# Patient Record
Sex: MALE | Race: WHITE | Employment: FULL TIME | URBAN - METROPOLITAN AREA
[De-identification: names, ages, dates, MRNs, and addresses within clinical notes are randomized per-mention and may not be internally consistent; named-entity substitution may affect disease eponyms.]

---

## 2017-01-31 ENCOUNTER — OFFICE VISIT (OUTPATIENT)
Dept: FAMILY MEDICINE | Facility: CLINIC | Age: 32
End: 2017-01-31
Payer: COMMERCIAL

## 2017-01-31 VITALS
BODY MASS INDEX: 33.6 KG/M2 | HEIGHT: 71 IN | TEMPERATURE: 97.8 F | DIASTOLIC BLOOD PRESSURE: 66 MMHG | WEIGHT: 240 LBS | HEART RATE: 76 BPM | SYSTOLIC BLOOD PRESSURE: 124 MMHG | RESPIRATION RATE: 20 BRPM

## 2017-01-31 DIAGNOSIS — R00.0 TACHYCARDIA: ICD-10-CM

## 2017-01-31 DIAGNOSIS — F32.1 MAJOR DEPRESSIVE DISORDER, SINGLE EPISODE, MODERATE (H): Primary | ICD-10-CM

## 2017-01-31 PROCEDURE — 99213 OFFICE O/P EST LOW 20 MIN: CPT | Performed by: PHYSICIAN ASSISTANT

## 2017-01-31 NOTE — MR AVS SNAPSHOT
After Visit Summary   1/31/2017    Ramiro Najera    MRN: [id number]           Patient Information     Date Of Birth          1985        Visit Information        Provider Department      1/31/2017 9:00 AM Laura Jung PA-C [facility] Instructions    [facility]   Discharged by : Vaishnavi LING CMA (AAMA)    Paper scripts provided to patient : none     If you have any questions regarding your visit please contact your care team:     Team Gold Clinic Hours Telephone Number   Dr. Jenn Jung PA-C   7am-7pm Monday - Thursday   7am-5pm Fridays  [phone]   (Appointment scheduling available 24/7)   RN Line   [phone] option 2       For a Price Quote for your services, please call our Consumer Price Line at [phone].     What options do I have for visits at the clinic other than the traditional office visit?     To expand how we care for you, many of our providers are utilizing electronic visits (e-visits) and telephone visits, when medically appropriate, for interactions with their patients rather than a visit in the clinic. We also offer nurse visits for many medical concerns. Just like any other service, we will bill your insurance company for this type of visit based on time spent on the phone with your provider. Not all insurance companies cover these visits. Please check with your medical insurance if this type of visit is covered. You will be responsible for any charges that are not paid by your insurance.   E-visits via uGift: generally incur a $35.00 fee.     Telephone visits:   Time spent on the phone: *charged based on time that is spent on the phone in increments of 10 minutes. Estimated cost:   5-10 mins $30.00   11-20 mins. $59.00   21-30 mins. $85.00     Use uGift (secure email communication and access to your chart) to send your primary care provider a message or  make an appointment. Ask someone on your Team how to sign up for Looxii.     As always, Thank you for trusting us with your health care needs!      [facility]:    Scheduling Appointments  Suresh Mendiola [facility]  Call: [phone]    AtkinsPillo bowman, [facility]  Call: [phone]    [facility]  Call: [phone]      WHERE TO GO FOR CARE?    Clinic    Make an appointment if you:       Are sick (cold, cough, flu, sore throat, earache or in pain).       Have a small injury (sprain, small cut, burn or broken bone).       Need a physical exam, Pap smear, vaccine or prescription refill.       Have questions about your health or medicines.    To reach us:      Call [phone] ([phone]). Open 24 hours every day. (For counseling services, call [phone].)    Log into Looxii at [email or web address]. (Visit [email or web address] to create an account.) Hospital emergency room    An emergency is a serious or life- threatening problem that must be treated right away.    Call 044 or get to the hospital if you have:      Very bad or sudden:            - Chest pain or pressure         - Bleeding         - Head or belly pain         - Dizziness or trouble seeing, walking or                          Speaking      Problems breathing      Blood in your vomit or you are coughing up blood      A major injury (knocked out, loss of a finger or limb, rape, broken bone protruding from skin)    A mental health crisis. (Or call the Mental Health Crisis line at 1-918.317.8920 or Suicide Prevention Hotline at 1-658.927.6290.)    Open 24 hours every day. You don't need an appointment.     Urgent care    Visit urgent care for sickness or small injuries when the clinic is closed. You don't need an appointment. To check hours or find an urgent care near you, visit [email or web address]. Online care    Get online care from [facility] for more than 70 common  problems, like colds, allergies and infections. Open 24 hours every day at: [email or web address]   Need help deciding?    For advice about where to be seen, you may call your clinic and ask to speak with a nurse. We're here for you 24 hours every day.         If you are deaf or hard of hearing, please let us know. We provide many free services including sign language interpreters, oral interpreters, TTYs, telephone amplifiers, note takers and written materials.                       Follow-ups after your visit        Your next 10 appointments already scheduled     Feb 06, 2017  9:30 AM   New Visit with Marcos Joseph [facility] ([facility])    [address] [address] [phone]            Mar 07, 2017  8:30 AM   (Arrive by 8:15 AM)   Return Visit with Lico Ruiz MD   [facility] Medicine ([facility])    [address]  5th Floor  [facility] [address] [phone]            Mar 14, 2017  8:00 AM   (Arrive by 7:45 AM)   POSTURAL ORTHOSTATIC SYCOPE with Bertin Fraser MD   [facility] ([facility])    [address]  3rd Floor  [facility] [address] [phone]              Who to contact     If you have questions or need follow up information about today's clinic visit or your schedule please contact [facility] directly at [phone].  Normal or non-critical lab and imaging results will be communicated to you by MyChart, letter or phone within 4 business days after the clinic has received the results. If you do not hear from us within 7 days, please contact the clinic through MyChart or phone. If you have a critical or abnormal lab result, we will notify you by phone as soon as possible.  Submit refill requests through [employer] or call your pharmacy and they will forward the refill request to us. Please allow 3  "business days for your refill to be completed.          Additional Information About Your Visit        MyChart Information     Geosophic gives you secure access to your electronic health record. If you see a primary care provider, you can also send messages to your care team and make appointments. If you have questions, please call your primary care clinic.  If you do not have a primary care provider, please call [phone] and they will assist you.        Care EveryWhere ID     This is your Care EveryWhere ID. This could be used by other organizations to access your [place] medical records  [id number]        Your Vitals Were     Pulse Temperature Respirations Height BMI (Body Mass Index)       76 97.8  F (36.6  C) (Oral) 20 5' 11\" (1.803 m) 33.49 kg/m2        Blood Pressure from Last 3 Encounters:   01/31/17 124/66   12/13/16 130/72   11/22/16 148/97    Weight from Last 3 Encounters:   01/31/17 240 lb (108.863 kg)   12/13/16 247 lb (112.038 kg)   11/22/16 250 lb 8 oz (113.626 kg)              Today, you had the following     No orders found for display       Primary Care Provider Office Phone # Fax #    Laura Jung PA-C [phone]       [address] [address]        Thank you!     Thank you for choosing [facility]  for your care. Our goal is always to provide you with excellent care. Hearing back from our patients is one way we can continue to improve our services. Please take a few minutes to complete the written survey that you may receive in the mail after your visit with us. Thank you!             Your Updated Medication List - Protect others around you: Learn how to safely use, store and throw away your medicines at www.disposemymeds.org.          This list is accurate as of: 1/31/17  9:36 AM.  Always use your most recent med list.                   Brand Name Dispense Instructions for use    vitamin B complex with " vitamin C Tabs tablet      Take 1 tablet by mouth daily       VITAMIN D (CHOLECALCIFEROL) PO      Take by mouth daily

## 2017-01-31 NOTE — PROGRESS NOTES
"  SUBJECTIVE:                                                    Ramiro Najera is a 31 year old male who presents to clinic today for the following health issues:      * discuss FMLA forms and plan of treatment for heart issues   Patient is a teacher and since tachycardia event and dealing with depression/ anxiety he has missed a lot of work. He needs FMLA for future appts for the remainder of the year. He has about 1 visit per week.     1. Tachycardia -- has appointment scheduled with cardiologist. He has really improved with his exercise and diet and has an improved resting heart rate.  This is likely from deconditioning.     2. Depression/anxiety. Has been much better since exercising, but he will start in therapy next week.         -------------------------------------    Problem list, Medication list, Allergies, and Medical/Social/Surgical histories reviewed in [facility] and updated as appropriate.    ROS:  A pertinent ROS of the General  Cardiovascular  Pulmonary  Psychological systems is otherwise unremarkable.      OBJECTIVE:                                                    /66 mmHg  Pulse 76  Temp(Src) 97.8  F (36.6  C) (Oral)  Resp 20  Ht 5' 11\" (1.803 m)  Wt 240 lb (108.863 kg)  BMI 33.49 kg/m2 .  GENERAL:: healthy, alert and no distress  PSYCH: Alert and oriented times 3; speech- coherent , normal rate and volume; able to articulate logical thoughts, able to abstract reason, no tangential thoughts, no hallucinations or delusions, affect- normal    Diagnostic test results:  Diagnostic Test Results:  none      ASSESSMENT/PLAN:                                                          ICD-10-CM    1. Major depressive disorder, single episode, moderate (H) F32.1    2. Tachycardia R00.0        Patient has many upcoming appts. Appropriate FMLA request.  Forms filled out with patient in visit.  Greater than 15 min with greater than 50 % in counseling    Follow up with Provider - 3-6 months or PRN "         Laura Jung PA-C  [facility]

## 2017-01-31 NOTE — PATIENT INSTRUCTIONS
[facility]   Discharged by : Vaishnavi LING CMA (AAMA)    Paper scripts provided to patient : none     If you have any questions regarding your visit please contact your care team:     Team Gold Clinic Hours Telephone Number   Dr. Jenn Jung PA-C   7am-7pm Monday - Thursday   7am-5pm Fridays  [phone]   (Appointment scheduling available 24/7)   RN Line   [phone] option 2       For a Price Quote for your services, please call our Consumer Price Line at [phone].     What options do I have for visits at the clinic other than the traditional office visit?     To expand how we care for you, many of our providers are utilizing electronic visits (e-visits) and telephone visits, when medically appropriate, for interactions with their patients rather than a visit in the clinic. We also offer nurse visits for many medical concerns. Just like any other service, we will bill your insurance company for this type of visit based on time spent on the phone with your provider. Not all insurance companies cover these visits. Please check with your medical insurance if this type of visit is covered. You will be responsible for any charges that are not paid by your insurance.   E-visits via Netlogon: generally incur a $35.00 fee.     Telephone visits:   Time spent on the phone: *charged based on time that is spent on the phone in increments of 10 minutes. Estimated cost:   5-10 mins $30.00   11-20 mins. $59.00   21-30 mins. $85.00     Use Pikihart (secure email communication and access to your chart) to send your primary care provider a message or make an appointment. Ask someone on your Team how to sign up for Nutmegt.     As always, Thank you for trusting us with your health care needs!      [facility]:    Scheduling Appointments  [facility]uresh Mendiola [facility]  Call: [phone]    [facility]  Call:  [phone]    [facility]  Call: [phone]      WHERE TO GO FOR CARE?    Clinic    Make an appointment if you:       Are sick (cold, cough, flu, sore throat, earache or in pain).       Have a small injury (sprain, small cut, burn or broken bone).       Need a physical exam, Pap smear, vaccine or prescription refill.       Have questions about your health or medicines.    To reach us:      Call [phone] ([phone]). Open 24 hours every day. (For counseling services, call [phone].)    Log into [employer] at FilterEasy. (Visit [email or web address] to create an account.) Hospital emergency room    An emergency is a serious or life- threatening problem that must be treated right away.    Call 288 or get to the hospital if you have:      Very bad or sudden:            - Chest pain or pressure         - Bleeding         - Head or belly pain         - Dizziness or trouble seeing, walking or                          Speaking      Problems breathing      Blood in your vomit or you are coughing up blood      A major injury (knocked out, loss of a finger or limb, rape, broken bone protruding from skin)    A mental health crisis. (Or call the Mental Health Crisis line at 1-286.735.3251 or Suicide Prevention Hotline at 1-682.368.5557.)    Open 24 hours every day. You don't need an appointment.     Urgent care    Visit urgent care for sickness or small injuries when the clinic is closed. You don't need an appointment. To check hours or find an urgent care near you, visit [email or web address]. Online care    Get online care from [employer] for more than 70 common problems, like colds, allergies and infections. Open 24 hours every day at: [email or web address]   Need help deciding?    For advice about where to be seen, you may call your clinic and ask to speak with a nurse. We're here for you 24 hours every day.         If you are deaf or hard of hearing, please let us  know. We provide many free services including sign language interpreters, oral interpreters, TTYs, telephone amplifiers, note takers and written materials.

## 2017-01-31 NOTE — NURSING NOTE
"Chief Complaint   Patient presents with     Forms     FMAL for upcoming medical appointments        Initial /66 mmHg  Pulse 76  Temp(Src) 97.8  F (36.6  C) (Oral)  Resp 20  Ht 5' 11\" (1.803 m)  Wt 240 lb (108.863 kg)  BMI 33.49 kg/m2 Estimated body mass index is 33.49 kg/(m^2) as calculated from the following:    Height as of this encounter: 5' 11\" (1.803 m).    Weight as of this encounter: 240 lb (108.863 kg).  BP completed using cuff size: sebastian Villalta CMA (AAMA)      "

## 2017-02-06 ENCOUNTER — OFFICE VISIT (OUTPATIENT)
Dept: BEHAVIORAL HEALTH | Facility: CLINIC | Age: 32
End: 2017-02-06
Attending: PHYSICIAN ASSISTANT
Payer: COMMERCIAL

## 2017-02-06 DIAGNOSIS — F31.81 BIPOLAR 2 DISORDER (H): Primary | ICD-10-CM

## 2017-02-06 PROCEDURE — 90791 PSYCH DIAGNOSTIC EVALUATION: CPT | Performed by: SOCIAL WORKER

## 2017-02-06 ASSESSMENT — ANXIETY QUESTIONNAIRES
2. NOT BEING ABLE TO STOP OR CONTROL WORRYING: SEVERAL DAYS
IF YOU CHECKED OFF ANY PROBLEMS ON THIS QUESTIONNAIRE, HOW DIFFICULT HAVE THESE PROBLEMS MADE IT FOR YOU TO DO YOUR WORK, TAKE CARE OF THINGS AT HOME, OR GET ALONG WITH OTHER PEOPLE: SOMEWHAT DIFFICULT
7. FEELING AFRAID AS IF SOMETHING AWFUL MIGHT HAPPEN: NOT AT ALL
1. FEELING NERVOUS, ANXIOUS, OR ON EDGE: SEVERAL DAYS
5. BEING SO RESTLESS THAT IT IS HARD TO SIT STILL: NEARLY EVERY DAY
GAD7 TOTAL SCORE: 9
6. BECOMING EASILY ANNOYED OR IRRITABLE: SEVERAL DAYS
3. WORRYING TOO MUCH ABOUT DIFFERENT THINGS: MORE THAN HALF THE DAYS

## 2017-02-06 ASSESSMENT — PATIENT HEALTH QUESTIONNAIRE - PHQ9: 5. POOR APPETITE OR OVEREATING: SEVERAL DAYS
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
## 2017-02-07 ASSESSMENT — PATIENT HEALTH QUESTIONNAIRE - PHQ9: SUM OF ALL RESPONSES TO PHQ QUESTIONS 1-9: 13

## 2017-02-07 ASSESSMENT — ANXIETY QUESTIONNAIRES: GAD7 TOTAL SCORE: 9

## 2017-02-20 ENCOUNTER — OFFICE VISIT (OUTPATIENT)
Dept: BEHAVIORAL HEALTH | Facility: CLINIC | Age: 32
End: 2017-02-20
Payer: COMMERCIAL

## 2017-02-20 DIAGNOSIS — F31.81 BIPOLAR 2 DISORDER (H): Primary | ICD-10-CM

## 2017-02-20 PROCEDURE — 90834 PSYTX W PT 45 MINUTES: CPT | Performed by: SOCIAL WORKER

## 2017-02-20 NOTE — PROGRESS NOTES
"                                             Progress Note    Client Name: Ramiro Najera  Date: 2/20/2017          Service Type: Individual      Session Start Time: 730  Session End Time: 815      Session Length: 45     Session #: 2     Attendees: Client attended alone    Treatment Plan Last Reviewed: 2/20/2017   PHQ-9 / DEANNA-7 :      DATA      Progress Since Last Session (Related to Symptoms / Goals / Homework):   Symptoms: Stable    Homework: Did not complete      Episode of Care Goals: Satisfactory progress - PREPARATION (Decided to change - considering how); Intervened by negotiating a change plan and determining options / strategies for behavior change, identifying triggers, exploring social supports, and working towards setting a date to begin behavior change     Current / Ongoing Stressors and Concerns:      Things have been \"OK.\"  Some stress with work and also struggling with relationship.  Thinks that his mood has nieves stable, sleep is still poor 4.5-5 hours per night.  Trouble staying asleep, wakes up multiple times during the night.  Energy is ok until mid-afternoon and thinks that his is related to poor sleep.                      Treatment Objective(s) Addressed in This Session:   Client will complete at least 10 minutes of self-regulation practice (e.g.: yoga, meditation, relaxation breathing, etc.) per day.     Client will use identified behavioral and cognitive skills to challenge negative self talk 90% of the time.     Intervention:     CBT   Discussed sleep today.  Client reports sleep onset/sleep maintenance insomnia.  Reviewed sleep habits.  Discussed avoiding caffeine and screen time before bed.  Identified negative thoughts associated with sleep.  Discussed the importance of limiting bedroom for sleep and intimate activities.  Discussed relying on internal cues (e.g. yawning, nodding) in deciding when to go to bed.  Discussed problem with \"forcing sleep.\"  Reviewed appropriate daytime napping " (45 minutes or less no later than 3pm).  Discussed impact of exercise and sunlight exposure on melatonin production, and discussed the role of melatonin in sleep.    Demonstrated diaphragmatic breathing (1:1 ratio and 1:2 ratio) for client today in session.  Led client in breathing exercise and coached client on abdominal vs chest breathing.  Discussed daily use of diaphragmatic breathing to reduce stress, panic, and anxiety.       ASSESSMENT: Current Emotional / Mental Status (status of significant symptoms):   Risk status (Self / Other harm or suicidal ideation)   Client denies current fears or concerns for personal safety.   Client denies current or recent suicidal ideation or behaviors.   Client denies current or recent homicidal ideation or behaviors.   Client denies current or recent self injurious behavior or ideation.   Client denies other safety concerns.   A safety and risk management plan has not been developed at this time, however client was given the after-hours number / 911 should there be a change in any of these risk factors.     Appearance:   Appropriate    Eye Contact:   Good    Psychomotor Behavior: Retarded (Slowed)    Attitude:   Cooperative    Orientation:   All   Speech    Rate / Production: Monotone     Volume:  Normal    Mood:    Depressed    Affect:    Appropriate    Thought Content:  Clear    Thought Form:  Coherent  Logical    Insight:    Good      Medication Review:   No current psychiatric medications prescribed     Medication Compliance:   NA     Changes in Health Issues:   None reported     Chemical Use Review:   Substance Use: Chemical use reviewed, no active concerns identified      Tobacco Use: No current tobacco use.       Collateral Reports Completed:   Not Applicable    PLAN: (Client Tasks / Therapist Tasks / Other)  1.  Client will practice diaphragmatic breathing once per day by next session.  Client will also rate mood and intensity of mood on a SUDS scale (1-10) before and  after breathing exercise at least once by next session.  2.  Meet in two weeks        Justo Dawson, LICSW                                                         ________________________________________________________________________    Treatment Plan    Client's Name: Ramiro Najera  YOB: 1985    Date: 2/20/2017     DSM5 Diagnoses: (Sustained by DSM5 Criteria Listed Above)  Diagnoses: 296.89 Bipolar II Disorder With anxious distress        PTSD  Psychosocial & Contextual Factors: parents' health problems  WHODAS 2.0 (12 item)            This questionnaire asks about difficulties due to health conditions. Health conditions  include  disease or illnesses, other health problems that may be short or long lasting,  injuries, mental health or emotional problems, and problems with alcohol or drugs.                     Think back over the past 30 days and answer these questions, thinking about how much  difficulty you had doing the following activities. For each question, please [place] only  one response.    S1 Standing for long periods such as 30 minutes? None =         1   S2 Taking care of household responsibilities? Mild =           2   S3 Learning a new task, for example, learning how to get to a new place? None =         1   S4 How much of a problem do you have joining community activities (for example, festivals, Anglican or other activities) in the same way as anyone else can? Moderate =   3   S5 How much have you been emotionally affected by your health problems? Moderate =   3     In the past 30 days, how much difficulty did you have in:   S6 Concentrating on doing something for ten minutes? Mild =           2   S7 Walking a long distance such as a kilometer (or equivalent)? None =         1   S8 Washing your whole body? None =         1   S9 Getting dressed? None =         1   S10 Dealing with people you do not know? None =         1   S11 Maintaining a friendship? Moderate =   3   S12  Your day to day work? None =         1     H1 Overall, in the past 30 days, how many days were these difficulties present? Record number of days 30   H2 In the past 30 days, for how many days were you totally unable to carry out your usual activities or work because of any health condition? Record number of days  0   H3 In the past 30 days, not counting the days that you were totally unable, for how many days did you cut back or reduce your usual activities or work because of any health condition? Record number of days 3       Referral / Collaboration:  Referral to another professional/service is not indicated at this time..    Anticipated number of session or this episode of care: 18-24      MeasurableTreatment Goal(s) related to diagnosis / functional impairment(s)  Goal-Emotional regulation: Client will effectively manage mood dysregulation    I will know I've met my goal when I am feeling less out of control.      Objective #A (Client Action)    Status: New - Date: 1/28/2016     Client will learn at least 3 mindfulness skills and practice one daily    Intervention(s)  Therapist will provide mindfulness training, psychoeducation, behavioral activation, and cognitive restructuring.    Objective #B  Client will use at least 3 coping skills for anxiety management in the next 12 weeks.    Status: New - Date: 1/28/2016     Intervention(s)  Therapist will provide psychoeducation, behavioral activation, and cognitive restructuring.      Objective #C  Client will identify three distraction and diversion activities and use those activities to improve distress tolerance and emotional regulation.  Status: New - Date: 1/28/2016     Intervention(s)  Therapist will provide psychoeducation, behavioral activation, and cognitive restructuring.    MeasurableTreatment Goal(s) related to diagnosis / functional impairment(s)            Goal-Depression: Client will decrease depressed mood    I will know I've met my goal when I am less  depressed.      Objective #A (Client Action)    Status: New - Date: 2/20/2017    Client will use identified behavioral and cognitive skills to challenge negative self talk 90% of the time.    Intervention(s)  Therapist will provide psychoeducation, behavioral activation, and cognitive restructuring.      Objective #B  Client will complete at least 10 minutes of self-regulation practice (e.g.: yoga, meditation, relaxation breathing, etc.) per day.    Status: New - Date: 2/20/2017    Intervention(s)  Therapist will provide psychoeducation, behavioral activation, and cognitive restructuring.      Objective #C  Client will exercise 30 minutes 36 times in the next 12 weeks.  Status: New - Date: 2/20/2017    Intervention(s)  Therapist will provide psychoeducation, behavioral activation, and cognitive restructuring.              Client has reviewed and agreed to the above plan.      Justo Dawson, YUNIOR  February 20, 2017

## 2017-02-20 NOTE — MR AVS SNAPSHOT
MRN:[id number]                      After Visit Summary   2/20/2017    Ramiro Najera    MRN: [id number]           Visit Information        Provider Department      2/20/2017 7:30 AM [facility]      Your next 10 appointments already scheduled     Mar 06, 2017  8:30 AM CST   Return Visit with YUNIOR Olea   [facility] ([facility])    [address] [place] [id number] [phone]            Mar 14, 2017  8:00 AM CDT   (Arrive by 7:45 AM)   POSTURAL ORTHOSTATIC SYCOPE with Bertin Fraser MD   [facility] ([facility])    [address] [place] [address]  3rd [facility] [id number] [phone]            Mar 14, 2017 11:45 AM CDT   (Arrive by 11:30 AM)   Return Visit with Lico Ruiz MD   [facility] ([facility])    [address] [place] [address]  [address] [facility] [id number] [phone]              MyChart Information     [facility] gives you secure access to your electronic health record. If you see a primary care provider, you can also send messages to your care team and make appointments. If you have questions, please call your primary care clinic.  If you do not have a primary care provider, please call [phone] and they will assist you.        Care EveryWhere ID     This is your Care EveryWhere ID. This could be used by other organizations to access your [place] medical records  [id number]

## 2017-03-02 ASSESSMENT — ENCOUNTER SYMPTOMS
DEPRESSION: 1
DECREASED CONCENTRATION: 1
NERVOUS/ANXIOUS: 1
PANIC: 0
INSOMNIA: 1

## 2017-03-06 ENCOUNTER — OFFICE VISIT (OUTPATIENT)
Dept: BEHAVIORAL HEALTH | Facility: CLINIC | Age: 32
End: 2017-03-06
Payer: COMMERCIAL

## 2017-03-06 DIAGNOSIS — F31.81 BIPOLAR 2 DISORDER (H): Primary | ICD-10-CM

## 2017-03-06 PROCEDURE — 90834 PSYTX W PT 45 MINUTES: CPT | Performed by: SOCIAL WORKER

## 2017-03-06 NOTE — PROGRESS NOTES
"                                             Progress Note    Client Name: Ramiro Najera  Date: 3/6/2017          Service Type: Individual      Session Start Time: 830  Session End Time: 915      Session Length: 45     Session #: 3     Attendees: Client attended alone    Treatment Plan Last Reviewed: 2/20/2017   PHQ-9 / DEANNA-7 :      DATA      Progress Since Last Session (Related to Symptoms / Goals / Homework):   Symptoms: Stable    Homework: Did not complete      Episode of Care Goals: Satisfactory progress - PREPARATION (Decided to change - considering how); Intervened by negotiating a change plan and determining options / strategies for behavior change, identifying triggers, exploring social supports, and working towards setting a date to begin behavior change     Current / Ongoing Stressors and Concerns:      Things have been \"stable emotionally.\"  Has been keeping up with the exercise and also doing some breathing and these seem to be helping.  Sleep is better, feeling a little more rested in the morning and less tossing and turning at night.                     Treatment Objective(s) Addressed in This Session:   Client will complete at least 10 minutes of self-regulation practice (e.g.: yoga, meditation, relaxation breathing, etc.) per day.     Client will use identified behavioral and cognitive skills to challenge negative self talk 90% of the time.     Intervention:     CBT     Discussed physical, mental, and emotional boundaries and limit-setting with others. Explored management of boundaries through direct communication and limiting contact and communication with others.  Discussed barriers to using boundary management skills including strong emotions and physical proximity.  Client given handout on boundaries today in session.         ASSESSMENT: Current Emotional / Mental Status (status of significant symptoms):   Risk status (Self / Other harm or suicidal ideation)   Client denies current fears or " concerns for personal safety.   Client denies current or recent suicidal ideation or behaviors.   Client denies current or recent homicidal ideation or behaviors.   Client denies current or recent self injurious behavior or ideation.   Client denies other safety concerns.   A safety and risk management plan has not been developed at this time, however client was given the after-hours number / 911 should there be a change in any of these risk factors.     Appearance:   Appropriate    Eye Contact:   Good    Psychomotor Behavior: Retarded (Slowed)    Attitude:   Cooperative    Orientation:   All   Speech    Rate / Production: Monotone     Volume:  Normal    Mood:    Depressed    Affect:    Appropriate    Thought Content:  Clear    Thought Form:  Coherent  Logical    Insight:    Good      Medication Review:   No current psychiatric medications prescribed     Medication Compliance:   NA     Changes in Health Issues:   None reported     Chemical Use Review:   Substance Use: Chemical use reviewed, no active concerns identified      Tobacco Use: No current tobacco use.       Collateral Reports Completed:   Not Applicable    PLAN: (Client Tasks / Therapist Tasks / Other)  1.  Client will use one boundary management skill (i.e., direct communication, saying no to a request) by next session.    2.  Meet in two weeks        YUNIOR Bustos                                                         ________________________________________________________________________    Treatment Plan    Client's Name: Ramiro Najera  YOB: 1985    Date: 2/20/2017     DSM5 Diagnoses: (Sustained by DSM5 Criteria Listed Above)  Diagnoses: 296.89 Bipolar II Disorder With anxious distress        PTSD  Psychosocial & Contextual Factors: parents' health problems  WHODAS 2.0 (12 item)            This questionnaire asks about difficulties due to health conditions. Health conditions  include  disease or illnesses, other health  problems that may be short or long lasting,  injuries, mental health or emotional problems, and problems with alcohol or drugs.                     Think back over the past 30 days and answer these questions, thinking about how much  difficulty you had doing the following activities. For each question, please [place] only  one response.    S1 Standing for long periods such as 30 minutes? None =         1   S2 Taking care of household responsibilities? Mild =           2   S3 Learning a new task, for example, learning how to get to a new place? None =         1   S4 How much of a problem do you have joining community activities (for example, festivals, [employer] or other activities) in the same way as anyone else can? Moderate =   3   S5 How much have you been emotionally affected by your health problems? Moderate =   3     In the past 30 days, how much difficulty did you have in:   S6 Concentrating on doing something for ten minutes? Mild =           2   S7 Walking a long distance such as a kilometer (or equivalent)? None =         1   S8 Washing your whole body? None =         1   S9 Getting dressed? None =         1   S10 Dealing with people you do not know? None =         1   S11 Maintaining a friendship? Moderate =   3   S12 Your day to day work? None =         1     H1 Overall, in the past 30 days, how many days were these difficulties present? Record number of days 30   H2 In the past 30 days, for how many days were you totally unable to carry out your usual activities or work because of any health condition? Record number of days  0   H3 In the past 30 days, not counting the days that you were totally unable, for how many days did you cut back or reduce your usual activities or work because of any health condition? Record number of days 3       Referral / Collaboration:  Referral to another professional/service is not indicated at this time..    Anticipated number of session or this episode of care:  18-24      MeasurableTreatment Goal(s) related to diagnosis / functional impairment(s)  Goal-Emotional regulation: Client will effectively manage mood dysregulation    I will know I've met my goal when I am feeling less out of control.      Objective #A (Client Action)    Status: New - Date: 1/28/2016     Client will learn at least 3 mindfulness skills and practice one daily    Intervention(s)  Therapist will provide mindfulness training, psychoeducation, behavioral activation, and cognitive restructuring.    Objective #B  Client will use at least 3 coping skills for anxiety management in the next 12 weeks.    Status: New - Date: 1/28/2016     Intervention(s)  Therapist will provide psychoeducation, behavioral activation, and cognitive restructuring.      Objective #C  Client will identify three distraction and diversion activities and use those activities to improve distress tolerance and emotional regulation.  Status: New - Date: 1/28/2016     Intervention(s)  Therapist will provide psychoeducation, behavioral activation, and cognitive restructuring.    MeasurableTreatment Goal(s) related to diagnosis / functional impairment(s)            Goal-Depression: Client will decrease depressed mood    I will know I've met my goal when I am less depressed.      Objective #A (Client Action)    Status: New - Date: 2/20/2017    Client will use identified behavioral and cognitive skills to challenge negative self talk 90% of the time.    Intervention(s)  Therapist will provide psychoeducation, behavioral activation, and cognitive restructuring.      Objective #B  Client will complete at least 10 minutes of self-regulation practice (e.g.: yoga, meditation, relaxation breathing, etc.) per day.    Status: New - Date: 2/20/2017    Intervention(s)  Therapist will provide psychoeducation, behavioral activation, and cognitive restructuring.      Objective #C  Client will exercise 30 minutes 36 times in the next 12 weeks.  Status: New  - Date: 2/20/2017    Intervention(s)  Therapist will provide psychoeducation, behavioral activation, and cognitive restructuring.              Client has reviewed and agreed to the above plan.      Justo Dawson, [facility]  February 20, 2017

## 2017-03-06 NOTE — MR AVS SNAPSHOT
MRN:[id number]                      After Visit Summary   3/6/2017    Ramiro Najera    MRN: [id number]           Visit Information        Provider Department      3/6/2017 8:30 AM Marcos Joseph LICSW [facility]      Your next 10 appointments already scheduled     Mar 14, 2017  8:00 AM CDT   (Arrive by 7:45 AM)   POSTURAL ORTHOSTATIC SYCOPE with Bertin Fraser MD   [facility] ([facility] [place])    [address]  [address] [facility] [address]   [phone]            Mar 14, 2017 11:45 AM CDT   (Arrive by 11:30 AM)   Return Visit with Lico Ruiz MD   [facility] Sports [facility] ([facility] [place])    [address] [place] [address]  [address] [facility] [id number] [phone]            Mar 20, 2017  8:30 AM CDT   Return Visit with YUNIOR Olea   [facility] ([facility])    [address] [place] [address] [phone]              MyChart Information     [employer] gives you secure access to your electronic health record. If you see a primary care provider, you can also send messages to your care team and make appointments. If you have questions, please call your primary care clinic.  If you do not have a primary care provider, please call [phone] and they will assist you.        Care EveryWhere ID     This is your Care EveryWhere ID. This could be used by other organizations to access your [place] medical records  [id number]

## 2017-03-13 ENCOUNTER — PRE VISIT (OUTPATIENT)
Dept: CARDIOLOGY | Facility: CLINIC | Age: 32
End: 2017-03-13

## 2017-03-13 DIAGNOSIS — R00.2 PALPITATIONS: Primary | ICD-10-CM

## 2017-03-14 ENCOUNTER — OFFICE VISIT (OUTPATIENT)
Dept: ORTHOPEDICS | Facility: CLINIC | Age: 32
End: 2017-03-14

## 2017-03-14 ENCOUNTER — OFFICE VISIT (OUTPATIENT)
Dept: CARDIOLOGY | Facility: CLINIC | Age: 32
End: 2017-03-14
Attending: INTERNAL MEDICINE
Payer: COMMERCIAL

## 2017-03-14 VITALS
SYSTOLIC BLOOD PRESSURE: 143 MMHG | HEIGHT: 71 IN | DIASTOLIC BLOOD PRESSURE: 95 MMHG | BODY MASS INDEX: 33.6 KG/M2 | WEIGHT: 240 LBS

## 2017-03-14 VITALS
SYSTOLIC BLOOD PRESSURE: 143 MMHG | HEIGHT: 71 IN | BODY MASS INDEX: 33.6 KG/M2 | WEIGHT: 240 LBS | DIASTOLIC BLOOD PRESSURE: 95 MMHG

## 2017-03-14 VITALS
HEIGHT: 71 IN | DIASTOLIC BLOOD PRESSURE: 79 MMHG | WEIGHT: 243.6 LBS | HEART RATE: 86 BPM | BODY MASS INDEX: 34.1 KG/M2 | OXYGEN SATURATION: 98 % | SYSTOLIC BLOOD PRESSURE: 135 MMHG

## 2017-03-14 DIAGNOSIS — R00.2 PALPITATIONS: ICD-10-CM

## 2017-03-14 DIAGNOSIS — Z71.3 DIETARY COUNSELING: Primary | ICD-10-CM

## 2017-03-14 DIAGNOSIS — E66.9 OBESITY, UNSPECIFIED: Primary | ICD-10-CM

## 2017-03-14 DIAGNOSIS — E66.9 OBESITY, UNSPECIFIED: ICD-10-CM

## 2017-03-14 PROCEDURE — 99204 OFFICE O/P NEW MOD 45 MIN: CPT | Performed by: INTERNAL MEDICINE

## 2017-03-14 PROCEDURE — 93010 ELECTROCARDIOGRAM REPORT: CPT | Mod: ZP | Performed by: INTERNAL MEDICINE

## 2017-03-14 PROCEDURE — 93005 ELECTROCARDIOGRAM TRACING: CPT | Mod: ZF

## 2017-03-14 PROCEDURE — 99212 OFFICE O/P EST SF 10 MIN: CPT | Mod: ZF

## 2017-03-14 ASSESSMENT — PAIN SCALES - GENERAL: PAINLEVEL: NO PAIN (0)

## 2017-03-14 NOTE — MR AVS SNAPSHOT
After Visit Summary   3/14/2017    Ramiro Najera    MRN: [id number]           Patient Information     Date Of Birth          1985        Visit Information        Provider Department      3/14/2017 8:00 AM Bertin Fraser MD [facility]        Today's Diagnoses     Palpitations          Care Instructions    You will be scheduled for a follow up visit as needed.    You will be scheduled for an echocardiogram. We will contact you with the results via my chart    If you have any questions regarding to your visit please contact your care team:     Cardiology  Telephone Number    Michelle Ren [phone]   Or send a message to your provider via my chart.   For scheduling appts or procedures:    April Alexander     [phone] or  [phone]   For the Device Clinic (Pacemakers and ICD's)   RN's :   Gerda Dowd  During business hours: [phone]    After business hours:   [phone]- select option 4 and ask for job code [phone].    You can also contact us using My Chart. If you need assistance in setting this up, please contact our office or ask at your follow up visit.     If you need a medication refill please contact your pharmacy. Please allow 3 business days for your refill to be completed.     As always, Thank you for trusting us with your health care needs!        Follow-ups after your visit        Follow-up notes from your care team     Return if symptoms worsen or fail to improve, for schd echocardiogram.      Your next 10 appointments already scheduled     Mar 14, 2017 11:30 AM CDT   (Arrive by 11:15 AM)   Return Weight Management Visit with Jennifer Workman RD   [facility] ([facility] [place])    [address] [place] [address]   [phone]            Mar 14, 2017 11:45 AM CDT   (Arrive by 11:30 AM)   Return Visit with Lico Ruiz MD   [facility] ([facility])    [address]  [address]th [address]  [place] [id number] [phone]            Mar 20, 2017  8:30 AM CDT   Return Visit with Marcos Joseph [facility] ([facility])    [address] [id number] [phone]            Mar 20, 2017 10:00 AM CDT   Ech Complete with [id number]   [facility] Echo ([facility])    [address]  3rd [address]  [facility] [id number] [phone]           1.  Please bring or wear a comfortable two-piece outfit. 2.  You may eat, drink and take your normal medicines. 3.  For any questions that cannot be answered, please contact the ordering physician              Future tests that were ordered for you today     Open Future Orders        Priority Expected Expires Ordered    Echocardiogram Complete Routine  3/14/2018 3/14/2017            Who to contact     If you have questions or need follow up information about today's clinic visit or your schedule please contact [facility] directly at [phone].  Normal or non-critical lab and imaging results will be communicated to you by [employer], letter or phone within 4 business days after the clinic has received the results. If you do not hear from us within 7 days, please contact the clinic through [employer] or phone. If you have a critical or abnormal lab result, we will notify you by phone as soon as possible.  Submit refill requests through [employer] or call your pharmacy and they will forward the refill request to us. Please allow 3 business days for your refill to be completed.          Additional Information About Your Visit        [employer] Information     [employer] gives you secure access to your electronic health record. If you see a primary care provider, you can also send messages to your care team and make appointments. If you have questions, please call your primary care clinic.  If you do not have a primary care  "provider, please call [phone] and they will assist you.        Care EveryWhere ID     This is your Care EveryWhere ID. This could be used by other organizations to access your [place] medical records  [id number]        Your Vitals Were     Pulse Height Pulse Oximetry BMI (Body Mass Index)          86 1.803 m (5' 11\") 98% 33.98 kg/m2         Blood Pressure from Last 3 Encounters:   03/14/17 135/79   01/31/17 124/66   12/13/16 130/72    Weight from Last 3 Encounters:   03/14/17 110.5 kg (243 lb 9.6 oz)   01/31/17 108.9 kg (240 lb)   12/13/16 112 kg (247 lb)              We Performed the Following     EKG 12-lead, tracing only (Future)        Primary Care Provider Office Phone # Fax #    Laura Jung PA-C [phone]       [address] [address]        Thank you!     Thank you for choosing [facility]  for your care. Our goal is always to provide you with excellent care. Hearing back from our patients is one way we can continue to improve our services. Please take a few minutes to complete the written survey that you may receive in the mail after your visit with us. Thank you!             Your Updated Medication List - Protect others around you: Learn how to safely use, store and throw away your medicines at www.disposemymeds.org.          This list is accurate as of: 3/14/17  8:23 AM.  Always use your most recent med list.                   Brand Name Dispense Instructions for use    vitamin B complex with vitamin C Tabs tablet      Take 1 tablet by mouth daily       VITAMIN D (CHOLECALCIFEROL) PO      Take by mouth daily         "

## 2017-03-14 NOTE — LETTER
"  3/14/2017      RE: Ramiro Najera  [address] [facility] [place] [facility] [address]       PATIENT HISTORY:   Mr. Ramiro Najera returns for his 2nd visit to the lifestyle medicine weight management program. His initial visit was on 11/22/2016 when his weight was 250.5 lbs.  Lifestyle Weight Tracking 11/22/2016 3/14/2017   Lifestyle /97 143/95   Lifestyle Ht 5' 11\" 5' 11\"   Starting Wt 250 lb 8 oz 250 lb 8 oz   Lifestyle Wt 250 lb 8 oz 240 lb   Lifestyle % Wt Change 0 0.0419   Lifestyle Body Fat % 31.9 42.4   Lifestyle BMI 34.9 33.5   Lifestyle BMR [id number]          Since his last visit, he reports to be weight lifting 5 days/week over the lunch break (about 15-20 mins). Also, on Thursday afternoon, goes for a 30-45 min jog.  On weekends, just walking. Monitoring steps and getting average of 9.5K per day.   Has not had any chest pain. In process of Cardiology evaluation with no limitations on exercise.     PERTINANT PAST HISTORY INCLUDES: See PMH      DIETARY HISTORY:   6:30:  Ham, banana, almonds or pistachios  9:00:  Almonds or other nuts  11:30:  PB sandwich, carrots, apple, almonds  14:00 nuts  17:00:  P[employer] cheese pizza ( 4-5 slices)  ETOH: none during lent and couple beers if not lets 2-3 beers per week.  pisacios 2 big handfuls a day.        OBJECTIVE:   5' 11\", 240 lbs 0 oz, Body mass index is 33.47 kg/(m^2)., (!) 143/95,  ,        Was 135/79 during his Cardiology evaluation earlier today.     Rest of physical exam is unchanged.      IMPRESSION:     32 yo with obesity. Has lost 10.5 lbs in past 3 months, just over 4% of body weight.       DIETARY ASSESSMENT/PLAN:     Discussed modifications in his nutrition:  Snacks and dinners that are simple but satisfies his nutritional needs.   Worked together to create practical ideas.  Questioning his nut consumption, he reports going through a few bags of nuts per week.  Will need to explore this further if weight loss stagnates due to high caloric density of " this food though it is healthy selection for fat.     Education on meal timing and meal balance.     Oden above tweaks in nutrition mainly snacks and dinners.     PHYSICAL ACTIVITY ASSESSMENT/PLAN:     Exercise is on the healthier side with the strength training, the 9.5K steps per day and one-day of jogging.    Suggest aiming for a minimum of 10K steps     Also, add a run/jog to the weekends which tend to be your least active times.     Look into signing up for a 5 or 10 K race. That is 3.1 or 6.2 miles.  Have a goal for a run this summer. If you really don't want to sign up, then do it on your own as a personal goal.     Miscellaneous Issues:     Had ECG this morning and has Echo next week.     New weight goal of 5% loss is 228 lbs (i.e. 12 lbs of weight loss) by July 2016    Follow-up:     With Nutrition in 2-4 weeks     With Dr. Ruiz in June    Over 50% of the 25 minute face-to-face clinic visit time was spent in counseling regarding strategies to achieve lifestyle changes in diet and physical activity as described above.   --MD Lico Rodarte MD

## 2017-03-14 NOTE — LETTER
3/14/2017      RE: Ramiro Najera  [address] [facility] [address]       Dear Colleague,    Thank you for the opportunity to participate in the care of your patient, Ramiro Najera, at the [facility] HEART [facility]. Please see a copy of my visit note below.          Clinical Cardiac Electrophysiology    Chief Complaint: Tachycardia    HPI: Ramiro Najera is a 31 year old male who presents to establish care for tachycardia.  His past medical history includes depression/anxiety, lyme's disease, and bipolar disorder.  On 11/11/2017, the episode started with GI distress, lack of sleep and attributed to anxiety to the chest pain. However, when it last for 4+ hours he decided to go to the ED.  He describes the CP as 4-5/10 constant under the pectoral muscle and elevated with walking and worse with lying still.   During this episode denies palpitations & SOB.   Presented to the ED for chest pain and his resting HR was in the 130-140s was given fluids and HR came down.  Now is his going throught the follow up.   States he hasn't had any reoccurent CP since 12/2016.  His primary provider thought the tachycardia may be related to deconditioning per note dated 1/31 as it has improved with increase in exercise.   Currently running and swimming; denies any chest pain and palpitations at rest or exercise.   Recovery from exercise includes deep breaths; denies headaches, dizziness, syncope, angina, dyspnea at rest or with exertion, palpitations.  Attributes this all to stress with not eating and sleeping due to mother and father having major health problems; he is an only child and has become the primary care giver.  Uses a fit bit regularly and states his HR is usually high (85 bpm last fall) and now is (65 bpm).  Has HR has decreased with increase in exercise.   Denies having HRs over 100 bpm other than with exercise.  Denies excessive caffeine use.  Denies smoking,  "alcohol or street drug use.  States he has always been able to keep up with others.   Only takes supplements; no other medications.       Family history = states his mother has a \"fast heart rate\" but doesn't know the details.  Maternal grandfather - heart disease states used nitroglycerin since age of 30     Today's EKG sinus rhythm with ventricular rate of 70.      Current Outpatient Prescriptions   Medication Sig Dispense Refill     vitamin  B complex with vitamin C (VITAMIN  B COMPLEX) TABS Take 1 tablet by mouth daily       VITAMIN D, CHOLECALCIFEROL, PO Take by mouth daily         Past Medical History   Diagnosis Date     Lyme disease      diagnosed 19, then flared at 22.     Major depression        Past Surgical History   Procedure Laterality Date     Tonsillectomy  1991       Family History   Problem Relation Age of Onset     DIABETES Mother      Hypertension Mother      Depression Mother      Other - See Comments Mother 63     abcess that was removed, caused by strep, located on spine     Obesity Mother      DIABETES Father      Hypertension Father      Depression Father      Aneurysm Father 61     DIABETES Maternal Grandmother      Hypertension Maternal Grandmother      DIABETES Paternal Grandmother      Hypertension Paternal Grandmother      HEART DISEASE Maternal Grandfather 60     Taking Nitro since he was 31     Coronary Artery Disease Maternal Grandfather      Breast Cancer No family hx of      Colon Cancer No family hx of      Prostate Cancer No family hx of        Social History   Substance Use Topics     Smoking status: Passive Smoke Exposure - Never Smoker     Smokeless tobacco: Not on file      Comment: lived with a smoker until he was 17 years old     Alcohol use Yes      Comment: a couple beers a month       No Known Allergies      ROS:   CONSTITUTIONAL:No report of fever, chills,  or change in weight  RESPIRATORY: No cough, wheezing, SOB, or hemoptysis  CARDIOVASCULAR: see " "HPI  MUSCULO-SKELETAL: No joint pain/swelling, no muscle pain  NEURO: No paresthesias, syncope, pre-syncope, light headness, dizziness or vertigo  ENDOCRINE: No temperature intolerance, no skin/hair changes  PSYCHIATRIC: No change in mood, feeling down/anxious, no change in sleep or appetite  GI: no melena or hematochezia, no change in bowel habits  : no hematuria or dysurea, no hesitancy, dribbling or incontinence  HEME: no easy bruising or bleeding, no history of anemia, no history of blood clots  SKIN: no rashes or sores, no unusual itching  Answers for HPI/ROS submitted by the patient on 3/2/2017   General Symptoms: No  Skin Symptoms: No  HENT Symptoms: No  EYE SYMPTOMS: No  HEART SYMPTOMS: No  LUNG SYMPTOMS: No  INTESTINAL SYMPTOMS: No  URINARY SYMPTOMS: No  REPRODUCTIVE SYMPTOMS: No  SKELETAL SYMPTOMS: No  BLOOD SYMPTOMS: No  NERVOUS SYSTEM SYMPTOMS: No  MENTAL HEALTH SYMPTOMS: Yes  Nervous or Anxious: Yes  Depression: Yes  Trouble sleeping: Yes  Trouble thinking or concentrating: Yes  Mood changes: Yes  Panic attacks: No        Physical Examination:  Vitals: /79 (BP Location: Right arm, Patient Position: Chair, Cuff Size: Adult Large)  Pulse 86  Ht 1.803 m (5' 11\")  Wt 110.5 kg (243 lb 9.6 oz)  SpO2 98%  BMI 33.98 kg/m2  BMI= Body mass index is 33.98 kg/(m^2).    GENERAL APPEARANCE: healthy, alert and no distress  HEENT: no icterus, no xanthelasmas, normal pupil size and reaction, normal palate, mucosa moist, no cyanosis.  NECK: no adenopathy, no asymmetry, masses, or scars, carotid upstrokes are normal bilaterally, no cervical bruits, JVP is not visible  CHEST: lungs clear to auscultation - no rales, rhonchi or wheezes, no use of accessory muscles, no retractions, respirations are unlabored, normal respiratory rate, no kyphosis, no scoliosis  CARDIOVASCULAR: regular rhythm, normal S1 with physiologic split S2, no S3 or S4 and no murmur, click or rub, precordium quiet with normal PMI.  ABDOMEN: soft, " non tender, without hepatosplenomegaly, no masses palpable, bowel sounds normal, aorta not enlarged by palpation, no abdominal bruits  EXTREMITIES: no clubbing, cyanosis or edema  NEURO: alert and oriented to person/place/time, normal speech, gait and affect  VASC: Radial, femoral, dorsalis pedis and posterior tibialis pulses are normal in volumes and symmetric bilaterally. No vascular bruits heard.  SKIN: no ecchymoses, no rashes      Laboratory:  Results for OZZY GARCIA (MRN [id number]) as of 3/14/2017 08:25   Ref. Range 11/11/2016 08:55   Sodium Latest Ref Range: 133 - 144 mmol/L 142   Potassium Latest Ref Range: 3.4 - 5.3 mmol/L 4.0   Chloride Latest Ref Range: 94 - 109 mmol/L 107   Carbon Dioxide Latest Ref Range: 20 - 32 mmol/L 26   Urea Nitrogen Latest Ref Range: 7 - 30 mg/dL 11   Creatinine Latest Ref Range: 0.66 - 1.25 mg/dL 0.92   GFR Estimate Latest Ref Range: >60 mL/min/1.7m2 >90...   GFR Estimate If Black Latest Ref Range: >60 mL/min/1.7m2 >90...   Calcium Latest Ref Range: 8.5 - 10.1 mg/dL 9.4   Anion Gap Latest Ref Range: 3 - 14 mmol/L 9   Troponin I ES Latest Ref Range: 0.000 - 0.045 ug/L <0.015...   TSH Latest Ref Range: 0.40 - 4.00 mU/L 1.05   Glucose Latest Ref Range: 70 - 99 mg/dL 102 (H)   WBC Latest Ref Range: 4.0 - 11.0 10e9/L 7.0   Hemoglobin Latest Ref Range: 13.3 - 17.7 g/dL 16.3   Hematocrit Latest Ref Range: 40.0 - 53.0 % 45.8   Platelet Count Latest Ref Range: 150 - 450 10e9/L 214   RBC Count Latest Ref Range: 4.4 - 5.9 10e12/L 5.55   MCV Latest Ref Range: 78 - 100 fl 83   MCH Latest Ref Range: 26.5 - 33.0 pg 29.4   MCHC Latest Ref Range: 31.5 - 36.5 g/dL 35.6   RDW Latest Ref Range: 10.0 - 15.0 % 13.1       Assessment and recommendations:  31 year old male with one episode of atypical chest pain and sinus tachycardia presents for evaluation.     His episode of tachycardia is likely physiological with appropriate response to pain, discomfort and anxiety, his does have a normal  heart rate fluctuation with several clinic visits showing a normal heart rate.   - ECHO to evaluate for structural heart problems  - continue to exercise regularly and eat heart healthy diet    Follow up as needed    I appreciate the chance to help with Mr. Najera care. Please let me know if you have any questions or concerns.    OSCAR Dillon, CNP    ELECTROPHYSIOLOGY STAFF  Patient seen and examined by me.  History and physical examination discussed with Padmini Martinez whose note reflects our joint assessment and recommendation/plans.  31 year old gentleman who presented to the ER with atypical chest pain and tachypalpitations.  ECG at that time showed sinus tachycardia.  He was under a lot of stress at that time due to issues with both of his parent's health.  Since then he has done very well.  He has had no symptoms and wears a monitor with no inappropriate fast HRs.  He appears to have had an appropriate, reactive sinus tachycardia.  He had had a limited bedside echo in the ER.  We will obtain a formal 2-D echo. We will contact him with the results.  Assuming there are no unexpected abnormalities we can follow-up prn.  It was a pleasure meeting Ramiro Najera today.  Bertin Fraser

## 2017-03-14 NOTE — PROGRESS NOTES
"      Clinical Cardiac Electrophysiology    Chief Complaint: Tachycardia    HPI: Ramiro Najera is a 31 year old male who presents to establish care for tachycardia.  His past medical history includes depression/anxiety, lyme's disease, and bipolar disorder.  On 11/11/2017, the episode started with GI distress, lack of sleep and attributed to anxiety to the chest pain. However, when it last for 4+ hours he decided to go to the ED.  He describes the CP as 4-5/10 constant under the pectoral muscle and elevated with walking and worse with lying still.   During this episode denies palpitations & SOB.   Presented to the ED for chest pain and his resting HR was in the 130-140s was given fluids and HR came down.  Now is his going throught the follow up.   States he hasn't had any reoccurent CP since 12/2016.  His primary provider thought the tachycardia may be related to deconditioning per note dated 1/31 as it has improved with increase in exercise.   Currently running and swimming; denies any chest pain and palpitations at rest or exercise.   Recovery from exercise includes deep breaths; denies headaches, dizziness, syncope, angina, dyspnea at rest or with exertion, palpitations.  Attributes this all to stress with not eating and sleeping due to mother and father having major health problems; he is an only child and has become the primary care giver.  Uses a fit bit regularly and states his HR is usually high (85 bpm last fall) and now is (65 bpm).  Has HR has decreased with increase in exercise.   Denies having HRs over 100 bpm other than with exercise.  Denies excessive caffeine use.  Denies smoking, alcohol or street drug use.  States he has always been able to keep up with others.   Only takes supplements; no other medications.       Family history = states his mother has a \"fast heart rate\" but doesn't know the details.  Maternal grandfather - heart disease states used nitroglycerin since age of 30     Today's EKG " sinus rhythm with ventricular rate of 70.      Current Outpatient Prescriptions   Medication Sig Dispense Refill     vitamin  B complex with vitamin C (VITAMIN  B COMPLEX) TABS Take 1 tablet by mouth daily       VITAMIN D, CHOLECALCIFEROL, PO Take by mouth daily         Past Medical History   Diagnosis Date     Lyme disease      diagnosed 19, then flared at 22.     Major depression        Past Surgical History   Procedure Laterality Date     Tonsillectomy  1991       Family History   Problem Relation Age of Onset     DIABETES Mother      Hypertension Mother      Depression Mother      Other - See Comments Mother 63     abcess that was removed, caused by strep, located on spine     Obesity Mother      DIABETES Father      Hypertension Father      Depression Father      Aneurysm Father 61     DIABETES Maternal Grandmother      Hypertension Maternal Grandmother      DIABETES Paternal Grandmother      Hypertension Paternal Grandmother      HEART DISEASE Maternal Grandfather 60     Taking Nitro since he was 31     Coronary Artery Disease Maternal Grandfather      Breast Cancer No family hx of      Colon Cancer No family hx of      Prostate Cancer No family hx of        Social History   Substance Use Topics     Smoking status: Passive Smoke Exposure - Never Smoker     Smokeless tobacco: Not on file      Comment: lived with a smoker until he was 17 years old     Alcohol use Yes      Comment: a couple beers a month       No Known Allergies      ROS:   CONSTITUTIONAL:No report of fever, chills,  or change in weight  RESPIRATORY: No cough, wheezing, SOB, or hemoptysis  CARDIOVASCULAR: see HPI  MUSCULO-SKELETAL: No joint pain/swelling, no muscle pain  NEURO: No paresthesias, syncope, pre-syncope, light headness, dizziness or vertigo  ENDOCRINE: No temperature intolerance, no skin/hair changes  PSYCHIATRIC: No change in mood, feeling down/anxious, no change in sleep or appetite  GI: no melena or hematochezia, no change in bowel  "habits  : no hematuria or dysurea, no hesitancy, dribbling or incontinence  HEME: no easy bruising or bleeding, no history of anemia, no history of blood clots  SKIN: no rashes or sores, no unusual itching  Answers for HPI/ROS submitted by the patient on 3/2/2017   General Symptoms: No  Skin Symptoms: No  HENT Symptoms: No  EYE SYMPTOMS: No  HEART SYMPTOMS: No  LUNG SYMPTOMS: No  INTESTINAL SYMPTOMS: No  URINARY SYMPTOMS: No  REPRODUCTIVE SYMPTOMS: No  SKELETAL SYMPTOMS: No  BLOOD SYMPTOMS: No  NERVOUS SYSTEM SYMPTOMS: No  MENTAL HEALTH SYMPTOMS: Yes  Nervous or Anxious: Yes  Depression: Yes  Trouble sleeping: Yes  Trouble thinking or concentrating: Yes  Mood changes: Yes  Panic attacks: No        Physical Examination:  Vitals: /79 (BP Location: Right arm, Patient Position: Chair, Cuff Size: Adult Large)  Pulse 86  Ht 1.803 m (5' 11\")  Wt 110.5 kg (243 lb 9.6 oz)  SpO2 98%  BMI 33.98 kg/m2  BMI= Body mass index is 33.98 kg/(m^2).    GENERAL APPEARANCE: healthy, alert and no distress  HEENT: no icterus, no xanthelasmas, normal pupil size and reaction, normal palate, mucosa moist, no cyanosis.  NECK: no adenopathy, no asymmetry, masses, or scars, carotid upstrokes are normal bilaterally, no cervical bruits, JVP is not visible  CHEST: lungs clear to auscultation - no rales, rhonchi or wheezes, no use of accessory muscles, no retractions, respirations are unlabored, normal respiratory rate, no kyphosis, no scoliosis  CARDIOVASCULAR: regular rhythm, normal S1 with physiologic split S2, no S3 or S4 and no murmur, click or rub, precordium quiet with normal PMI.  ABDOMEN: soft, non tender, without hepatosplenomegaly, no masses palpable, bowel sounds normal, aorta not enlarged by palpation, no abdominal bruits  EXTREMITIES: no clubbing, cyanosis or edema  NEURO: alert and oriented to person/place/time, normal speech, gait and affect  VASC: Radial, femoral, dorsalis pedis and posterior tibialis pulses are normal in " volumes and symmetric bilaterally. No vascular bruits heard.  SKIN: no ecchymoses, no rashes      Laboratory:  Results for OZZY GARCIA (MRN [id number]) as of 3/14/2017 08:25   Ref. Range 11/11/2016 08:55   Sodium Latest Ref Range: 133 - 144 mmol/L 142   Potassium Latest Ref Range: 3.4 - 5.3 mmol/L 4.0   Chloride Latest Ref Range: 94 - 109 mmol/L 107   Carbon Dioxide Latest Ref Range: 20 - 32 mmol/L 26   Urea Nitrogen Latest Ref Range: 7 - 30 mg/dL 11   Creatinine Latest Ref Range: 0.66 - 1.25 mg/dL 0.92   GFR Estimate Latest Ref Range: >60 mL/min/1.7m2 >90...   GFR Estimate If Black Latest Ref Range: >60 mL/min/1.7m2 >90...   Calcium Latest Ref Range: 8.5 - 10.1 mg/dL 9.4   Anion Gap Latest Ref Range: 3 - 14 mmol/L 9   Troponin I ES Latest Ref Range: 0.000 - 0.045 ug/L <0.015...   TSH Latest Ref Range: 0.40 - 4.00 mU/L 1.05   Glucose Latest Ref Range: 70 - 99 mg/dL 102 (H)   WBC Latest Ref Range: 4.0 - 11.0 10e9/L 7.0   Hemoglobin Latest Ref Range: 13.3 - 17.7 g/dL 16.3   Hematocrit Latest Ref Range: 40.0 - 53.0 % 45.8   Platelet Count Latest Ref Range: 150 - 450 10e9/L 214   RBC Count Latest Ref Range: 4.4 - 5.9 10e12/L 5.55   MCV Latest Ref Range: 78 - 100 fl 83   MCH Latest Ref Range: 26.5 - 33.0 pg 29.4   MCHC Latest Ref Range: 31.5 - 36.5 g/dL 35.6   RDW Latest Ref Range: 10.0 - 15.0 % 13.1       Assessment and recommendations:  31 year old male with one episode of atypical chest pain and sinus tachycardia presents for evaluation.     His episode of tachycardia is likely physiological with appropriate response to pain, discomfort and anxiety, his does have a normal heart rate fluctuation with several clinic visits showing a normal heart rate.   - ECHO to evaluate for structural heart problems  - continue to exercise regularly and eat heart healthy diet    Follow up as needed    I appreciate the chance to help with Mr. Reinaldo childs. Please let me know if you have any questions or concerns.    Padmini  Michelle, OSCAR, CNP    ELECTROPHYSIOLOGY STAFF  Patient seen and examined by me.  History and physical examination discussed with Padmini Martinez whose note reflects our joint assessment and recommendation/plans.  31 year old gentleman who presented to the ER with atypical chest pain and tachypalpitations.  ECG at that time showed sinus tachycardia.  He was under a lot of stress at that time due to issues with both of his parent's health.  Since then he has done very well.  He has had no symptoms and wears a monitor with no inappropriate fast HRs.  He appears to have had an appropriate, reactive sinus tachycardia.  He had had a limited bedside echo in the ER.  We will obtain a formal 2-D echo. We will contact him with the results.  Assuming there are no unexpected abnormalities we can follow-up prn.  It was a pleasure meeting Ramiro Najera today.  Bertin Fraser

## 2017-03-14 NOTE — PROGRESS NOTES
"Ramiro Najera  is a 31 year old male presents today for new nutrition consultation.    Vitals:  BP (!) 143/95  Ht 5' 11\" (1.803 m)  Wt 240 lb (108.9 kg)  BMI 33.47 kg/m2      Nutrition History  Patient is on a regular  diet at home.  Recall:  IETARY HISTORY:   6:30:  Ham, banana, almonds or pistachios  9:00:  Almonds or other nuts  11:30:  PB sandwich, carrots, apple, almonds  14:00 nuts  17:00:  [employer] cheese pizza ( 4-5 slices)  ETOH: none during lent and couple beers if not lets 2-3 beers per week.  pisacios 2 big handfuls a day.      IMPRESSION:     32 yo with obesity. Has lost 10.5 lbs in past 3 months, just over 4% of body weight.          Physical Activity  Wheight lifting 5 days/week over the lunch break (about 15-20 mins). Also, on Thursday afternoon, goes for a 30-45 min jog.  On weekends, just walking. Monitoring steps and getting average of 9.5K per day    Time with Patient:  30 Minutes    Nutrition  DX:.   1. Dietary counseling    2. Obesity, unspecified          Nutrition Goals:     Discussed modifications in his nutrition:  Snacks and dinners that are simple but satisfies his nutritional needs.   Worked together to create practical ideas.  Questioning his nut consumption, he reports going through a few bags of nuts per week.  Will need to explore this further if weight loss stagnates due to high caloric density of this food though it is healthy selection for fat.     Education on meal timing and meal balance.     [place] above tweaks in nutrition mainly snacks and dinners.        Jennifer Workman, MS, RD, CSSD, LD  [facility]    "

## 2017-03-14 NOTE — MR AVS SNAPSHOT
After Visit Summary   3/14/2017    Ramiro Najera    MRN: [id number]           Patient Information     Date Of Birth          1985        Visit Information        Provider Department      3/14/2017 11:45 AM Lico Ruiz MD [facility]        Today's Diagnoses     Obesity, unspecified    -  1       Follow-ups after your visit        Your next 10 appointments already scheduled     Apr 03, 2017  8:30 AM CDT   Return Visit with Marcos Joseph [facility] ([facility])    [address] [place] [id number] [phone]              Who to contact     Please call your clinic at [phone] to:    Ask questions about your health    Make or cancel appointments    Discuss your medicines    Learn about your test results    Speak to your doctor   If you have compliments or concerns about an experience at your clinic, or if you wish to file a complaint, please contact [facility] Patient Relations at [phone] or email us at [email or web address]         Additional Information About Your Visit        MyChart Information     [employer] gives you secure access to your electronic health record. If you see a primary care provider, you can also send messages to your care team and make appointments. If you have questions, please call your primary care clinic.  If you do not have a primary care provider, please call [phone] and they will assist you.      [employer] is an electronic gateway that provides easy, online access to your medical records. With [employer], you can request a clinic appointment, read your test results, renew a prescription or communicate with your care team.     To access your existing account, please contact your [facility] or call [phone] for assistance.        Care EveryWhere ID     This is your Care EveryWhere ID. This could be  "used by other organizations to access your [place] medical records  [id number]        Your Vitals Were     Height BMI (Body Mass Index)                5' 11\" (180.3 cm) 33.47 kg/m2           Blood Pressure from Last 3 Encounters:   03/14/17 (!) 143/95   03/14/17 (!) 143/95   03/14/17 135/79    Weight from Last 3 Encounters:   03/14/17 240 lb (108.9 kg)   03/14/17 240 lb (108.9 kg)   03/14/17 243 lb 9.6 oz (110.5 kg)              Today, you had the following     No orders found for display       Primary Care Provider Office Phone # Fax #    Laura Jung PA-C [phone]       [address] [address]        Thank you!     Thank you for choosing [facility]  for your care. Our goal is always to provide you with excellent care. Hearing back from our patients is one way we can continue to improve our services. Please take a few minutes to complete the written survey that you may receive in the mail after your visit with us. Thank you!             Your Updated Medication List - Protect others around you: Learn how to safely use, store and throw away your medicines at www.disposemymeds.org.          This list is accurate as of: 3/14/17 11:59 PM.  Always use your most recent med list.                   Brand Name Dispense Instructions for use    vitamin B complex with vitamin C Tabs tablet      Take 1 tablet by mouth daily       VITAMIN D (CHOLECALCIFEROL) PO      Take by mouth daily         "

## 2017-03-14 NOTE — PROGRESS NOTES
"PATIENT HISTORY:   Mr. Ramiro Najera returns for his 2nd visit to the lifestyle medicine weight management program. His initial visit was on 11/22/2016 when his weight was 250.5 lbs.  Lifestyle Weight Tracking 11/22/2016 3/14/2017   Lifestyle /97 143/95   Lifestyle Ht 5' 11\" 5' 11\"   Starting Wt 250 lb 8 oz 250 lb 8 oz   Lifestyle Wt 250 lb 8 oz 240 lb   Lifestyle % Wt Change 0 0.0419   Lifestyle Body Fat % 31.9 42.4   Lifestyle BMI 34.9 33.5   Lifestyle BMR 2334 1893          Since his last visit, he reports to be weight lifting 5 days/week over the lunch break (about 15-20 mins). Also, on Thursday afternoon, goes for a 30-45 min jog.  On weekends, just walking. Monitoring steps and getting average of 9.5K per day.   Has not had any chest pain. In process of Cardiology evaluation with no limitations on exercise.     PERTINANT PAST HISTORY INCLUDES: See PMH      DIETARY HISTORY:   6:30:  Ham, banana, almonds or pistachios  9:00:  Almonds or other nuts  11:30:  PB sandwich, carrots, apple, almonds  14:00 nuts  17:00:  Pizza Hut cheese pizza ( 4-5 slices)  ETOH: none during lent and couple beers if not lets 2-3 beers per week.  pisacios 2 big handfuls a day.        OBJECTIVE:   5' 11\", 240 lbs 0 oz, Body mass index is 33.47 kg/(m^2)., (!) 143/95,  ,        Was 135/79 during his Cardiology evaluation earlier today.     Rest of physical exam is unchanged.      IMPRESSION:     32 yo with obesity. Has lost 10.5 lbs in past 3 months, just over 4% of body weight.       DIETARY ASSESSMENT/PLAN:     Discussed modifications in his nutrition:  Snacks and dinners that are simple but satisfies his nutritional needs.   Worked together to create practical ideas.  Questioning his nut consumption, he reports going through a few bags of nuts per week.  Will need to explore this further if weight loss stagnates due to high caloric density of this food though it is healthy selection for fat.     Education on meal timing and " meal balance.     [place] above tweaks in nutrition mainly snacks and dinners.     PHYSICAL ACTIVITY ASSESSMENT/PLAN:     Exercise is on the healthier side with the strength training, the 9.5K steps per day and one-day of jogging.    Suggest aiming for a minimum of 10K steps     Also, add a run/jog to the weekends which tend to be your least active times.     Look into signing up for a 5 or 10 K race. That is 3.1 or 6.2 miles.  Have a goal for a run this summer. If you really don't want to sign up, then do it on your own as a personal goal.     Miscellaneous Issues:     Had ECG this morning and has Echo next week.     New weight goal of 5% loss is 228 lbs (i.e. 12 lbs of weight loss) by July 2016    Follow-up:     With Nutrition in 2-4 weeks     With Dr. Ruiz in June    Over 50% of the 25 minute face-to-face clinic visit time was spent in counseling regarding strategies to achieve lifestyle changes in diet and physical activity as described above.   --Lico Ruiz MD

## 2017-03-14 NOTE — MR AVS SNAPSHOT
After Visit Summary   3/14/2017    Ramiro Najera    MRN: [id number]           Patient Information     Date Of Birth          1985        Visit Information        Provider Department      3/14/2017 11:30 AM Jennifer Workman RD [facility]        Today's Diagnoses     Dietary counseling    -  1    Obesity, unspecified           Follow-ups after your visit        Your next 10 appointments already scheduled     Mar 20, 2017  8:30 AM CDT   Return Visit with YUNIOR Olea   [facility] ([facility])    [address] [address] [phone]            Mar 20, 2017 10:00 AM CDT   Ech Complete with UCECHCR4   [facility] Echo ([facility] [place])    [address]  [address] [facility] [address] [phone]           1.  Please bring or wear a comfortable two-piece outfit. 2.  You may eat, drink and take your normal medicines. 3.  For any questions that cannot be answered, please contact the ordering physician              Future tests that were ordered for you today     Open Future Orders        Priority Expected Expires Ordered    Echocardiogram Complete Routine  3/14/2018 3/14/2017            Who to contact     Please call your clinic at [phone] to:    Ask questions about your health    Make or cancel appointments    Discuss your medicines    Learn about your test results    Speak to your doctor   If you have compliments or concerns about an experience at your clinic, or if you wish to file a complaint, please contact [facility] Patient Relations at [phone] or email us at [email or web address]         Additional Information About Your Visit        MyChart Information     [employer] gives you secure access to your electronic health record. If you see a primary care provider, you can also send messages to your care team and make appointments. If  "you have questions, please call your primary care clinic.  If you do not have a primary care provider, please call [phone] and they will assist you.      [employer]cs is an electronic gateway that provides easy, online access to your medical records. With [employer]cs, you can request a clinic appointment, read your test results, renew a prescription or communicate with your care team.     To access your existing account, please contact your [facility] or call [phone] for assistance.        Care EveryWhere ID     This is your Care EveryWhere ID. This could be used by other organizations to access your [place] medical records  [id number]        Your Vitals Were     Height BMI (Body Mass Index)                5' 11\" (1.803 m) 33.47 kg/m2           Blood Pressure from Last 3 Encounters:   03/14/17 (!) 143/95   03/14/17 (!) 143/95 03/14/17 135/79    Weight from Last 3 Encounters:   03/14/17 240 lb (108.9 kg)   03/14/17 240 lb (108.9 kg)   03/14/17 243 lb 9.6 oz (110.5 kg)              Today, you had the following     No orders found for display       Primary Care Provider Office Phone # Fax #    Laura Jung PA-C [phone]       [address] [address]        Thank you!     Thank you for choosing [facility]  for your care. Our goal is always to provide you with excellent care. Hearing back from our patients is one way we can continue to improve our services. Please take a few minutes to complete the written survey that you may receive in the mail after your visit with us. Thank you!             Your Updated Medication List - Protect others around you: Learn how to safely use, store and throw away your medicines at www.disposemymeds.org.          This list is accurate as of: 3/14/17 10:22 PM.  Always use your most recent med list.                   Brand Name Dispense Instructions for use    vitamin B " complex with vitamin C Tabs tablet      Take 1 tablet by mouth daily       VITAMIN D (CHOLECALCIFEROL) PO      Take by mouth daily

## 2017-03-14 NOTE — LETTER
"  3/14/2017      RE: Ramiro Najera  [address] [facility] [place] [address]       Ramiro Najera  is a 31 year old male presents today for new nutrition consultation.    Vitals:  BP (!) 143/95  Ht 5' 11\" (1.803 m)  Wt 240 lb (108.9 kg)  BMI 33.47 kg/m2      Nutrition History  Patient is on a regular  diet at home.  Recall:  IETARY HISTORY:   6:30:  Ham, banana, almonds or pistachios  9:00:  Almonds or other nuts  11:30:  PB sandwich, carrots, apple, almonds  14:00 nuts  17:00:  Pizza Hut cheese pizza ( 4-5 slices)  ETOH: none during lent and couple beers if not lets 2-3 beers per week.  pisacios 2 big handfuls a day.      IMPRESSION:     30 yo with obesity. Has lost 10.5 lbs in past 3 months, just over 4% of body weight.          Physical Activity  Wheight lifting 5 days/week over the lunch break (about 15-20 mins). Also, on Thursday afternoon, goes for a 30-45 min jog.  On weekends, just walking. Monitoring steps and getting average of 9.5K per day    Time with Patient:  30 Minutes    Nutrition  DX:.   1. Dietary counseling    2. Obesity, unspecified          Nutrition Goals:     Discussed modifications in his nutrition:  Snacks and dinners that are simple but satisfies his nutritional needs.   Worked together to create practical ideas.  Questioning his nut consumption, he reports going through a few bags of nuts per week.  Will need to explore this further if weight loss stagnates due to high caloric density of this food though it is healthy selection for fat.     Education on meal timing and meal balance.     [place] above tweaks in nutrition mainly snacks and dinners.        Jennifer Workman, MS, RD, CSSD, LD  [facility]      Jennifer Workman RD    "

## 2017-03-15 LAB — INTERPRETATION ECG - MUSE: NORMAL

## 2017-03-20 ENCOUNTER — OFFICE VISIT (OUTPATIENT)
Dept: BEHAVIORAL HEALTH | Facility: CLINIC | Age: 32
End: 2017-03-20
Payer: COMMERCIAL

## 2017-03-20 DIAGNOSIS — F31.81 BIPOLAR 2 DISORDER (H): Primary | ICD-10-CM

## 2017-03-20 PROCEDURE — 90834 PSYTX W PT 45 MINUTES: CPT | Performed by: SOCIAL WORKER

## 2017-03-20 NOTE — PROGRESS NOTES
"                                             Progress Note    Client Name: Ramiro Najera  Date: 3/20/2017          Service Type: Individual      Session Start Time: 830  Session End Time: 915      Session Length: 45     Session #: 4     Attendees: Client attended alone    Treatment Plan Last Reviewed: 2/20/2017   PHQ-9 / DEANNA-7 :      DATA      Progress Since Last Session (Related to Symptoms / Goals / Homework):   Symptoms: Stable    Homework: Did not complete      Episode of Care Goals: Satisfactory progress - PREPARATION (Decided to change - considering how); Intervened by negotiating a change plan and determining options / strategies for behavior change, identifying triggers, exploring social supports, and working towards setting a date to begin behavior change     Current / Ongoing Stressors and Concerns:      Things have been \"ok, no real changes.\"  Mood has been stable.  Still sleeping less than he would like, most of this has to do with him staying out late rather than laying in bed being unable to sleep.  Appetite and energy and ok.  Long discussion today about relationships.  Client ventilated feelings of sadness and loss related to his ex-girlfriend leaving him several years ago.  Has tried to do some dating but not had much success.                    Treatment Objective(s) Addressed in This Session:   Client will complete at least 10 minutes of self-regulation practice (e.g.: yoga, meditation, relaxation breathing, etc.) per day.     Client will use identified behavioral and cognitive skills to challenge negative self talk 90% of the time.     Intervention:     CBT     Discussed physical, mental, and emotional boundaries and limit-setting with others. Explored management of boundaries through direct communication and limiting contact and communication with others.  Discussed barriers to using boundary management skills including strong emotions and physical proximity.  Client given handout on " boundaries today in session.         ASSESSMENT: Current Emotional / Mental Status (status of significant symptoms):   Risk status (Self / Other harm or suicidal ideation)   Client denies current fears or concerns for personal safety.   Client denies current or recent suicidal ideation or behaviors.   Client denies current or recent homicidal ideation or behaviors.   Client denies current or recent self injurious behavior or ideation.   Client denies other safety concerns.   A safety and risk management plan has not been developed at this time, however client was given the after-hours number / 911 should there be a change in any of these risk factors.     Appearance:   Appropriate    Eye Contact:   Good    Psychomotor Behavior: Retarded (Slowed)    Attitude:   Cooperative    Orientation:   All   Speech    Rate / Production: Monotone     Volume:  Normal    Mood:    Depressed    Affect:    Appropriate    Thought Content:  Clear    Thought Form:  Coherent  Logical    Insight:    Good      Medication Review:   No current psychiatric medications prescribed     Medication Compliance:   NA     Changes in Health Issues:   None reported     Chemical Use Review:   Substance Use: Chemical use reviewed, no active concerns identified      Tobacco Use: No current tobacco use.       Collateral Reports Completed:   Not Applicable    PLAN: (Client Tasks / Therapist Tasks / Other)  1.  Client will use one boundary management skill (i.e., direct communication, saying no to a request) by next session.    2.  Meet in two weeks        YUNIOR Bustos                                                         ________________________________________________________________________    Treatment Plan    Client's Name: Ramiro Najera  YOB: 1985    Date: 2/20/2017     DSM5 Diagnoses: (Sustained by DSM5 Criteria Listed Above)  Diagnoses: 296.89 Bipolar II Disorder With anxious distress        PTSD  Psychosocial &  Contextual Factors: parents' health problems  WHODAS 2.0 (12 item)            This questionnaire asks about difficulties due to health conditions. Health conditions  include  disease or illnesses, other health problems that may be short or long lasting,  injuries, mental health or emotional problems, and problems with alcohol or drugs.                     Think back over the past 30 days and answer these questions, thinking about how much  difficulty you had doing the following activities. For each question, please [place] only  one response.    S1 Standing for long periods such as 30 minutes? None =         1   S2 Taking care of household responsibilities? Mild =           2   S3 Learning a new task, for example, learning how to get to a new place? None =         1   S4 How much of a problem do you have joining community activities (for example, festivals, Sikh or other activities) in the same way as anyone else can? Moderate =   3   S5 How much have you been emotionally affected by your health problems? Moderate =   3     In the past 30 days, how much difficulty did you have in:   S6 Concentrating on doing something for ten minutes? Mild =           2   S7 Walking a long distance such as a kilometer (or equivalent)? None =         1   S8 Washing your whole body? None =         1   S9 Getting dressed? None =         1   S10 Dealing with people you do not know? None =         1   S11 Maintaining a friendship? Moderate =   3   S12 Your day to day work? None =         1     H1 Overall, in the past 30 days, how many days were these difficulties present? Record number of days 30   H2 In the past 30 days, for how many days were you totally unable to carry out your usual activities or work because of any health condition? Record number of days  0   H3 In the past 30 days, not counting the days that you were totally unable, for how many days did you cut back or reduce your usual activities or work because of any health  condition? Record number of days 3       Referral / Collaboration:  Referral to another professional/service is not indicated at this time..    Anticipated number of session or this episode of care: 18-24      MeasurableTreatment Goal(s) related to diagnosis / functional impairment(s)  Goal-Emotional regulation: Client will effectively manage mood dysregulation    I will know I've met my goal when I am feeling less out of control.      Objective #A (Client Action)    Status: New - Date: 1/28/2016     Client will learn at least 3 mindfulness skills and practice one daily    Intervention(s)  Therapist will provide mindfulness training, psychoeducation, behavioral activation, and cognitive restructuring.    Objective #B  Client will use at least 3 coping skills for anxiety management in the next 12 weeks.    Status: New - Date: 1/28/2016     Intervention(s)  Therapist will provide psychoeducation, behavioral activation, and cognitive restructuring.      Objective #C  Client will identify three distraction and diversion activities and use those activities to improve distress tolerance and emotional regulation.  Status: New - Date: 1/28/2016     Intervention(s)  Therapist will provide psychoeducation, behavioral activation, and cognitive restructuring.    MeasurableTreatment Goal(s) related to diagnosis / functional impairment(s)            Goal-Depression: Client will decrease depressed mood    I will know I've met my goal when I am less depressed.      Objective #A (Client Action)    Status: New - Date: 2/20/2017    Client will use identified behavioral and cognitive skills to challenge negative self talk 90% of the time.    Intervention(s)  Therapist will provide psychoeducation, behavioral activation, and cognitive restructuring.      Objective #B  Client will complete at least 10 minutes of self-regulation practice (e.g.: yoga, meditation, relaxation breathing, etc.) per day.    Status: New - Date:  2/20/2017    Intervention(s)  Therapist will provide psychoeducation, behavioral activation, and cognitive restructuring.      Objective #C  Client will exercise 30 minutes 36 times in the next 12 weeks.  Status: New - Date: 2/20/2017    Intervention(s)  Therapist will provide psychoeducation, behavioral activation, and cognitive restructuring.              Client has reviewed and agreed to the above plan.      Justo Dawson, [facility]  February 20, 2017

## 2017-03-20 NOTE — MR AVS SNAPSHOT
MRN:[id number]                      After Visit Summary   3/20/2017    Ramiro Najera    MRN: [id number]           Visit Information        Provider Department      3/20/2017 8:30 AM Marcos Joseph, [facility]      Your next 10 appointments already scheduled     Mar 20, 2017 10:00 AM CDT   Ech Complete with UCECHCR4   [facility] ([facility] [place])    [address]  [address] [facility] [id number] [phone]           1.  Please bring or wear a comfortable two-piece outfit. 2.  You may eat, drink and take your normal medicines. 3.  For any questions that cannot be answered, please contact the ordering physician            Apr 03, 2017  8:30 AM CDT   Return Visit with YUNIOR Olea   [facility] ([facility])    [address] [place] [address] [phone]              MyChart Information     [employer] gives you secure access to your electronic health record. If you see a primary care provider, you can also send messages to your care team and make appointments. If you have questions, please call your primary care clinic.  If you do not have a primary care provider, please call [phone] and they will assist you.        Care EveryWhere ID     This is your Care EveryWhere ID. This could be used by other organizations to access your [place] medical records  [id number]

## 2017-04-03 ENCOUNTER — OFFICE VISIT (OUTPATIENT)
Dept: BEHAVIORAL HEALTH | Facility: CLINIC | Age: 32
End: 2017-04-03
Payer: COMMERCIAL

## 2017-04-03 DIAGNOSIS — F31.81 BIPOLAR 2 DISORDER (H): Primary | ICD-10-CM

## 2017-04-03 PROCEDURE — 90834 PSYTX W PT 45 MINUTES: CPT | Performed by: SOCIAL WORKER

## 2017-04-03 NOTE — PROGRESS NOTES
"                                             Progress Note    Client Name: Ramiro Najera  Date: 4/3/2017          Service Type: Individual      Session Start Time: 830  Session End Time: 915      Session Length: 45     Session #: 5     Attendees: Client attended alone    Treatment Plan Last Reviewed: 2/20/2017   PHQ-9 / DEANNA-7 :      DATA      Progress Since Last Session (Related to Symptoms / Goals / Homework):   Symptoms: Stable    Homework: Did not complete      Episode of Care Goals: Satisfactory progress - PREPARATION (Decided to change - considering how); Intervened by negotiating a change plan and determining options / strategies for behavior change, identifying triggers, exploring social supports, and working towards setting a date to begin behavior change     Current / Ongoing Stressors and Concerns:      Things have been \"pretty even keel.\"  Wishes to discuss self-esteem and confidence.  Would like to develop some skills to help build this up.  Discussed automatic thoughts today in session.  Explored the role of automatic thoughts in increasing unhelpful thinking in depression, anxiety, and stress.  Explored techniques and strategies to increase awareness of unhelpful automatic thinking such as mindfulness.  Introduced concept of challenging negative thinking.           Treatment Objective(s) Addressed in This Session:   Client will complete at least 10 minutes of self-regulation practice (e.g.: yoga, meditation, relaxation breathing, etc.) per day.     Client will use identified behavioral and cognitive skills to challenge negative self talk 90% of the time.     Intervention:     CBT     Discussed physical, mental, and emotional boundaries and limit-setting with others. Explored management of boundaries through direct communication and limiting contact and communication with others.  Discussed barriers to using boundary management skills including strong emotions and physical proximity.  Client given " handout on boundaries today in session.         ASSESSMENT: Current Emotional / Mental Status (status of significant symptoms):   Risk status (Self / Other harm or suicidal ideation)   Client denies current fears or concerns for personal safety.   Client denies current or recent suicidal ideation or behaviors.   Client denies current or recent homicidal ideation or behaviors.   Client denies current or recent self injurious behavior or ideation.   Client denies other safety concerns.   A safety and risk management plan has not been developed at this time, however client was given the after-hours number / 911 should there be a change in any of these risk factors.     Appearance:   Appropriate    Eye Contact:   Good    Psychomotor Behavior: Retarded (Slowed)    Attitude:   Cooperative    Orientation:   All   Speech    Rate / Production: Monotone     Volume:  Normal    Mood:    Depressed    Affect:    Appropriate    Thought Content:  Clear    Thought Form:  Coherent  Logical    Insight:    Good      Medication Review:   No current psychiatric medications prescribed     Medication Compliance:   NA     Changes in Health Issues:   None reported     Chemical Use Review:   Substance Use: Chemical use reviewed, no active concerns identified      Tobacco Use: No current tobacco use.       Collateral Reports Completed:   Not Applicable    PLAN: (Client Tasks / Therapist Tasks / Other)  1.  Client will use one boundary management skill (i.e., direct communication, saying no to a request) by next session.    2.  Meet in two weeks        YUNIOR Bustos                                                         ________________________________________________________________________    Treatment Plan    Client's Name: Ramiro Najera  YOB: 1985    Date: 2/20/2017     DSM5 Diagnoses: (Sustained by DSM5 Criteria Listed Above)  Diagnoses: 296.89 Bipolar II Disorder With anxious distress         PTSD  Psychosocial & Contextual Factors: parents' health problems  WHODAS 2.0 (12 item)            This questionnaire asks about difficulties due to health conditions. Health conditions  include  disease or illnesses, other health problems that may be short or long lasting,  injuries, mental health or emotional problems, and problems with alcohol or drugs.                     Think back over the past 30 days and answer these questions, thinking about how much  difficulty you had doing the following activities. For each question, please [place] only  one response.    S1 Standing for long periods such as 30 minutes? None =         1   S2 Taking care of household responsibilities? Mild =           2   S3 Learning a new task, for example, learning how to get to a new place? None =         1   S4 How much of a problem do you have joining community activities (for example, festivals, [employer] or other activities) in the same way as anyone else can? Moderate =   3   S5 How much have you been emotionally affected by your health problems? Moderate =   3     In the past 30 days, how much difficulty did you have in:   S6 Concentrating on doing something for ten minutes? Mild =           2   S7 Walking a long distance such as a kilometer (or equivalent)? None =         1   S8 Washing your whole body? None =         1   S9 Getting dressed? None =         1   S10 Dealing with people you do not know? None =         1   S11 Maintaining a friendship? Moderate =   3   S12 Your day to day work? None =         1     H1 Overall, in the past 30 days, how many days were these difficulties present? Record number of days 30   H2 In the past 30 days, for how many days were you totally unable to carry out your usual activities or work because of any health condition? Record number of days  0   H3 In the past 30 days, not counting the days that you were totally unable, for how many days did you cut back or reduce your usual activities or work  because of any health condition? Record number of days 3       Referral / Collaboration:  Referral to another professional/service is not indicated at this time..    Anticipated number of session or this episode of care: 18-24      MeasurableTreatment Goal(s) related to diagnosis / functional impairment(s)  Goal-Emotional regulation: Client will effectively manage mood dysregulation    I will know I've met my goal when I am feeling less out of control.      Objective #A (Client Action)    Status: New - Date: 1/28/2016     Client will learn at least 3 mindfulness skills and practice one daily    Intervention(s)  Therapist will provide mindfulness training, psychoeducation, behavioral activation, and cognitive restructuring.    Objective #B  Client will use at least 3 coping skills for anxiety management in the next 12 weeks.    Status: New - Date: 1/28/2016     Intervention(s)  Therapist will provide psychoeducation, behavioral activation, and cognitive restructuring.      Objective #C  Client will identify three distraction and diversion activities and use those activities to improve distress tolerance and emotional regulation.  Status: New - Date: 1/28/2016     Intervention(s)  Therapist will provide psychoeducation, behavioral activation, and cognitive restructuring.    MeasurableTreatment Goal(s) related to diagnosis / functional impairment(s)            Goal-Depression: Client will decrease depressed mood    I will know I've met my goal when I am less depressed.      Objective #A (Client Action)    Status: New - Date: 2/20/2017    Client will use identified behavioral and cognitive skills to challenge negative self talk 90% of the time.    Intervention(s)  Therapist will provide psychoeducation, behavioral activation, and cognitive restructuring.      Objective #B  Client will complete at least 10 minutes of self-regulation practice (e.g.: yoga, meditation, relaxation breathing, etc.) per day.    Status: New -  Date: 2/20/2017    Intervention(s)  Therapist will provide psychoeducation, behavioral activation, and cognitive restructuring.      Objective #C  Client will exercise 30 minutes 36 times in the next 12 weeks.  Status: New - Date: 2/20/2017    Intervention(s)  Therapist will provide psychoeducation, behavioral activation, and cognitive restructuring.              Client has reviewed and agreed to the above plan.      Justo Dawson, [facility]  February 20, 2017

## 2017-04-03 NOTE — MR AVS SNAPSHOT
MRN:[id number]                      After Visit Summary   4/3/2017    Ramiro Najera    MRN: [id number]           Visit Information        Provider Department      4/3/2017 8:30 AM [facility]      Your next 10 appointments already scheduled     Apr 17, 2017  8:30 AM CDT   Return Visit with YUNIOR Olea   Located within [facility] ([facility])    [address] [place] [id number] [phone]              MyChart Information     [employer] gives you secure access to your electronic health record. If you see a primary care provider, you can also send messages to your care team and make appointments. If you have questions, please call your primary care clinic.  If you do not have a primary care provider, please call [phone] and they will assist you.        Care EveryWhere ID     This is your Care EveryWhere ID. This could be used by other organizations to access your [place] medical records  [id number]

## 2017-05-01 ENCOUNTER — OFFICE VISIT (OUTPATIENT)
Dept: BEHAVIORAL HEALTH | Facility: CLINIC | Age: 32
End: 2017-05-01
Payer: COMMERCIAL

## 2017-05-01 DIAGNOSIS — F31.81 BIPOLAR 2 DISORDER (H): Primary | ICD-10-CM

## 2017-05-01 PROCEDURE — 90834 PSYTX W PT 45 MINUTES: CPT | Performed by: SOCIAL WORKER

## 2017-05-01 NOTE — MR AVS SNAPSHOT
MRN:[id number]                      After Visit Summary   5/1/2017    Ramiro Najera    MRN: [id number]           Visit Information        Provider Department      5/1/2017 8:30 AM [facility]      Your next 10 appointments already scheduled     May 15, 2017  8:30 AM CDT   Return Visit with YUNIOR Olea   [facility] ([facility]    [address] [place] [id number] [phone]              MyChart Information     [employer] gives you secure access to your electronic health record. If you see a primary care provider, you can also send messages to your care team and make appointments. If you have questions, please call your primary care clinic.  If you do not have a primary care provider, please call [phone] and they will assist you.        Care EveryWhere ID     This is your Care EveryWhere ID. This could be used by other organizations to access your [place] medical records  [id number]

## 2017-05-01 NOTE — PROGRESS NOTES
"                                             Progress Note    Client Name: Ramiro Najera  Date: 5/1/2017          Service Type: Individual      Session Start Time: 830  Session End Time: 915      Session Length: 45     Session #: 6     Attendees: Client attended alone    Treatment Plan Last Reviewed: 2/20/2017   PHQ-9 / DEANNA-7 :      DATA      Progress Since Last Session (Related to Symptoms / Goals / Homework):   Symptoms: Stable    Homework: Did not complete      Episode of Care Goals: Satisfactory progress - PREPARATION (Decided to change - considering how); Intervened by negotiating a change plan and determining options / strategies for behavior change, identifying triggers, exploring social supports, and working towards setting a date to begin behavior change     Current / Ongoing Stressors and Concerns:      Things have been \"ok, not great, not awful.\"  Got sick over the past week and this has gotten in the way of his routine.  Notices that he does better when he sticks to a routine, when he does not \"things go to shit.\"  Diet, exercise, bible study, Uats[employer], lucero night all have been off over the last week.  Seems like it is hard to get back on track once he has gotten out of his routine.                  Treatment Objective(s) Addressed in This Session:   Client will complete at least 10 minutes of self-regulation practice (e.g.: yoga, meditation, relaxation breathing, etc.) per day.     Client will use identified behavioral and cognitive skills to challenge negative self talk 90% of the time.     Intervention:     CBT     Discussed physical, mental, and emotional boundaries and limit-setting with others. Explored management of boundaries through direct communication and limiting contact and communication with others.  Discussed barriers to using boundary management skills including strong emotions and physical proximity.  Client given handout on boundaries today in session.         ASSESSMENT: Current " Emotional / Mental Status (status of significant symptoms):   Risk status (Self / Other harm or suicidal ideation)   Client denies current fears or concerns for personal safety.   Client denies current or recent suicidal ideation or behaviors.   Client denies current or recent homicidal ideation or behaviors.   Client denies current or recent self injurious behavior or ideation.   Client denies other safety concerns.   A safety and risk management plan has not been developed at this time, however client was given the after-hours number / 911 should there be a change in any of these risk factors.     Appearance:   Appropriate    Eye Contact:   Good    Psychomotor Behavior: Retarded (Slowed)    Attitude:   Cooperative    Orientation:   All   Speech    Rate / Production: Monotone     Volume:  Normal    Mood:    Depressed    Affect:    Appropriate    Thought Content:  Clear    Thought Form:  Coherent  Logical    Insight:    Good      Medication Review:   No current psychiatric medications prescribed     Medication Compliance:   NA     Changes in Health Issues:   None reported     Chemical Use Review:   Substance Use: Chemical use reviewed, no active concerns identified      Tobacco Use: No current tobacco use.       Collateral Reports Completed:   Not Applicable    PLAN: (Client Tasks / Therapist Tasks / Other)  1.  Client will use one boundary management skill (i.e., direct communication, saying no to a request) by next session.    2.  Meet in two weeks        YUNIOR Bustos                                                         ________________________________________________________________________    Treatment Plan    Client's Name: Ramiro Najera  YOB: 1985    Date: 2/20/2017     DSM5 Diagnoses: (Sustained by DSM5 Criteria Listed Above)  Diagnoses: 296.89 Bipolar II Disorder With anxious distress        PTSD  Psychosocial & Contextual Factors: parents' health problems  WHODAS 2.0 (12  item)            This questionnaire asks about difficulties due to health conditions. Health conditions  include  disease or illnesses, other health problems that may be short or long lasting,  injuries, mental health or emotional problems, and problems with alcohol or drugs.                     Think back over the past 30 days and answer these questions, thinking about how much  difficulty you had doing the following activities. For each question, please [place] only  one response.    S1 Standing for long periods such as 30 minutes? None =         1   S2 Taking care of household responsibilities? Mild =           2   S3 Learning a new task, for example, learning how to get to a new place? None =         1   S4 How much of a problem do you have joining community activities (for example, festivals, Yaz[employer] or other activities) in the same way as anyone else can? Moderate =   3   S5 How much have you been emotionally affected by your health problems? Moderate =   3     In the past 30 days, how much difficulty did you have in:   S6 Concentrating on doing something for ten minutes? Mild =           2   S7 Walking a long distance such as a kilometer (or equivalent)? None =         1   S8 Washing your whole body? None =         1   S9 Getting dressed? None =         1   S10 Dealing with people you do not know? None =         1   S11 Maintaining a friendship? Moderate =   3   S12 Your day to day work? None =         1     H1 Overall, in the past 30 days, how many days were these difficulties present? Record number of days 30   H2 In the past 30 days, for how many days were you totally unable to carry out your usual activities or work because of any health condition? Record number of days  0   H3 In the past 30 days, not counting the days that you were totally unable, for how many days did you cut back or reduce your usual activities or work because of any health condition? Record number of days 3       Referral /  Collaboration:  Referral to another professional/service is not indicated at this time..    Anticipated number of session or this episode of care: 18-24      MeasurableTreatment Goal(s) related to diagnosis / functional impairment(s)  Goal-Emotional regulation: Client will effectively manage mood dysregulation    I will know I've met my goal when I am feeling less out of control.      Objective #A (Client Action)    Status: New - Date: 1/28/2016     Client will learn at least 3 mindfulness skills and practice one daily    Intervention(s)  Therapist will provide mindfulness training, psychoeducation, behavioral activation, and cognitive restructuring.    Objective #B  Client will use at least 3 coping skills for anxiety management in the next 12 weeks.    Status: New - Date: 1/28/2016     Intervention(s)  Therapist will provide psychoeducation, behavioral activation, and cognitive restructuring.      Objective #C  Client will identify three distraction and diversion activities and use those activities to improve distress tolerance and emotional regulation.  Status: New - Date: 1/28/2016     Intervention(s)  Therapist will provide psychoeducation, behavioral activation, and cognitive restructuring.    MeasurableTreatment Goal(s) related to diagnosis / functional impairment(s)            Goal-Depression: Client will decrease depressed mood    I will know I've met my goal when I am less depressed.      Objective #A (Client Action)    Status: New - Date: 2/20/2017    Client will use identified behavioral and cognitive skills to challenge negative self talk 90% of the time.    Intervention(s)  Therapist will provide psychoeducation, behavioral activation, and cognitive restructuring.      Objective #B  Client will complete at least 10 minutes of self-regulation practice (e.g.: yoga, meditation, relaxation breathing, etc.) per day.    Status: New - Date: 2/20/2017    Intervention(s)  Therapist will provide psychoeducation,  behavioral activation, and cognitive restructuring.      Objective #C  Client will exercise 30 minutes 36 times in the next 12 weeks.  Status: New - Date: 2/20/2017    Intervention(s)  Therapist will provide psychoeducation, behavioral activation, and cognitive restructuring.              Client has reviewed and agreed to the above plan.      Justo Dawson, [facility]  February 20, 2017

## 2017-05-15 ENCOUNTER — OFFICE VISIT (OUTPATIENT)
Dept: BEHAVIORAL HEALTH | Facility: CLINIC | Age: 32
End: 2017-05-15
Payer: COMMERCIAL

## 2017-05-15 DIAGNOSIS — F31.81 BIPOLAR 2 DISORDER (H): Primary | ICD-10-CM

## 2017-05-15 PROCEDURE — 90834 PSYTX W PT 45 MINUTES: CPT | Performed by: SOCIAL WORKER

## 2017-05-15 NOTE — MR AVS SNAPSHOT
MRN:[id number]                      After Visit Summary   5/15/2017    Ramiro Najera    MRN: [id number]           Visit Information        Provider Department      5/15/2017 8:30 AM [facility]      Your next 10 appointments already scheduled     Jun 05, 2017  8:30 AM CDT   Return Visit with YUNIOR Olea   [facility] ([facility])    [address] [place] [id number] [phone]              MyChart Information     [employer] gives you secure access to your electronic health record. If you see a primary care provider, you can also send messages to your care team and make appointments. If you have questions, please call your primary care clinic.  If you do not have a primary care provider, please call [phone] and they will assist you.        Care EveryWhere ID     This is your Care EveryWhere ID. This could be used by other organizations to access your [place] medical records  [id number]

## 2017-05-15 NOTE — PROGRESS NOTES
"                                             Progress Note    Client Name: Ramiro Najera  Date: 5/15/2017          Service Type: Individual      Session Start Time: 830  Session End Time: 915      Session Length: 45     Session #: 7     Attendees: Client attended alone    Treatment Plan Last Reviewed: 2/20/2017   PHQ-9 / DEANNA-7 :      DATA      Progress Since Last Session (Related to Symptoms / Goals / Homework):   Symptoms: Stable    Homework: Did not complete      Episode of Care Goals: Satisfactory progress - PREPARATION (Decided to change - considering how); Intervened by negotiating a change plan and determining options / strategies for behavior change, identifying triggers, exploring social supports, and working towards setting a date to begin behavior change     Current / Ongoing Stressors and Concerns:      Things have been \"good, from a mood perspective.\"  Wishes to discuss how to be more assertive with his own needs.  Discussed interpersonal effectiveness in session.  Provided handouts and homework.  Discussed attending to relationships, balancing should and wants, balancing priorities and demands.  Also discussed potential barriers to interpersonal effectiveness.            Treatment Objective(s) Addressed in This Session:   Client will complete at least 10 minutes of self-regulation practice (e.g.: yoga, meditation, relaxation breathing, etc.) per day.     Client will use identified behavioral and cognitive skills to challenge negative self talk 90% of the time.     Intervention:     CBT     Discussed physical, mental, and emotional boundaries and limit-setting with others. Explored management of boundaries through direct communication and limiting contact and communication with others.  Discussed barriers to using boundary management skills including strong emotions and physical proximity.  Client given handout on boundaries today in session.         ASSESSMENT: Current Emotional / Mental Status " (status of significant symptoms):   Risk status (Self / Other harm or suicidal ideation)   Client denies current fears or concerns for personal safety.   Client denies current or recent suicidal ideation or behaviors.   Client denies current or recent homicidal ideation or behaviors.   Client denies current or recent self injurious behavior or ideation.   Client denies other safety concerns.   A safety and risk management plan has not been developed at this time, however client was given the after-hours number / 911 should there be a change in any of these risk factors.     Appearance:   Appropriate    Eye Contact:   Good    Psychomotor Behavior: Retarded (Slowed)    Attitude:   Cooperative    Orientation:   All   Speech    Rate / Production: Monotone     Volume:  Normal    Mood:    Depressed    Affect:    Appropriate    Thought Content:  Clear    Thought Form:  Coherent  Logical    Insight:    Good      Medication Review:   No current psychiatric medications prescribed     Medication Compliance:   NA     Changes in Health Issues:   None reported     Chemical Use Review:   Substance Use: Chemical use reviewed, no active concerns identified      Tobacco Use: No current tobacco use.       Collateral Reports Completed:   Not Applicable    PLAN: (Client Tasks / Therapist Tasks / Other)  1.  Client will use one boundary management skill (i.e., direct communication, saying no to a request) by next session.    2.  Meet in two weeks        YUNIOR Bustos                                                         ________________________________________________________________________    Treatment Plan    Client's Name: Ramiro Najera  YOB: 1985    Date: 2/20/2017     DSM5 Diagnoses: (Sustained by DSM5 Criteria Listed Above)  Diagnoses: 296.89 Bipolar II Disorder With anxious distress        PTSD  Psychosocial & Contextual Factors: parents' health problems  WHODAS 2.0 (12 item)            This  questionnaire asks about difficulties due to health conditions. Health conditions  include  disease or illnesses, other health problems that may be short or long lasting,  injuries, mental health or emotional problems, and problems with alcohol or drugs.                     Think back over the past 30 days and answer these questions, thinking about how much  difficulty you had doing the following activities. For each question, please [place] only  one response.    S1 Standing for long periods such as 30 minutes? None =         1   S2 Taking care of household responsibilities? Mild =           2   S3 Learning a new task, for example, learning how to get to a new place? None =         1   S4 How much of a problem do you have joining community activities (for example, festivals, Rastafarian or other activities) in the same way as anyone else can? Moderate =   3   S5 How much have you been emotionally affected by your health problems? Moderate =   3     In the past 30 days, how much difficulty did you have in:   S6 Concentrating on doing something for ten minutes? Mild =           2   S7 Walking a long distance such as a kilometer (or equivalent)? None =         1   S8 Washing your whole body? None =         1   S9 Getting dressed? None =         1   S10 Dealing with people you do not know? None =         1   S11 Maintaining a friendship? Moderate =   3   S12 Your day to day work? None =         1     H1 Overall, in the past 30 days, how many days were these difficulties present? Record number of days 30   H2 In the past 30 days, for how many days were you totally unable to carry out your usual activities or work because of any health condition? Record number of days  0   H3 In the past 30 days, not counting the days that you were totally unable, for how many days did you cut back or reduce your usual activities or work because of any health condition? Record number of days 3       Referral / Collaboration:  Referral to  another professional/service is not indicated at this time..    Anticipated number of session or this episode of care: 18-24      MeasurableTreatment Goal(s) related to diagnosis / functional impairment(s)  Goal-Emotional regulation: Client will effectively manage mood dysregulation    I will know I've met my goal when I am feeling less out of control.      Objective #A (Client Action)    Status: New - Date: 1/28/2016     Client will learn at least 3 mindfulness skills and practice one daily    Intervention(s)  Therapist will provide mindfulness training, psychoeducation, behavioral activation, and cognitive restructuring.    Objective #B  Client will use at least 3 coping skills for anxiety management in the next 12 weeks.    Status: New - Date: 1/28/2016     Intervention(s)  Therapist will provide psychoeducation, behavioral activation, and cognitive restructuring.      Objective #C  Client will identify three distraction and diversion activities and use those activities to improve distress tolerance and emotional regulation.  Status: New - Date: 1/28/2016     Intervention(s)  Therapist will provide psychoeducation, behavioral activation, and cognitive restructuring.    MeasurableTreatment Goal(s) related to diagnosis / functional impairment(s)            Goal-Depression: Client will decrease depressed mood    I will know I've met my goal when I am less depressed.      Objective #A (Client Action)    Status: New - Date: 2/20/2017    Client will use identified behavioral and cognitive skills to challenge negative self talk 90% of the time.    Intervention(s)  Therapist will provide psychoeducation, behavioral activation, and cognitive restructuring.      Objective #B  Client will complete at least 10 minutes of self-regulation practice (e.g.: yoga, meditation, relaxation breathing, etc.) per day.    Status: New - Date: 2/20/2017    Intervention(s)  Therapist will provide psychoeducation, behavioral activation, and  cognitive restructuring.      Objective #C  Client will exercise 30 minutes 36 times in the next 12 weeks.  Status: New - Date: 2/20/2017    Intervention(s)  Therapist will provide psychoeducation, behavioral activation, and cognitive restructuring.               Client has reviewed and agreed to the above plan.      Justo Dawson, [facility]  February 20, 2017

## 2017-06-05 ENCOUNTER — OFFICE VISIT (OUTPATIENT)
Dept: BEHAVIORAL HEALTH | Facility: CLINIC | Age: 32
End: 2017-06-05
Payer: COMMERCIAL

## 2017-06-05 DIAGNOSIS — F31.81 BIPOLAR 2 DISORDER (H): Primary | ICD-10-CM

## 2017-06-05 PROCEDURE — 90834 PSYTX W PT 45 MINUTES: CPT | Performed by: SOCIAL WORKER

## 2017-06-05 NOTE — MR AVS SNAPSHOT
MRN:[id number]                      After Visit Summary   6/5/2017    Ramiro Najera    MRN: [id number]           Visit Information        Provider Department      6/5/2017 8:30 AM Marcos Joseph, [facility] Generic      Your next 10 appointments already scheduled     Jun 20, 2017 12:30 PM CDT   [facility]    [address] [place] [id number] [phone]              MyChart Information     [employer] gives you secure access to your electronic health record. If you see a primary care provider, you can also send messages to your care team and make appointments. If you have questions, please call your primary care clinic.  If you do not have a primary care provider, please call [phone] and they will assist you.        Care EveryWhere ID     This is your Care EveryWhere ID. This could be used by other organizations to access your [place] medical records  [id number]

## 2017-06-05 NOTE — PROGRESS NOTES
"                                             Progress Note    Client Name: Ramiro Najera  Date: 6/5/2017          Service Type: Individual      Session Start Time: 830  Session End Time: 915      Session Length: 45     Session #: 8     Attendees: Client attended alone    Treatment Plan Last Reviewed: 6/5/2017   PHQ-9 / DEANNA-7 :      DATA      Progress Since Last Session (Related to Symptoms / Goals / Homework):   Symptoms: Stable    Homework: Did not complete      Episode of Care Goals: Satisfactory progress - PREPARATION (Decided to change - considering how); Intervened by negotiating a change plan and determining options / strategies for behavior change, identifying triggers, exploring social supports, and working towards setting a date to begin behavior change     Current / Ongoing Stressors and Concerns:      Things have been \"not great, have felt myself sliding into a depressive mood.\"  Not sure what has triggered it, but he thinks that work stress and stress in her relationship with Stacy have been both a part of it.  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  Basically argues everytime he sees Stacy, he is starting to realize just how \"abusive\" this relationship can be.                   Treatment Objective(s) Addressed in This Session:   Client will complete at least 10 minutes of self-regulation practice (e.g.: yoga, meditation, relaxation breathing, etc.) per day.     Client will use identified behavioral and cognitive skills to challenge negative self talk 90% of the time.     Intervention:     CBT     Discussed physical, mental, and emotional boundaries and limit-setting with others. Explored management of boundaries through direct communication and limiting contact and communication with others.  Discussed barriers to using boundary management skills including " strong emotions and physical proximity.  Client given handout on boundaries today in session.         ASSESSMENT: Current Emotional / Mental Status (status of significant symptoms):   Risk status (Self / Other harm or suicidal ideation)   Client denies current fears or concerns for personal safety.   Client denies current or recent suicidal ideation or behaviors.   Client denies current or recent homicidal ideation or behaviors.   Client denies current or recent self injurious behavior or ideation.   Client denies other safety concerns.   A safety and risk management plan has not been developed at this time, however client was given the after-hours number / 911 should there be a change in any of these risk factors.     Appearance:   Appropriate    Eye Contact:   Good    Psychomotor Behavior: Retarded (Slowed)    Attitude:   Cooperative    Orientation:   All   Speech    Rate / Production: Monotone     Volume:  Normal    Mood:    Depressed    Affect:    Appropriate    Thought Content:  Clear    Thought Form:  Coherent  Logical    Insight:    Good      Medication Review:   No current psychiatric medications prescribed     Medication Compliance:   NA     Changes in Health Issues:   None reported     Chemical Use Review:   Substance Use: Chemical use reviewed, no active concerns identified      Tobacco Use: No current tobacco use.       Collateral Reports Completed:   Not Applicable    PLAN: (Client Tasks / Therapist Tasks / Other)  1.  Client will use one boundary management skill (i.e., direct communication, saying no to a request) by next session.    2.  Meet in two weeks        YUNIOR Bustos                                                         ________________________________________________________________________    Treatment Plan    Client's Name: Ramiro Najera  YOB: 1985    Date: 2/20/2017     DSM5 Diagnoses: (Sustained by DSM5 Criteria Listed Above)  Diagnoses: 296.89 Bipolar II  Disorder With anxious distress        PTSD  Psychosocial & Contextual Factors: parents' health problems  WHODAS 2.0 (12 item)            This questionnaire asks about difficulties due to health conditions. Health conditions  include  disease or illnesses, other health problems that may be short or long lasting,  injuries, mental health or emotional problems, and problems with alcohol or drugs.                     Think back over the past 30 days and answer these questions, thinking about how much  difficulty you had doing the following activities. For each question, please [place] only  one response.    S1 Standing for long periods such as 30 minutes? None =         1   S2 Taking care of household responsibilities? Mild =           2   S3 Learning a new task, for example, learning how to get to a new place? None =         1   S4 How much of a problem do you have joining community activities (for example, festivals, Hinduism or other activities) in the same way as anyone else can? Moderate =   3   S5 How much have you been emotionally affected by your health problems? Moderate =   3     In the past 30 days, how much difficulty did you have in:   S6 Concentrating on doing something for ten minutes? Mild =           2   S7 Walking a long distance such as a kilometer (or equivalent)? None =         1   S8 Washing your whole body? None =         1   S9 Getting dressed? None =         1   S10 Dealing with people you do not know? None =         1   S11 Maintaining a friendship? Moderate =   3   S12 Your day to day work? None =         1     H1 Overall, in the past 30 days, how many days were these difficulties present? Record number of days 30   H2 In the past 30 days, for how many days were you totally unable to carry out your usual activities or work because of any health condition? Record number of days  0   H3 In the past 30 days, not counting the days that you were totally unable, for how many days did you cut back or  reduce your usual activities or work because of any health condition? Record number of days 3       Referral / Collaboration:  Referral to another professional/service is not indicated at this time..    Anticipated number of session or this episode of care: 18-24      MeasurableTreatment Goal(s) related to diagnosis / functional impairment(s)  Goal-Emotional regulation: Client will effectively manage mood dysregulation    I will know I've met my goal when I am feeling less out of control.      Objective #A (Client Action)    Status: continued- Date: 6/5/2017     Client will learn at least 3 mindfulness skills and practice one daily    Intervention(s)  Therapist will provide mindfulness training, psychoeducation, behavioral activation, and cognitive restructuring.    Objective #B  Client will use at least 3 coping skills for anxiety management in the next 12 weeks.    Status: continued- Date: 6/5/2017     Intervention(s)  Therapist will provide psychoeducation, behavioral activation, and cognitive restructuring.      Objective #C  Client will identify three distraction and diversion activities and use those activities to improve distress tolerance and emotional regulation.  Status: continued- Date: 6/5/2017     Intervention(s)  Therapist will provide psychoeducation, behavioral activation, and cognitive restructuring.    MeasurableTreatment Goal(s) related to diagnosis / functional impairment(s)            Goal-Depression: Client will decrease depressed mood    I will know I've met my goal when I am less depressed.      Objective #A (Client Action)    Status: New - Date: 2/20/2017    Client will use identified behavioral and cognitive skills to challenge negative self talk 90% of the time.    Intervention(s)  Therapist will provide psychoeducation, behavioral activation, and cognitive restructuring.      Objective #B  Client will complete at least 10 minutes of self-regulation practice (e.g.: yoga, meditation,  relaxation breathing, etc.) per day.    Status: New - Date: 2/20/2017    Intervention(s)  Therapist will provide psychoeducation, behavioral activation, and cognitive restructuring.      Objective #C  Client will exercise 30 minutes 36 times in the next 12 weeks.  Status: New - Date: 2/20/2017    Intervention(s)  Therapist will provide psychoeducation, behavioral activation, and cognitive restructuring.               Client has reviewed and agreed to the above plan.      Justo Dawson, [facility]  February 20, 2017

## 2017-06-20 ENCOUNTER — OFFICE VISIT (OUTPATIENT)
Dept: BEHAVIORAL HEALTH | Facility: CLINIC | Age: 32
End: 2017-06-20
Payer: COMMERCIAL

## 2017-06-20 DIAGNOSIS — F31.81 BIPOLAR 2 DISORDER (H): Primary | ICD-10-CM

## 2017-06-20 PROCEDURE — 90834 PSYTX W PT 45 MINUTES: CPT | Performed by: SOCIAL WORKER

## 2017-06-20 NOTE — MR AVS SNAPSHOT
MRN:[id number]                      After Visit Summary   6/20/2017    Ramiro Najera    MRN: [id number]           Visit Information        Provider Department      6/20/2017 12:30 PM [facility]      Your next 10 appointments already scheduled     Jul 06, 2017  7:30 AM CDT   Return Visit with YUNIOR Olea   [facility] ([facility])    [address] [place] [id number] [phone]              MyChart Information     [employer] gives you secure access to your electronic health record. If you see a primary care provider, you can also send messages to your care team and make appointments. If you have questions, please call your primary care clinic.  If you do not have a primary care provider, please call [phone] and they will assist you.        Care EveryWhere ID     This is your Care EveryWhere ID. This could be used by other organizations to access your [place] medical records  [id number]

## 2017-06-20 NOTE — PROGRESS NOTES
"                                             Progress Note    Client Name: Ramiro Najera  Date: 6/20/2017          Service Type: Individual      Session Start Time: 830  Session End Time: 915      Session Length: 45     Session #: 9     Attendees: Client attended alone    Treatment Plan Last Reviewed: 6/5/2017   PHQ-9 / DEANNA-7 :      DATA      Progress Since Last Session (Related to Symptoms / Goals / Homework):   Symptoms: Stable    Homework: Did not complete      Episode of Care Goals: Satisfactory progress - PREPARATION (Decided to change - considering how); Intervened by negotiating a change plan and determining options / strategies for behavior change, identifying triggers, exploring social supports, and working towards setting a date to begin behavior change     Current / Ongoing Stressors and Concerns:      Things have been \"depressed.\"  Has not gotten in the way of work at this point, does feel \"drained and out of sorts on my days off.\"  No changes in medication, chemical use, or medical diagnosis.    Discussed the work of KIM Mathew from [facility] and highlighted \"3 Good Things\" protocol for depression, anxiety, and anger.  Identified that client should spend 10 minutes writing three good things that happened during the previous day, identify their role in these things, and list the emotional effects of these three good things.  Reviewed the research on the effectiveness of this practice and set a goal for client to follow it for two weeks and to log the results.                      Treatment Objective(s) Addressed in This Session:   Client will complete at least 10 minutes of self-regulation practice (e.g.: yoga, meditation, relaxation breathing, etc.) per day.     Client will use identified behavioral and cognitive skills to challenge negative self talk 90% of the time.     Intervention:     CBT     Discussed physical, mental, and emotional boundaries and limit-setting with others. Explored " management of boundaries through direct communication and limiting contact and communication with others.  Discussed barriers to using boundary management skills including strong emotions and physical proximity.  Client given handout on boundaries today in session.         ASSESSMENT: Current Emotional / Mental Status (status of significant symptoms):   Risk status (Self / Other harm or suicidal ideation)   Client denies current fears or concerns for personal safety.   Client denies current or recent suicidal ideation or behaviors.   Client denies current or recent homicidal ideation or behaviors.   Client denies current or recent self injurious behavior or ideation.   Client denies other safety concerns.   A safety and risk management plan has not been developed at this time, however client was given the after-hours number / 911 should there be a change in any of these risk factors.     Appearance:   Appropriate    Eye Contact:   Good    Psychomotor Behavior: Retarded (Slowed)    Attitude:   Cooperative    Orientation:   All   Speech    Rate / Production: Monotone     Volume:  Normal    Mood:    Depressed    Affect:    Appropriate    Thought Content:  Clear    Thought Form:  Coherent  Logical    Insight:    Good      Medication Review:   No current psychiatric medications prescribed     Medication Compliance:   NA     Changes in Health Issues:   None reported     Chemical Use Review:   Substance Use: Chemical use reviewed, no active concerns identified      Tobacco Use: No current tobacco use.       Collateral Reports Completed:   Not Applicable    PLAN: (Client Tasks / Therapist Tasks / Other)  1.  Client will use one boundary management skill (i.e., direct communication, saying no to a request) by next session.    2.  3 good things    3.  Meet in two weeks        YUNIOR Bustos                                                          ________________________________________________________________________    Treatment Plan    Client's Name: Ramiro Najera  YOB: 1985    Date: 2/20/2017     DSM5 Diagnoses: (Sustained by DSM5 Criteria Listed Above)  Diagnoses: 296.89 Bipolar II Disorder With anxious distress        PTSD  Psychosocial & Contextual Factors: parents' health problems  WHODAS 2.0 (12 item)            This questionnaire asks about difficulties due to health conditions. Health conditions  include  disease or illnesses, other health problems that may be short or long lasting,  injuries, mental health or emotional problems, and problems with alcohol or drugs.                     Think back over the past 30 days and answer these questions, thinking about how much  difficulty you had doing the following activities. For each question, please [place] only  one response.    S1 Standing for long periods such as 30 minutes? None =         1   S2 Taking care of household responsibilities? Mild =           2   S3 Learning a new task, for example, learning how to get to a new place? None =         1   S4 How much of a problem do you have joining community activities (for example, festivals, [employer] or other activities) in the same way as anyone else can? Moderate =   3   S5 How much have you been emotionally affected by your health problems? Moderate =   3     In the past 30 days, how much difficulty did you have in:   S6 Concentrating on doing something for ten minutes? Mild =           2   S7 Walking a long distance such as a kilometer (or equivalent)? None =         1   S8 Washing your whole body? None =         1   S9 Getting dressed? None =         1   S10 Dealing with people you do not know? None =         1   S11 Maintaining a friendship? Moderate =   3   S12 Your day to day work? None =         1     H1 Overall, in the past 30 days, how many days were these difficulties present? Record number of days 30   H2 In the past  30 days, for how many days were you totally unable to carry out your usual activities or work because of any health condition? Record number of days  0   H3 In the past 30 days, not counting the days that you were totally unable, for how many days did you cut back or reduce your usual activities or work because of any health condition? Record number of days 3       Referral / Collaboration:  Referral to another professional/service is not indicated at this time..    Anticipated number of session or this episode of care: 18-24      MeasurableTreatment Goal(s) related to diagnosis / functional impairment(s)  Goal-Emotional regulation: Client will effectively manage mood dysregulation    I will know I've met my goal when I am feeling less out of control.      Objective #A (Client Action)    Status: continued- Date: 6/5/2017     Client will learn at least 3 mindfulness skills and practice one daily    Intervention(s)  Therapist will provide mindfulness training, psychoeducation, behavioral activation, and cognitive restructuring.    Objective #B  Client will use at least 3 coping skills for anxiety management in the next 12 weeks.    Status: continued- Date: 6/5/2017     Intervention(s)  Therapist will provide psychoeducation, behavioral activation, and cognitive restructuring.      Objective #C  Client will identify three distraction and diversion activities and use those activities to improve distress tolerance and emotional regulation.  Status: continued- Date: 6/5/2017     Intervention(s)  Therapist will provide psychoeducation, behavioral activation, and cognitive restructuring.    MeasurableTreatment Goal(s) related to diagnosis / functional impairment(s)            Goal-Depression: Client will decrease depressed mood    I will know I've met my goal when I am less depressed.      Objective #A (Client Action)    Status: New - Date: 2/20/2017    Client will use identified behavioral and cognitive skills to challenge  negative self talk 90% of the time.    Intervention(s)  Therapist will provide psychoeducation, behavioral activation, and cognitive restructuring.      Objective #B  Client will complete at least 10 minutes of self-regulation practice (e.g.: yoga, meditation, relaxation breathing, etc.) per day.    Status: New - Date: 2/20/2017    Intervention(s)  Therapist will provide psychoeducation, behavioral activation, and cognitive restructuring.      Objective #C  Client will exercise 30 minutes 36 times in the next 12 weeks.  Status: New - Date: 2/20/2017    Intervention(s)  Therapist will provide psychoeducation, behavioral activation, and cognitive restructuring.               Client has reviewed and agreed to the above plan.      Justo Dawson, LICSW  February 20, 2017

## 2017-07-06 ENCOUNTER — OFFICE VISIT (OUTPATIENT)
Dept: BEHAVIORAL HEALTH | Facility: CLINIC | Age: 32
End: 2017-07-06
Payer: COMMERCIAL

## 2017-07-06 DIAGNOSIS — F31.81 BIPOLAR 2 DISORDER (H): Primary | ICD-10-CM

## 2017-07-06 PROCEDURE — 90834 PSYTX W PT 45 MINUTES: CPT | Performed by: SOCIAL WORKER

## 2017-07-06 NOTE — PROGRESS NOTES
"                                             Progress Note    Client Name: Ramiro Najera  Date: 7/6/2017          Service Type: Individual      Session Start Time: 730  Session End Time: 815      Session Length: 45     Session #: 10     Attendees: Client attended alone    Treatment Plan Last Reviewed: 6/5/2017   PHQ-9 / DEANNA-7 :      DATA      Progress Since Last Session (Related to Symptoms / Goals / Homework):   Symptoms: Stable    Homework: Did not complete      Episode of Care Goals: Satisfactory progress - PREPARATION (Decided to change - considering how); Intervened by negotiating a change plan and determining options / strategies for behavior change, identifying triggers, exploring social supports, and working towards setting a date to begin behavior change     Current / Ongoing Stressors and Concerns:      Things have been \"better.\"  Mood has been \"more stable and better in general.\"  Reviewed 3 good things.  Client brings his journal to session today and we review this.  Discussed negative thinking and schemas for client and framed these as the pathology of his depression.  Identified confirmation bias as one reason that these thoughts feels normal to him, and identified mindfulness and therapuetic writing as a way to challenge these taken for granted assumptions.                      Treatment Objective(s) Addressed in This Session:   Client will complete at least 10 minutes of self-regulation practice (e.g.: yoga, meditation, relaxation breathing, etc.) per day.     Client will use identified behavioral and cognitive skills to challenge negative self talk 90% of the time.     Intervention:     CBT     Discussed physical, mental, and emotional boundaries and limit-setting with others. Explored management of boundaries through direct communication and limiting contact and communication with others.  Discussed barriers to using boundary management skills including strong emotions and physical proximity.  " Client given handout on boundaries today in session.         ASSESSMENT: Current Emotional / Mental Status (status of significant symptoms):   Risk status (Self / Other harm or suicidal ideation)   Client denies current fears or concerns for personal safety.   Client denies current or recent suicidal ideation or behaviors.   Client denies current or recent homicidal ideation or behaviors.   Client denies current or recent self injurious behavior or ideation.   Client denies other safety concerns.   A safety and risk management plan has not been developed at this time, however client was given the after-hours number / 911 should there be a change in any of these risk factors.     Appearance:   Appropriate    Eye Contact:   Good    Psychomotor Behavior: Retarded (Slowed)    Attitude:   Cooperative    Orientation:   All   Speech    Rate / Production: Monotone     Volume:  Normal    Mood:    Depressed    Affect:    Appropriate    Thought Content:  Clear    Thought Form:  Coherent  Logical    Insight:    Good      Medication Review:   No current psychiatric medications prescribed     Medication Compliance:   NA     Changes in Health Issues:   None reported     Chemical Use Review:   Substance Use: Chemical use reviewed, no active concerns identified      Tobacco Use: No current tobacco use.       Collateral Reports Completed:   Not Applicable    PLAN: (Client Tasks / Therapist Tasks / Other)  1.  Client will use one boundary management skill (i.e., direct communication, saying no to a request) by next session.    2.  3 good things    3.  Meet in two weeks        YUNIOR Bustos                                                         ________________________________________________________________________    Treatment Plan    Client's Name: Ramiro Najera  YOB: 1985    Date: 2/20/2017     DSM5 Diagnoses: (Sustained by DSM5 Criteria Listed Above)  Diagnoses: 296.89 Bipolar II Disorder With  anxious distress        PTSD  Psychosocial & Contextual Factors: parents' health problems  WHODAS 2.0 (12 item)            This questionnaire asks about difficulties due to health conditions. Health conditions  include  disease or illnesses, other health problems that may be short or long lasting,  injuries, mental health or emotional problems, and problems with alcohol or drugs.                     Think back over the past 30 days and answer these questions, thinking about how much  difficulty you had doing the following activities. For each question, please [place] only  one response.    S1 Standing for long periods such as 30 minutes? None =         1   S2 Taking care of household responsibilities? Mild =           2   S3 Learning a new task, for example, learning how to get to a new place? None =         1   S4 How much of a problem do you have joining community activities (for example, festivals, [employer] or other activities) in the same way as anyone else can? Moderate =   3   S5 How much have you been emotionally affected by your health problems? Moderate =   3     In the past 30 days, how much difficulty did you have in:   S6 Concentrating on doing something for ten minutes? Mild =           2   S7 Walking a long distance such as a kilometer (or equivalent)? None =         1   S8 Washing your whole body? None =         1   S9 Getting dressed? None =         1   S10 Dealing with people you do not know? None =         1   S11 Maintaining a friendship? Moderate =   3   S12 Your day to day work? None =         1     H1 Overall, in the past 30 days, how many days were these difficulties present? Record number of days 30   H2 In the past 30 days, for how many days were you totally unable to carry out your usual activities or work because of any health condition? Record number of days  0   H3 In the past 30 days, not counting the days that you were totally unable, for how many days did you cut back or reduce your  usual activities or work because of any health condition? Record number of days 3       Referral / Collaboration:  Referral to another professional/service is not indicated at this time..    Anticipated number of session or this episode of care: 18-24      MeasurableTreatment Goal(s) related to diagnosis / functional impairment(s)  Goal-Emotional regulation: Client will effectively manage mood dysregulation    I will know I've met my goal when I am feeling less out of control.      Objective #A (Client Action)    Status: continued- Date: 6/5/2017     Client will learn at least 3 mindfulness skills and practice one daily    Intervention(s)  Therapist will provide mindfulness training, psychoeducation, behavioral activation, and cognitive restructuring.    Objective #B  Client will use at least 3 coping skills for anxiety management in the next 12 weeks.    Status: continued- Date: 6/5/2017     Intervention(s)  Therapist will provide psychoeducation, behavioral activation, and cognitive restructuring.      Objective #C  Client will identify three distraction and diversion activities and use those activities to improve distress tolerance and emotional regulation.  Status: continued- Date: 6/5/2017     Intervention(s)  Therapist will provide psychoeducation, behavioral activation, and cognitive restructuring.    MeasurableTreatment Goal(s) related to diagnosis / functional impairment(s)            Goal-Depression: Client will decrease depressed mood    I will know I've met my goal when I am less depressed.      Objective #A (Client Action)    Status: New - Date: 2/20/2017    Client will use identified behavioral and cognitive skills to challenge negative self talk 90% of the time.    Intervention(s)  Therapist will provide psychoeducation, behavioral activation, and cognitive restructuring.      Objective #B  Client will complete at least 10 minutes of self-regulation practice (e.g.: yoga, meditation, relaxation  breathing, etc.) per day.    Status: New - Date: 2/20/2017    Intervention(s)  Therapist will provide psychoeducation, behavioral activation, and cognitive restructuring.      Objective #C  Client will exercise 30 minutes 36 times in the next 12 weeks.  Status: New - Date: 2/20/2017    Intervention(s)  Therapist will provide psychoeducation, behavioral activation, and cognitive restructuring.               Client has reviewed and agreed to the above plan.      Justo Dawson, MaineGeneral Medical CenterSW  February 20, 2017

## 2017-07-06 NOTE — MR AVS SNAPSHOT
MRN:[id number]                      After Visit Summary   7/6/2017    Ramiro Najera    MRN: [id number]           Visit Information        Provider Department      7/6/2017 7:30 AM Marcos Joseph, YUNIOR [facility]      Your next 10 appointments already scheduled     Jul 17, 2017  7:30 AM CDT   Return Visit with YUNIOR Olea   [facility] ([facility])    [address] [place] [address] [id number] [phone]              MyChart Information     [employer] gives you secure access to your electronic health record. If you see a primary care provider, you can also send messages to your care team and make appointments. If you have questions, please call your primary care clinic.  If you do not have a primary care provider, please call [phone] and they will assist you.        Care EveryWhere ID     This is your Care EveryWhere ID. This could be used by other organizations to access your [place] medical records  [id number]        Equal Access to Services     CLAUS JOSEPH AH: Hadii aad ku hadasho Sojocelynali, waaxda luqadaha, qaybta kaalmada adeegyagladys, loly brown. So [facility] [phone].    ATENCIÓN: Si habla español, tiene a [place] disposición servicios gratuitos de asistencia lingüística. [facility] [phone].    We comply with applicable federal civil rights laws and [place] laws. We do not discriminate on the basis of race, color, national origin, age, disability sex, sexual orientation or gender identity.

## 2017-07-17 ENCOUNTER — OFFICE VISIT (OUTPATIENT)
Dept: BEHAVIORAL HEALTH | Facility: CLINIC | Age: 32
End: 2017-07-17
Payer: COMMERCIAL

## 2017-07-17 DIAGNOSIS — F31.81 BIPOLAR 2 DISORDER (H): Primary | ICD-10-CM

## 2017-07-17 PROCEDURE — 90834 PSYTX W PT 45 MINUTES: CPT | Performed by: SOCIAL WORKER

## 2017-07-17 NOTE — MR AVS SNAPSHOT
MRN:[id number]                      After Visit Summary   7/17/2017    Ramiro Najera    MRN: [id number]           Visit Information        Provider Department      7/17/2017 7:30 AM Marcos Joseph, YUNIOR [facility]      Your next 10 appointments already scheduled     Jul 31, 2017  7:30 AM CDT   Return Visit with YUNIOR Olea   [facility] ([facility])    [address] [place] [address] [phone]              MyChart Information     [employer] gives you secure access to your electronic health record. If you see a primary care provider, you can also send messages to your care team and make appointments. If you have questions, please call your primary care clinic.  If you do not have a primary care provider, please call [phone] and they will assist you.        Care EveryWhere ID     This is your Care EveryWhere ID. This could be used by other organizations to access your [place] medical records  [id number]        Equal Access to Services     CLAUS JOSEPH AH: Hadii aad ku hadasho Sojocelynali, waaxda luqadaha, qaybta kaalmada adeegyagladys, loly brown. So [facility] [phone].    ATENCIÓN: Si habla español, tiene a moffett disposición servicios gratuitos de asistencia lingüística. [facility] [phone].    We comply with applicable federal civil rights laws and [place] laws. We do not discriminate on the basis of race, color, national origin, age, disability sex, sexual orientation or gender identity.

## 2017-07-17 NOTE — PROGRESS NOTES
"                                             Progress Note    Client Name: Ramiro Najera  Date: 7/17/2017          Service Type: Individual      Session Start Time: 730  Session End Time: 815      Session Length: 45     Session #: 11     Attendees: Client attended alone    Treatment Plan Last Reviewed: 6/5/2017   PHQ-9 / DEANNA-7 :      DATA      Progress Since Last Session (Related to Symptoms / Goals / Homework):   Symptoms: Stable    Homework: Did not complete      Episode of Care Goals: Satisfactory progress - PREPARATION (Decided to change - considering how); Intervened by negotiating a change plan and determining options / strategies for behavior change, identifying triggers, exploring social supports, and working towards setting a date to begin behavior change     Current / Ongoing Stressors and Concerns:      Things have been \"on a better more even keel.\"  Has been working on his diet, consuming less in general and eating vegetarian at least two meals per day.  Feels like he has more energy and does think that his mood is better.  Also has been working to get more exercise, has a friend who is training for a 10k and client has been running with him.  Discussed mindfulness as being aware of what we are experiencing while we are experiencing it.  Contrasted this with avoidance and rumination.  Explored the role of mindfulness as an overall coping strategy for managing depression, anxiety, and strong emotions.  Explained concept of state of mind using SIFT (sensations, images, feelings, thoughts) pneumonic.  Led client in a guided mindfulness exercise focusing on sensations, images, feelings, and thoughts.  Discussed mindfulness as a tool to intentionally shift our awareness and focus as needed.                   Treatment Objective(s) Addressed in This Session:   Client will complete at least 10 minutes of self-regulation practice (e.g.: yoga, meditation, relaxation breathing, etc.) per day.     Client will " use identified behavioral and cognitive skills to challenge negative self talk 90% of the time.     Intervention:     CBT     Discussed physical, mental, and emotional boundaries and limit-setting with others. Explored management of boundaries through direct communication and limiting contact and communication with others.  Discussed barriers to using boundary management skills including strong emotions and physical proximity.  Client given handout on boundaries today in session.         ASSESSMENT: Current Emotional / Mental Status (status of significant symptoms):   Risk status (Self / Other harm or suicidal ideation)   Client denies current fears or concerns for personal safety.   Client denies current or recent suicidal ideation or behaviors.   Client denies current or recent homicidal ideation or behaviors.   Client denies current or recent self injurious behavior or ideation.   Client denies other safety concerns.   A safety and risk management plan has not been developed at this time, however client was given the after-hours number / 911 should there be a change in any of these risk factors.     Appearance:   Appropriate    Eye Contact:   Good    Psychomotor Behavior: Retarded (Slowed)    Attitude:   Cooperative    Orientation:   All   Speech    Rate / Production: Monotone     Volume:  Normal    Mood:    Depressed    Affect:    Appropriate    Thought Content:  Clear    Thought Form:  Coherent  Logical    Insight:    Good      Medication Review:   No current psychiatric medications prescribed     Medication Compliance:   NA     Changes in Health Issues:   None reported     Chemical Use Review:   Substance Use: Chemical use reviewed, no active concerns identified      Tobacco Use: No current tobacco use.       Collateral Reports Completed:   Not Applicable    PLAN: (Client Tasks / Therapist Tasks / Other)  1.  SIFT meditation daily    2.  3 good things    3.  Meet in two weeks        Justo Dawson  LICSW                                                         ________________________________________________________________________    Treatment Plan    Client's Name: Ramiro Najera  YOB: 1985    Date: 2/20/2017     DSM5 Diagnoses: (Sustained by DSM5 Criteria Listed Above)  Diagnoses: 296.89 Bipolar II Disorder With anxious distress        PTSD  Psychosocial & Contextual Factors: parents' health problems  WHODAS 2.0 (12 item)            This questionnaire asks about difficulties due to health conditions. Health conditions  include  disease or illnesses, other health problems that may be short or long lasting,  injuries, mental health or emotional problems, and problems with alcohol or drugs.                     Think back over the past 30 days and answer these questions, thinking about how much  difficulty you had doing the following activities. For each question, please [place] only  one response.    S1 Standing for long periods such as 30 minutes? None =         1   S2 Taking care of household responsibilities? Mild =           2   S3 Learning a new task, for example, learning how to get to a new place? None =         1   S4 How much of a problem do you have joining community activities (for example, festivals, Yazidism or other activities) in the same way as anyone else can? Moderate =   3   S5 How much have you been emotionally affected by your health problems? Moderate =   3     In the past 30 days, how much difficulty did you have in:   S6 Concentrating on doing something for ten minutes? Mild =           2   S7 Walking a long distance such as a kilometer (or equivalent)? None =         1   S8 Washing your whole body? None =         1   S9 Getting dressed? None =         1   S10 Dealing with people you do not know? None =         1   S11 Maintaining a friendship? Moderate =   3   S12 Your day to day work? None =         1     H1 Overall, in the past 30 days, how many days were these  difficulties present? Record number of days 30   H2 In the past 30 days, for how many days were you totally unable to carry out your usual activities or work because of any health condition? Record number of days  0   H3 In the past 30 days, not counting the days that you were totally unable, for how many days did you cut back or reduce your usual activities or work because of any health condition? Record number of days 3       Referral / Collaboration:  Referral to another professional/service is not indicated at this time..    Anticipated number of session or this episode of care: 18-24      MeasurableTreatment Goal(s) related to diagnosis / functional impairment(s)  Goal-Emotional regulation: Client will effectively manage mood dysregulation    I will know I've met my goal when I am feeling less out of control.      Objective #A (Client Action)    Status: continued- Date: 6/5/2017     Client will learn at least 3 mindfulness skills and practice one daily    Intervention(s)  Therapist will provide mindfulness training, psychoeducation, behavioral activation, and cognitive restructuring.    Objective #B  Client will use at least 3 coping skills for anxiety management in the next 12 weeks.    Status: continued- Date: 6/5/2017     Intervention(s)  Therapist will provide psychoeducation, behavioral activation, and cognitive restructuring.      Objective #C  Client will identify three distraction and diversion activities and use those activities to improve distress tolerance and emotional regulation.  Status: continued- Date: 6/5/2017     Intervention(s)  Therapist will provide psychoeducation, behavioral activation, and cognitive restructuring.    MeasurableTreatment Goal(s) related to diagnosis / functional impairment(s)            Goal-Depression: Client will decrease depressed mood    I will know I've met my goal when I am less depressed.      Objective #A (Client Action)    Status: New - Date: 2/20/2017    Client  will use identified behavioral and cognitive skills to challenge negative self talk 90% of the time.    Intervention(s)  Therapist will provide psychoeducation, behavioral activation, and cognitive restructuring.      Objective #B  Client will complete at least 10 minutes of self-regulation practice (e.g.: yoga, meditation, relaxation breathing, etc.) per day.    Status: New - Date: 2/20/2017    Intervention(s)  Therapist will provide psychoeducation, behavioral activation, and cognitive restructuring.      Objective #C  Client will exercise 30 minutes 36 times in the next 12 weeks.  Status: New - Date: 2/20/2017    Intervention(s)  Therapist will provide psychoeducation, behavioral activation, and cognitive restructuring.                Client has reviewed and agreed to the above plan.      Justo Dawson, [facility]  February 20, 2017

## 2017-07-31 ENCOUNTER — OFFICE VISIT (OUTPATIENT)
Dept: BEHAVIORAL HEALTH | Facility: CLINIC | Age: 32
End: 2017-07-31
Payer: COMMERCIAL

## 2017-07-31 DIAGNOSIS — F31.81 BIPOLAR 2 DISORDER (H): Primary | ICD-10-CM

## 2017-07-31 PROCEDURE — 90834 PSYTX W PT 45 MINUTES: CPT | Performed by: SOCIAL WORKER

## 2017-07-31 NOTE — PROGRESS NOTES
"                                             Progress Note    Client Name: Ramiro Najera  Date: 7/31/2017          Service Type: Individual      Session Start Time: 730  Session End Time: 815      Session Length: 45     Session #: 12     Attendees: Client attended alone    Treatment Plan Last Reviewed: 6/5/2017   PHQ-9 / DEANNA-7 :      DATA      Progress Since Last Session (Related to Symptoms / Goals / Homework):   Symptoms: Stable    Homework: Did not complete      Episode of Care Goals: Satisfactory progress - PREPARATION (Decided to change - considering how); Intervened by negotiating a change plan and determining options / strategies for behavior change, identifying triggers, exploring social supports, and working towards setting a date to begin behavior change     Current / Ongoing Stressors and Concerns:      Things have been \"pretty good.\"  Reports that mood has been improved.  Has kept up with changes in his diet and exercise.  Has noticed that he has more energy these days.  Discussed mindfulness as being aware of what we are experiencing while we are experiencing it.  Contrasted this with avoidance and rumination.  Explored the role of mindfulness as an overall coping strategy for managing depression, anxiety, and strong emotions.  Explained concept of state of mind using SIFT (sensations, images, feelings, thoughts) pneumonic.  Led client in a guided mindfulness exercise focusing on sensations, images, feelings, and thoughts.  Discussed mindfulness as a tool to intentionally shift our awareness and focus as needed.                   Treatment Objective(s) Addressed in This Session:   Client will complete at least 10 minutes of self-regulation practice (e.g.: yoga, meditation, relaxation breathing, etc.) per day.     Client will use identified behavioral and cognitive skills to challenge negative self talk 90% of the time.     Intervention:     CBT     Discussed physical, mental, and emotional boundaries " and limit-setting with others. Explored management of boundaries through direct communication and limiting contact and communication with others.  Discussed barriers to using boundary management skills including strong emotions and physical proximity.  Client given handout on boundaries today in session.         ASSESSMENT: Current Emotional / Mental Status (status of significant symptoms):   Risk status (Self / Other harm or suicidal ideation)   Client denies current fears or concerns for personal safety.   Client denies current or recent suicidal ideation or behaviors.   Client denies current or recent homicidal ideation or behaviors.   Client denies current or recent self injurious behavior or ideation.   Client denies other safety concerns.   A safety and risk management plan has not been developed at this time, however client was given the after-hours number / 911 should there be a change in any of these risk factors.     Appearance:   Appropriate    Eye Contact:   Good    Psychomotor Behavior: Retarded (Slowed)    Attitude:   Cooperative    Orientation:   All   Speech    Rate / Production: Monotone     Volume:  Normal    Mood:    Depressed    Affect:    Appropriate    Thought Content:  Clear    Thought Form:  Coherent  Logical    Insight:    Good      Medication Review:   No current psychiatric medications prescribed     Medication Compliance:   NA     Changes in Health Issues:   None reported     Chemical Use Review:   Substance Use: Chemical use reviewed, no active concerns identified      Tobacco Use: No current tobacco use.       Collateral Reports Completed:   Not Applicable    PLAN: (Client Tasks / Therapist Tasks / Other)  1.  SIFT meditation daily    2.  3 good things    3.  Meet in two weeks        YUNIOR Bustos                                                         ________________________________________________________________________    Treatment Plan    Client's Name: Ramiro L  Reinaldo  YOB: 1985    Date: 2/20/2017     DSM5 Diagnoses: (Sustained by DSM5 Criteria Listed Above)  Diagnoses: 296.89 Bipolar II Disorder With anxious distress        PTSD  Psychosocial & Contextual Factors: parents' health problems  WHODAS 2.0 (12 item)            This questionnaire asks about difficulties due to health conditions. Health conditions  include  disease or illnesses, other health problems that may be short or long lasting,  injuries, mental health or emotional problems, and problems with alcohol or drugs.                     Think back over the past 30 days and answer these questions, thinking about how much  difficulty you had doing the following activities. For each question, please [place] only  one response.    S1 Standing for long periods such as 30 minutes? None =         1   S2 Taking care of household responsibilities? Mild =           2   S3 Learning a new task, for example, learning how to get to a new place? None =         1   S4 How much of a problem do you have joining community activities (for example, festivals, L[employer]-day or other activities) in the same way as anyone else can? Moderate =   3   S5 How much have you been emotionally affected by your health problems? Moderate =   3     In the past 30 days, how much difficulty did you have in:   S6 Concentrating on doing something for ten minutes? Mild =           2   S7 Walking a long distance such as a kilometer (or equivalent)? None =         1   S8 Washing your whole body? None =         1   S9 Getting dressed? None =         1   S10 Dealing with people you do not know? None =         1   S11 Maintaining a friendship? Moderate =   3   S12 Your day to day work? None =         1     H1 Overall, in the past 30 days, how many days were these difficulties present? Record number of days 30   H2 In the past 30 days, for how many days were you totally unable to carry out your usual activities or work because of any health  condition? Record number of days  0   H3 In the past 30 days, not counting the days that you were totally unable, for how many days did you cut back or reduce your usual activities or work because of any health condition? Record number of days 3       Referral / Collaboration:  Referral to another professional/service is not indicated at this time..    Anticipated number of session or this episode of care: 18-24      MeasurableTreatment Goal(s) related to diagnosis / functional impairment(s)  Goal-Emotional regulation: Client will effectively manage mood dysregulation    I will know I've met my goal when I am feeling less out of control.      Objective #A (Client Action)    Status: continued- Date: 6/5/2017     Client will learn at least 3 mindfulness skills and practice one daily    Intervention(s)  Therapist will provide mindfulness training, psychoeducation, behavioral activation, and cognitive restructuring.    Objective #B  Client will use at least 3 coping skills for anxiety management in the next 12 weeks.    Status: continued- Date: 6/5/2017     Intervention(s)  Therapist will provide psychoeducation, behavioral activation, and cognitive restructuring.      Objective #C  Client will identify three distraction and diversion activities and use those activities to improve distress tolerance and emotional regulation.  Status: continued- Date: 6/5/2017     Intervention(s)  Therapist will provide psychoeducation, behavioral activation, and cognitive restructuring.    MeasurableTreatment Goal(s) related to diagnosis / functional impairment(s)            Goal-Depression: Client will decrease depressed mood    I will know I've met my goal when I am less depressed.      Objective #A (Client Action)    Status: New - Date: 2/20/2017    Client will use identified behavioral and cognitive skills to challenge negative self talk 90% of the time.    Intervention(s)  Therapist will provide psychoeducation, behavioral  activation, and cognitive restructuring.      Objective #B  Client will complete at least 10 minutes of self-regulation practice (e.g.: yoga, meditation, relaxation breathing, etc.) per day.    Status: New - Date: 2/20/2017    Intervention(s)  Therapist will provide psychoeducation, behavioral activation, and cognitive restructuring.      Objective #C  Client will exercise 30 minutes 36 times in the next 12 weeks.  Status: New - Date: 2/20/2017    Intervention(s)  Therapist will provide psychoeducation, behavioral activation, and cognitive restructuring.                Client has reviewed and agreed to the above plan.      Justo Dawson, [facility]  February 20, 2017

## 2017-07-31 NOTE — MR AVS SNAPSHOT
MRN:[id number]                      After Visit Summary   7/31/2017    Ramiro Najera    MRN: [id number]           Visit Information        Provider Department      7/31/2017 7:30 AM Marcos Joseph, YUNIOR [facility]      Your next 10 appointments already scheduled     Aug 14, 2017  8:30 AM CDT   Return Visit with YUNIOR Olea   [facility] ([facility])    [address] [place] [address] [id number] [phone]              MyChart Information     [facility] gives you secure access to your electronic health record. If you see a primary care provider, you can also send messages to your care team and make appointments. If you have questions, please call your primary care clinic.  If you do not have a primary care provider, please call [phone] and they will assist you.        Care EveryWhere ID     This is your Care EveryWhere ID. This could be used by other organizations to access your [place] medical records  [id number]        Equal Access to Services     CLAUS JOSEPH AH: Hadii aad ku hadasho Sodeanne, waaxda luqadaha, qaybta kaalmada adeegyagladys, loly brown. So [facility] [phone].    ATENCIÓN: Si habla español, tiene a [place] disposición servicios gratuitos de asistencia lingüística. [facility] [phone].    We comply with applicable federal civil rights laws and [place] laws. We do not discriminate on the basis of race, color, national origin, age, disability sex, sexual orientation or gender identity.

## 2017-08-14 ENCOUNTER — OFFICE VISIT (OUTPATIENT)
Dept: BEHAVIORAL HEALTH | Facility: CLINIC | Age: 32
End: 2017-08-14
Payer: COMMERCIAL

## 2017-08-14 DIAGNOSIS — F31.81 BIPOLAR 2 DISORDER (H): Primary | ICD-10-CM

## 2017-08-14 PROCEDURE — 90834 PSYTX W PT 45 MINUTES: CPT | Performed by: SOCIAL WORKER
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
## 2017-08-14 NOTE — MR AVS SNAPSHOT
MRN:[id number]                      After Visit Summary   8/14/2017    Ramiro Najera    MRN: [id number]           Visit Information        Provider Department      8/14/2017 8:30 AM Marcos Joseph, YUNIOR [facility] Generic      Your next 10 appointments already scheduled     Sep 11, 2017  7:30 AM CDT   Return Visit with YUNIOR Olea   [facility] ([facility])    [address] [place] [address] [phone]              MyChart Information     [employer] gives you secure access to your electronic health record. If you see a primary care provider, you can also send messages to your care team and make appointments. If you have questions, please call your primary care clinic.  If you do not have a primary care provider, please call [phone] and they will assist you.        Care EveryWhere ID     This is your Care EveryWhere ID. This could be used by other organizations to access your [facility] medical records  [id number]        Equal Access to Services     CLAUS JOSEPH AH: Hadii aad ku hadasho Sojocelynali, waaxda luqadaha, qaybta kaalmada adeegyagladys, loly brown. So [facility] [phone].    ATENCIÓN: Si habla español, tiene a [place]ett disposición servicios gratuitos de asistencia lingüística. [facility] [phone].    We comply with applicable federal civil rights laws and [place] laws. We do not discriminate on the basis of race, color, national origin, age, disability sex, sexual orientation or gender identity.

## 2017-11-06 ENCOUNTER — OFFICE VISIT (OUTPATIENT)
Dept: BEHAVIORAL HEALTH | Facility: CLINIC | Age: 32
End: 2017-11-06
Payer: COMMERCIAL

## 2017-11-06 DIAGNOSIS — F31.81 BIPOLAR 2 DISORDER (H): Primary | ICD-10-CM

## 2017-11-06 PROCEDURE — 90834 PSYTX W PT 45 MINUTES: CPT | Performed by: SOCIAL WORKER

## 2017-11-06 NOTE — MR AVS SNAPSHOT
MRN:[id number]                      After Visit Summary   11/6/2017    Ramiro Najera    MRN: [id number]           Visit Information        Provider Department      11/6/2017 7:30 AM Marcos Joseph, [facility] Generic      Your next 10 appointments already scheduled     Nov 20, 2017  9:30 AM CST   Return Visit with Marcos Joseph [facility] ([address] [phone]            Dec 04, 2017  7:30 AM CST   Return Visit with Marcos Joseph [facility] ([address] [phone]              MyChart Information     [employer] gives you secure access to your electronic health record. If you see a primary care provider, you can also send messages to your care team and make appointments. If you have questions, please call your primary care clinic.  If you do not have a primary care provider, please call [phone] and they will assist you.        Care EveryWhere ID     This is your Care EveryWhere ID. This could be used by other organizations to access your [facility] medical records  [id number]        Equal Access to Services     CLAUS JOSEPH : Ann holmano Sodeanne, waaxda luqadaha, qaybta kaalmada ike, loly brown. So [facility] [phone].    ATENCIÓN: Si habla español, tiene a [place]offett disposición servicios gratuitos de asistencia lingüística. Llame al [phone].    We comply with applicable federal civil rights laws and [place] laws. We do not discriminate on the basis of race, color, national origin, age, disability, sex, sexual orientation, or gender identity.

## 2017-11-06 NOTE — PROGRESS NOTES
"                                             Progress Note    Client Name: Ramiro Najera  Date: 11/6/2017          Service Type: Individual      Session Start Time: 730  Session End Time: 815      Session Length: 38-52     Session #: 14     Attendees: Client attended alone    Treatment Plan Last Reviewed: 11/6/2017   PHQ-9 / DEANNA-7 :      DATA      Progress Since Last Session (Related to Symptoms / Goals / Homework):   Symptoms: Stable    Homework: Partially completed   1.  SIFT meditation daily   2.  3 good things   3.  Meet in two weeks     Episode of Care Goals: Satisfactory progress - PREPARATION (Decided to change - considering how); Intervened by negotiating a change plan and determining options / strategies for behavior change, identifying triggers, exploring social supports, and working towards setting a date to begin behavior change     Current / Ongoing Stressors and Concerns:      First meeting since August.  Things have been \"crazy busy with work, too busy to do much more than just get work done.\"  Since things have calmed down at work he has noticed more depression and anxiety.  Discussed concept of schemas as habitual concepts of thinking.  Explained that schemas relate to core beliefs people have about themselves, and that they can influence our thoughts, feelings, and behaviors.  Described the connection between schemas and vulnerability to depression, anxiety, anger, personality disorders, and other mental health issues.  Provided the following examples of this: depression relate to schemas about loss, failure, rejection, and depletion; anxiety relates to schemas of threat and injury; anger relates to schemas of humiliation and domination.                           Treatment Objective(s) Addressed in This Session:   Client will complete at least 10 minutes of self-regulation practice (e.g.: yoga, meditation, relaxation breathing, etc.) per day.     Client will use identified behavioral and " cognitive skills to challenge negative self talk 90% of the time.     Intervention:     CBT     Discussed physical, mental, and emotional boundaries and limit-setting with others. Explored management of boundaries through direct communication and limiting contact and communication with others.  Discussed barriers to using boundary management skills including strong emotions and physical proximity.  Client given handout on boundaries today in session.         ASSESSMENT: Current Emotional / Mental Status (status of significant symptoms):   Risk status (Self / Other harm or suicidal ideation)   Client denies current fears or concerns for personal safety.   Client denies current or recent suicidal ideation or behaviors.   Client denies current or recent homicidal ideation or behaviors.   Client denies current or recent self injurious behavior or ideation.   Client denies other safety concerns.   A safety and risk management plan has not been developed at this time, however client was given the after-hours number / 911 should there be a change in any of these risk factors.     Appearance:   Appropriate    Eye Contact:   Good    Psychomotor Behavior: Retarded (Slowed)    Attitude:   Cooperative    Orientation:   All   Speech    Rate / Production: Monotone     Volume:  Normal    Mood:    Depressed    Affect:    Appropriate    Thought Content:  Clear    Thought Form:  Coherent  Logical    Insight:    Good      Medication Review:   No current psychiatric medications prescribed     Medication Compliance:   NA     Changes in Health Issues:   None reported     Chemical Use Review:   Substance Use: Chemical use reviewed, no active concerns identified      Tobacco Use: No current tobacco use.       Collateral Reports Completed:   Not Applicable    PLAN: (Client Tasks / Therapist Tasks / Other)  1.  SIFT meditation daily    2.  More schema work at next session    3.  Meet in two weeks        LINETTE BustosSW                                                          ________________________________________________________________________    Treatment Plan    Client's Name: Ramiro Najera  YOB: 1985    Date: 2/20/2017     DSM5 Diagnoses: (Sustained by DSM5 Criteria Listed Above)  Diagnoses: 296.89 Bipolar II Disorder With anxious distress        PTSD  Psychosocial & Contextual Factors: parents' health problems  WHODAS 2.0 (12 item)            This questionnaire asks about difficulties due to health conditions. Health conditions  include  disease or illnesses, other health problems that may be short or long lasting,  injuries, mental health or emotional problems, and problems with alcohol or drugs.                     Think back over the past 30 days and answer these questions, thinking about how much  difficulty you had doing the following activities. For each question, please [place] only  one response.    S1 Standing for long periods such as 30 minutes? None =         1   S2 Taking care of household responsibilities? Mild =           2   S3 Learning a new task, for example, learning how to get to a new place? None =         1   S4 How much of a problem do you have joining community activities (for example, festivals, [employer] or other activities) in the same way as anyone else can? Moderate =   3   S5 How much have you been emotionally affected by your health problems? Moderate =   3     In the past 30 days, how much difficulty did you have in:   S6 Concentrating on doing something for ten minutes? Mild =           2   S7 Walking a long distance such as a kilometer (or equivalent)? None =         1   S8 Washing your whole body? None =         1   S9 Getting dressed? None =         1   S10 Dealing with people you do not know? None =         1   S11 Maintaining a friendship? Moderate =   3   S12 Your day to day work? None =         1     H1 Overall, in the past 30 days, how many days were these difficulties present?  Record number of days 30   H2 In the past 30 days, for how many days were you totally unable to carry out your usual activities or work because of any health condition? Record number of days  0   H3 In the past 30 days, not counting the days that you were totally unable, for how many days did you cut back or reduce your usual activities or work because of any health condition? Record number of days 3       Referral / Collaboration:  Referral to another professional/service is not indicated at this time..    Anticipated number of session or this episode of care: 18-24      MeasurableTreatment Goal(s) related to diagnosis / functional impairment(s)  Goal-Emotional regulation: Client will effectively manage mood dysregulation    I will know I've met my goal when I am feeling less out of control.      Objective #A (Client Action)    Status: continued- Date: 11/6/2017     Client will learn at least 3 mindfulness skills and practice one daily    Intervention(s)  Therapist will provide mindfulness training, psychoeducation, behavioral activation, and cognitive restructuring.    Objective #B  Client will use at least 3 coping skills for anxiety management in the next 12 weeks.    Status: continued- Date: 11/6/2017     Intervention(s)  Therapist will provide psychoeducation, behavioral activation, and cognitive restructuring.      Objective #C  Client will identify three distraction and diversion activities and use those activities to improve distress tolerance and emotional regulation.  Status: continued- Date: 11/6/2017     Intervention(s)  Therapist will provide psychoeducation, behavioral activation, and cognitive restructuring.    MeasurableTreatment Goal(s) related to diagnosis / functional impairment(s)            Goal-Depression: Client will decrease depressed mood    I will know I've met my goal when I am less depressed.      Objective #A (Client Action)    Status: Continued- Date: 11/6/2017     Client will use  identified behavioral and cognitive skills to challenge negative self talk 90% of the time.    Intervention(s)  Therapist will provide psychoeducation, behavioral activation, and cognitive restructuring.      Objective #B  Client will complete at least 10 minutes of self-regulation practice (e.g.: yoga, meditation, relaxation breathing, etc.) per day.    Status: Continued- Date: 11/6/2017     Intervention(s)  Therapist will provide psychoeducation, behavioral activation, and cognitive restructuring.      Objective #C  Client will exercise 30 minutes 36 times in the next 12 weeks.  Status: Continued- Date: 11/6/2017     Intervention(s)  Therapist will provide psychoeducation, behavioral activation, and cognitive restructuring.                Client has reviewed and agreed to the above plan.      Justo Dawson, LINETTESW  February 20, 2017

## 2017-11-20 ENCOUNTER — OFFICE VISIT (OUTPATIENT)
Dept: BEHAVIORAL HEALTH | Facility: CLINIC | Age: 32
End: 2017-11-20
Payer: COMMERCIAL

## 2017-11-20 DIAGNOSIS — F31.81 BIPOLAR 2 DISORDER (H): Primary | ICD-10-CM

## 2017-11-20 PROCEDURE — 90834 PSYTX W PT 45 MINUTES: CPT | Performed by: SOCIAL WORKER

## 2017-11-20 NOTE — PROGRESS NOTES
"                                             Progress Note    Client Name: Ramiro Najera  Date: 11/20/2017          Service Type: Individual      Session Start Time: 930  Session End Time: 1015      Session Length: 38-52     Session #: 15     Attendees: Client attended alone    Treatment Plan Last Reviewed: 11/6/2017   PHQ-9 / DEANNA-7 :      DATA      Progress Since Last Session (Related to Symptoms / Goals / Homework):   Symptoms: Stable    Homework: Partially completed   1.  SIFT meditation daily   2.  3 good things   3.  Meet in two weeks     Episode of Care Goals: Satisfactory progress - PREPARATION (Decided to change - considering how); Intervened by negotiating a change plan and determining options / strategies for behavior change, identifying triggers, exploring social supports, and working towards setting a date to begin behavior change     Current / Ongoing Stressors and Concerns:      Things have been \"ok, overall pretty steady.\"  Some changes at home, living along now.  Roommate bought her own house and moved out a few weeks ago.  Things have been peaceful since she left.  Plans to remain in his place on his own while he saves money to buy a home of his own.  Discussed mindfulness as being aware of what we are experiencing while we are experiencing it.  Contrasted this with avoidance and rumination.  Explored the role of mindfulness as an overall coping strategy for managing depression, anxiety, and strong emotions.  Explained concept of state of mind using SIFT (sensations, images, feelings, thoughts) pneumonic.  Led client in a guided mindfulness exercise focusing on sensations, images, feelings, and thoughts.  Discussed mindfulness as a tool to intentionally shift our awareness and focus as needed.                           Treatment Objective(s) Addressed in This Session:   Client will complete at least 10 minutes of self-regulation practice (e.g.: yoga, meditation, relaxation breathing, etc.) per " day.     Client will use identified behavioral and cognitive skills to challenge negative self talk 90% of the time.     Intervention:     CBT     Discussed physical, mental, and emotional boundaries and limit-setting with others. Explored management of boundaries through direct communication and limiting contact and communication with others.  Discussed barriers to using boundary management skills including strong emotions and physical proximity.  Client given handout on boundaries today in session.         ASSESSMENT: Current Emotional / Mental Status (status of significant symptoms):   Risk status (Self / Other harm or suicidal ideation)   Client denies current fears or concerns for personal safety.   Client denies current or recent suicidal ideation or behaviors.   Client denies current or recent homicidal ideation or behaviors.   Client denies current or recent self injurious behavior or ideation.   Client denies other safety concerns.   A safety and risk management plan has not been developed at this time, however client was given the after-hours number / 911 should there be a change in any of these risk factors.     Appearance:   Appropriate    Eye Contact:   Good    Psychomotor Behavior: Retarded (Slowed)    Attitude:   Cooperative    Orientation:   All   Speech    Rate / Production: Monotone     Volume:  Normal    Mood:    Depressed    Affect:    Appropriate    Thought Content:  Clear    Thought Form:  Coherent  Logical    Insight:    Good      Medication Review:   No current psychiatric medications prescribed     Medication Compliance:   NA     Changes in Health Issues:   None reported     Chemical Use Review:   Substance Use: Chemical use reviewed, no active concerns identified      Tobacco Use: No current tobacco use.       Collateral Reports Completed:   Not Applicable    PLAN: (Client Tasks / Therapist Tasks / Other)  1.  SIFT meditation daily    2.  More schema work at next session    3.  Standing  meditation next session      4.  Meet in two weeks        Justo Dawson, LICSW                                                         ________________________________________________________________________    Treatment Plan    Client's Name: Ramiro Najera  YOB: 1985    Date: 2/20/2017     DSM5 Diagnoses: (Sustained by DSM5 Criteria Listed Above)  Diagnoses: 296.89 Bipolar II Disorder With anxious distress        PTSD  Psychosocial & Contextual Factors: parents' health problems  WHODAS 2.0 (12 item)            This questionnaire asks about difficulties due to health conditions. Health conditions  include  disease or illnesses, other health problems that may be short or long lasting,  injuries, mental health or emotional problems, and problems with alcohol or drugs.                     Think back over the past 30 days and answer these questions, thinking about how much  difficulty you had doing the following activities. For each question, please [place] only  one response.    S1 Standing for long periods such as 30 minutes? None =         1   S2 Taking care of household responsibilities? Mild =           2   S3 Learning a new task, for example, learning how to get to a new place? None =         1   S4 How much of a problem do you have joining community activities (for example, festivals, Orthodox or other activities) in the same way as anyone else can? Moderate =   3   S5 How much have you been emotionally affected by your health problems? Moderate =   3     In the past 30 days, how much difficulty did you have in:   S6 Concentrating on doing something for ten minutes? Mild =           2   S7 Walking a long distance such as a kilometer (or equivalent)? None =         1   S8 Washing your whole body? None =         1   S9 Getting dressed? None =         1   S10 Dealing with people you do not know? None =         1   S11 Maintaining a friendship? Moderate =   3   S12 Your day to day work? None =          1     H1 Overall, in the past 30 days, how many days were these difficulties present? Record number of days 30   H2 In the past 30 days, for how many days were you totally unable to carry out your usual activities or work because of any health condition? Record number of days  0   H3 In the past 30 days, not counting the days that you were totally unable, for how many days did you cut back or reduce your usual activities or work because of any health condition? Record number of days 3       Referral / Collaboration:  Referral to another professional/service is not indicated at this time..    Anticipated number of session or this episode of care: 18-24      MeasurableTreatment Goal(s) related to diagnosis / functional impairment(s)  Goal-Emotional regulation: Client will effectively manage mood dysregulation    I will know I've met my goal when I am feeling less out of control.      Objective #A (Client Action)    Status: continued- Date: 11/6/2017     Client will learn at least 3 mindfulness skills and practice one daily    Intervention(s)  Therapist will provide mindfulness training, psychoeducation, behavioral activation, and cognitive restructuring.    Objective #B  Client will use at least 3 coping skills for anxiety management in the next 12 weeks.    Status: continued- Date: 11/6/2017     Intervention(s)  Therapist will provide psychoeducation, behavioral activation, and cognitive restructuring.      Objective #C  Client will identify three distraction and diversion activities and use those activities to improve distress tolerance and emotional regulation.  Status: continued- Date: 11/6/2017     Intervention(s)  Therapist will provide psychoeducation, behavioral activation, and cognitive restructuring.    MeasurableTreatment Goal(s) related to diagnosis / functional impairment(s)            Goal-Depression: Client will decrease depressed mood    I will know I've met my goal when I am less depressed.       Objective #A (Client Action)    Status: Continued- Date: 11/6/2017     Client will use identified behavioral and cognitive skills to challenge negative self talk 90% of the time.    Intervention(s)  Therapist will provide psychoeducation, behavioral activation, and cognitive restructuring.      Objective #B  Client will complete at least 10 minutes of self-regulation practice (e.g.: yoga, meditation, relaxation breathing, etc.) per day.    Status: Continued- Date: 11/6/2017     Intervention(s)  Therapist will provide psychoeducation, behavioral activation, and cognitive restructuring.      Objective #C  Client will exercise 30 minutes 36 times in the next 12 weeks.  Status: Continued- Date: 11/6/2017     Intervention(s)  Therapist will provide psychoeducation, behavioral activation, and cognitive restructuring.                Client has reviewed and agreed to the above plan.      Justo Dawson, [facility]  February 20, 2017

## 2017-11-20 NOTE — MR AVS SNAPSHOT
MRN:[id number]                      After Visit Summary   11/20/2017    Ramiro Najera    MRN: [id number]           Visit Information        Provider Department      11/20/2017 9:30 AM [facility]      Your next 10 appointments already scheduled     Dec 04, 2017  7:30 AM CST   Return Visit with YUNIOR Olea   [facility] with [facility] ([facility])    [address] [place] [id number] [phone]              MyChart Information     [facility] gives you secure access to your electronic health record. If you see a primary care provider, you can also send messages to your care team and make appointments. If you have questions, please call your primary care clinic.  If you do not have a primary care provider, please call [phone] and they will assist you.        Care EveryWhere ID     This is your Care EveryWhere ID. This could be used by other organizations to access your [place] medical records  [id number]        Equal Access to Services     CLAUS JOSEPH : Hadii aad ku hadasho Sojocelnyali, waaxda luqadaha, qaybta kaalmada adeegyagladys, loly brown. So [facility] [phone].    ATENCIÓN: Si habla español, tiene a moffett disposición servicios gratuitos de asistencia lingüística. Llame al [phone].    We comply with applicable federal civil rights laws and [place] laws. We do not discriminate on the basis of race, color, national origin, age, disability, sex, sexual orientation, or gender identity.

## 2017-12-04 ENCOUNTER — OFFICE VISIT (OUTPATIENT)
Dept: BEHAVIORAL HEALTH | Facility: CLINIC | Age: 32
End: 2017-12-04
Payer: COMMERCIAL

## 2017-12-04 DIAGNOSIS — F31.81 BIPOLAR 2 DISORDER (H): Primary | ICD-10-CM

## 2017-12-04 PROCEDURE — 90834 PSYTX W PT 45 MINUTES: CPT | Performed by: SOCIAL WORKER

## 2017-12-04 NOTE — PROGRESS NOTES
"                                             Progress Note    Client Name: Ramiro Najera  Date: 12/4/2017          Service Type: Individual      Session Start Time: 730  Session End Time: 815      Session Length: 38-52     Session #: 16     Attendees: Client attended alone    Treatment Plan Last Reviewed: 11/6/2017   PHQ-9 / DEANNA-7 :      DATA      Progress Since Last Session (Related to Symptoms / Goals / Homework):   Symptoms: Stable    Homework: Partially completed   1.  SIFT meditation daily   2.  3 good things   3.  Meet in two weeks     Episode of Care Goals: Satisfactory progress - PREPARATION (Decided to change - considering how); Intervened by negotiating a change plan and determining options / strategies for behavior change, identifying triggers, exploring social supports, and working towards setting a date to begin behavior change     Current / Ongoing Stressors and Concerns:      Things have been \"ok, working a lot so that has been tiring but good too, get a sense of accomplishment.\"  Continues to have stress in his relationship with Stacy, not sure what he should do about it.  Discussed physical, mental, and emotional boundaries and limit-setting with others. Explored management of boundaries through direct communication and limiting contact and communication with others.  Discussed barriers to using boundary management skills including strong emotions and physical proximity.  Client given handout on boundaries today in session.                              Treatment Objective(s) Addressed in This Session:   Client will complete at least 10 minutes of self-regulation practice (e.g.: yoga, meditation, relaxation breathing, etc.) per day.     Client will use identified behavioral and cognitive skills to challenge negative self talk 90% of the time.     Intervention:     CBT     Discussed physical, mental, and emotional boundaries and limit-setting with others. Explored management of boundaries " through direct communication and limiting contact and communication with others.  Discussed barriers to using boundary management skills including strong emotions and physical proximity.  Client given handout on boundaries today in session.         ASSESSMENT: Current Emotional / Mental Status (status of significant symptoms):   Risk status (Self / Other harm or suicidal ideation)   Client denies current fears or concerns for personal safety.   Client denies current or recent suicidal ideation or behaviors.   Client denies current or recent homicidal ideation or behaviors.   Client denies current or recent self injurious behavior or ideation.   Client denies other safety concerns.   A safety and risk management plan has not been developed at this time, however client was given the after-hours number / 911 should there be a change in any of these risk factors.     Appearance:   Appropriate    Eye Contact:   Good    Psychomotor Behavior: Retarded (Slowed)    Attitude:   Cooperative    Orientation:   All   Speech    Rate / Production: Monotone     Volume:  Normal    Mood:    Depressed    Affect:    Appropriate    Thought Content:  Clear    Thought Form:  Coherent  Logical    Insight:    Good      Medication Review:   No current psychiatric medications prescribed     Medication Compliance:   NA     Changes in Health Issues:   None reported     Chemical Use Review:   Substance Use: Chemical use reviewed, no active concerns identified      Tobacco Use: No current tobacco use.       Collateral Reports Completed:   Not Applicable    PLAN: (Client Tasks / Therapist Tasks / Other)  1.  SIFT meditation daily    2.  More schema work at next session    3.  Standing meditation next session      4.  Meet in two weeks        YUNIOR Bustos                                                         ________________________________________________________________________    Treatment Plan    Client's Name: Ramiro TRAVIS  Reinaldo  YOB: 1985    Date: 2/20/2017     DSM5 Diagnoses: (Sustained by DSM5 Criteria Listed Above)  Diagnoses: 296.89 Bipolar II Disorder With anxious distress        PTSD  Psychosocial & Contextual Factors: parents' health problems  WHODAS 2.0 (12 item)            This questionnaire asks about difficulties due to health conditions. Health conditions  include  disease or illnesses, other health problems that may be short or long lasting,  injuries, mental health or emotional problems, and problems with alcohol or drugs.                     Think back over the past 30 days and answer these questions, thinking about how much  difficulty you had doing the following activities. For each question, please [place] only  one response.    S1 Standing for long periods such as 30 minutes? None =         1   S2 Taking care of household responsibilities? Mild =           2   S3 Learning a new task, for example, learning how to get to a new place? None =         1   S4 How much of a problem do you have joining community activities (for example, festivals, [place] or other activities) in the same way as anyone else can? Moderate =   3   S5 How much have you been emotionally affected by your health problems? Moderate =   3     In the past 30 days, how much difficulty did you have in:   S6 Concentrating on doing something for ten minutes? Mild =           2   S7 Walking a long distance such as a kilometer (or equivalent)? None =         1   S8 Washing your whole body? None =         1   S9 Getting dressed? None =         1   S10 Dealing with people you do not know? None =         1   S11 Maintaining a friendship? Moderate =   3   S12 Your day to day work? None =         1     H1 Overall, in the past 30 days, how many days were these difficulties present? Record number of days 30   H2 In the past 30 days, for how many days were you totally unable to carry out your usual activities or work because of any health  condition? Record number of days  0   H3 In the past 30 days, not counting the days that you were totally unable, for how many days did you cut back or reduce your usual activities or work because of any health condition? Record number of days 3       Referral / Collaboration:  Referral to another professional/service is not indicated at this time..    Anticipated number of session or this episode of care: 18-24      MeasurableTreatment Goal(s) related to diagnosis / functional impairment(s)  Goal-Emotional regulation: Client will effectively manage mood dysregulation    I will know I've met my goal when I am feeling less out of control.      Objective #A (Client Action)    Status: continued- Date: 11/6/2017     Client will learn at least 3 mindfulness skills and practice one daily    Intervention(s)  Therapist will provide mindfulness training, psychoeducation, behavioral activation, and cognitive restructuring.    Objective #B  Client will use at least 3 coping skills for anxiety management in the next 12 weeks.    Status: continued- Date: 11/6/2017     Intervention(s)  Therapist will provide psychoeducation, behavioral activation, and cognitive restructuring.      Objective #C  Client will identify three distraction and diversion activities and use those activities to improve distress tolerance and emotional regulation.  Status: continued- Date: 11/6/2017     Intervention(s)  Therapist will provide psychoeducation, behavioral activation, and cognitive restructuring.    MeasurableTreatment Goal(s) related to diagnosis / functional impairment(s)            Goal-Depression: Client will decrease depressed mood    I will know I've met my goal when I am less depressed.      Objective #A (Client Action)    Status: Continued- Date: 11/6/2017     Client will use identified behavioral and cognitive skills to challenge negative self talk 90% of the time.    Intervention(s)  Therapist will provide psychoeducation, behavioral  activation, and cognitive restructuring.      Objective #B  Client will complete at least 10 minutes of self-regulation practice (e.g.: yoga, meditation, relaxation breathing, etc.) per day.    Status: Continued- Date: 11/6/2017     Intervention(s)  Therapist will provide psychoeducation, behavioral activation, and cognitive restructuring.      Objective #C  Client will exercise 30 minutes 36 times in the next 12 weeks.  Status: Continued- Date: 11/6/2017     Intervention(s)  Therapist will provide psychoeducation, behavioral activation, and cognitive restructuring.                Client has reviewed and agreed to the above plan.      Justo Dawson, [facility]  February 20, 2017

## 2017-12-04 NOTE — MR AVS SNAPSHOT
MRN:[id number]                      After Visit Summary   12/4/2017    Ramiro Najera    MRN: [id number]           Visit Information        Provider Department      12/4/2017 7:30 AM Marcos Joseph, [facility] Generic      Your next 10 appointments already scheduled     Dec 18, 2017  1:30 PM CST   Return Visit with YUNIOR Olea   [facility] ([facility])    [address] [place] [id number] [phone]              MyChart Information     [facility] gives you secure access to your electronic health record. If you see a primary care provider, you can also send messages to your care team and make appointments. If you have questions, please call your primary care clinic.  If you do not have a primary care provider, please call [phone] and they will assist you.        Care EveryWhere ID     This is your Care EveryWhere ID. This could be used by other organizations to access your [place] medical records  [id number]        Equal Access to Services     CLAUS JOSEPH : Hadii aad ku hadasho Sojocelynali, waaxda luqadaha, qaybta kaalmada adeegyagladys, loly brown. So [facility] [phone].    ATENCIÓN: Si habla español, tiene a moffett disposición servicios gratuitos de asistencia lingüística. Llame al [phone].    We comply with applicable federal civil rights laws and [place] laws. We do not discriminate on the basis of race, color, national origin, age, disability, sex, sexual orientation, or gender identity.

## 2017-12-18 ENCOUNTER — OFFICE VISIT (OUTPATIENT)
Dept: BEHAVIORAL HEALTH | Facility: CLINIC | Age: 32
End: 2017-12-18
Payer: COMMERCIAL

## 2017-12-18 DIAGNOSIS — F31.81 BIPOLAR 2 DISORDER (H): Primary | ICD-10-CM

## 2017-12-18 PROCEDURE — 90834 PSYTX W PT 45 MINUTES: CPT | Performed by: SOCIAL WORKER

## 2017-12-18 NOTE — MR AVS SNAPSHOT
MRN:[id number]                      After Visit Summary   12/18/2017    Ramiro Najera    MRN: [id number]           Visit Information        Provider Department      12/18/2017 1:30 PM [facility]      Your next 10 appointments already scheduled     Jan 05, 2018  8:30 AM CST   Return Visit with YUNIOR Olea   Loc[facility] ([facility])    [address] [place] [id number] [phone]              MyChart Information     [facility] gives you secure access to your electronic health record. If you see a primary care provider, you can also send messages to your care team and make appointments. If you have questions, please call your primary care clinic.  If you do not have a primary care provider, please call [phone] and they will assist you.        Care EveryWhere ID     This is your Care EveryWhere ID. This could be used by other organizations to access your [place] medical records  [id number]        Equal Access to Services     CLAUS JOSEPH : Hadii aad ku hadasho Sojocelynali, waaxda luqadaha, qaybta kaalmada adeegyagladys, loly brown. So [facility] [phone].    ATENCIÓN: Si habla español, tiene a moffett disposición servicios gratuitos de asistencia lingüística. Llame al [phone].    We comply with applicable federal civil rights laws and [place] laws. We do not discriminate on the basis of race, color, national origin, age, disability, sex, sexual orientation, or gender identity.

## 2017-12-18 NOTE — PROGRESS NOTES
"                                             Progress Note    Client Name: Ramiro Najera  Date: 12/18/2017          Service Type: Individual      Session Start Time: 130  Session End Time: 215      Session Length: 38-52     Session #: 17     Attendees: Client attended alone    Treatment Plan Last Reviewed: 11/6/2017   PHQ-9 / DEANNA-7 :      DATA      Progress Since Last Session (Related to Symptoms / Goals / Homework):   Symptoms: Stable    Homework: Partially completed   1.  SIFT meditation daily   2.  3 good things   3.  Meet in two weeks     Episode of Care Goals: Satisfactory progress - PREPARATION (Decided to change - considering how); Intervened by negotiating a change plan and determining options / strategies for behavior change, identifying triggers, exploring social supports, and working towards setting a date to begin behavior change     Current / Ongoing Stressors and Concerns:      Things have been \"going well until Friday.   Got word that his father may be going into renal failure soon.\"  Client notes \"I am ok but scared.\"  Did not know that his dad's health was failure to this degree.  2 years ago his dad had an aneurism but had surgery for this and did recover.  Sounds like the kidney issues started about this time, but has recently gotten worse.                            Treatment Objective(s) Addressed in This Session:   Client will complete at least 10 minutes of self-regulation practice (e.g.: yoga, meditation, relaxation breathing, etc.) per day.     Client will use identified behavioral and cognitive skills to challenge negative self talk 90% of the time.     Intervention:     CBT     Discussed physical, mental, and emotional boundaries and limit-setting with others. Explored management of boundaries through direct communication and limiting contact and communication with others.  Discussed barriers to using boundary management skills including strong emotions and physical proximity.  Client " given handout on boundaries today in session.         ASSESSMENT: Current Emotional / Mental Status (status of significant symptoms):   Risk status (Self / Other harm or suicidal ideation)   Client denies current fears or concerns for personal safety.   Client denies current or recent suicidal ideation or behaviors.   Client denies current or recent homicidal ideation or behaviors.   Client denies current or recent self injurious behavior or ideation.   Client denies other safety concerns.   A safety and risk management plan has not been developed at this time, however client was given the after-hours number / 911 should there be a change in any of these risk factors.     Appearance:   Appropriate    Eye Contact:   Good    Psychomotor Behavior: Retarded (Slowed)    Attitude:   Cooperative    Orientation:   All   Speech    Rate / Production: Monotone     Volume:  Normal    Mood:    Depressed    Affect:    Appropriate    Thought Content:  Clear    Thought Form:  Coherent  Logical    Insight:    Good      Medication Review:   No current psychiatric medications prescribed     Medication Compliance:   NA     Changes in Health Issues:   None reported     Chemical Use Review:   Substance Use: Chemical use reviewed, no active concerns identified      Tobacco Use: No current tobacco use.       Collateral Reports Completed:   Not Applicable    PLAN: (Client Tasks / Therapist Tasks / Other)  1.  SIFT meditation daily    2.  More schema work at next session    3.  Standing meditation next session      4.  Meet in two weeks        YUNIOR Bustos                                                         ________________________________________________________________________    Treatment Plan    Client's Name: Ramiro Najera  YOB: 1985    Date: 2/20/2017     DSM5 Diagnoses: (Sustained by DSM5 Criteria Listed Above)  Diagnoses: 296.89 Bipolar II Disorder With anxious distress        PTSD  Psychosocial &  Contextual Factors: parents' health problems  WHODAS 2.0 (12 item)            This questionnaire asks about difficulties due to health conditions. Health conditions  include  disease or illnesses, other health problems that may be short or long lasting,  injuries, mental health or emotional problems, and problems with alcohol or drugs.                     Think back over the past 30 days and answer these questions, thinking about how much  difficulty you had doing the following activities. For each question, please [place] only  one response.    S1 Standing for long periods such as 30 minutes? None =         1   S2 Taking care of household responsibilities? Mild =           2   S3 Learning a new task, for example, learning how to get to a new place? None =         1   S4 How much of a problem do you have joining community activities (for example, festivals, Gnosticism or other activities) in the same way as anyone else can? Moderate =   3   S5 How much have you been emotionally affected by your health problems? Moderate =   3     In the past 30 days, how much difficulty did you have in:   S6 Concentrating on doing something for ten minutes? Mild =           2   S7 Walking a long distance such as a kilometer (or equivalent)? None =         1   S8 Washing your whole body? None =         1   S9 Getting dressed? None =         1   S10 Dealing with people you do not know? None =         1   S11 Maintaining a friendship? Moderate =   3   S12 Your day to day work? None =         1     H1 Overall, in the past 30 days, how many days were these difficulties present? Record number of days 30   H2 In the past 30 days, for how many days were you totally unable to carry out your usual activities or work because of any health condition? Record number of days  0   H3 In the past 30 days, not counting the days that you were totally unable, for how many days did you cut back or reduce your usual activities or work because of any health  condition? Record number of days 3       Referral / Collaboration:  Referral to another professional/service is not indicated at this time..    Anticipated number of session or this episode of care: 18-24      MeasurableTreatment Goal(s) related to diagnosis / functional impairment(s)  Goal-Emotional regulation: Client will effectively manage mood dysregulation    I will know I've met my goal when I am feeling less out of control.      Objective #A (Client Action)    Status: continued- Date: 11/6/2017     Client will learn at least 3 mindfulness skills and practice one daily    Intervention(s)  Therapist will provide mindfulness training, psychoeducation, behavioral activation, and cognitive restructuring.    Objective #B  Client will use at least 3 coping skills for anxiety management in the next 12 weeks.    Status: continued- Date: 11/6/2017     Intervention(s)  Therapist will provide psychoeducation, behavioral activation, and cognitive restructuring.      Objective #C  Client will identify three distraction and diversion activities and use those activities to improve distress tolerance and emotional regulation.  Status: continued- Date: 11/6/2017     Intervention(s)  Therapist will provide psychoeducation, behavioral activation, and cognitive restructuring.    MeasurableTreatment Goal(s) related to diagnosis / functional impairment(s)            Goal-Depression: Client will decrease depressed mood    I will know I've met my goal when I am less depressed.      Objective #A (Client Action)    Status: Continued- Date: 11/6/2017     Client will use identified behavioral and cognitive skills to challenge negative self talk 90% of the time.    Intervention(s)  Therapist will provide psychoeducation, behavioral activation, and cognitive restructuring.      Objective #B  Client will complete at least 10 minutes of self-regulation practice (e.g.: yoga, meditation, relaxation breathing, etc.) per day.    Status:  Continued- Date: 11/6/2017     Intervention(s)  Therapist will provide psychoeducation, behavioral activation, and cognitive restructuring.      Objective #C  Client will exercise 30 minutes 36 times in the next 12 weeks.  Status: Continued- Date: 11/6/2017     Intervention(s)  Therapist will provide psychoeducation, behavioral activation, and cognitive restructuring.                Client has reviewed and agreed to the above plan.      Justo Dawson, [facility]  February 20, 2017

## 2018-01-05 ENCOUNTER — OFFICE VISIT (OUTPATIENT)
Dept: BEHAVIORAL HEALTH | Facility: CLINIC | Age: 33
End: 2018-01-05
Payer: COMMERCIAL

## 2018-01-05 DIAGNOSIS — F31.81 BIPOLAR 2 DISORDER (H): Primary | ICD-10-CM

## 2018-01-05 PROCEDURE — 90834 PSYTX W PT 45 MINUTES: CPT | Performed by: SOCIAL WORKER

## 2018-01-05 NOTE — MR AVS SNAPSHOT
MRN:[id number]                      After Visit Summary   1/5/2018    Ramiro Najera    MRN: [id number]           Visit Information        Provider Department      1/5/2018 8:30 AM [facility]      Your next 10 appointments already scheduled     Jan 22, 2018  7:30 AM CST   Return Visit with YUNIOR Olea   [facility]    [address] [place] [address] [id number] [phone]              MyChart Information     [facility] gives you secure access to your electronic health record. If you see a primary care provider, you can also send messages to your care team and make appointments. If you have questions, please call your primary care clinic.  If you do not have a primary care provider, please call [phone] and they will assist you.        Care EveryWhere ID     This is your Care EveryWhere ID. This could be used by other organizations to access your [place] medical records  [id number]        Equal Access to Services     CLAUS JOSEPH : Hadii aad ku hadasho Sojocelynali, waaxda luqadaha, qaybta kaalmada adeegyagladys, loly brown. So [facility] [phone].    ATENCIÓN: Si habla español, tiene a moffett disposición servicios gratuitos de asistencia lingüística. Llame al [phone].    We comply with applicable federal civil rights laws and [place] laws. We do not discriminate on the basis of race, color, national origin, age, disability, sex, sexual orientation, or gender identity.

## 2018-01-05 NOTE — PROGRESS NOTES
"                                             Progress Note    Client Name: Ramiro Najera  Date: 1/5/2018           Service Type: Individual      Session Start Time: 130  Session End Time: 215      Session Length: 38-52     Session #: 18     Attendees: Client attended alone    Treatment Plan Last Reviewed: 11/6/2017   PHQ-9 / DEANNA-7 :      DATA      Progress Since Last Session (Related to Symptoms / Goals / Homework):   Symptoms: Stable    Homework: Partially completed   1.  SIFT meditation daily   2.  3 good things   3.  Meet in two weeks     Episode of Care Goals: Satisfactory progress - PREPARATION (Decided to change - considering how); Intervened by negotiating a change plan and determining options / strategies for behavior change, identifying triggers, exploring social supports, and working towards setting a date to begin behavior change     Current / Ongoing Stressors and Concerns:      Things have been \"been ok, lonely but ok.\"  Lead up to [employer] and New Years was busy with work.  Went to [employer] [place] [employer] with his mom, then went home and played [employer] and this was good.  But then realized that he had spent most of Chritmas alone and this was hard for him.  Does note that his life has \"seemed to be more calmed and settled and good.\"   Feels like mood has been stable, no strong anxiety despite some stress at work.  Has been able to avoid \"going into a spiral down.\"    Led client in a standing jay chi meditation exercise focused on breathing, balance, and staying present-focused. Explained neurobiological benefits of relaxation.  Discussed the benefits of daily practice as a way to manage stress and anxiety.  Encouraged client to practice on his own daily 5 minutes in AM, 5 minutes in PM, and whenever client notices an increase in anxiety, depression, stress, or strong emotion.                     Treatment Objective(s) Addressed in This Session:   Client will complete at least 10 minutes of self-regulation " practice (e.g.: yoga, meditation, relaxation breathing, etc.) per day.     Client will use identified behavioral and cognitive skills to challenge negative self talk 90% of the time.     Intervention:     CBT     Discussed physical, mental, and emotional boundaries and limit-setting with others. Explored management of boundaries through direct communication and limiting contact and communication with others.  Discussed barriers to using boundary management skills including strong emotions and physical proximity.  Client given handout on boundaries today in session.         ASSESSMENT: Current Emotional / Mental Status (status of significant symptoms):   Risk status (Self / Other harm or suicidal ideation)   Client denies current fears or concerns for personal safety.   Client denies current or recent suicidal ideation or behaviors.   Client denies current or recent homicidal ideation or behaviors.   Client denies current or recent self injurious behavior or ideation.   Client denies other safety concerns.   A safety and risk management plan has not been developed at this time, however client was given the after-hours number / 911 should there be a change in any of these risk factors.     Appearance:   Appropriate    Eye Contact:   Good    Psychomotor Behavior: Retarded (Slowed)    Attitude:   Cooperative    Orientation:   All   Speech    Rate / Production: Monotone     Volume:  Normal    Mood:    Depressed    Affect:    Appropriate    Thought Content:  Clear    Thought Form:  Coherent  Logical    Insight:    Good      Medication Review:   No current psychiatric medications prescribed     Medication Compliance:   NA     Changes in Health Issues:   None reported     Chemical Use Review:   Substance Use: Chemical use reviewed, no active concerns identified      Tobacco Use: No current tobacco use.       Collateral Reports Completed:   Not Applicable    PLAN: (Client Tasks / Therapist Tasks / Other)  1.  SIFT meditation  daily    2.  More schema work at next session    3.  Standing meditation next session      4.  Meet in two weeks        Justo Dawson, LICSW                                                         ________________________________________________________________________    Treatment Plan    Client's Name: Ramiro Najera  YOB: 1985    Date: 2/20/2017     DSM5 Diagnoses: (Sustained by DSM5 Criteria Listed Above)  Diagnoses: 296.89 Bipolar II Disorder With anxious distress        PTSD  Psychosocial & Contextual Factors: parents' health problems  WHODAS 2.0 (12 item)            This questionnaire asks about difficulties due to health conditions. Health conditions  include  disease or illnesses, other health problems that may be short or long lasting,  injuries, mental health or emotional problems, and problems with alcohol or drugs.                     Think back over the past 30 days and answer these questions, thinking about how much  difficulty you had doing the following activities. For each question, please [place] only  one response.    S1 Standing for long periods such as 30 minutes? None =         1   S2 Taking care of household responsibilities? Mild =           2   S3 Learning a new task, for example, learning how to get to a new place? None =         1   S4 How much of a problem do you have joining community activities (for example, festivals, Yarsanism or other activities) in the same way as anyone else can? Moderate =   3   S5 How much have you been emotionally affected by your health problems? Moderate =   3     In the past 30 days, how much difficulty did you have in:   S6 Concentrating on doing something for ten minutes? Mild =           2   S7 Walking a long distance such as a kilometer (or equivalent)? None =         1   S8 Washing your whole body? None =         1   S9 Getting dressed? None =         1   S10 Dealing with people you do not know? None =         1   S11 Maintaining a  friendship? Moderate =   3   S12 Your day to day work? None =         1     H1 Overall, in the past 30 days, how many days were these difficulties present? Record number of days 30   H2 In the past 30 days, for how many days were you totally unable to carry out your usual activities or work because of any health condition? Record number of days  0   H3 In the past 30 days, not counting the days that you were totally unable, for how many days did you cut back or reduce your usual activities or work because of any health condition? Record number of days 3       Referral / Collaboration:  Referral to another professional/service is not indicated at this time..    Anticipated number of session or this episode of care: 18-24      MeasurableTreatment Goal(s) related to diagnosis / functional impairment(s)  Goal-Emotional regulation: Client will effectively manage mood dysregulation    I will know I've met my goal when I am feeling less out of control.      Objective #A (Client Action)    Status: continued- Date: 11/6/2017     Client will learn at least 3 mindfulness skills and practice one daily    Intervention(s)  Therapist will provide mindfulness training, psychoeducation, behavioral activation, and cognitive restructuring.    Objective #B  Client will use at least 3 coping skills for anxiety management in the next 12 weeks.    Status: continued- Date: 11/6/2017     Intervention(s)  Therapist will provide psychoeducation, behavioral activation, and cognitive restructuring.      Objective #C  Client will identify three distraction and diversion activities and use those activities to improve distress tolerance and emotional regulation.  Status: continued- Date: 11/6/2017     Intervention(s)  Therapist will provide psychoeducation, behavioral activation, and cognitive restructuring.    MeasurableTreatment Goal(s) related to diagnosis / functional impairment(s)            Goal-Depression: Client will decrease depressed  mood    I will know I've met my goal when I am less depressed.      Objective #A (Client Action)    Status: Continued- Date: 11/6/2017     Client will use identified behavioral and cognitive skills to challenge negative self talk 90% of the time.    Intervention(s)  Therapist will provide psychoeducation, behavioral activation, and cognitive restructuring.      Objective #B  Client will complete at least 10 minutes of self-regulation practice (e.g.: yoga, meditation, relaxation breathing, etc.) per day.    Status: Continued- Date: 11/6/2017     Intervention(s)  Therapist will provide psychoeducation, behavioral activation, and cognitive restructuring.      Objective #C  Client will exercise 30 minutes 36 times in the next 12 weeks.  Status: Continued- Date: 11/6/2017     Intervention(s)  Therapist will provide psychoeducation, behavioral activation, and cognitive restructuring.                Client has reviewed and agreed to the above plan.      Justo Dawson, [facility]  February 20, 2017

## 2018-01-22 ENCOUNTER — OFFICE VISIT (OUTPATIENT)
Dept: BEHAVIORAL HEALTH | Facility: CLINIC | Age: 33
End: 2018-01-22
Payer: COMMERCIAL

## 2018-01-22 DIAGNOSIS — F31.81 BIPOLAR 2 DISORDER (H): Primary | ICD-10-CM

## 2018-01-22 PROCEDURE — 90834 PSYTX W PT 45 MINUTES: CPT | Performed by: SOCIAL WORKER

## 2018-01-22 NOTE — PROGRESS NOTES
"                                             Progress Note    Client Name: Ramiro Najera  Date: 1/22/2018          Service Type: Individual      Session Start Time: 730  Session End Time: 815      Session Length: 38-52     Session #: 19     Attendees: Client attended alone    Treatment Plan Last Reviewed: 11/6/2017   PHQ-9 / DEANNA-7 :      DATA      Progress Since Last Session (Related to Symptoms / Goals / Homework):   Symptoms: Stable    Homework: Partially completed   1.  SIFT meditation daily   2.  3 good things   3.  Meet in two weeks     Episode of Care Goals: Satisfactory progress - PREPARATION (Decided to change - considering how); Intervened by negotiating a change plan and determining options / strategies for behavior change, identifying triggers, exploring social supports, and working towards setting a date to begin behavior change     Current / Ongoing Stressors and Concerns:      Things have been \"busy, been fine, nothing really to report.\"  Symptoms have been \"not as pronounced, some nights of loneliness and depression, but not sure how much of that is normal and how much is not.  Does not feel like the same pressing depression as before.\"      Led client in a 6 hand position standing jay chi meditation exercise focused on breathing, balance, and staying present-focused. Explained neurobiological benefits of relaxation.  Discussed the benefits of daily practice as a way to manage stress and anxiety.  Encouraged client to practice on his own daily 5 minutes in AM, 5 minutes in PM, and whenever client notices an increase in anxiety, depression, stress, or strong emotion.                     Treatment Objective(s) Addressed in This Session:   Client will complete at least 10 minutes of self-regulation practice (e.g.: yoga, meditation, relaxation breathing, etc.) per day.     Client will use identified behavioral and cognitive skills to challenge negative self talk 90% of the " time.     Intervention:     CBT     Discussed physical, mental, and emotional boundaries and limit-setting with others. Explored management of boundaries through direct communication and limiting contact and communication with others.  Discussed barriers to using boundary management skills including strong emotions and physical proximity.  Client given handout on boundaries today in session.         ASSESSMENT: Current Emotional / Mental Status (status of significant symptoms):   Risk status (Self / Other harm or suicidal ideation)   Client denies current fears or concerns for personal safety.   Client denies current or recent suicidal ideation or behaviors.   Client denies current or recent homicidal ideation or behaviors.   Client denies current or recent self injurious behavior or ideation.   Client denies other safety concerns.   A safety and risk management plan has not been developed at this time, however client was given the after-hours number / 911 should there be a change in any of these risk factors.     Appearance:   Appropriate    Eye Contact:   Good    Psychomotor Behavior: Retarded (Slowed)    Attitude:   Cooperative    Orientation:   All   Speech    Rate / Production: Monotone     Volume:  Normal    Mood:    Depressed    Affect:    Appropriate    Thought Content:  Clear    Thought Form:  Coherent  Logical    Insight:    Good      Medication Review:   No current psychiatric medications prescribed     Medication Compliance:   NA     Changes in Health Issues:   None reported     Chemical Use Review:   Substance Use: Chemical use reviewed, no active concerns identified      Tobacco Use: No current tobacco use.       Collateral Reports Completed:   Not Applicable    PLAN: (Client Tasks / Therapist Tasks / Other)  1.  SIFT meditation daily    2.  More schema work at next session    3.  Standing meditation next session      4.  Meet in two weeks        YUNIOR Bustos                                                          ________________________________________________________________________    Treatment Plan    Client's Name: Ramiro Najera  YOB: 1985    Date: 2/20/2017     DSM5 Diagnoses: (Sustained by DSM5 Criteria Listed Above)  Diagnoses: 296.89 Bipolar II Disorder With anxious distress        PTSD  Psychosocial & Contextual Factors: parents' health problems  WHODAS 2.0 (12 item)            This questionnaire asks about difficulties due to health conditions. Health conditions  include  disease or illnesses, other health problems that may be short or long lasting,  injuries, mental health or emotional problems, and problems with alcohol or drugs.                     Think back over the past 30 days and answer these questions, thinking about how much  difficulty you had doing the following activities. For each question, please [place] only  one response.    S1 Standing for long periods such as 30 minutes? None =         1   S2 Taking care of household responsibilities? Mild =           2   S3 Learning a new task, for example, learning how to get to a new place? None =         1   S4 How much of a problem do you have joining community activities (for example, festivals, [employer] or other activities) in the same way as anyone else can? Moderate =   3   S5 How much have you been emotionally affected by your health problems? Moderate =   3     In the past 30 days, how much difficulty did you have in:   S6 Concentrating on doing something for ten minutes? Mild =           2   S7 Walking a long distance such as a kilometer (or equivalent)? None =         1   S8 Washing your whole body? None =         1   S9 Getting dressed? None =         1   S10 Dealing with people you do not know? None =         1   S11 Maintaining a friendship? Moderate =   3   S12 Your day to day work? None =         1     H1 Overall, in the past 30 days, how many days were these difficulties present? Record number of days  30   H2 In the past 30 days, for how many days were you totally unable to carry out your usual activities or work because of any health condition? Record number of days  0   H3 In the past 30 days, not counting the days that you were totally unable, for how many days did you cut back or reduce your usual activities or work because of any health condition? Record number of days 3       Referral / Collaboration:  Referral to another professional/service is not indicated at this time..    Anticipated number of session or this episode of care: 18-24      MeasurableTreatment Goal(s) related to diagnosis / functional impairment(s)  Goal-Emotional regulation: Client will effectively manage mood dysregulation    I will know I've met my goal when I am feeling less out of control.      Objective #A (Client Action)    Status: continued- Date: 11/6/2017     Client will learn at least 3 mindfulness skills and practice one daily    Intervention(s)  Therapist will provide mindfulness training, psychoeducation, behavioral activation, and cognitive restructuring.    Objective #B  Client will use at least 3 coping skills for anxiety management in the next 12 weeks.    Status: continued- Date: 11/6/2017     Intervention(s)  Therapist will provide psychoeducation, behavioral activation, and cognitive restructuring.      Objective #C  Client will identify three distraction and diversion activities and use those activities to improve distress tolerance and emotional regulation.  Status: continued- Date: 11/6/2017     Intervention(s)  Therapist will provide psychoeducation, behavioral activation, and cognitive restructuring.    MeasurableTreatment Goal(s) related to diagnosis / functional impairment(s)            Goal-Depression: Client will decrease depressed mood    I will know I've met my goal when I am less depressed.      Objective #A (Client Action)    Status: Continued- Date: 11/6/2017     Client will use identified behavioral and  cognitive skills to challenge negative self talk 90% of the time.    Intervention(s)  Therapist will provide psychoeducation, behavioral activation, and cognitive restructuring.      Objective #B  Client will complete at least 10 minutes of self-regulation practice (e.g.: yoga, meditation, relaxation breathing, etc.) per day.    Status: Continued- Date: 11/6/2017     Intervention(s)  Therapist will provide psychoeducation, behavioral activation, and cognitive restructuring.      Objective #C  Client will exercise 30 minutes 36 times in the next 12 weeks.  Status: Continued- Date: 11/6/2017     Intervention(s)  Therapist will provide psychoeducation, behavioral activation, and cognitive restructuring.                Client has reviewed and agreed to the above plan.      Justo Dawson, LICSW  February 20, 2017

## 2018-01-22 NOTE — MR AVS SNAPSHOT
MRN:[id number]                      After Visit Summary   1/22/2018    Ramiro Najera    MRN: [id number]           Visit Information        Provider Department      1/22/2018 7:30 AM [facility]      Your next 10 appointments already scheduled     Feb 05, 2018  8:30 AM CST   Return Visit with YUNIOR Olea   [facility] ([facility])    [address] [place] [address] [id number] [phone]              MyChart Information     [facility] gives you secure access to your electronic health record. If you see a primary care provider, you can also send messages to your care team and make appointments. If you have questions, please call your primary care clinic.  If you do not have a primary care provider, please call [phone] and they will assist you.        Care EveryWhere ID     This is your Care EveryWhere ID. This could be used by other organizations to access your [facility] medical records  [id number]        Equal Access to Services     CLAUS JOSEPH : Hadii aad ku hadasho Sojocelynali, waaxda luqadaha, qaybta kaalmada adeegyagladys, loly brown. So [facility] [phone].    ATENCIÓN: Si habla español, tiene a moffett disposición servicios gratuitos de asistencia lingüística. [facility] [phone].    We comply with applicable federal civil rights laws and [place] laws. We do not discriminate on the basis of race, color, national origin, age, disability, sex, sexual orientation, or gender identity.

## 2018-02-06 ENCOUNTER — OFFICE VISIT (OUTPATIENT)
Dept: BEHAVIORAL HEALTH | Facility: CLINIC | Age: 33
End: 2018-02-06
Payer: COMMERCIAL

## 2018-02-06 DIAGNOSIS — F31.81 BIPOLAR 2 DISORDER (H): Primary | ICD-10-CM

## 2018-02-06 PROCEDURE — 90834 PSYTX W PT 45 MINUTES: CPT | Performed by: SOCIAL WORKER

## 2018-02-06 NOTE — PROGRESS NOTES
"                                             Progress Note    Client Name: Ramiro Najera  Date: 2/6/2018          Service Type: Individual      Session Start Time: 730  Session End Time: 815      Session Length: 38-52     Session #: 19     Attendees: Client attended alone    Treatment Plan Last Reviewed: 11/6/2017   PHQ-9 / DEANNA-7 :      DATA      Progress Since Last Session (Related to Symptoms / Goals / Homework):   Symptoms: Stable    Homework: Partially completed   1.  SIFT meditation daily   2.  3 good things   3.  Meet in two weeks     Episode of Care Goals: Satisfactory progress - PREPARATION (Decided to change - considering how); Intervened by negotiating a change plan and determining options / strategies for behavior change, identifying triggers, exploring social supports, and working towards setting a date to begin behavior change     Current / Ongoing Stressors and Concerns:      Things have been \"a rough couple weeks.\"  Has been feeling \"out of it, listless and lost.\"  Some stress in relationship with Terrell and Stacy.  Discussed physical, mental, and emotional boundaries and limit-setting with others. Explored management of boundaries through direct communication and limiting contact and communication with others.  Discussed barriers to using boundary management skills including strong emotions and physical proximity.  Client given handout on boundaries today in session.                     Treatment Objective(s) Addressed in This Session:   Client will complete at least 10 minutes of self-regulation practice (e.g.: yoga, meditation, relaxation breathing, etc.) per day.     Client will use identified behavioral and cognitive skills to challenge negative self talk 90% of the time.     Intervention:     CBT     Discussed physical, mental, and emotional boundaries and limit-setting with others. Explored management of boundaries through direct communication and limiting contact and communication with " others.  Discussed barriers to using boundary management skills including strong emotions and physical proximity.  Client given handout on boundaries today in session.         ASSESSMENT: Current Emotional / Mental Status (status of significant symptoms):   Risk status (Self / Other harm or suicidal ideation)   Client denies current fears or concerns for personal safety.   Client denies current or recent suicidal ideation or behaviors.   Client denies current or recent homicidal ideation or behaviors.   Client denies current or recent self injurious behavior or ideation.   Client denies other safety concerns.   A safety and risk management plan has not been developed at this time, however client was given the after-hours number / 911 should there be a change in any of these risk factors.     Appearance:   Appropriate    Eye Contact:   Good    Psychomotor Behavior: Retarded (Slowed)    Attitude:   Cooperative    Orientation:   All   Speech    Rate / Production: Monotone     Volume:  Normal    Mood:    Depressed    Affect:    Appropriate    Thought Content:  Clear    Thought Form:  Coherent  Logical    Insight:    Good      Medication Review:   No current psychiatric medications prescribed     Medication Compliance:   NA     Changes in Health Issues:   None reported     Chemical Use Review:   Substance Use: Chemical use reviewed, no active concerns identified      Tobacco Use: No current tobacco use.       Collateral Reports Completed:   Not Applicable    PLAN: (Client Tasks / Therapist Tasks / Other)  1.  SIFT meditation daily    2.  More schema work at next session    3.  Standing meditation next session      4.  Meet in two weeks        YUNIOR Bustos                                                         ________________________________________________________________________    Treatment Plan    Client's Name: Ramiro Najera  YOB: 1985    Date: 2/20/2017     DSM5 Diagnoses: (Sustained  by DSM5 Criteria Listed Above)  Diagnoses: 296.89 Bipolar II Disorder With anxious distress        PTSD  Psychosocial & Contextual Factors: parents' health problems  WHODAS 2.0 (12 item)            This questionnaire asks about difficulties due to health conditions. Health conditions  include  disease or illnesses, other health problems that may be short or long lasting,  injuries, mental health or emotional problems, and problems with alcohol or drugs.                     Think back over the past 30 days and answer these questions, thinking about how much  difficulty you had doing the following activities. For each question, please [place] only  one response.    S1 Standing for long periods such as 30 minutes? None =         1   S2 Taking care of household responsibilities? Mild =           2   S3 Learning a new task, for example, learning how to get to a new place? None =         1   S4 How much of a problem do you have joining community activities (for example, festivals, Christianity or other activities) in the same way as anyone else can? Moderate =   3   S5 How much have you been emotionally affected by your health problems? Moderate =   3     In the past 30 days, how much difficulty did you have in:   S6 Concentrating on doing something for ten minutes? Mild =           2   S7 Walking a long distance such as a kilometer (or equivalent)? None =         1   S8 Washing your whole body? None =         1   S9 Getting dressed? None =         1   S10 Dealing with people you do not know? None =         1   S11 Maintaining a friendship? Moderate =   3   S12 Your day to day work? None =         1     H1 Overall, in the past 30 days, how many days were these difficulties present? Record number of days 30   H2 In the past 30 days, for how many days were you totally unable to carry out your usual activities or work because of any health condition? Record number of days  0   H3 In the past 30 days, not counting the days that  you were totally unable, for how many days did you cut back or reduce your usual activities or work because of any health condition? Record number of days 3       Referral / Collaboration:  Referral to another professional/service is not indicated at this time..    Anticipated number of session or this episode of care: 18-24      MeasurableTreatment Goal(s) related to diagnosis / functional impairment(s)  Goal-Emotional regulation: Client will effectively manage mood dysregulation    I will know I've met my goal when I am feeling less out of control.      Objective #A (Client Action)    Status: continued- Date: 11/6/2017     Client will learn at least 3 mindfulness skills and practice one daily    Intervention(s)  Therapist will provide mindfulness training, psychoeducation, behavioral activation, and cognitive restructuring.    Objective #B  Client will use at least 3 coping skills for anxiety management in the next 12 weeks.    Status: continued- Date: 11/6/2017     Intervention(s)  Therapist will provide psychoeducation, behavioral activation, and cognitive restructuring.      Objective #C  Client will identify three distraction and diversion activities and use those activities to improve distress tolerance and emotional regulation.  Status: continued- Date: 11/6/2017     Intervention(s)  Therapist will provide psychoeducation, behavioral activation, and cognitive restructuring.    MeasurableTreatment Goal(s) related to diagnosis / functional impairment(s)            Goal-Depression: Client will decrease depressed mood    I will know I've met my goal when I am less depressed.      Objective #A (Client Action)    Status: Continued- Date: 11/6/2017     Client will use identified behavioral and cognitive skills to challenge negative self talk 90% of the time.    Intervention(s)  Therapist will provide psychoeducation, behavioral activation, and cognitive restructuring.      Objective #B  Client will complete at  least 10 minutes of self-regulation practice (e.g.: yoga, meditation, relaxation breathing, etc.) per day.    Status: Continued- Date: 11/6/2017     Intervention(s)  Therapist will provide psychoeducation, behavioral activation, and cognitive restructuring.      Objective #C  Client will exercise 30 minutes 36 times in the next 12 weeks.  Status: Continued- Date: 11/6/2017     Intervention(s)  Therapist will provide psychoeducation, behavioral activation, and cognitive restructuring.                Client has reviewed and agreed to the above plan.      Justo Dawson, [facility]  February 20, 2017

## 2018-02-06 NOTE — MR AVS SNAPSHOT
MRN:[id number]                      After Visit Summary   2/6/2018    Ramiro Najera    MRN: [id number]           Visit Information        Provider Department      2/6/2018 8:30 AM [facility]      Your next 10 appointments already scheduled     Feb 20, 2018  8:30 AM CST   Return Visit with YUNIOR Olea   [facility] ([facility])    [address] [place] [address] [id number] [phone]              MyChart Information     [facility] gives you secure access to your electronic health record. If you see a primary care provider, you can also send messages to your care team and make appointments. If you have questions, please call your primary care clinic.  If you do not have a primary care provider, please call [phone] and they will assist you.        Care EveryWhere ID     This is your Care EveryWhere ID. This could be used by other organizations to access your [facility] medical records  [id number]        Equal Access to Services     CLAUS JOSEPH : Hadii aad ku hadasho Sojocelynali, waaxda luqadaha, qaybta kaalmada adeegyagladys, loly brown. So [facility] [phone].    ATENCIÓN: Si habla español, tiene a moffett disposición servicios gratuitos de asistencia lingüística. [facility] [phone].    We comply with applicable federal civil rights laws and [place] laws. We do not discriminate on the basis of race, color, national origin, age, disability, sex, sexual orientation, or gender identity.

## 2018-02-20 ENCOUNTER — OFFICE VISIT (OUTPATIENT)
Dept: BEHAVIORAL HEALTH | Facility: CLINIC | Age: 33
End: 2018-02-20
Payer: COMMERCIAL

## 2018-02-20 DIAGNOSIS — F31.81 BIPOLAR 2 DISORDER (H): Primary | ICD-10-CM

## 2018-02-20 PROCEDURE — 90834 PSYTX W PT 45 MINUTES: CPT | Performed by: SOCIAL WORKER

## 2018-02-20 NOTE — PROGRESS NOTES
"                                             Progress Note    Client Name: Ramiro Najera  Date: 2/20/2018          Service Type: Individual      Session Start Time: 730  Session End Time: 815      Session Length: 38-52     Session #: 20     Attendees: Client attended alone    Treatment Plan Last Reviewed: 2/20/2018   PHQ-9 / DEANNA-7 :      DATA      Progress Since Last Session (Related to Symptoms / Goals / Homework):   Symptoms: Stable    Homework: Partially completed   1.  SIFT meditation daily   2.  3 good things   3.  Meet in two weeks     Episode of Care Goals: Satisfactory progress - PREPARATION (Decided to change - considering how); Intervened by negotiating a change plan and determining options / strategies for behavior change, identifying triggers, exploring social supports, and working towards setting a date to begin behavior change     Current / Ongoing Stressors and Concerns:      Things have been \"sad but OK.\"  Has been dealing with some ambivalence, most of this is around his relationships.  Long discussion today about this.  Discussed physical, mental, and emotional boundaries and limit-setting with others. Explored management of boundaries through direct communication and limiting contact and communication with others.  Discussed barriers to using boundary management skills including strong emotions and physical proximity.                         Treatment Objective(s) Addressed in This Session:   Client will complete at least 10 minutes of self-regulation practice (e.g.: yoga, meditation, relaxation breathing, etc.) per day.     Client will use identified behavioral and cognitive skills to challenge negative self talk 90% of the time.     Intervention:     CBT     Discussed physical, mental, and emotional boundaries and limit-setting with others. Explored management of boundaries through direct communication and limiting contact and communication with others.  Discussed barriers to using boundary " management skills including strong emotions and physical proximity.  Client given handout on boundaries today in session.         ASSESSMENT: Current Emotional / Mental Status (status of significant symptoms):   Risk status (Self / Other harm or suicidal ideation)   Client denies current fears or concerns for personal safety.   Client denies current or recent suicidal ideation or behaviors.   Client denies current or recent homicidal ideation or behaviors.   Client denies current or recent self injurious behavior or ideation.   Client denies other safety concerns.   A safety and risk management plan has not been developed at this time, however client was given the after-hours number / 911 should there be a change in any of these risk factors.     Appearance:   Appropriate    Eye Contact:   Good    Psychomotor Behavior: Retarded (Slowed)    Attitude:   Cooperative    Orientation:   All   Speech    Rate / Production: Monotone     Volume:  Normal    Mood:    Depressed    Affect:    Appropriate    Thought Content:  Clear    Thought Form:  Coherent  Logical    Insight:    Good      Medication Review:   No current psychiatric medications prescribed     Medication Compliance:   NA     Changes in Health Issues:   None reported     Chemical Use Review:   Substance Use: Chemical use reviewed, no active concerns identified      Tobacco Use: No current tobacco use.       Collateral Reports Completed:   Not Applicable    PLAN: (Client Tasks / Therapist Tasks / Other)  1.  SIFT meditation daily    2.  More schema work at next session    3.  Standing meditation next session      4.  Meet in two weeks        YUNIOR Bustos                                                         ________________________________________________________________________    Treatment Plan    Client's Name: Ramiro Najera  YOB: 1985    Date: 2/20/2017     DSM5 Diagnoses: (Sustained by DSM5 Criteria Listed  Above)  Diagnoses: 296.89 Bipolar II Disorder With anxious distress        PTSD  Psychosocial & Contextual Factors: parents' health problems  WHODAS 2.0 (12 item)            This questionnaire asks about difficulties due to health conditions. Health conditions  include  disease or illnesses, other health problems that may be short or long lasting,  injuries, mental health or emotional problems, and problems with alcohol or drugs.                     Think back over the past 30 days and answer these questions, thinking about how much  difficulty you had doing the following activities. For each question, please [place] only  one response.    S1 Standing for long periods such as 30 minutes? None =         1   S2 Taking care of household responsibilities? Mild =           2   S3 Learning a new task, for example, learning how to get to a new place? None =         1   S4 How much of a problem do you have joining community activities (for example, festivals, [employer] or other activities) in the same way as anyone else can? Moderate =   3   S5 How much have you been emotionally affected by your health problems? Moderate =   3     In the past 30 days, how much difficulty did you have in:   S6 Concentrating on doing something for ten minutes? Mild =           2   S7 Walking a long distance such as a kilometer (or equivalent)? None =         1   S8 Washing your whole body? None =         1   S9 Getting dressed? None =         1   S10 Dealing with people you do not know? None =         1   S11 Maintaining a friendship? Moderate =   3   S12 Your day to day work? None =         1     H1 Overall, in the past 30 days, how many days were these difficulties present? Record number of days 30   H2 In the past 30 days, for how many days were you totally unable to carry out your usual activities or work because of any health condition? Record number of days  0   H3 In the past 30 days, not counting the days that you were totally unable,  for how many days did you cut back or reduce your usual activities or work because of any health condition? Record number of days 3       Referral / Collaboration:  Referral to another professional/service is not indicated at this time..    Anticipated number of session or this episode of care: 18-24      MeasurableTreatment Goal(s) related to diagnosis / functional impairment(s)  Goal-Emotional regulation: Client will effectively manage mood dysregulation    I will know I've met my goal when I am feeling less out of control.      Objective #A (Client Action)    Status: continued- Date: 2/20/2018     Client will learn at least 3 mindfulness skills and practice one daily    Intervention(s)  Therapist will provide mindfulness training, psychoeducation, behavioral activation, and cognitive restructuring.    Objective #B  Client will use at least 3 coping skills for anxiety management in the next 12 weeks.    Status: continued- Date: 2/20/2018     Intervention(s)  Therapist will provide psychoeducation, behavioral activation, and cognitive restructuring.      Objective #C  Client will identify three distraction and diversion activities and use those activities to improve distress tolerance and emotional regulation.  Status: continued- Date: 2/20/2018     Intervention(s)  Therapist will provide psychoeducation, behavioral activation, and cognitive restructuring.    MeasurableTreatment Goal(s) related to diagnosis / functional impairment(s)            Goal-Depression: Client will decrease depressed mood    I will know I've met my goal when I am less depressed.      Objective #A (Client Action)    Status: Continued- Date: 2/20/2018     Client will use identified behavioral and cognitive skills to challenge negative self talk 90% of the time.    Intervention(s)  Therapist will provide psychoeducation, behavioral activation, and cognitive restructuring.      Objective #B  Client will complete at least 10 minutes of  self-regulation practice (e.g.: yoga, meditation, relaxation breathing, etc.) per day.    Status: Continued- Date: 2/20/2018     Intervention(s)  Therapist will provide psychoeducation, behavioral activation, and cognitive restructuring.      Objective #C  Client will exercise 30 minutes 36 times in the next 12 weeks.  Status: Continued- Date: 2/20/2018     Intervention(s)  Therapist will provide psychoeducation, behavioral activation, and cognitive restructuring.                Client has reviewed and agreed to the above plan.      Justo Dawson, LICSW  February 20, 2017

## 2018-02-20 NOTE — MR AVS SNAPSHOT
MRN:[id number]                      After Visit Summary   2/20/2018    Ramiro Najera    MRN: [id number]           Visit Information        Provider Department      2/20/2018 8:30 AM [facility]      Your next 10 appointments already scheduled     Mar 09, 2018  8:30 AM CST   Return Visit with YUNIOR Olea   [facility] ([facility])    [address] [place] [id number] [phone]              MyChart Information     [facility] gives you secure access to your electronic health record. If you see a primary care provider, you can also send messages to your care team and make appointments. If you have questions, please call your primary care clinic.  If you do not have a primary care provider, please call [phone] and they will assist you.        Care EveryWhere ID     This is your Care EveryWhere ID. This could be used by other organizations to access your [place] medical records  [id number]        Equal Access to Services     CLAUS JOSEPH : Hadii aad ku hadasho Sojocelynali, waaxda luqadaha, qaybta kaalmada adeegyagladys, loly brown. So [facility] [phone].    ATENCIÓN: Si habla español, tiene a moffett disposición servicios gratuitos de asistencia lingüística. Llame al [phone].    We comply with applicable federal civil rights laws and [place] laws. We do not discriminate on the basis of race, color, national origin, age, disability, sex, sexual orientation, or gender identity.

## 2018-03-09 ENCOUNTER — OFFICE VISIT (OUTPATIENT)
Dept: BEHAVIORAL HEALTH | Facility: CLINIC | Age: 33
End: 2018-03-09
Payer: COMMERCIAL

## 2018-03-09 DIAGNOSIS — F31.81 BIPOLAR 2 DISORDER (H): Primary | ICD-10-CM

## 2018-03-09 PROCEDURE — 90834 PSYTX W PT 45 MINUTES: CPT | Performed by: SOCIAL WORKER

## 2018-03-09 NOTE — MR AVS SNAPSHOT
MRN:[id number]                      After Visit Summary   3/9/2018    Ramiro Najera    MRN: [id number]           Visit Information        Provider Department      3/9/2018 8:30 AM Marcos Joseph, YUNIOR [facility] Generic      Your next 10 appointments already scheduled     Apr 03, 2018  8:30 AM CDT   Return Visit with YUNIOR Olea   [facility] ([facility])    [address] [place] [address] [id number] [phone]              MyChart Information     [facility] gives you secure access to your electronic health record. If you see a primary care provider, you can also send messages to your care team and make appointments. If you have questions, please call your primary care clinic.  If you do not have a primary care provider, please call [phone] and they will assist you.        Care EveryWhere ID     This is your Care EveryWhere ID. This could be used by other organizations to access your [place] medical records  [id number]        Equal Access to Services     CLAUS JOSEPH AH: Hadii aad ku hadasho Sojocelynali, waaxda luqadaha, qaybta kaalmada adeegyagladys, loly brown. So [facility] [phone].    ATENCIÓN: Si habla español, tiene a moffett disposición servicios gratuitos de asistencia lingüística. [facility] [phone].    We comply with applicable federal civil rights laws and [place] laws. We do not discriminate on the basis of race, color, national origin, age, disability, sex, sexual orientation, or gender identity.

## 2018-03-09 NOTE — PROGRESS NOTES
"                                             Progress Note    Client Name: Ramiro Najera  Date: 3/9/2018          Service Type: Individual      Session Start Time: 830  Session End Time: 915      Session Length: 38-52     Session #: 21     Attendees: Client attended alone    Treatment Plan Last Reviewed: 2/20/2018   PHQ-9 / DEANNA-7 :      DATA      Progress Since Last Session (Related to Symptoms / Goals / Homework):   Symptoms: Stable    Homework: Partially completed   1.  SIFT meditation daily   2.  3 good things   3.  Meet in two weeks     Episode of Care Goals: Satisfactory progress - PREPARATION (Decided to change - considering how); Intervened by negotiating a change plan and determining options / strategies for behavior change, identifying triggers, exploring social supports, and working towards setting a date to begin behavior change     Current / Ongoing Stressors and Concerns:      Things have been \"pretty good, pretty quiet.  Trying to get more structure in my life.  A few bouts of loneliness.\"  Has started working out again and this feels good.  Doing calisthenics and weightlifting at home.  40-50 minutes a night at home.  Also trying to eat  and more intentionally.  Eating to fuel his body rather than for \"emotional needs.\"  Discussed physical, mental, and emotional boundaries and limit-setting with others. Explored management of boundaries through direct communication and limiting contact and communication with others.  Discussed barriers to using boundary management skills including strong emotions and physical proximity.                                 Treatment Objective(s) Addressed in This Session:   Client will complete at least 10 minutes of self-regulation practice (e.g.: yoga, meditation, relaxation breathing, etc.) per day.     Client will use identified behavioral and cognitive skills to challenge negative self talk 90% of the time.     Intervention:     CBT     Discussed physical, " mental, and emotional boundaries and limit-setting with others. Explored management of boundaries through direct communication and limiting contact and communication with others.  Discussed barriers to using boundary management skills including strong emotions and physical proximity.  Client given handout on boundaries today in session.         ASSESSMENT: Current Emotional / Mental Status (status of significant symptoms):   Risk status (Self / Other harm or suicidal ideation)   Client denies current fears or concerns for personal safety.   Client denies current or recent suicidal ideation or behaviors.   Client denies current or recent homicidal ideation or behaviors.   Client denies current or recent self injurious behavior or ideation.   Client denies other safety concerns.   A safety and risk management plan has not been developed at this time, however client was given the after-hours number / 911 should there be a change in any of these risk factors.     Appearance:   Appropriate    Eye Contact:   Good    Psychomotor Behavior: Retarded (Slowed)    Attitude:   Cooperative    Orientation:   All   Speech    Rate / Production: Monotone     Volume:  Normal    Mood:    Depressed    Affect:    Appropriate    Thought Content:  Clear    Thought Form:  Coherent  Logical    Insight:    Good      Medication Review:   No current psychiatric medications prescribed     Medication Compliance:   NA     Changes in Health Issues:   None reported     Chemical Use Review:   Substance Use: Chemical use reviewed, no active concerns identified      Tobacco Use: No current tobacco use.       Collateral Reports Completed:   Not Applicable    PLAN: (Client Tasks / Therapist Tasks / Other)  1.  SIFT meditation daily    2.  More schema work at next session    3.  Standing meditation next session      4.  Meet in two weeks        YUNIOR Bustos                                                          ________________________________________________________________________    Treatment Plan    Client's Name: Ramiro Najera  YOB: 1985    Date: 2/20/2017     DSM5 Diagnoses: (Sustained by DSM5 Criteria Listed Above)  Diagnoses: 296.89 Bipolar II Disorder With anxious distress        PTSD  Psychosocial & Contextual Factors: parents' health problems  WHODAS 2.0 (12 item)            This questionnaire asks about difficulties due to health conditions. Health conditions  include  disease or illnesses, other health problems that may be short or long lasting,  injuries, mental health or emotional problems, and problems with alcohol or drugs.                     Think back over the past 30 days and answer these questions, thinking about how much  difficulty you had doing the following activities. For each question, please [place] only  one response.    S1 Standing for long periods such as 30 minutes? None =         1   S2 Taking care of household responsibilities? Mild =           2   S3 Learning a new task, for example, learning how to get to a new place? None =         1   S4 How much of a problem do you have joining community activities (for example, festivals, Hinduism or other activities) in the same way as anyone else can? Moderate =   3   S5 How much have you been emotionally affected by your health problems? Moderate =   3     In the past 30 days, how much difficulty did you have in:   S6 Concentrating on doing something for ten minutes? Mild =           2   S7 Walking a long distance such as a kilometer (or equivalent)? None =         1   S8 Washing your whole body? None =         1   S9 Getting dressed? None =         1   S10 Dealing with people you do not know? None =         1   S11 Maintaining a friendship? Moderate =   3   S12 Your day to day work? None =         1     H1 Overall, in the past 30 days, how many days were these difficulties present? Record number of days 30   H2 In the past  30 days, for how many days were you totally unable to carry out your usual activities or work because of any health condition? Record number of days  0   H3 In the past 30 days, not counting the days that you were totally unable, for how many days did you cut back or reduce your usual activities or work because of any health condition? Record number of days 3       Referral / Collaboration:  Referral to another professional/service is not indicated at this time..    Anticipated number of session or this episode of care: 18-24      MeasurableTreatment Goal(s) related to diagnosis / functional impairment(s)  Goal-Emotional regulation: Client will effectively manage mood dysregulation    I will know I've met my goal when I am feeling less out of control.      Objective #A (Client Action)    Status: continued- Date: 2/20/2018     Client will learn at least 3 mindfulness skills and practice one daily    Intervention(s)  Therapist will provide mindfulness training, psychoeducation, behavioral activation, and cognitive restructuring.    Objective #B  Client will use at least 3 coping skills for anxiety management in the next 12 weeks.    Status: continued- Date: 2/20/2018     Intervention(s)  Therapist will provide psychoeducation, behavioral activation, and cognitive restructuring.      Objective #C  Client will identify three distraction and diversion activities and use those activities to improve distress tolerance and emotional regulation.  Status: continued- Date: 2/20/2018     Intervention(s)  Therapist will provide psychoeducation, behavioral activation, and cognitive restructuring.    MeasurableTreatment Goal(s) related to diagnosis / functional impairment(s)            Goal-Depression: Client will decrease depressed mood    I will know I've met my goal when I am less depressed.      Objective #A (Client Action)    Status: Continued- Date: 2/20/2018     Client will use identified behavioral and cognitive skills to  challenge negative self talk 90% of the time.    Intervention(s)  Therapist will provide psychoeducation, behavioral activation, and cognitive restructuring.      Objective #B  Client will complete at least 10 minutes of self-regulation practice (e.g.: yoga, meditation, relaxation breathing, etc.) per day.    Status: Continued- Date: 2/20/2018     Intervention(s)  Therapist will provide psychoeducation, behavioral activation, and cognitive restructuring.      Objective #C  Client will exercise 30 minutes 36 times in the next 12 weeks.  Status: Continued- Date: 2/20/2018     Intervention(s)  Therapist will provide psychoeducation, behavioral activation, and cognitive restructuring.                 Client has reviewed and agreed to the above plan.      Justo Dawson, [facility]  February 20, 2017

## 2018-04-03 ENCOUNTER — OFFICE VISIT (OUTPATIENT)
Dept: BEHAVIORAL HEALTH | Facility: CLINIC | Age: 33
End: 2018-04-03
Payer: COMMERCIAL

## 2018-04-03 DIAGNOSIS — F31.81 BIPOLAR 2 DISORDER (H): Primary | ICD-10-CM

## 2018-04-03 PROCEDURE — 90834 PSYTX W PT 45 MINUTES: CPT | Performed by: SOCIAL WORKER

## 2018-04-03 NOTE — MR AVS SNAPSHOT
MRN:[id number]                      After Visit Summary   4/3/2018    Ramiro Najera    MRN: [id number]           Visit Information        Provider Department      4/3/2018 8:30 AM Marcos Joseph, [facility] Generic      Your next 10 appointments already scheduled     Apr 16, 2018  3:15 PM CDT   (Arrive by 3:00 PM)   Return Visit with Marcos Joseph [facility] ([address] [address]   [phone]            May 01, 2018  8:30 AM CDT   Return Visit with Marcos Joseph [facility] ([address] [id number]   [phone]            May 14, 2018  8:30 AM CDT   Return Visit with Marcos Joseph [facility] ([address] [address]   [phone]              MyChart Information     [facility] gives you secure access to your electronic health record. If you see a primary care provider, you can also send messages to your care team and make appointments. If you have questions, please call your primary care clinic.  If you do not have a primary care provider, please call [phone] and they will assist you.        Care EveryWhere ID     This is your Care EveryWhere ID. This could be used by other organizations to access your [place] medical records  [id number]        Equal Access to Services     CLAUS JOSEPH : Hadii aad ku hadasho Soomaali, waaxda luqadaha, qaybta kaalmada adeegyada, loly vogt . So [facility] [phone].    ATENCIÓN: Si habla español, tiene a moffett disposición servicios gratuitos de asistencia lingüística. Llame al [phone].    We comply with applicable federal civil rights laws and [place] laws. We do not discriminate on the basis of  race, color, national origin, age, disability, sex, sexual orientation, or gender identity.

## 2018-04-16 ENCOUNTER — OFFICE VISIT (OUTPATIENT)
Dept: BEHAVIORAL HEALTH | Facility: CLINIC | Age: 33
End: 2018-04-16
Payer: COMMERCIAL

## 2018-04-16 DIAGNOSIS — F31.81 BIPOLAR 2 DISORDER (H): Primary | ICD-10-CM

## 2018-04-16 PROCEDURE — 90834 PSYTX W PT 45 MINUTES: CPT | Performed by: SOCIAL WORKER

## 2018-04-16 NOTE — PROGRESS NOTES
"                                             Progress Note    Client Name: Ramiro Najera  Date: 4/16/2018          Service Type: Individual      Session Start Time: 315 Session End Time: 400      Session Length: 38-52     Session #: 23     Attendees: Client attended alone    Treatment Plan Last Reviewed: 2/20/2018   PHQ-9 / DEANNA-7 :      DATA      Progress Since Last Session (Related to Symptoms / Goals / Homework):   Symptoms: Stable    Homework: Partially completed   1.  SIFT meditation daily   2.  3 good things   3.  Meet in two weeks     Episode of Care Goals: Satisfactory progress - PREPARATION (Decided to change - considering how); Intervened by negotiating a change plan and determining options / strategies for behavior change, identifying triggers, exploring social supports, and working towards setting a date to begin behavior change     Current / Ongoing Stressors and Concerns:      Things have been \"feeling discouraged.\"  Client notes that he has been having a hard time keeping to the things that have been good for him, diet and exercise and regularly scheduled social activities have all fallen by the wayside lately.  Discussed automatic thoughts today in session.  Explored the role of automatic thoughts in increasing unhelpful thinking in depression, anxiety, and stress.  Explored techniques and strategies to increase awareness of unhelpful automatic thinking such as mindfulness.  Introduced concept of challenging negative thinking.                                   Treatment Objective(s) Addressed in This Session:   Client will complete at least 10 minutes of self-regulation practice (e.g.: yoga, meditation, relaxation breathing, etc.) per day.     Client will use identified behavioral and cognitive skills to challenge negative self talk 90% of the time.     Intervention:     CBT     Discussed physical, mental, and emotional boundaries and limit-setting with others. Explored management of boundaries " through direct communication and limiting contact and communication with others.  Discussed barriers to using boundary management skills including strong emotions and physical proximity.  Client given handout on boundaries today in session.         ASSESSMENT: Current Emotional / Mental Status (status of significant symptoms):   Risk status (Self / Other harm or suicidal ideation)   Client denies current fears or concerns for personal safety.   Client denies current or recent suicidal ideation or behaviors.   Client denies current or recent homicidal ideation or behaviors.   Client denies current or recent self injurious behavior or ideation.   Client denies other safety concerns.   A safety and risk management plan has not been developed at this time, however client was given the after-hours number / 911 should there be a change in any of these risk factors.     Appearance:   Appropriate    Eye Contact:   Good    Psychomotor Behavior: Retarded (Slowed)    Attitude:   Cooperative    Orientation:   All   Speech    Rate / Production: Monotone     Volume:  Normal    Mood:    Depressed    Affect:    Appropriate    Thought Content:  Clear    Thought Form:  Coherent  Logical    Insight:    Good      Medication Review:   No current psychiatric medications prescribed     Medication Compliance:   NA     Changes in Health Issues:   None reported     Chemical Use Review:   Substance Use: Chemical use reviewed, no active concerns identified      Tobacco Use: No current tobacco use.       Collateral Reports Completed:   Not Applicable    PLAN: (Client Tasks / Therapist Tasks / Other)  1.  SIFT meditation daily    2.  More schema work at next session    3.  Standing meditation next session      4.  Meet in two weeks        YUNIOR Bustos                                                         ________________________________________________________________________    Treatment Plan    Client's Name: Ramiro TRAVIS  Reinaldo  YOB: 1985    Date: 2/20/2017     DSM5 Diagnoses: (Sustained by DSM5 Criteria Listed Above)  Diagnoses: 296.89 Bipolar II Disorder With anxious distress        PTSD  Psychosocial & Contextual Factors: parents' health problems  WHODAS 2.0 (12 item)            This questionnaire asks about difficulties due to health conditions. Health conditions  include  disease or illnesses, other health problems that may be short or long lasting,  injuries, mental health or emotional problems, and problems with alcohol or drugs.                     Think back over the past 30 days and answer these questions, thinking about how much  difficulty you had doing the following activities. For each question, please [place] only  one response.    S1 Standing for long periods such as 30 minutes? None =         1   S2 Taking care of household responsibilities? Mild =           2   S3 Learning a new task, for example, learning how to get to a new place? None =         1   S4 How much of a problem do you have joining community activities (for example, festivals, Restorationism or other activities) in the same way as anyone else can? Moderate =   3   S5 How much have you been emotionally affected by your health problems? Moderate =   3     In the past 30 days, how much difficulty did you have in:   S6 Concentrating on doing something for ten minutes? Mild =           2   S7 Walking a long distance such as a kilometer (or equivalent)? None =         1   S8 Washing your whole body? None =         1   S9 Getting dressed? None =         1   S10 Dealing with people you do not know? None =         1   S11 Maintaining a friendship? Moderate =   3   S12 Your day to day work? None =         1     H1 Overall, in the past 30 days, how many days were these difficulties present? Record number of days 30   H2 In the past 30 days, for how many days were you totally unable to carry out your usual activities or work because of any health  condition? Record number of days  0   H3 In the past 30 days, not counting the days that you were totally unable, for how many days did you cut back or reduce your usual activities or work because of any health condition? Record number of days 3       Referral / Collaboration:  Referral to another professional/service is not indicated at this time..    Anticipated number of session or this episode of care: 18-24      MeasurableTreatment Goal(s) related to diagnosis / functional impairment(s)  Goal-Emotional regulation: Client will effectively manage mood dysregulation    I will know I've met my goal when I am feeling less out of control.      Objective #A (Client Action)    Status: continued- Date: 2/20/2018     Client will learn at least 3 mindfulness skills and practice one daily    Intervention(s)  Therapist will provide mindfulness training, psychoeducation, behavioral activation, and cognitive restructuring.    Objective #B  Client will use at least 3 coping skills for anxiety management in the next 12 weeks.    Status: continued- Date: 2/20/2018     Intervention(s)  Therapist will provide psychoeducation, behavioral activation, and cognitive restructuring.      Objective #C  Client will identify three distraction and diversion activities and use those activities to improve distress tolerance and emotional regulation.  Status: continued- Date: 2/20/2018     Intervention(s)  Therapist will provide psychoeducation, behavioral activation, and cognitive restructuring.    MeasurableTreatment Goal(s) related to diagnosis / functional impairment(s)            Goal-Depression: Client will decrease depressed mood    I will know I've met my goal when I am less depressed.      Objective #A (Client Action)    Status: Continued- Date: 2/20/2018     Client will use identified behavioral and cognitive skills to challenge negative self talk 90% of the time.    Intervention(s)  Therapist will provide psychoeducation, behavioral  activation, and cognitive restructuring.      Objective #B  Client will complete at least 10 minutes of self-regulation practice (e.g.: yoga, meditation, relaxation breathing, etc.) per day.    Status: Continued- Date: 2/20/2018     Intervention(s)  Therapist will provide psychoeducation, behavioral activation, and cognitive restructuring.      Objective #C  Client will exercise 30 minutes 36 times in the next 12 weeks.  Status: Continued- Date: 2/20/2018     Intervention(s)  Therapist will provide psychoeducation, behavioral activation, and cognitive restructuring.                 Client has reviewed and agreed to the above plan.      Justo Dawson, [facility]  February 20, 2017

## 2018-04-16 NOTE — MR AVS SNAPSHOT
MRN:[id number]                      After Visit Summary   4/16/2018    Ramiro Najera    MRN: [id number]           Visit Information        Provider Department      4/16/2018 3:15 PM Marcos Joseph, [facility] Generic      Your next 10 appointments already scheduled     May 01, 2018  8:30 AM CDT   Return Visit with Marcos Joseph [facility] ([address] [address]   [phone]            May 14, 2018  8:30 AM CDT   Return Visit with Marcos Joseph [facility] ([address] [address]   [phone]            May 25, 2018  8:30 AM CDT   Return Visit with Marcos Joseph [facility] ([address] [id number] [phone]              MyChart Information     [employer] gives you secure access to your electronic health record. If you see a primary care provider, you can also send messages to your care team and make appointments. If you have questions, please call your primary care clinic.  If you do not have a primary care provider, please call [phone] and they will assist you.        Care EveryWhere ID     This is your Care EveryWhere ID. This could be used by other organizations to access your [place] medical records  [id number]        Equal Access to Services     CLAUS JOSEPH : Hadii lawson leblanc hadasho Soomaali, waaxda luqadaha, qaybta kaalmada adeegemmy, waxay alicia vogt . So [facility] [phone].    ATENCIÓN: Si habla español, tiene a moffett disposición servicios gratuitos de asistencia lingüística. Llame al [phone].    We comply with applicable federal civil rights laws and [place] laws. We do not discriminate on the basis of race, color, national  origin, age, disability, sex, sexual orientation, or gender identity.

## 2018-05-01 ENCOUNTER — OFFICE VISIT (OUTPATIENT)
Dept: BEHAVIORAL HEALTH | Facility: CLINIC | Age: 33
End: 2018-05-01
Payer: COMMERCIAL

## 2018-05-01 DIAGNOSIS — F31.81 BIPOLAR 2 DISORDER (H): Primary | ICD-10-CM

## 2018-05-01 PROCEDURE — 90834 PSYTX W PT 45 MINUTES: CPT | Performed by: SOCIAL WORKER

## 2018-05-01 NOTE — MR AVS SNAPSHOT
MRN:[id number]                      After Visit Summary   5/1/2018    Ramiro Najera    MRN: [id number]           Visit Information        Provider Department      5/1/2018 8:30 AM Marcos Joseph, [facility] Generic      Your next 10 appointments already scheduled     May 14, 2018  8:30 AM CDT   Return Visit with Marcos Joseph [facility] ([address] [address] [phone]            May 25, 2018  8:30 AM CDT   Return Visit with Marcos Joseph [facility] ([facility])    [address] [place] [id number] [phone]              MyChart Information     [employer] gives you secure access to your electronic health record. If you see a primary care provider, you can also send messages to your care team and make appointments. If you have questions, please call your primary care clinic.  If you do not have a primary care provider, please call [phone] and they will assist you.        Care EveryWhere ID     This is your Care EveryWhere ID. This could be used by other organizations to access your [place] medical records  [id number]        Equal Access to Services     CLAUS JOSEPH : Ann holmano Sodeanne, waaxda luqadaha, qaybta kaalmada adewalteryada, loly brown. So [facility] [phone].    ATENCIÓN: Si habla español, tiene a moffett disposición servicios gratuitos de asistencia lingüística. Llniharika al [phone].    We comply with applicable federal civil rights laws and [place] laws. We do not discriminate on the basis of race, color, national origin, age, disability, sex, sexual orientation, or gender identity.

## 2018-05-01 NOTE — PROGRESS NOTES
"                                             Progress Note    Client Name: Ramiro Najera  Date: 5/1/2018          Service Type: Individual      Session Start Time: 830 Session End Time: 915      Session Length: 38-52     Session #: 25     Attendees: Client attended alone    Treatment Plan Last Reviewed: 2/20/2018   PHQ-9 / DEANNA-7 :      DATA      Progress Since Last Session (Related to Symptoms / Goals / Homework):   Symptoms: Stable    Homework: Partially completed   1.  SIFT meditation daily   2.  3 good things   3.  Meet in two weeks     Episode of Care Goals: Satisfactory progress - PREPARATION (Decided to change - considering how); Intervened by negotiating a change plan and determining options / strategies for behavior change, identifying triggers, exploring social supports, and working towards setting a date to begin behavior change     Current / Ongoing Stressors and Concerns:      Things have been \"had a rough couple of days.\"  Thinks that he may have had a \"manic episode.\"  Had racing thoughts, a hard time sleeping, and concentrating, emotionally drained and physically drained, spent more money than he should have.  Thinks it started on Sunday and ended on Wednesday of last week.  Denies psychosis during this time.   Not taking any medicine currently.  But has been taking his B and D vitamins.  Discussed a psych consult and that this may be worth doing, client notes that he will consider doing this.                                  Treatment Objective(s) Addressed in This Session:   Client will complete at least 10 minutes of self-regulation practice (e.g.: yoga, meditation, relaxation breathing, etc.) per day.     Client will use identified behavioral and cognitive skills to challenge negative self talk 90% of the time.     Intervention:     CBT     Discussed physical, mental, and emotional boundaries and limit-setting with others. Explored management of boundaries through direct communication and " limiting contact and communication with others.  Discussed barriers to using boundary management skills including strong emotions and physical proximity.  Client given handout on boundaries today in session.         ASSESSMENT: Current Emotional / Mental Status (status of significant symptoms):   Risk status (Self / Other harm or suicidal ideation)   Client denies current fears or concerns for personal safety.   Client denies current or recent suicidal ideation or behaviors.   Client denies current or recent homicidal ideation or behaviors.   Client denies current or recent self injurious behavior or ideation.   Client denies other safety concerns.   A safety and risk management plan has not been developed at this time, however client was given the after-hours number / 911 should there be a change in any of these risk factors.     Appearance:   Appropriate    Eye Contact:   Good    Psychomotor Behavior: Retarded (Slowed)    Attitude:   Cooperative    Orientation:   All   Speech    Rate / Production: Monotone     Volume:  Normal    Mood:    Depressed    Affect:    Appropriate    Thought Content:  Clear    Thought Form:  Coherent  Logical    Insight:    Good      Medication Review:   No current psychiatric medications prescribed     Medication Compliance:   NA     Changes in Health Issues:   None reported     Chemical Use Review:   Substance Use: Chemical use reviewed, no active concerns identified      Tobacco Use: No current tobacco use.       Collateral Reports Completed:   Not Applicable    PLAN: (Client Tasks / Therapist Tasks / Other)  1.  SIFT meditation daily    2.  More schema work at next session    3.  Standing meditation next session      4.  Meet in two weeks        YUNIOR Bustos                                                         ________________________________________________________________________    Treatment Plan    Client's Name: Ramiro Najera  YOB: 1985    Date:  2/20/2017     DSM5 Diagnoses: (Sustained by DSM5 Criteria Listed Above)  Diagnoses: 296.89 Bipolar II Disorder With anxious distress        PTSD  Psychosocial & Contextual Factors: parents' health problems  WHODAS 2.0 (12 item)            This questionnaire asks about difficulties due to health conditions. Health conditions  include  disease or illnesses, other health problems that may be short or long lasting,  injuries, mental health or emotional problems, and problems with alcohol or drugs.                     Think back over the past 30 days and answer these questions, thinking about how much  difficulty you had doing the following activities. For each question, please [place] only  one response.    S1 Standing for long periods such as 30 minutes? None =         1   S2 Taking care of household responsibilities? Mild =           2   S3 Learning a new task, for example, learning how to get to a new place? None =         1   S4 How much of a problem do you have joining community activities (for example, festivals, Hoahaoism or other activities) in the same way as anyone else can? Moderate =   3   S5 How much have you been emotionally affected by your health problems? Moderate =   3     In the past 30 days, how much difficulty did you have in:   S6 Concentrating on doing something for ten minutes? Mild =           2   S7 Walking a long distance such as a kilometer (or equivalent)? None =         1   S8 Washing your whole body? None =         1   S9 Getting dressed? None =         1   S10 Dealing with people you do not know? None =         1   S11 Maintaining a friendship? Moderate =   3   S12 Your day to day work? None =         1     H1 Overall, in the past 30 days, how many days were these difficulties present? Record number of days 30   H2 In the past 30 days, for how many days were you totally unable to carry out your usual activities or work because of any health condition? Record number of days  0   H3 In the  past 30 days, not counting the days that you were totally unable, for how many days did you cut back or reduce your usual activities or work because of any health condition? Record number of days 3       Referral / Collaboration:  Referral to another professional/service is not indicated at this time..    Anticipated number of session or this episode of care: 18-24      MeasurableTreatment Goal(s) related to diagnosis / functional impairment(s)  Goal-Emotional regulation: Client will effectively manage mood dysregulation    I will know I've met my goal when I am feeling less out of control.      Objective #A (Client Action)    Status: continued- Date: 2/20/2018     Client will learn at least 3 mindfulness skills and practice one daily    Intervention(s)  Therapist will provide mindfulness training, psychoeducation, behavioral activation, and cognitive restructuring.    Objective #B  Client will use at least 3 coping skills for anxiety management in the next 12 weeks.    Status: continued- Date: 2/20/2018     Intervention(s)  Therapist will provide psychoeducation, behavioral activation, and cognitive restructuring.      Objective #C  Client will identify three distraction and diversion activities and use those activities to improve distress tolerance and emotional regulation.  Status: continued- Date: 2/20/2018     Intervention(s)  Therapist will provide psychoeducation, behavioral activation, and cognitive restructuring.    MeasurableTreatment Goal(s) related to diagnosis / functional impairment(s)            Goal-Depression: Client will decrease depressed mood    I will know I've met my goal when I am less depressed.      Objective #A (Client Action)    Status: Continued- Date: 2/20/2018     Client will use identified behavioral and cognitive skills to challenge negative self talk 90% of the time.    Intervention(s)  Therapist will provide psychoeducation, behavioral activation, and cognitive  restructuring.      Objective #B  Client will complete at least 10 minutes of self-regulation practice (e.g.: yoga, meditation, relaxation breathing, etc.) per day.    Status: Continued- Date: 2/20/2018     Intervention(s)  Therapist will provide psychoeducation, behavioral activation, and cognitive restructuring.      Objective #C  Client will exercise 30 minutes 36 times in the next 12 weeks.  Status: Continued- Date: 2/20/2018     Intervention(s)  Therapist will provide psychoeducation, behavioral activation, and cognitive restructuring.                 Client has reviewed and agreed to the above plan.      Justo Dawson, [facility] Care[facility]  February 20, 2017

## 2018-05-14 ENCOUNTER — OFFICE VISIT (OUTPATIENT)
Dept: BEHAVIORAL HEALTH | Facility: CLINIC | Age: 33
End: 2018-05-14
Payer: COMMERCIAL

## 2018-05-14 DIAGNOSIS — F31.81 BIPOLAR 2 DISORDER (H): Primary | ICD-10-CM

## 2018-05-14 PROCEDURE — 90834 PSYTX W PT 45 MINUTES: CPT | Performed by: SOCIAL WORKER

## 2018-05-14 NOTE — PROGRESS NOTES
"                                             Progress Note    Client Name: Ramiro Najera  Date: 5/14/2018          Service Type: Individual      Session Start Time: 830 Session End Time: 915      Session Length: 38-52     Session #: 26     Attendees: Client attended alone    Treatment Plan Last Reviewed: 5/14/2018   PHQ-9 / DEANNA-7 :      DATA      Progress Since Last Session (Related to Symptoms / Goals / Homework):   Symptoms: Stable    Homework: Partially completed   1.  SIFT meditation daily   2.  3 good things   3.  Meet in two weeks     Episode of Care Goals: Satisfactory progress - PREPARATION (Decided to change - considering how); Intervened by negotiating a change plan and determining options / strategies for behavior change, identifying triggers, exploring social supports, and working towards setting a date to begin behavior change     Current / Ongoing Stressors and Concerns:      Things have been \"good to great.\"  Has been focused on trying to be more conscious and empathic in his view of himself.  Seeing lots of differences between his interactions with Stacy and with most everyone else he deals with.  Is starting to see that she \"is consciously malicious at time.\"  Discussed mindfulness as being aware of what we are experiencing while we are experiencing it.  Contrasted this with avoidance and rumination.  Explored the role of mindfulness as an overall coping strategy for managing depression, anxiety, and strong emotions.  Explained concept of state of mind using SIFT (sensations, images, feelings, thoughts) pneumonic.  Led client in a guided mindfulness exercise focusing on sensations, images, feelings, and thoughts.  Discussed mindfulness as a tool to intentionally shift our awareness and focus as needed.  Completed a compassion-based meditation focused on generosity, patience, compassion, and lovingkindness.                           Treatment Objective(s) Addressed in This Session:   Client " will complete at least 10 minutes of self-regulation practice (e.g.: yoga, meditation, relaxation breathing, etc.) per day.     Client will use identified behavioral and cognitive skills to challenge negative self talk 90% of the time.     Intervention:     CBT     Discussed physical, mental, and emotional boundaries and limit-setting with others. Explored management of boundaries through direct communication and limiting contact and communication with others.  Discussed barriers to using boundary management skills including strong emotions and physical proximity.  Client given handout on boundaries today in session.         ASSESSMENT: Current Emotional / Mental Status (status of significant symptoms):   Risk status (Self / Other harm or suicidal ideation)   Client denies current fears or concerns for personal safety.   Client denies current or recent suicidal ideation or behaviors.   Client denies current or recent homicidal ideation or behaviors.   Client denies current or recent self injurious behavior or ideation.   Client denies other safety concerns.   A safety and risk management plan has not been developed at this time, however client was given the after-hours number / 911 should there be a change in any of these risk factors.     Appearance:   Appropriate    Eye Contact:   Good    Psychomotor Behavior: Retarded (Slowed)    Attitude:   Cooperative    Orientation:   All   Speech    Rate / Production: Monotone     Volume:  Normal    Mood:    Depressed    Affect:    Appropriate    Thought Content:  Clear    Thought Form:  Coherent  Logical    Insight:    Good      Medication Review:   No current psychiatric medications prescribed     Medication Compliance:   NA     Changes in Health Issues:   None reported     Chemical Use Review:   Substance Use: Chemical use reviewed, no active concerns identified      Tobacco Use: No current tobacco use.       Collateral Reports Completed:   Not Applicable    PLAN: (Client  Tasks / Therapist Tasks / Other)  1.  SIFT meditation daily    2.  More schema work at next session    3.  Standing meditation next session      4.  Meet in two weeks        Justo Dawson, LICSW                                                         ________________________________________________________________________    Treatment Plan    Client's Name: Ramiro Najera  YOB: 1985    Date: 2/20/2017     DSM5 Diagnoses: (Sustained by DSM5 Criteria Listed Above)  Diagnoses: 296.89 Bipolar II Disorder With anxious distress        PTSD  Psychosocial & Contextual Factors: parents' health problems  WHODAS 2.0 (12 item)            This questionnaire asks about difficulties due to health conditions. Health conditions  include  disease or illnesses, other health problems that may be short or long lasting,  injuries, mental health or emotional problems, and problems with alcohol or drugs.                     Think back over the past 30 days and answer these questions, thinking about how much  difficulty you had doing the following activities. For each question, please [place] only  one response.    S1 Standing for long periods such as 30 minutes? None =         1   S2 Taking care of household responsibilities? Mild =           2   S3 Learning a new task, for example, learning how to get to a new place? None =         1   S4 How much of a problem do you have joining community activities (for example, festivals, Yazidi or other activities) in the same way as anyone else can? Moderate =   3   S5 How much have you been emotionally affected by your health problems? Moderate =   3     In the past 30 days, how much difficulty did you have in:   S6 Concentrating on doing something for ten minutes? Mild =           2   S7 Walking a long distance such as a kilometer (or equivalent)? None =         1   S8 Washing your whole body? None =         1   S9 Getting dressed? None =         1   S10 Dealing with people  you do not know? None =         1   S11 Maintaining a friendship? Moderate =   3   S12 Your day to day work? None =         1     H1 Overall, in the past 30 days, how many days were these difficulties present? Record number of days 30   H2 In the past 30 days, for how many days were you totally unable to carry out your usual activities or work because of any health condition? Record number of days  0   H3 In the past 30 days, not counting the days that you were totally unable, for how many days did you cut back or reduce your usual activities or work because of any health condition? Record number of days 3       Referral / Collaboration:  Referral to another professional/service is not indicated at this time..    Anticipated number of session or this episode of care: 18-24      MeasurableTreatment Goal(s) related to diagnosis / functional impairment(s)  Goal-Emotional regulation: Client will effectively manage mood dysregulation    I will know I've met my goal when I am feeling less out of control.      Objective #A (Client Action)    Status: continued- Date: 5/14/2018     Client will learn at least 3 mindfulness skills and practice one daily    Intervention(s)  Therapist will provide mindfulness training, psychoeducation, behavioral activation, and cognitive restructuring.    Objective #B  Client will use at least 3 coping skills for anxiety management in the next 12 weeks.    Status: continued- Date: 5/14/2018     Intervention(s)  Therapist will provide psychoeducation, behavioral activation, and cognitive restructuring.      Objective #C  Client will identify three distraction and diversion activities and use those activities to improve distress tolerance and emotional regulation.  Status: continued- Date: 5/14/2018     Intervention(s)  Therapist will provide psychoeducation, behavioral activation, and cognitive restructuring.    MeasurableTreatment Goal(s) related to diagnosis / functional  impairment(s)            Goal-Depression: Client will decrease depressed mood    I will know I've met my goal when I am less depressed.      Objective #A (Client Action)    Status: Continued- Date: 5/14/2018     Client will use identified behavioral and cognitive skills to challenge negative self talk 90% of the time.    Intervention(s)  Therapist will provide psychoeducation, behavioral activation, and cognitive restructuring.      Objective #B  Client will complete at least 10 minutes of self-regulation practice (e.g.: yoga, meditation, relaxation breathing, etc.) per day.    Status: Continued- Date: 5/14/2018     Intervention(s)  Therapist will provide psychoeducation, behavioral activation, and cognitive restructuring.      Objective #C  Client will exercise 30 minutes 36 times in the next 12 weeks.  Status: Continued- Date: 5/14/2018     Intervention(s)  Therapist will provide psychoeducation, behavioral activation, and cognitive restructuring.                 Client has reviewed and agreed to the above plan.      Justo Dawson, [facility] Hospital[facility]  February 20, 2017

## 2018-05-14 NOTE — MR AVS SNAPSHOT
MRN:[id number]                      After Visit Summary   5/14/2018    Ramiro Najera    MRN: [id number]           Visit Information        Provider Department      5/14/2018 8:30 AM Marcos Joseph, [facility] Generic      Your next 10 appointments already scheduled     May 25, 2018  8:30 AM CDT   Return Visit with Marcos Joseph [facility] ([address] [address]   [phone]            Jun 18, 2018  7:30 AM CDT   Return Visit with Marcos Joseph [facility] ([address] [address]   [phone]            Jul 09, 2018  8:30 AM CDT   Return Visit with Marcos Joseph [facility] ([address] [id number] [phone]              MyChart Information     [employer] gives you secure access to your electronic health record. If you see a primary care provider, you can also send messages to your care team and make appointments. If you have questions, please call your primary care clinic.  If you do not have a primary care provider, please call [phone] and they will assist you.        Care EveryWhere ID     This is your Care EveryWhere ID. This could be used by other organizations to access your [place] medical records  [id number]        Equal Access to Services     CLAUS JOSEPH : Hadii lawson leblanc hadasho Sojocelynali, waaxda luqadaha, qaybta kaalmada adeegemmy, waxay alicia vogt . So [facility] [phone].    ATENCIÓN: Si habla español, tiene a moffett disposición servicios gratuitos de asistencia lingüística. Llame al [phone].    We comply with applicable federal civil rights laws and [place] laws. We do not discriminate on the basis of race, color, national  origin, age, disability, sex, sexual orientation, or gender identity.

## 2018-05-25 ENCOUNTER — OFFICE VISIT (OUTPATIENT)
Dept: BEHAVIORAL HEALTH | Facility: CLINIC | Age: 33
End: 2018-05-25
Payer: COMMERCIAL

## 2018-05-25 DIAGNOSIS — F31.81 BIPOLAR 2 DISORDER (H): Primary | ICD-10-CM

## 2018-05-25 PROCEDURE — 90834 PSYTX W PT 45 MINUTES: CPT | Performed by: SOCIAL WORKER

## 2018-05-25 NOTE — PROGRESS NOTES
"                                             Progress Note    Client Name: Ramiro Najera  Date: 5/25/2018          Service Type: Individual      Session Start Time: 830 Session End Time: 915      Session Length: 38-52     Session #: 27     Attendees: Client attended alone    Treatment Plan Last Reviewed: 5/14/2018   PHQ-9 / DEANNA-7 :      DATA      Progress Since Last Session (Related to Symptoms / Goals / Homework):   Symptoms: Stable    Homework: Partially completed   1.  SIFT meditation daily   2.  3 good things   3.  Meet in two weeks     Episode of Care Goals: Satisfactory progress - PREPARATION (Decided to change - considering how); Intervened by negotiating a change plan and determining options / strategies for behavior change, identifying triggers, exploring social supports, and working towards setting a date to begin behavior change     Current / Ongoing Stressors and Concerns:      Things have been \"hard.\"  Stress continues with Stacy, she was pregnant.  Long discussion today about this in session.  Therapist encouraged client to ventilate feelings and led client in a mindfulness exercise geared to increase integration of feelings and thoughts.  Discussed mindfulness as being aware of what we are experiencing while we are experiencing it.  Contrasted this with avoidance and rumination.  Explored the role of mindfulness as an overall coping strategy for managing depression, anxiety, and strong emotions.  Explained concept of state of mind using SIFT (sensations, images, feelings, thoughts) pneumonic.  Led client in a guided mindfulness exercise focusing on sensations, images, feelings, and thoughts.  Discussed mindfulness as a tool to intentionally shift our awareness and focus as needed.                           Treatment Objective(s) Addressed in This Session:   Client will complete at least 10 minutes of self-regulation practice (e.g.: yoga, meditation, relaxation breathing, etc.) per " day.     Client will use identified behavioral and cognitive skills to challenge negative self talk 90% of the time.     Intervention:     CBT     Discussed physical, mental, and emotional boundaries and limit-setting with others. Explored management of boundaries through direct communication and limiting contact and communication with others.  Discussed barriers to using boundary management skills including strong emotions and physical proximity.  Client given handout on boundaries today in session.         ASSESSMENT: Current Emotional / Mental Status (status of significant symptoms):   Risk status (Self / Other harm or suicidal ideation)   Client denies current fears or concerns for personal safety.   Client denies current or recent suicidal ideation or behaviors.   Client denies current or recent homicidal ideation or behaviors.   Client denies current or recent self injurious behavior or ideation.   Client denies other safety concerns.   A safety and risk management plan has not been developed at this time, however client was given the after-hours number / 911 should there be a change in any of these risk factors.     Appearance:   Appropriate    Eye Contact:   Good    Psychomotor Behavior: Retarded (Slowed)    Attitude:   Cooperative    Orientation:   All   Speech    Rate / Production: Monotone     Volume:  Normal    Mood:    Depressed    Affect:    Appropriate    Thought Content:  Clear    Thought Form:  Coherent  Logical    Insight:    Good      Medication Review:   No current psychiatric medications prescribed     Medication Compliance:   NA     Changes in Health Issues:   None reported     Chemical Use Review:   Substance Use: Chemical use reviewed, no active concerns identified      Tobacco Use: No current tobacco use.       Collateral Reports Completed:   Not Applicable    PLAN: (Client Tasks / Therapist Tasks / Other)  1.  SIFT meditation daily    2.  More schema work at next session    3.  Standing  meditation next session      4.  Meet in two weeks        Justo Dawson, LICSW                                                         ________________________________________________________________________    Treatment Plan    Client's Name: Ramiro Najera  YOB: 1985    Date: 2/20/2017     DSM5 Diagnoses: (Sustained by DSM5 Criteria Listed Above)  Diagnoses: 296.89 Bipolar II Disorder With anxious distress        PTSD  Psychosocial & Contextual Factors: parents' health problems  WHODAS 2.0 (12 item)            This questionnaire asks about difficulties due to health conditions. Health conditions  include  disease or illnesses, other health problems that may be short or long lasting,  injuries, mental health or emotional problems, and problems with alcohol or drugs.                     Think back over the past 30 days and answer these questions, thinking about how much  difficulty you had doing the following activities. For each question, please [place] only  one response.    S1 Standing for long periods such as 30 minutes? None =         1   S2 Taking care of household responsibilities? Mild =           2   S3 Learning a new task, for example, learning how to get to a new place? None =         1   S4 How much of a problem do you have joining community activities (for example, festivals, Adventism or other activities) in the same way as anyone else can? Moderate =   3   S5 How much have you been emotionally affected by your health problems? Moderate =   3     In the past 30 days, how much difficulty did you have in:   S6 Concentrating on doing something for ten minutes? Mild =           2   S7 Walking a long distance such as a kilometer (or equivalent)? None =         1   S8 Washing your whole body? None =         1   S9 Getting dressed? None =         1   S10 Dealing with people you do not know? None =         1   S11 Maintaining a friendship? Moderate =   3   S12 Your day to day work? None =          1     H1 Overall, in the past 30 days, how many days were these difficulties present? Record number of days 30   H2 In the past 30 days, for how many days were you totally unable to carry out your usual activities or work because of any health condition? Record number of days  0   H3 In the past 30 days, not counting the days that you were totally unable, for how many days did you cut back or reduce your usual activities or work because of any health condition? Record number of days 3       Referral / Collaboration:  Referral to another professional/service is not indicated at this time..    Anticipated number of session or this episode of care: 18-24      MeasurableTreatment Goal(s) related to diagnosis / functional impairment(s)  Goal-Emotional regulation: Client will effectively manage mood dysregulation    I will know I've met my goal when I am feeling less out of control.      Objective #A (Client Action)    Status: continued- Date: 5/14/2018     Client will learn at least 3 mindfulness skills and practice one daily    Intervention(s)  Therapist will provide mindfulness training, psychoeducation, behavioral activation, and cognitive restructuring.    Objective #B  Client will use at least 3 coping skills for anxiety management in the next 12 weeks.    Status: continued- Date: 5/14/2018     Intervention(s)  Therapist will provide psychoeducation, behavioral activation, and cognitive restructuring.      Objective #C  Client will identify three distraction and diversion activities and use those activities to improve distress tolerance and emotional regulation.  Status: continued- Date: 5/14/2018     Intervention(s)  Therapist will provide psychoeducation, behavioral activation, and cognitive restructuring.    MeasurableTreatment Goal(s) related to diagnosis / functional impairment(s)            Goal-Depression: Client will decrease depressed mood    I will know I've met my goal when I am less depressed.       Objective #A (Client Action)    Status: Continued- Date: 5/14/2018     Client will use identified behavioral and cognitive skills to challenge negative self talk 90% of the time.    Intervention(s)  Therapist will provide psychoeducation, behavioral activation, and cognitive restructuring.      Objective #B  Client will complete at least 10 minutes of self-regulation practice (e.g.: yoga, meditation, relaxation breathing, etc.) per day.    Status: Continued- Date: 5/14/2018     Intervention(s)  Therapist will provide psychoeducation, behavioral activation, and cognitive restructuring.      Objective #C  Client will exercise 30 minutes 36 times in the next 12 weeks.  Status: Continued- Date: 5/14/2018     Intervention(s)  Therapist will provide psychoeducation, behavioral activation, and cognitive restructuring.                 Client has reviewed and agreed to the above plan.      Justo Dawson, [facility]  February 20, 2017

## 2018-05-25 NOTE — MR AVS SNAPSHOT
MRN:[id number]                      After Visit Summary   5/25/2018    Ramiro Najera    MRN: [id number]           Visit Information        Provider Department      5/25/2018 8:30 AM Marcos Joseph, [facility] Generic      Your next 10 appointments already scheduled     Jun 01, 2018 11:30 AM CDT   Return Visit with Marcos Joseph [facility] ([address] [address]   [phone]            Jun 18, 2018  7:30 AM CDT   Return Visit with Marcos Joseph [facility] ([address] [address]   [phone]            Jul 09, 2018  8:30 AM CDT   Return Visit with Marcos Joseph [facility] ([address] [address] [phone]              MyChart Information     [employer] gives you secure access to your electronic health record. If you see a primary care provider, you can also send messages to your care team and make appointments. If you have questions, please call your primary care clinic.  If you do not have a primary care provider, please call [phone] and they will assist you.        Care EveryWhere ID     This is your Care EveryWhere ID. This could be used by other organizations to access your [place] medical records  [id number]        Equal Access to Services     CLAUS JOSEPH : Hadii lawson leblanc hadasho Sojocelynali, waaxda luqadaha, qaybta kaalmada adeegemmy, waxay alicia vogt . So [facility] [phone].    ATENCIÓN: Si habla español, tiene a moffett disposición servicios gratuitos de asistencia lingüística. Llame al [phone].    We comply with applicable federal civil rights laws and [place] laws. We do not discriminate on the basis of race, color, national  origin, age, disability, sex, sexual orientation, or gender identity.

## 2018-06-01 ENCOUNTER — OFFICE VISIT (OUTPATIENT)
Dept: BEHAVIORAL HEALTH | Facility: CLINIC | Age: 33
End: 2018-06-01
Payer: COMMERCIAL

## 2018-06-01 DIAGNOSIS — F31.81 BIPOLAR 2 DISORDER (H): Primary | ICD-10-CM

## 2018-06-01 PROCEDURE — 90834 PSYTX W PT 45 MINUTES: CPT | Performed by: SOCIAL WORKER

## 2018-06-01 NOTE — PROGRESS NOTES
"                                             Progress Note    Client Name: Ramiro Najera  Date: 6/1/2018          Service Type: Individual      Session Start Time: 830 Session End Time: 915      Session Length: 38-52     Session #: 28     Attendees: Client attended alone    Treatment Plan Last Reviewed: 5/14/2018   PHQ-9 / DEANNA-7 :      DATA      Progress Since Last Session (Related to Symptoms / Goals / Homework):   Symptoms: Stable    Homework: Partially completed   1.  SIFT meditation daily   2.  3 good things   3.  Meet in two weeks     Episode of Care Goals: Satisfactory progress - PREPARATION (Decided to change - considering how); Intervened by negotiating a change plan and determining options / strategies for behavior change, identifying triggers, exploring social supports, and working towards setting a date to begin behavior change     Current / Ongoing Stressors and Concerns:      Things have been \"ok.\"  Has been feeling tired lately.  Long discussion today about Maraget's pregnancy and the impact that this had on him.      Reviewed 5 Stages of Grief (Yulia Moore MD) with client today in session:  1. Denial: This can t be happening to me.   2. Anger:  Why is this happening to me?   3. Bargaining:  Please make this not happen and in return I will do anything.   4. Depression/Mourning:  I am so sad that I cannot change what has happened.   5. Acceptance:  I have made peace both with what has happened as well as my inability to change it.   Reviewed these as phases that may not always follow a linear pattern (i.e. client may feel depressed one day and angry the next).  Identified strategies for managing grief and loss including journaling, exercise, and investing time and energy into healthy interpersonal relationships.                         Treatment Objective(s) Addressed in This Session:   Client will complete at least 10 minutes of self-regulation practice (e.g.: yoga, meditation, relaxation " breathing, etc.) per day.     Client will use identified behavioral and cognitive skills to challenge negative self talk 90% of the time.     Intervention:     CBT     Discussed physical, mental, and emotional boundaries and limit-setting with others. Explored management of boundaries through direct communication and limiting contact and communication with others.  Discussed barriers to using boundary management skills including strong emotions and physical proximity.  Client given handout on boundaries today in session.         ASSESSMENT: Current Emotional / Mental Status (status of significant symptoms):   Risk status (Self / Other harm or suicidal ideation)   Client denies current fears or concerns for personal safety.   Client denies current or recent suicidal ideation or behaviors.   Client denies current or recent homicidal ideation or behaviors.   Client denies current or recent self injurious behavior or ideation.   Client denies other safety concerns.   A safety and risk management plan has not been developed at this time, however client was given the after-hours number / 911 should there be a change in any of these risk factors.     Appearance:   Appropriate    Eye Contact:   Good    Psychomotor Behavior: Retarded (Slowed)    Attitude:   Cooperative    Orientation:   All   Speech    Rate / Production: Monotone     Volume:  Normal    Mood:    Depressed    Affect:    Appropriate    Thought Content:  Clear    Thought Form:  Coherent  Logical    Insight:    Good      Medication Review:   No current psychiatric medications prescribed     Medication Compliance:   NA     Changes in Health Issues:   None reported     Chemical Use Review:   Substance Use: Chemical use reviewed, no active concerns identified      Tobacco Use: No current tobacco use.       Collateral Reports Completed:   Not Applicable    PLAN: (Client Tasks / Therapist Tasks / Other)  1.  SIFT meditation daily    2.  More schema work at next  session    3.  Standing meditation next session      4.  Meet in two weeks        Justo Dawson, LICSW                                                         ________________________________________________________________________    Treatment Plan    Client's Name: Ramiro Najera  YOB: 1985    Date: 2/20/2017     DSM5 Diagnoses: (Sustained by DSM5 Criteria Listed Above)  Diagnoses: 296.89 Bipolar II Disorder With anxious distress        PTSD  Psychosocial & Contextual Factors: parents' health problems  WHODAS 2.0 (12 item)            This questionnaire asks about difficulties due to health conditions. Health conditions  include  disease or illnesses, other health problems that may be short or long lasting,  injuries, mental health or emotional problems, and problems with alcohol or drugs.                     Think back over the past 30 days and answer these questions, thinking about how much  difficulty you had doing the following activities. For each question, please [place] only  one response.    S1 Standing for long periods such as 30 minutes? None =         1   S2 Taking care of household responsibilities? Mild =           2   S3 Learning a new task, for example, learning how to get to a new place? None =         1   S4 How much of a problem do you have joining community activities (for example, festivals, [employer] or other activities) in the same way as anyone else can? Moderate =   3   S5 How much have you been emotionally affected by your health problems? Moderate =   3     In the past 30 days, how much difficulty did you have in:   S6 Concentrating on doing something for ten minutes? Mild =           2   S7 Walking a long distance such as a kilometer (or equivalent)? None =         1   S8 Washing your whole body? None =         1   S9 Getting dressed? None =         1   S10 Dealing with people you do not know? None =         1   S11 Maintaining a friendship? Moderate =   3   S12 Your  day to day work? None =         1     H1 Overall, in the past 30 days, how many days were these difficulties present? Record number of days 30   H2 In the past 30 days, for how many days were you totally unable to carry out your usual activities or work because of any health condition? Record number of days  0   H3 In the past 30 days, not counting the days that you were totally unable, for how many days did you cut back or reduce your usual activities or work because of any health condition? Record number of days 3       Referral / Collaboration:  Referral to another professional/service is not indicated at this time..    Anticipated number of session or this episode of care: 18-24      MeasurableTreatment Goal(s) related to diagnosis / functional impairment(s)  Goal-Emotional regulation: Client will effectively manage mood dysregulation    I will know I've met my goal when I am feeling less out of control.      Objective #A (Client Action)    Status: continued- Date: 5/14/2018     Client will learn at least 3 mindfulness skills and practice one daily    Intervention(s)  Therapist will provide mindfulness training, psychoeducation, behavioral activation, and cognitive restructuring.    Objective #B  Client will use at least 3 coping skills for anxiety management in the next 12 weeks.    Status: continued- Date: 5/14/2018     Intervention(s)  Therapist will provide psychoeducation, behavioral activation, and cognitive restructuring.      Objective #C  Client will identify three distraction and diversion activities and use those activities to improve distress tolerance and emotional regulation.  Status: continued- Date: 5/14/2018     Intervention(s)  Therapist will provide psychoeducation, behavioral activation, and cognitive restructuring.    MeasurableTreatment Goal(s) related to diagnosis / functional impairment(s)            Goal-Depression: Client will decrease depressed mood    I will know I've met my goal when  I am less depressed.      Objective #A (Client Action)    Status: Continued- Date: 5/14/2018     Client will use identified behavioral and cognitive skills to challenge negative self talk 90% of the time.    Intervention(s)  Therapist will provide psychoeducation, behavioral activation, and cognitive restructuring.      Objective #B  Client will complete at least 10 minutes of self-regulation practice (e.g.: yoga, meditation, relaxation breathing, etc.) per day.    Status: Continued- Date: 5/14/2018     Intervention(s)  Therapist will provide psychoeducation, behavioral activation, and cognitive restructuring.      Objective #C  Client will exercise 30 minutes 36 times in the next 12 weeks.  Status: Continued- Date: 5/14/2018     Intervention(s)  Therapist will provide psychoeducation, behavioral activation, and cognitive restructuring.                 Client has reviewed and agreed to the above plan.      Justo Dawson, [facility]  February 20, 2017

## 2018-06-01 NOTE — PATIENT INSTRUCTIONS
You will be scheduled for a follow up visit as needed.    You will be scheduled for an echocardiogram. We will contact you with the results via my chart    If you have any questions regarding to your visit please contact your care team:     Cardiology  Telephone Number    Michelle Ren [phone]   Or send a message to your provider via my chart.   For scheduling appts or procedures:    April Alexander     [phone] or  [phone]   For the Device Clinic (Pacemakers and ICD's)   RN's :   Gedra Dowd  During business hours: [phone]    After business hours:   [phone]- select option 4 and ask for job code [id number].    You can also contact us using My Chart. If you need assistance in setting this up, please contact our office or ask at your follow up visit.     If you need a medication refill please contact your pharmacy. Please allow 3 business days for your refill to be completed.     As always, Thank you for trusting us with your health care needs!  
OT evaluate and treat

## 2018-06-01 NOTE — MR AVS SNAPSHOT
MRN:[id number]                      After Visit Summary   6/1/2018    Ramiro Najera    MRN: [id number]           Visit Information        Provider Department      6/1/2018 11:30 AM Marcos Joseph, [facility] Generic      Your next 10 appointments already scheduled     Jun 18, 2018  7:30 AM CDT   Return Visit with Marcos Joseph [facility] ([address] [id number] [phone]            Jul 09, 2018  8:30 AM CDT   [facility]    [address] [place] [id number] [phone]              MyChart Information     [employer] gives you secure access to your electronic health record. If you see a primary care provider, you can also send messages to your care team and make appointments. If you have questions, please call your primary care clinic.  If you do not have a primary care provider, please call [phone] and they will assist you.        Care EveryWhere ID     This is your Care EveryWhere ID. This could be used by other organizations to access your [place] medical records  [id number]        Equal Access to Services     CLAUS JOSEPH : Ann holmano Sodeanne, waaxda luqadaha, qaybta kaalmada adewalteryada, loly brown. So [facility] [phone].    ATENCIÓN: Si habla español, tiene a [place] disposición servicios gratuitos de asistencia lingüística. Llniharika al [phone].    We comply with applicable federal civil rights laws and [place] laws. We do not discriminate on the basis of race, color, national origin, age, disability, sex, sexual orientation, or gender identity.

## 2018-06-18 ENCOUNTER — OFFICE VISIT (OUTPATIENT)
Dept: BEHAVIORAL HEALTH | Facility: CLINIC | Age: 33
End: 2018-06-18
Payer: COMMERCIAL

## 2018-06-18 DIAGNOSIS — F31.81 BIPOLAR 2 DISORDER (H): Primary | ICD-10-CM

## 2018-06-18 PROCEDURE — 90834 PSYTX W PT 45 MINUTES: CPT | Performed by: SOCIAL WORKER

## 2018-06-18 NOTE — MR AVS SNAPSHOT
MRN:[id number]                      After Visit Summary   6/18/2018    Ramiro Najera    MRN: [id number]           Visit Information        Provider Department      6/18/2018 7:30 AM Marcos Joseph, YUNIOR [facility] Generic      Your next 10 appointments already scheduled     Jul 09, 2018  8:30 AM CDT   Return Visit with YUNIOR Olea   [facility] ([facility])    [address] [place] [id number] [phone]              MyChart Information     [employer] gives you secure access to your electronic health record. If you see a primary care provider, you can also send messages to your care team and make appointments. If you have questions, please call your primary care clinic.  If you do not have a primary care provider, please call [phone] and they will assist you.        Care EveryWhere ID     This is your Care EveryWhere ID. This could be used by other organizations to access your [place] medical records  [id number]        Equal Access to Services     CLAUS JOSEPH AH: Hadii aad ku hadasho Sojocelynali, waaxda luqadaha, qaybta kaalmada adeegyagladys, loly brown. So [facility] [phone].    ATENCIÓN: Si habla español, tiene a moffett disposición servicios gratuitos de asistencia lingüística. [facility] [phone].    We comply with applicable federal civil rights laws and [place] laws. We do not discriminate on the basis of race, color, national origin, age, disability, sex, sexual orientation, or gender identity.

## 2018-06-18 NOTE — PROGRESS NOTES
"                                             Progress Note    Client Name: Ramiro Najera  Date: 6/18/2018          Service Type: Individual      Session Start Time: 830 Session End Time: 915      Session Length: 38-52     Session #: 29     Attendees: Client attended alone    Treatment Plan Last Reviewed: 6/18/2018   PHQ-9 / DEANNA-7 :      DATA      Progress Since Last Session (Related to Symptoms / Goals / Homework):   Symptoms: Stable    Homework: Partially completed   1.  SIFT meditation daily   2.  3 good things   3.  Meet in two weeks     Episode of Care Goals: Satisfactory progress - PREPARATION (Decided to change - considering how); Intervened by negotiating a change plan and determining options / strategies for behavior change, identifying triggers, exploring social supports, and working towards setting a date to begin behavior change     Current / Ongoing Stressors and Concerns:      Things have been \"busy, len tiring but ok.\"  Has been tying to stay busy with some of the behavioral goals that we set at our last few sessions.  Missed about a week but otherwise has been consistent, trying to find some new things to do.  Meditation has not been as consistent as it could be, but is working on this.  Diet is going well. Long discussion today about behavioral activation.  Reviewed basic tracking options, identified differences between long-term v short-term planning, and discussed setting and achieving SMART goals as an effective method for dealing with depression, anxiety, and many other mental health concerns.  Set behavioral goals in session today.                                   Treatment Objective(s) Addressed in This Session:   Client will complete at least 10 minutes of self-regulation practice (e.g.: yoga, meditation, relaxation breathing, etc.) per day.     Client will use identified behavioral and cognitive skills to challenge negative self talk 90% of the " time.     Intervention:     CBT     Discussed physical, mental, and emotional boundaries and limit-setting with others. Explored management of boundaries through direct communication and limiting contact and communication with others.  Discussed barriers to using boundary management skills including strong emotions and physical proximity.  Client given handout on boundaries today in session.         ASSESSMENT: Current Emotional / Mental Status (status of significant symptoms):   Risk status (Self / Other harm or suicidal ideation)   Client denies current fears or concerns for personal safety.   Client denies current or recent suicidal ideation or behaviors.   Client denies current or recent homicidal ideation or behaviors.   Client denies current or recent self injurious behavior or ideation.   Client denies other safety concerns.   A safety and risk management plan has not been developed at this time, however client was given the after-hours number / 911 should there be a change in any of these risk factors.     Appearance:   Appropriate    Eye Contact:   Good    Psychomotor Behavior: Retarded (Slowed)    Attitude:   Cooperative    Orientation:   All   Speech    Rate / Production: Monotone     Volume:  Normal    Mood:    Depressed    Affect:    Appropriate    Thought Content:  Clear    Thought Form:  Coherent  Logical    Insight:    Good      Medication Review:   No current psychiatric medications prescribed     Medication Compliance:   NA     Changes in Health Issues:   None reported     Chemical Use Review:   Substance Use: Chemical use reviewed, no active concerns identified      Tobacco Use: No current tobacco use.       Collateral Reports Completed:   Not Applicable    PLAN: (Client Tasks / Therapist Tasks / Other)  1.  SIFT meditation daily    2.  More schema work at next session    3.  Standing meditation next session      4.  Meet in two weeks        YUNIOR Bustos                                                          ________________________________________________________________________    Treatment Plan    Client's Name: Ramiro Najera  YOB: 1985    Date: 2/20/2017     DSM5 Diagnoses: (Sustained by DSM5 Criteria Listed Above)  Diagnoses: 296.89 Bipolar II Disorder With anxious distress        PTSD  Psychosocial & Contextual Factors: parents' health problems  WHODAS 2.0 (12 item)            This questionnaire asks about difficulties due to health conditions. Health conditions  include  disease or illnesses, other health problems that may be short or long lasting,  injuries, mental health or emotional problems, and problems with alcohol or drugs.                     Think back over the past 30 days and answer these questions, thinking about how much  difficulty you had doing the following activities. For each question, please [place] only  one response.    S1 Standing for long periods such as 30 minutes? None =         1   S2 Taking care of household responsibilities? Mild =           2   S3 Learning a new task, for example, learning how to get to a new place? None =         1   S4 How much of a problem do you have joining community activities (for example, festivals, [employer] or other activities) in the same way as anyone else can? Moderate =   3   S5 How much have you been emotionally affected by your health problems? Moderate =   3     In the past 30 days, how much difficulty did you have in:   S6 Concentrating on doing something for ten minutes? Mild =           2   S7 Walking a long distance such as a kilometer (or equivalent)? None =         1   S8 Washing your whole body? None =         1   S9 Getting dressed? None =         1   S10 Dealing with people you do not know? None =         1   S11 Maintaining a friendship? Moderate =   3   S12 Your day to day work? None =         1     H1 Overall, in the past 30 days, how many days were these difficulties present? Record number of days  30   H2 In the past 30 days, for how many days were you totally unable to carry out your usual activities or work because of any health condition? Record number of days  0   H3 In the past 30 days, not counting the days that you were totally unable, for how many days did you cut back or reduce your usual activities or work because of any health condition? Record number of days 3       Referral / Collaboration:  Referral to another professional/service is not indicated at this time..    Anticipated number of session or this episode of care: 18-24      MeasurableTreatment Goal(s) related to diagnosis / functional impairment(s)  Goal-Emotional regulation: Client will effectively manage mood dysregulation    I will know I've met my goal when I am feeling less out of control.      Objective #A (Client Action)    Status: continued- Date: 5/14/2018     Client will learn at least 3 mindfulness skills and practice one daily    Intervention(s)  Therapist will provide mindfulness training, psychoeducation, behavioral activation, and cognitive restructuring.    Objective #B  Client will use at least 3 coping skills for anxiety management in the next 12 weeks.    Status: continued- Date: 5/14/2018     Intervention(s)  Therapist will provide psychoeducation, behavioral activation, and cognitive restructuring.      Objective #C  Client will identify three distraction and diversion activities and use those activities to improve distress tolerance and emotional regulation.  Status: continued- Date: 5/14/2018     Intervention(s)  Therapist will provide psychoeducation, behavioral activation, and cognitive restructuring.    MeasurableTreatment Goal(s) related to diagnosis / functional impairment(s)            Goal-Depression: Client will decrease depressed mood    I will know I've met my goal when I am less depressed.      Objective #A (Client Action)    Status: Continued- Date: 5/14/2018     Client will use identified behavioral and  cognitive skills to challenge negative self talk 90% of the time.    Intervention(s)  Therapist will provide psychoeducation, behavioral activation, and cognitive restructuring.      Objective #B  Client will complete at least 10 minutes of self-regulation practice (e.g.: yoga, meditation, relaxation breathing, etc.) per day.    Status: Continued- Date: 5/14/2018     Intervention(s)  Therapist will provide psychoeducation, behavioral activation, and cognitive restructuring.      Objective #C  Client will exercise 30 minutes 36 times in the next 12 weeks.  Status: Continued- Date: 5/14/2018     Intervention(s)  Therapist will provide psychoeducation, behavioral activation, and cognitive restructuring.                 Client has reviewed and agreed to the above plan.      Justo Dawson, LICSW  February 20, 2017

## 2018-07-09 ENCOUNTER — OFFICE VISIT (OUTPATIENT)
Dept: BEHAVIORAL HEALTH | Facility: CLINIC | Age: 33
End: 2018-07-09
Payer: COMMERCIAL

## 2018-07-09 DIAGNOSIS — F31.81 BIPOLAR 2 DISORDER (H): Primary | ICD-10-CM

## 2018-07-09 PROCEDURE — 90834 PSYTX W PT 45 MINUTES: CPT | Performed by: SOCIAL WORKER

## 2018-07-09 NOTE — PROGRESS NOTES
"                                             Progress Note    Client Name: Ramiro Najera  Date: 7/9/2018          Service Type: Individual      Session Start Time: 830 Session End Time: 915      Session Length: 38-52     Session #: 30     Attendees: Client attended alone    Treatment Plan Last Reviewed: 6/18/2018   PHQ-9 / DEANNA-7 :      DATA      Progress Since Last Session (Related to Symptoms / Goals / Homework):   Symptoms: Stable    Homework: Partially completed   1.  SIFT meditation daily   2.  3 good things   3.  Meet in two weeks     Episode of Care Goals: Satisfactory progress - PREPARATION (Decided to change - considering how); Intervened by negotiating a change plan and determining options / strategies for behavior change, identifying triggers, exploring social supports, and working towards setting a date to begin behavior change     Current / Ongoing Stressors and Concerns:      Things have been \"overwhelmongly positive.\"  Getting ready for the move in a few weeks. Some stress with work but this has nieves fulfilling.  Has been feeling tired lately and thinks that this is due to being busy in so many areas of life.  Workouts have fallen off a bit lately over the past week.  Long discussion around sleep today in session.  Discussed sleep today.  Client reports sleep maintenance insomnia.  Reviewed sleep habits.  Discussed avoiding caffeine and screen time before bed.  Identified negative thoughts associated with sleep.  Discussed the importance of limiting bedroom for sleep and intimate activities.  Discussed relying on internal cues (e.g. yawning, nodding) in deciding when to go to bed.  Discussed problem with \"forcing sleep.\"  Reviewed appropriate daytime napping (45 minutes or less no later than 3pm).  Discussed impact of exercise and sunlight exposure on melatonin production, and discussed the role of melatonin in sleep.                                    Treatment Objective(s) Addressed in This " Session:   Client will complete at least 10 minutes of self-regulation practice (e.g.: yoga, meditation, relaxation breathing, etc.) per day.     Client will use identified behavioral and cognitive skills to challenge negative self talk 90% of the time.     Intervention:     CBT     Discussed physical, mental, and emotional boundaries and limit-setting with others. Explored management of boundaries through direct communication and limiting contact and communication with others.  Discussed barriers to using boundary management skills including strong emotions and physical proximity.  Client given handout on boundaries today in session.         ASSESSMENT: Current Emotional / Mental Status (status of significant symptoms):   Risk status (Self / Other harm or suicidal ideation)   Client denies current fears or concerns for personal safety.   Client denies current or recent suicidal ideation or behaviors.   Client denies current or recent homicidal ideation or behaviors.   Client denies current or recent self injurious behavior or ideation.   Client denies other safety concerns.   A safety and risk management plan has not been developed at this time, however client was given the after-hours number / 911 should there be a change in any of these risk factors.     Appearance:   Appropriate    Eye Contact:   Good    Psychomotor Behavior: Retarded (Slowed)    Attitude:   Cooperative    Orientation:   All   Speech    Rate / Production: Monotone     Volume:  Normal    Mood:    Depressed    Affect:    Appropriate    Thought Content:  Clear    Thought Form:  Coherent  Logical    Insight:    Good      Medication Review:   No current psychiatric medications prescribed     Medication Compliance:   NA     Changes in Health Issues:   None reported     Chemical Use Review:   Substance Use: Chemical use reviewed, no active concerns identified      Tobacco Use: No current tobacco use.       Collateral Reports Completed:   Not  Applicable    PLAN: (Client Tasks / Therapist Tasks / Other)  1.  SIFT meditation daily    2.  More schema work at next session    3.  Standing meditation next session      4.  Meet in two weeks        YUNIOR Bustos                                                         ________________________________________________________________________    Treatment Plan    Client's Name: Ramiro Najera  YOB: 1985    Date: 2/20/2017     DSM5 Diagnoses: (Sustained by DSM5 Criteria Listed Above)  Diagnoses: 296.89 Bipolar II Disorder With anxious distress        PTSD  Psychosocial & Contextual Factors: parents' health problems  WHODAS 2.0 (12 item)            This questionnaire asks about difficulties due to health conditions. Health conditions  include  disease or illnesses, other health problems that may be short or long lasting,  injuries, mental health or emotional problems, and problems with alcohol or drugs.                     Think back over the past 30 days and answer these questions, thinking about how much  difficulty you had doing the following activities. For each question, please [place] only  one response.    S1 Standing for long periods such as 30 minutes? None =         1   S2 Taking care of household responsibilities? Mild =           2   S3 Learning a new task, for example, learning how to get to a new place? None =         1   S4 How much of a problem do you have joining community activities (for example, festivals, [employer] or other activities) in the same way as anyone else can? Moderate =   3   S5 How much have you been emotionally affected by your health problems? Moderate =   3     In the past 30 days, how much difficulty did you have in:   S6 Concentrating on doing something for ten minutes? Mild =           2   S7 Walking a long distance such as a kilometer (or equivalent)? None =         1   S8 Washing your whole body? None =         1   S9 Getting dressed? None =          1   S10 Dealing with people you do not know? None =         1   S11 Maintaining a friendship? Moderate =   3   S12 Your day to day work? None =         1     H1 Overall, in the past 30 days, how many days were these difficulties present? Record number of days 30   H2 In the past 30 days, for how many days were you totally unable to carry out your usual activities or work because of any health condition? Record number of days  0   H3 In the past 30 days, not counting the days that you were totally unable, for how many days did you cut back or reduce your usual activities or work because of any health condition? Record number of days 3       Referral / Collaboration:  Referral to another professional/service is not indicated at this time..    Anticipated number of session or this episode of care: 18-24      MeasurableTreatment Goal(s) related to diagnosis / functional impairment(s)  Goal-Emotional regulation: Client will effectively manage mood dysregulation    I will know I've met my goal when I am feeling less out of control.      Objective #A (Client Action)    Status: continued- Date: 5/14/2018     Client will learn at least 3 mindfulness skills and practice one daily    Intervention(s)  Therapist will provide mindfulness training, psychoeducation, behavioral activation, and cognitive restructuring.    Objective #B  Client will use at least 3 coping skills for anxiety management in the next 12 weeks.    Status: continued- Date: 5/14/2018     Intervention(s)  Therapist will provide psychoeducation, behavioral activation, and cognitive restructuring.      Objective #C  Client will identify three distraction and diversion activities and use those activities to improve distress tolerance and emotional regulation.  Status: continued- Date: 5/14/2018     Intervention(s)  Therapist will provide psychoeducation, behavioral activation, and cognitive restructuring.    MeasurableTreatment Goal(s) related to diagnosis /  functional impairment(s)            Goal-Depression: Client will decrease depressed mood    I will know I've met my goal when I am less depressed.      Objective #A (Client Action)    Status: Continued- Date: 5/14/2018     Client will use identified behavioral and cognitive skills to challenge negative self talk 90% of the time.    Intervention(s)  Therapist will provide psychoeducation, behavioral activation, and cognitive restructuring.      Objective #B  Client will complete at least 10 minutes of self-regulation practice (e.g.: yoga, meditation, relaxation breathing, etc.) per day.    Status: Continued- Date: 5/14/2018     Intervention(s)  Therapist will provide psychoeducation, behavioral activation, and cognitive restructuring.      Objective #C  Client will exercise 30 minutes 36 times in the next 12 weeks.  Status: Continued- Date: 5/14/2018     Intervention(s)  Therapist will provide psychoeducation, behavioral activation, and cognitive restructuring.                 Client has reviewed and agreed to the above plan.      Justo Dawson, [facility] HospitalSW  February 20, 2017

## 2018-07-09 NOTE — MR AVS SNAPSHOT
MRN:[id number]                      After Visit Summary   7/9/2018    Ramiro Najera    MRN: [id number]           Visit Information        Provider Department      7/9/2018 8:30 AM Marcos Joseph, YUNIOR [facility] Generic      Your next 10 appointments already scheduled     Jul 30, 2018  8:30 AM CDT   Return Visit with YUNIOR Olea   [facility] ([facility])    [address] [place] [id number] [phone]              MyChart Information     [employer] gives you secure access to your electronic health record. If you see a primary care provider, you can also send messages to your care team and make appointments. If you have questions, please call your primary care clinic.  If you do not have a primary care provider, please call [phone] and they will assist you.        Care EveryWhere ID     This is your Care EveryWhere ID. This could be used by other organizations to access your [place] medical records  [id number]        Equal Access to Services     CLAUS JOSEPH AH: Hadii aad ku hadasho Sojocelynali, waaxda luqadaha, qaybta kaalmada adeegyagladys, loly brown. So [facility] [phone].    ATENCIÓN: Si habla español, tiene a [place]offett disposición servicios gratuitos de asistencia lingüística. [facility] [phone].    We comply with applicable federal civil rights laws and [place] laws. We do not discriminate on the basis of race, color, national origin, age, disability, sex, sexual orientation, or gender identity.

## 2018-07-30 ENCOUNTER — OFFICE VISIT (OUTPATIENT)
Dept: BEHAVIORAL HEALTH | Facility: CLINIC | Age: 33
End: 2018-07-30
Payer: COMMERCIAL

## 2018-07-30 DIAGNOSIS — F31.81 BIPOLAR 2 DISORDER (H): Primary | ICD-10-CM

## 2018-07-30 PROCEDURE — 90834 PSYTX W PT 45 MINUTES: CPT | Performed by: SOCIAL WORKER

## 2018-07-30 NOTE — MR AVS SNAPSHOT
MRN:[id number]                      After Visit Summary   7/30/2018    Ramiro Najera    MRN: [id number]           Visit Information        Provider Department      7/30/2018 8:30 AM Marcos Joseph, [facility] Generic      Your next 10 appointments already scheduled     Jul 31, 2018 10:40 AM CDT   MyChart Long with Samantha Belle, DO   [facility]    [address] [place] [id number] [phone]            Aug 13, 2018  7:30 AM CDT   Return Visit with YUNIOR Olea   [facility] ([facility])    [address] [place] [id number] [phone]              MyChart Information     MyChart gives you secure access to your electronic health record. If you see a primary care provider, you can also send messages to your care team and make appointments. If you have questions, please call your primary care clinic.  If you do not have a primary care provider, please call [phone] and they will assist you.        Care EveryWhere ID     This is your Care EveryWhere ID. This could be used by other organizations to access your [place] medical records  [id number]        Equal Access to Services     CLAUS JOSEPH : Hadii lawson holmano Sojocelynali, waaxda luqadaha, qaybta kaalmada adeegyada, loly brown. So [facility] [phone].    ATENCIÓN: Si habla español, tiene a moffett disposición servicios gratuitos de asistencia lingüística. Llniharika al [phone].    We comply with applicable federal civil rights laws and [place] laws. We do not discriminate on the basis of race, color, national origin, age, disability, sex, sexual orientation, or gender identity.

## 2018-07-30 NOTE — PROGRESS NOTES
"                                             Progress Note    Client Name: Ramiro Najera  Date: 7/30/2018          Service Type: Individual      Session Start Time: 830 Session End Time: 915      Session Length: 38-52     Session #: 31     Attendees: Client attended alone    Treatment Plan Last Reviewed: 6/18/2018   PHQ-9 / DEANNA-7 :      DATA      Progress Since Last Session (Related to Symptoms / Goals / Homework):   Symptoms: Stable    Homework: Partially completed   1.  SIFT meditation daily   2.  3 good things   3.  Meet in two weeks     Episode of Care Goals: Satisfactory progress - PREPARATION (Decided to change - considering how); Intervened by negotiating a change plan and determining options / strategies for behavior change, identifying triggers, exploring social supports, and working towards setting a date to begin behavior change     Current / Ongoing Stressors and Concerns:      Things have been \"ok, its been a hard couple weeks.  Stress of the move coupled with the stress of work has been hard to stick to my routines.\"  Has not been doing as much of his self care activities lately.  Sleep has been an issue recently as well, client thinks that this is due to stress.  Taking some otc aids to help with this.  Sounds like eagle has gotten back together with her ex but still wants client to move in with her.  Discussed physical, mental, and emotional boundaries and limit-setting with others. Explored management of boundaries through direct communication and limiting contact and communication with others.  Discussed barriers to using boundary management skills including strong emotions and physical proximity.                            Treatment Objective(s) Addressed in This Session:   Client will complete at least 10 minutes of self-regulation practice (e.g.: yoga, meditation, relaxation breathing, etc.) per day.     Client will use identified behavioral and cognitive skills to challenge negative self talk " 90% of the time.     Intervention:     CBT     Discussed physical, mental, and emotional boundaries and limit-setting with others. Explored management of boundaries through direct communication and limiting contact and communication with others.  Discussed barriers to using boundary management skills including strong emotions and physical proximity.  Client given handout on boundaries today in session.         ASSESSMENT: Current Emotional / Mental Status (status of significant symptoms):   Risk status (Self / Other harm or suicidal ideation)   Client denies current fears or concerns for personal safety.   Client denies current or recent suicidal ideation or behaviors.   Client denies current or recent homicidal ideation or behaviors.   Client denies current or recent self injurious behavior or ideation.   Client denies other safety concerns.   A safety and risk management plan has not been developed at this time, however client was given the after-hours number / 911 should there be a change in any of these risk factors.     Appearance:   Appropriate    Eye Contact:   Good    Psychomotor Behavior: Retarded (Slowed)    Attitude:   Cooperative    Orientation:   All   Speech    Rate / Production: Monotone     Volume:  Normal    Mood:    Depressed    Affect:    Appropriate    Thought Content:  Clear    Thought Form:  Coherent  Logical    Insight:    Good      Medication Review:   No current psychiatric medications prescribed     Medication Compliance:   NA     Changes in Health Issues:   None reported     Chemical Use Review:   Substance Use: Chemical use reviewed, no active concerns identified      Tobacco Use: No current tobacco use.       Collateral Reports Completed:   Not Applicable    PLAN: (Client Tasks / Therapist Tasks / Other)  1.  SIFT meditation daily    2.  More schema work at next session    3.  Standing meditation next session      4.  Meet in two weeks        YUNIOR Bustos                                                          ________________________________________________________________________    Treatment Plan    Client's Name: Ramiro Najera  YOB: 1985    Date: 2/20/2017     DSM5 Diagnoses: (Sustained by DSM5 Criteria Listed Above)  Diagnoses: 296.89 Bipolar II Disorder With anxious distress        PTSD  Psychosocial & Contextual Factors: parents' health problems  WHODAS 2.0 (12 item)            This questionnaire asks about difficulties due to health conditions. Health conditions  include  disease or illnesses, other health problems that may be short or long lasting,  injuries, mental health or emotional problems, and problems with alcohol or drugs.                     Think back over the past 30 days and answer these questions, thinking about how much  difficulty you had doing the following activities. For each question, please [place] only  one response.    S1 Standing for long periods such as 30 minutes? None =         1   S2 Taking care of household responsibilities? Mild =           2   S3 Learning a new task, for example, learning how to get to a new place? None =         1   S4 How much of a problem do you have joining community activities (for example, festivals, Muslim or other activities) in the same way as anyone else can? Moderate =   3   S5 How much have you been emotionally affected by your health problems? Moderate =   3     In the past 30 days, how much difficulty did you have in:   S6 Concentrating on doing something for ten minutes? Mild =           2   S7 Walking a long distance such as a kilometer (or equivalent)? None =         1   S8 Washing your whole body? None =         1   S9 Getting dressed? None =         1   S10 Dealing with people you do not know? None =         1   S11 Maintaining a friendship? Moderate =   3   S12 Your day to day work? None =         1     H1 Overall, in the past 30 days, how many days were these difficulties present? Record  number of days 30   H2 In the past 30 days, for how many days were you totally unable to carry out your usual activities or work because of any health condition? Record number of days  0   H3 In the past 30 days, not counting the days that you were totally unable, for how many days did you cut back or reduce your usual activities or work because of any health condition? Record number of days 3       Referral / Collaboration:  Referral to another professional/service is not indicated at this time..    Anticipated number of session or this episode of care: 18-24      MeasurableTreatment Goal(s) related to diagnosis / functional impairment(s)  Goal-Emotional regulation: Client will effectively manage mood dysregulation    I will know I've met my goal when I am feeling less out of control.      Objective #A (Client Action)    Status: continued- Date: 5/14/2018     Client will learn at least 3 mindfulness skills and practice one daily    Intervention(s)  Therapist will provide mindfulness training, psychoeducation, behavioral activation, and cognitive restructuring.    Objective #B  Client will use at least 3 coping skills for anxiety management in the next 12 weeks.    Status: continued- Date: 5/14/2018     Intervention(s)  Therapist will provide psychoeducation, behavioral activation, and cognitive restructuring.      Objective #C  Client will identify three distraction and diversion activities and use those activities to improve distress tolerance and emotional regulation.  Status: continued- Date: 5/14/2018     Intervention(s)  Therapist will provide psychoeducation, behavioral activation, and cognitive restructuring.    MeasurableTreatment Goal(s) related to diagnosis / functional impairment(s)            Goal-Depression: Client will decrease depressed mood    I will know I've met my goal when I am less depressed.      Objective #A (Client Action)    Status: Continued- Date: 5/14/2018     Client will use identified  behavioral and cognitive skills to challenge negative self talk 90% of the time.    Intervention(s)  Therapist will provide psychoeducation, behavioral activation, and cognitive restructuring.      Objective #B  Client will complete at least 10 minutes of self-regulation practice (e.g.: yoga, meditation, relaxation breathing, etc.) per day.    Status: Continued- Date: 5/14/2018     Intervention(s)  Therapist will provide psychoeducation, behavioral activation, and cognitive restructuring.      Objective #C  Client will exercise 30 minutes 36 times in the next 12 weeks.  Status: Continued- Date: 5/14/2018     Intervention(s)  Therapist will provide psychoeducation, behavioral activation, and cognitive restructuring.                 Client has reviewed and agreed to the above plan.      Justo Dawson, LICSW  February 20, 2017

## 2018-07-31 ENCOUNTER — OFFICE VISIT (OUTPATIENT)
Dept: FAMILY MEDICINE | Facility: CLINIC | Age: 33
End: 2018-07-31
Payer: COMMERCIAL

## 2018-07-31 VITALS
HEIGHT: 71 IN | WEIGHT: 225 LBS | SYSTOLIC BLOOD PRESSURE: 130 MMHG | BODY MASS INDEX: 31.5 KG/M2 | TEMPERATURE: 98 F | DIASTOLIC BLOOD PRESSURE: 76 MMHG | HEART RATE: 74 BPM

## 2018-07-31 DIAGNOSIS — Z11.3 SCREEN FOR STD (SEXUALLY TRANSMITTED DISEASE): ICD-10-CM

## 2018-07-31 DIAGNOSIS — F32.1 MAJOR DEPRESSIVE DISORDER, SINGLE EPISODE, MODERATE (H): Primary | ICD-10-CM

## 2018-07-31 DIAGNOSIS — F31.81 BIPOLAR 2 DISORDER (H): ICD-10-CM

## 2018-07-31 DIAGNOSIS — H61.21 IMPACTED CERUMEN OF RIGHT EAR: ICD-10-CM

## 2018-07-31 DIAGNOSIS — Z83.2 FAMILY HISTORY OF ALPHA THALASSEMIA: ICD-10-CM

## 2018-07-31 LAB
ERYTHROCYTE [DISTWIDTH] IN BLOOD BY AUTOMATED COUNT: 13.6 % (ref 10–15)
HCT VFR BLD AUTO: 46 % (ref 40–53)
HGB BLD-MCNC: 16.1 G/DL (ref 13.3–17.7)
MCH RBC QN AUTO: 28.6 PG (ref 26.5–33)
MCHC RBC AUTO-ENTMCNC: 35 G/DL (ref 31.5–36.5)
MCV RBC AUTO: 82 FL (ref 78–100)
PLATELET # BLD AUTO: 235 10E9/L (ref 150–450)
RBC # BLD AUTO: 5.62 10E12/L (ref 4.4–5.9)
WBC # BLD AUTO: 6.9 10E9/L (ref 4–11)

## 2018-07-31 PROCEDURE — 99213 OFFICE O/P EST LOW 20 MIN: CPT | Performed by: FAMILY MEDICINE

## 2018-07-31 PROCEDURE — 87591 N.GONORRHOEAE DNA AMP PROB: CPT | Performed by: FAMILY MEDICINE

## 2018-07-31 PROCEDURE — 85027 COMPLETE CBC AUTOMATED: CPT | Performed by: FAMILY MEDICINE

## 2018-07-31 PROCEDURE — 86780 TREPONEMA PALLIDUM: CPT | Performed by: FAMILY MEDICINE

## 2018-07-31 PROCEDURE — 87491 CHLMYD TRACH DNA AMP PROBE: CPT | Performed by: FAMILY MEDICINE

## 2018-07-31 PROCEDURE — 87389 HIV-1 AG W/HIV-1&-2 AB AG IA: CPT | Performed by: FAMILY MEDICINE

## 2018-07-31 PROCEDURE — 36415 COLL VENOUS BLD VENIPUNCTURE: CPT | Performed by: FAMILY MEDICINE

## 2018-07-31 ASSESSMENT — PATIENT HEALTH QUESTIONNAIRE - PHQ9: 5. POOR APPETITE OR OVEREATING: SEVERAL DAYS

## 2018-07-31 ASSESSMENT — ANXIETY QUESTIONNAIRES
5. BEING SO RESTLESS THAT IT IS HARD TO SIT STILL: NOT AT ALL
2. NOT BEING ABLE TO STOP OR CONTROL WORRYING: MORE THAN HALF THE DAYS
GAD7 TOTAL SCORE: 9
6. BECOMING EASILY ANNOYED OR IRRITABLE: MORE THAN HALF THE DAYS
3. WORRYING TOO MUCH ABOUT DIFFERENT THINGS: MORE THAN HALF THE DAYS
7. FEELING AFRAID AS IF SOMETHING AWFUL MIGHT HAPPEN: NOT AT ALL
1. FEELING NERVOUS, ANXIOUS, OR ON EDGE: MORE THAN HALF THE DAYS

## 2018-07-31 NOTE — MR AVS SNAPSHOT
After Visit Summary   7/31/2018    Ramiro Najera    MRN: [id number]           Patient Information     Date Of Birth          1985        Visit Information        Provider Department      7/31/2018 10:40 AM Samantha Belle DO [facility]        Today's Diagnoses     Major depressive disorder, single episode, moderate (H)    -  1    Bipolar 2 disorder (H)        Screen for STD (sexually transmitted disease)        Impacted cerumen of right ear        Family history of alpha thalassemia          Care Instructions    [facility]   Discharged by : Janny Martin MA    Paper scripts provided to patient :  no   If you have any questions regarding to your visit please contact your care team:     Team Silver              Clinic Hours Telephone Number     Dr. Angel Gregorio PA-C   7am-7pm  Monday - Thursday   7am-5pm  Fridays  [phone]   (Appointment scheduling available 24/7)     RN Line  [phone] option 2     Urgent Care - [place] and [place] - 11am-9pm Monday-Friday Saturday-Sunday- 9am-5pm     [place] -   5pm-9pm Monday-Friday Saturday-Sunday- 9am-5pm    [phone] - [place]    [phone] - [place]     For a Price Quote for your services, please call our Consumer Price Line at [phone].     What options do I have for visits at the clinic other than the traditional office visit?     To expand how we care for you, many of our providers are utilizing electronic visits (e-visits) and telephone visits, when medically appropriate, for interactions with their patients rather than a visit in the clinic. We also offer nurse visits for many medical concerns. Just like any other service, we will bill your insurance company for this type of visit based on time spent on the phone with your provider. Not all insurance companies cover these visits. Please check with your medical  insurance if this type of visit is covered. You will be responsible for any charges that are not paid by your insurance.   E-visits via Aunt BerthaharMoxie: generally incur a $35.00 fee.     Telephone visits:   Time spent on the phone: *charged based on time that is spent on the phone in increments of 10 minutes. Estimated cost:   5-10 mins $30.00   11-20 mins. $59.00   21-30 mins. $85.00     Use SOAK (Smart [employer] toolKit) (secure email communication and access to your chart) to send your primary care provider a message or make an appointment. Ask someone on your Team how to sign up for SOAK ([employer] toolKit).     As always, Thank you for trusting us with your health care needs!      [facility]:    Scheduling Appointments  [place]  Call: [phone]    [place] [facility]  Call: [phone]    [facility]  Call: [phone]    For Gastroenterology referrals   [facility] Gastroenterology   Clinics and Surgery Center, 4th Floor   [address] [place] [address]   Appointments: [phone]    WHERE TO GO FOR CARE?  Clinic    Make an appointment if you:       Are sick (cold, cough, flu, sore throat, earache or in pain).       Have a small injury (sprain, small cut, burn or broken bone).       Need a physical exam, Pap smear, vaccine or prescription refill.       Have questions about your health or medicines.    To reach us:      Call [phone] ([phone]). Open 24 hours every day. (For counseling services, call [phone].)    Log into SOAK (Smart Operational [employer] toolKit) at [email or web address]. (Visit [email or web address] to create an account.) Hospital emergency room    An emergency is a serious or life- threatening problem that must be treated right away.    Call 416 or get to the hospital if you have:      Very bad or sudden:            - Chest pain or pressure         - Bleeding         - Head or belly pain         - Dizziness or trouble seeing, walking or                           Speaking      Problems breathing      Blood in your vomit or you are coughing up blood      A major injury (knocked out, loss of a finger or limb, rape, broken bone protruding from skin)    A mental health crisis. (Or call the Mental Health Crisis line at 1-536.571.1807 or Suicide Prevention Hotline at 1-454.949.1554.)    Open 24 hours every day. You don't need an appointment.     Urgent care    Visit urgent care for sickness or small injuries when the clinic is closed. You don't need an appointment. To check hours or find an urgent care near you, visit [email or web address]. Online care    Get online care from Twonq for more than 70 common problems, like colds, allergies and infections. Open 24 hours every day at:   [email or web address]   Need help deciding?    For advice about where to be seen, you may call your clinic and ask to speak with a nurse. We're here for you 24 hours every day.         If you are deaf or hard of hearing, please let us know. We provide many free services including sign language interpreters, oral interpreters, TTYs, telephone amplifiers, note takers and written materials.               Follow-ups after your visit        Your next 10 appointments already scheduled     Aug 13, 2018  7:30 AM CDT   Return Visit with [facility]    [address] [place] [id number] [phone]              Who to contact     If you have questions or need follow up information about today's clinic visit or your schedule please contact [facility] directly at [phone].  Normal or non-critical lab and imaging results will be communicated to you by MyChart, letter or phone within 4 business days after the clinic has received the results. If you do not hear from us within 7 days, please contact the clinic through MyChart or phone. If you have a critical or abnormal lab result, we will notify you by  "phone as soon as possible.  Submit refill requests through RingCaptcha or call your pharmacy and they will forward the refill request to us. Please allow 3 business days for your refill to be completed.          Additional Information About Your Visit        MetaIntellharKargoCard Information     RingCaptcha gives you secure access to your electronic health record. If you see a primary care provider, you can also send messages to your care team and make appointments. If you have questions, please call your primary care clinic.  If you do not have a primary care provider, please call [phone] and they will assist you.        Care EveryWhere ID     This is your Care EveryWhere ID. This could be used by other organizations to access your [facility] medical records  [id number]        Your Vitals Were     Pulse Temperature Height BMI (Body Mass Index)          74 98  F (36.7  C) (Oral) 5' 11\" (1.803 m) 31.38 kg/m2         Blood Pressure from Last 3 Encounters:   07/31/18 130/76   03/14/17 (!) 143/95   03/14/17 (!) 143/95    Weight from Last 3 Encounters:   07/31/18 225 lb (102.1 kg)   03/14/17 240 lb (108.9 kg)   03/14/17 240 lb (108.9 kg)              We Performed the Following     CBC with platelets     CHLAMYDIA TRACHOMATIS PCR     HIV Antigen Antibody Combo     NEISSERIA GONORRHOEA PCR     Treponema Abs w Reflex to RPR and Titer        Primary Care Provider Office Phone # Fax #    [facility] [phone]       [address] [facility] [address]        Equal Access to Services     JERALD JOSEPH AH: Hadii aad ku hadasho Soomaali, waaxda luqadaha, qaybta kaalmada adeegyada, waxay alicia brown. So [facility] [phone].    ATENCIÓN: Si habla español, tiene a moffett disposición servicios gratuitos de asistencia lingüística. Llame al [phone].    We comply with applicable federal civil rights laws and [place] laws. We do not discriminate on the basis of race, color, national origin, " age, disability, sex, sexual orientation, or gender identity.            Thank you!     Thank you for choosing [facility]  for your care. Our goal is always to provide you with excellent care. Hearing back from our patients is one way we can continue to improve our services. Please take a few minutes to complete the written survey that you may receive in the mail after your visit with us. Thank you!             Your Updated Medication List - Protect others around you: Learn how to safely use, store and throw away your medicines at www.disposemymeds.org.          This list is accurate as of 7/31/18 11:08 AM.  Always use your most recent med list.                   Brand Name Dispense Instructions for use Diagnosis    IRON SUPPLEMENT PO      Taking every other day        vitamin B complex with vitamin C Tabs tablet      Take 1 tablet by mouth daily        VITAMIN D (CHOLECALCIFEROL) PO      Take by mouth daily

## 2018-07-31 NOTE — PROGRESS NOTES
SUBJECTIVE:   Ramiro Najera is a 32 year old male who presents to clinic today for the following health issues:    Patient is here to establish care. Was a patient of Laura Jung.     Had some ear plugging earlier this year. Problems with hearing when waking up at times. He has had some allergy issues and attributes his ear issues to that.   This has resolved.      Depression and Anxiety Follow-Up    Status since last visit: still seeing therapist for depression and anxiety, lifestyle chages have helped. Diet and exercise.     Other associated symptoms:None    Complicating factors:     Significant life event: No     Current substance abuse: None    He is not on medication for this.     He has bipolar and was on lamictal.  This worked for awhile.  But he got sores on his gums.      He is doing well with self care.      His mother was recently diagnosed with alpha thalaseemia.  He is wondering about getting screened.  He had a  Normal CBC in 11/2016.      PHQ-9 2/6/2017   Total Score 13   Q9: Suicide Ideation Not at all     DEANNA-7 SCORE 2/6/2017   Total Score 9     In the past two weeks have you had thoughts of suicide or self-harm?  No.    Do you have concerns about your personal safety or the safety of others?   No  PHQ-9  English  PHQ-9   Any Language  DEANNA-7  Suicide Assessment Five-step Evaluation and Treatment (SAFE-T)    Amount of exercise or physical activity: 3 to 4 days weekly    Problems taking medications regularly: No    Medication side effects: none    Diet: intermittened fasting. Eat at 630am and stop at 12ish. Low carb, high protien, high fiber.           Problem list and histories reviewed & adjusted, as indicated.  Additional history: as documented    BP Readings from Last 3 Encounters:   07/31/18 130/76   03/14/17 (!) 143/95   03/14/17 (!) 143/95    Wt Readings from Last 3 Encounters:   07/31/18 225 lb (102.1 kg)   03/14/17 240 lb (108.9 kg)   03/14/17 240 lb (108.9 kg)                 "    Reviewed and updated as needed this visit by clinical staff       Reviewed and updated as needed this visit by Provider         ROS:  Constitutional, HEENT, cardiovascular, pulmonary, GI, , musculoskeletal, neuro, skin, endocrine and psych systems are negative, except as otherwise noted.    OBJECTIVE:     /76  Pulse 74  Temp 98  F (36.7  C) (Oral)  Ht 5' 11\" (1.803 m)  Wt 225 lb (102.1 kg)  BMI 31.38 kg/m2  Body mass index is 31.38 kg/(m^2).  GENERAL: healthy, alert and no distress  HENT: normal cephalic/atraumatic, oropharynx clear and oral mucous membranes moist  NECK: no adenopathy, no asymmetry, masses, or scars and thyroid normal to palpation  RESP: lungs clear to auscultation - no rales, rhonchi or wheezes  CV: regular rate and rhythm, normal S1 S2, no S3 or S4, no murmur, click or rub, no peripheral edema and peripheral pulses strong  MS: no gross musculoskeletal defects noted, no edema  PSYCH: mentation appears normal, affect normal/bright    Diagnostic Test Results:  none     ASSESSMENT/PLAN:     ASSESSMENT/PLAN:      ICD-10-CM    1. Major depressive disorder, single episode, moderate (H) F32.1    2. Bipolar 2 disorder (H) F31.81    3. Screen for STD (sexually transmitted disease) Z11.3 HIV Antigen Antibody Combo     Treponema Abs w Reflex to RPR and Titer     NEISSERIA GONORRHOEA PCR     CHLAMYDIA TRACHOMATIS PCR   4. Impacted cerumen of right ear H61.21    5. Family history of alpha thalassemia Z83.2 CBC with platelets     He is not on medications for his bipolar but sees a psychologist regularly     FUTURE APPOINTMENTS:       - Follow-up visit in 6 months for winston Belle DO  [facility]    "

## 2018-07-31 NOTE — NURSING NOTE
Patient identified using two patient identifiers.  Ear exam showing wax occlusion completed by provider.  Solution: warm water was placed in the right ear(s) via irrigation tool: elephant ear.  BORIS Toe

## 2018-07-31 NOTE — PATIENT INSTRUCTIONS
[facility]   Discharged by : Janny Martin MA    Paper scripts provided to patient :  no   If you have any questions regarding to your visit please contact your care team:     Team Silver              Clinic Hours Telephone Number     Dr. Angel Gregorio PA-C   7am-7pm  Monday - Thursday   7am-5pm  Fridays  [phone]   (Appointment scheduling available 24/7)     RN Line  [phone] option 2     Urgent Care - [place] - 11am-9pm Monday-Friday Saturday-Sunday- 9am-5pm     [place] -   5pm-9pm Monday-Friday Saturday-Sunday- 9am-5pm    [phone] - [place]    [phone] - [place]     For a Price Quote for your services, please call our [employer] Price Line at [phone].     What options do I have for visits at the clinic other than the traditional office visit?     To expand how we care for you, many of our providers are utilizing electronic visits (e-visits) and telephone visits, when medically appropriate, for interactions with their patients rather than a visit in the clinic. We also offer nurse visits for many medical concerns. Just like any other service, we will bill your insurance company for this type of visit based on time spent on the phone with your provider. Not all insurance companies cover these visits. Please check with your medical insurance if this type of visit is covered. You will be responsible for any charges that are not paid by your insurance.   E-visits via Myreks: generally incur a $35.00 fee.     Telephone visits:   Time spent on the phone: *charged based on time that is spent on the phone in increments of 10 minutes. Estimated cost:   5-10 mins $30.00   11-20 mins. $59.00   21-30 mins. $85.00     Use Myreks (secure email communication and access to your chart) to send your primary care provider a message or make an appointment. Ask someone on your Team how to sign up  for [employer].     As always, Thank you for trusting us with your health care needs!      [facility]:    Scheduling Appointments  [facility]  Call: [phone]    [place] [facility]  Call: [phone]    [facility]  Call: [phone]    For Gastroenterology referrals   [facility] Gastroenterology   Clinics and Surgery Center, 4th Floor   [address] [place] [address]   Appointments: [phone]    WHERE TO GO FOR CARE?  Clinic    Make an appointment if you:       Are sick (cold, cough, flu, sore throat, earache or in pain).       Have a small injury (sprain, small cut, burn or broken bone).       Need a physical exam, Pap smear, vaccine or prescription refill.       Have questions about your health or medicines.    To reach us:      Call [phone] ([phone]). Open 24 hours every day. (For counseling services, call [phone].)    Log into [employer] at [email or web address]. (Visit [email or web address] to create an account.) Hospital emergency room    An emergency is a serious or life- threatening problem that must be treated right away.    Call 523 or get to the hospital if you have:      Very bad or sudden:            - Chest pain or pressure         - Bleeding         - Head or belly pain         - Dizziness or trouble seeing, walking or                          Speaking      Problems breathing      Blood in your vomit or you are coughing up blood      A major injury (knocked out, loss of a finger or limb, rape, broken bone protruding from skin)    A mental health crisis. (Or call the Mental Health Crisis line at 1-796.626.2513 or Suicide Prevention Hotline at 1-850.621.4929.)    Open 24 hours every day. You don't need an appointment.     Urgent care    Visit urgent care for sickness or small injuries when the clinic is closed. You don't need an appointment. To check hours or find an urgent care near you,  visit [email or web address]. Online care    Get online care from [facility] for more than 70 common problems, like colds, allergies and infections. Open 24 hours every day at:   [email or web address]   Need help deciding?    For advice about where to be seen, you may call your clinic and ask to speak with a nurse. We're here for you 24 hours every day.         If you are deaf or hard of hearing, please let us know. We provide many free services including sign language interpreters, oral interpreters, TTYs, telephone amplifiers, note takers and written materials.

## 2018-08-01 LAB
C TRACH DNA SPEC QL NAA+PROBE: NEGATIVE
HIV 1+2 AB+HIV1 P24 AG SERPL QL IA: NONREACTIVE
N GONORRHOEA DNA SPEC QL NAA+PROBE: NEGATIVE
SPECIMEN SOURCE: NORMAL
SPECIMEN SOURCE: NORMAL
T PALLIDUM AB SER QL: NONREACTIVE

## 2018-08-01 ASSESSMENT — ANXIETY QUESTIONNAIRES: GAD7 TOTAL SCORE: 9

## 2018-08-01 ASSESSMENT — PATIENT HEALTH QUESTIONNAIRE - PHQ9: SUM OF ALL RESPONSES TO PHQ QUESTIONS 1-9: 10

## 2018-08-13 ENCOUNTER — OFFICE VISIT (OUTPATIENT)
Dept: BEHAVIORAL HEALTH | Facility: CLINIC | Age: 33
End: 2018-08-13
Payer: COMMERCIAL

## 2018-08-13 DIAGNOSIS — F31.81 BIPOLAR 2 DISORDER (H): Primary | ICD-10-CM

## 2018-08-13 PROCEDURE — 90834 PSYTX W PT 45 MINUTES: CPT | Performed by: SOCIAL WORKER

## 2018-08-13 NOTE — PROGRESS NOTES
"                                             Progress Note    Client Name: Ramiro Najera  Date: 8/13/2018          Service Type: Individual      Session Start Time: 730 Session End Time: 815      Session Length: 38-52     Session #: 32     Attendees: Client attended alone    Treatment Plan Last Reviewed: 6/18/2018   PHQ-9 / DEANNA-7 :      DATA      Progress Since Last Session (Related to Symptoms / Goals / Homework):   Symptoms: Stable    Homework: Partially completed   1.  SIFT meditation daily   2.  3 good things   3.  Meet in two weeks     Episode of Care Goals: Satisfactory progress - PREPARATION (Decided to change - considering how); Intervened by negotiating a change plan and determining options / strategies for behavior change, identifying triggers, exploring social supports, and working towards setting a date to begin behavior change     Current / Ongoing Stressors and Concerns:      Things have been \"hard, and even as I say that I don't feel as depressed.\"  Has feelings of \"loss, anger, sadness.\"  \"I am lonely and angry at Stacy and that is what everything is centered on.\"  Long discussion today about relationship with Stacy and also about client's \"sexual obsessions.\"  More on this at next session.                              Treatment Objective(s) Addressed in This Session:   Client will complete at least 10 minutes of self-regulation practice (e.g.: yoga, meditation, relaxation breathing, etc.) per day.     Client will use identified behavioral and cognitive skills to challenge negative self talk 90% of the time.     Intervention:     CBT     Discussed physical, mental, and emotional boundaries and limit-setting with others. Explored management of boundaries through direct communication and limiting contact and communication with others.  Discussed barriers to using boundary management skills including strong emotions and physical proximity.  Client given handout on boundaries today in " session.         ASSESSMENT: Current Emotional / Mental Status (status of significant symptoms):   Risk status (Self / Other harm or suicidal ideation)   Client denies current fears or concerns for personal safety.   Client denies current or recent suicidal ideation or behaviors.   Client denies current or recent homicidal ideation or behaviors.   Client denies current or recent self injurious behavior or ideation.   Client denies other safety concerns.   A safety and risk management plan has not been developed at this time, however client was given the after-hours number / 911 should there be a change in any of these risk factors.     Appearance:   Appropriate    Eye Contact:   Good    Psychomotor Behavior: Retarded (Slowed)    Attitude:   Cooperative    Orientation:   All   Speech    Rate / Production: Monotone     Volume:  Normal    Mood:    Depressed    Affect:    Appropriate    Thought Content:  Clear    Thought Form:  Coherent  Logical    Insight:    Good      Medication Review:   No current psychiatric medications prescribed     Medication Compliance:   NA     Changes in Health Issues:   None reported     Chemical Use Review:   Substance Use: Chemical use reviewed, no active concerns identified      Tobacco Use: No current tobacco use.       Collateral Reports Completed:   Not Applicable    PLAN: (Client Tasks / Therapist Tasks / Other)  1.  SIFT meditation daily    2.  More schema work at next session    3.  Standing meditation next session      4.  Meet in two weeks        YUNIOR Bustos                                                         ________________________________________________________________________    Treatment Plan    Client's Name: Ramiro Najera  YOB: 1985    Date: 2/20/2017     DSM5 Diagnoses: (Sustained by DSM5 Criteria Listed Above)  Diagnoses: 296.89 Bipolar II Disorder With anxious distress        PTSD  Psychosocial & Contextual Factors: parents' health  problems  WHODAS 2.0 (12 item)            This questionnaire asks about difficulties due to health conditions. Health conditions  include  disease or illnesses, other health problems that may be short or long lasting,  injuries, mental health or emotional problems, and problems with alcohol or drugs.                     Think back over the past 30 days and answer these questions, thinking about how much  difficulty you had doing the following activities. For each question, please Nenana only  one response.    S1 Standing for long periods such as 30 minutes? None =         1   S2 Taking care of household responsibilities? Mild =           2   S3 Learning a new task, for example, learning how to get to a new place? None =         1   S4 How much of a problem do you have joining community activities (for example, festivals, Judaism or other activities) in the same way as anyone else can? Moderate =   3   S5 How much have you been emotionally affected by your health problems? Moderate =   3     In the past 30 days, how much difficulty did you have in:   S6 Concentrating on doing something for ten minutes? Mild =           2   S7 Walking a long distance such as a kilometer (or equivalent)? None =         1   S8 Washing your whole body? None =         1   S9 Getting dressed? None =         1   S10 Dealing with people you do not know? None =         1   S11 Maintaining a friendship? Moderate =   3   S12 Your day to day work? None =         1     H1 Overall, in the past 30 days, how many days were these difficulties present? Record number of days 30   H2 In the past 30 days, for how many days were you totally unable to carry out your usual activities or work because of any health condition? Record number of days  0   H3 In the past 30 days, not counting the days that you were totally unable, for how many days did you cut back or reduce your usual activities or work because of any health condition? Record number of days 3        Referral / Collaboration:  Referral to another professional/service is not indicated at this time..    Anticipated number of session or this episode of care: 18-24      MeasurableTreatment Goal(s) related to diagnosis / functional impairment(s)  Goal-Emotional regulation: Client will effectively manage mood dysregulation    I will know I've met my goal when I am feeling less out of control.      Objective #A (Client Action)    Status: continued- Date: 5/14/2018     Client will learn at least 3 mindfulness skills and practice one daily    Intervention(s)  Therapist will provide mindfulness training, psychoeducation, behavioral activation, and cognitive restructuring.    Objective #B  Client will use at least 3 coping skills for anxiety management in the next 12 weeks.    Status: continued- Date: 5/14/2018     Intervention(s)  Therapist will provide psychoeducation, behavioral activation, and cognitive restructuring.      Objective #C  Client will identify three distraction and diversion activities and use those activities to improve distress tolerance and emotional regulation.  Status: continued- Date: 5/14/2018     Intervention(s)  Therapist will provide psychoeducation, behavioral activation, and cognitive restructuring.    MeasurableTreatment Goal(s) related to diagnosis / functional impairment(s)            Goal-Depression: Client will decrease depressed mood    I will know I've met my goal when I am less depressed.      Objective #A (Client Action)    Status: Continued- Date: 5/14/2018     Client will use identified behavioral and cognitive skills to challenge negative self talk 90% of the time.    Intervention(s)  Therapist will provide psychoeducation, behavioral activation, and cognitive restructuring.      Objective #B  Client will complete at least 10 minutes of self-regulation practice (e.g.: yoga, meditation, relaxation breathing, etc.) per day.    Status: Continued- Date: 5/14/2018      Intervention(s)  Therapist will provide psychoeducation, behavioral activation, and cognitive restructuring.      Objective #C  Client will exercise 30 minutes 36 times in the next 12 weeks.  Status: Continued- Date: 5/14/2018     Intervention(s)  Therapist will provide psychoeducation, behavioral activation, and cognitive restructuring.                 Client has reviewed and agreed to the above plan.      Justo Dawson, [facility] CenterSW  February 20, 2017

## 2018-08-13 NOTE — MR AVS SNAPSHOT
MRN:[id number]                      After Visit Summary   8/13/2018    Ramiro Najera    MRN: [id number]           Visit Information        Provider Department      8/13/2018 7:30 AM [facility]      Your next 10 appointments already scheduled     Aug 20, 2018  3:15 PM CDT   Return Visit with YUNIOR Olea   [facility] ([facility])    [address] [place] [id number] [phone]              MyChart Information     [employer] gives you secure access to your electronic health record. If you see a primary care provider, you can also send messages to your care team and make appointments. If you have questions, please call your primary care clinic.  If you do not have a primary care provider, please call [phone] and they will assist you.        Care EveryWhere ID     This is your Care EveryWhere ID. This could be used by other organizations to access your [facility] medical records  [id number]        Equal Access to Services     CLAUS JOSEPH AH: Hadii aad ku hadasho Sojocelynali, waaxda luqadaha, qaybta kaalmada adeegyagladys, loly brown. So [facility] [phone].    ATENCIÓN: Si habla español, tiene a moffett disposición servicios gratuitos de asistencia lingüística. [facility] [phone].    We comply with applicable federal civil rights laws and [place] laws. We do not discriminate on the basis of race, color, national origin, age, disability, sex, sexual orientation, or gender identity.

## 2018-08-20 ENCOUNTER — OFFICE VISIT (OUTPATIENT)
Dept: BEHAVIORAL HEALTH | Facility: CLINIC | Age: 33
End: 2018-08-20
Payer: COMMERCIAL

## 2018-08-20 DIAGNOSIS — F31.81 BIPOLAR 2 DISORDER (H): Primary | ICD-10-CM

## 2018-08-20 PROCEDURE — 90834 PSYTX W PT 45 MINUTES: CPT | Performed by: SOCIAL WORKER

## 2018-08-20 NOTE — PROGRESS NOTES
"                                             Progress Note    Client Name: Ramiro Najera  Date: 8/20/2018          Service Type: Individual      Session Start Time: 315 Session End Time: 400      Session Length: 38-52     Session #: 32     Attendees: Client attended alone    Treatment Plan Last Reviewed: 6/18/2018   PHQ-9 / DEANNA-7 :      DATA      Progress Since Last Session (Related to Symptoms / Goals / Homework):   Symptoms: Stable    Homework: Partially completed   1.  SIFT meditation daily   2.  3 good things   3.  Meet in two weeks     Episode of Care Goals: Satisfactory progress - PREPARATION (Decided to change - considering how); Intervened by negotiating a change plan and determining options / strategies for behavior change, identifying triggers, exploring social supports, and working towards setting a date to begin behavior change     Current / Ongoing Stressors and Concerns:      Things have been \"better, have had some good discussions with Stacy.\"  Sounds like he has had some conversations specifically about why things did not work out between them.  Long discussion today about \"bids\" as an important way to form and maintain relationships.  Identified that the process of making and responding to bids strengthens connections to others.  Discussed using repair attempts to fix rifts in relationships.                              Treatment Objective(s) Addressed in This Session:   Client will complete at least 10 minutes of self-regulation practice (e.g.: yoga, meditation, relaxation breathing, etc.) per day.     Client will use identified behavioral and cognitive skills to challenge negative self talk 90% of the time.     Intervention:     CBT     Discussed physical, mental, and emotional boundaries and limit-setting with others. Explored management of boundaries through direct communication and limiting contact and communication with others.  Discussed barriers to using boundary management skills " including strong emotions and physical proximity.  Client given handout on boundaries today in session.         ASSESSMENT: Current Emotional / Mental Status (status of significant symptoms):   Risk status (Self / Other harm or suicidal ideation)   Client denies current fears or concerns for personal safety.   Client denies current or recent suicidal ideation or behaviors.   Client denies current or recent homicidal ideation or behaviors.   Client denies current or recent self injurious behavior or ideation.   Client denies other safety concerns.   A safety and risk management plan has not been developed at this time, however client was given the after-hours number / 911 should there be a change in any of these risk factors.     Appearance:   Appropriate    Eye Contact:   Good    Psychomotor Behavior: Retarded (Slowed)    Attitude:   Cooperative    Orientation:   All   Speech    Rate / Production: Monotone     Volume:  Normal    Mood:    Depressed    Affect:    Appropriate    Thought Content:  Clear    Thought Form:  Coherent  Logical    Insight:    Good      Medication Review:   No current psychiatric medications prescribed     Medication Compliance:   NA     Changes in Health Issues:   None reported     Chemical Use Review:   Substance Use: Chemical use reviewed, no active concerns identified      Tobacco Use: No current tobacco use.       Collateral Reports Completed:   Not Applicable    PLAN: (Client Tasks / Therapist Tasks / Other)  1.  SIFT meditation daily    2.  More schema work at next session    3.  Standing meditation next session      4.  Meet in two weeks        YUNIOR Bustos                                                         ________________________________________________________________________    Treatment Plan    Client's Name: Ramiro Najera  YOB: 1985    Date: 2/20/2017     DSM5 Diagnoses: (Sustained by DSM5 Criteria Listed Above)  Diagnoses: 296.89 Bipolar II  Disorder With anxious distress        PTSD  Psychosocial & Contextual Factors: parents' health problems  WHODAS 2.0 (12 item)            This questionnaire asks about difficulties due to health conditions. Health conditions  include  disease or illnesses, other health problems that may be short or long lasting,  injuries, mental health or emotional problems, and problems with alcohol or drugs.                     Think back over the past 30 days and answer these questions, thinking about how much  difficulty you had doing the following activities. For each question, please [place]man only  one response.    S1 Standing for long periods such as 30 minutes? None =         1   S2 Taking care of household responsibilities? Mild =           2   S3 Learning a new task, for example, learning how to get to a new place? None =         1   S4 How much of a problem do you have joining community activities (for example, festivals, Temple or other activities) in the same way as anyone else can? Moderate =   3   S5 How much have you been emotionally affected by your health problems? Moderate =   3     In the past 30 days, how much difficulty did you have in:   S6 Concentrating on doing something for ten minutes? Mild =           2   S7 Walking a long distance such as a kilometer (or equivalent)? None =         1   S8 Washing your whole body? None =         1   S9 Getting dressed? None =         1   S10 Dealing with people you do not know? None =         1   S11 Maintaining a friendship? Moderate =   3   S12 Your day to day work? None =         1     H1 Overall, in the past 30 days, how many days were these difficulties present? Record number of days 30   H2 In the past 30 days, for how many days were you totally unable to carry out your usual activities or work because of any health condition? Record number of days  0   H3 In the past 30 days, not counting the days that you were totally unable, for how many days did you cut back or  reduce your usual activities or work because of any health condition? Record number of days 3       Referral / Collaboration:  Referral to another professional/service is not indicated at this time..    Anticipated number of session or this episode of care: 18-24      MeasurableTreatment Goal(s) related to diagnosis / functional impairment(s)  Goal-Emotional regulation: Client will effectively manage mood dysregulation    I will know I've met my goal when I am feeling less out of control.      Objective #A (Client Action)    Status: continued- Date: 5/14/2018     Client will learn at least 3 mindfulness skills and practice one daily    Intervention(s)  Therapist will provide mindfulness training, psychoeducation, behavioral activation, and cognitive restructuring.    Objective #B  Client will use at least 3 coping skills for anxiety management in the next 12 weeks.    Status: continued- Date: 5/14/2018     Intervention(s)  Therapist will provide psychoeducation, behavioral activation, and cognitive restructuring.      Objective #C  Client will identify three distraction and diversion activities and use those activities to improve distress tolerance and emotional regulation.  Status: continued- Date: 5/14/2018     Intervention(s)  Therapist will provide psychoeducation, behavioral activation, and cognitive restructuring.    MeasurableTreatment Goal(s) related to diagnosis / functional impairment(s)            Goal-Depression: Client will decrease depressed mood    I will know I've met my goal when I am less depressed.      Objective #A (Client Action)    Status: Continued- Date: 5/14/2018     Client will use identified behavioral and cognitive skills to challenge negative self talk 90% of the time.    Intervention(s)  Therapist will provide psychoeducation, behavioral activation, and cognitive restructuring.      Objective #B  Client will complete at least 10 minutes of self-regulation practice (e.g.: yoga,  meditation, relaxation breathing, etc.) per day.    Status: Continued- Date: 5/14/2018     Intervention(s)  Therapist will provide psychoeducation, behavioral activation, and cognitive restructuring.      Objective #C  Client will exercise 30 minutes 36 times in the next 12 weeks.  Status: Continued- Date: 5/14/2018     Intervention(s)  Therapist will provide psychoeducation, behavioral activation, and cognitive restructuring.                 Client has reviewed and agreed to the above plan.      Justo Dawson, [facility] HospitalSW  February 20, 2017

## 2018-08-20 NOTE — MR AVS SNAPSHOT
MRN:[id number]                      After Visit Summary   8/20/2018    Ramiro Najera    MRN: [id number]           Visit Information        Provider Department      8/20/2018 3:15 PM Marcos Joseph, [facility] Generic      Your next 10 appointments already scheduled     Sep 10, 2018  3:15 PM CDT   Return Visit with Marcos [facility] ([address] [id number] [phone]            Sep 24, 2018  8:30 AM CDT   [facility]    [address] [place] [id number] [phone]              MyChart Information     [employer] gives you secure access to your electronic health record. If you see a primary care provider, you can also send messages to your care team and make appointments. If you have questions, please call your primary care clinic.  If you do not have a primary care provider, please call [phone] and they will assist you.        Care EveryWhere ID     This is your Care EveryWhere ID. This could be used by other organizations to access your [facility] medical records  [id number]        Equal Access to Services     CLAUS JOSEPH : Ann holmano Sodeanne, waaxda luqadaha, qaybta kaalmada adewalteryada, loly brown. So [facility] [phone].    ATENCIÓN: Si habla español, tiene a moffett disposición servicios gratuitos de asistencia lingüística. Llniharika al [phone].    We comply with applicable federal civil rights laws and [place] laws. We do not discriminate on the basis of race, color, national origin, age, disability, sex, sexual orientation, or gender identity.

## 2018-09-10 ENCOUNTER — OFFICE VISIT (OUTPATIENT)
Dept: BEHAVIORAL HEALTH | Facility: CLINIC | Age: 33
End: 2018-09-10
Payer: COMMERCIAL

## 2018-09-10 DIAGNOSIS — F31.81 BIPOLAR 2 DISORDER (H): Primary | ICD-10-CM

## 2018-09-10 PROCEDURE — 90834 PSYTX W PT 45 MINUTES: CPT | Performed by: SOCIAL WORKER

## 2018-09-10 NOTE — MR AVS SNAPSHOT
MRN:[id number]                      After Visit Summary   9/10/2018    Ramiro Najera    MRN: [id number]           Visit Information        Provider Department      9/10/2018 3:15 PM [facility]      Your next 10 appointments already scheduled     Sep 24, 2018  8:30 AM CDT   Return Visit with YUNIOR Olea   [facility] ([facility])    [address] [place] [id number] [phone]              MyChart Information     [employer] gives you secure access to your electronic health record. If you see a primary care provider, you can also send messages to your care team and make appointments. If you have questions, please call your primary care clinic.  If you do not have a primary care provider, please call [phone] and they will assist you.        Care EveryWhere ID     This is your Care EveryWhere ID. This could be used by other organizations to access your [facility] medical records  [id number]        Equal Access to Services     CLAUS JOSEPH AH: Hadii aad ku hadasho Sojocelynali, waaxda luqadaha, qaybta kaalmada adeegyagladys, loly brown. So [facility] [phone].    ATENCIÓN: Si habla español, tiene a moffett disposición servicios gratuitos de asistencia lingüística. [facility] [phone].    We comply with applicable federal civil rights laws and [place] laws. We do not discriminate on the basis of race, color, national origin, age, disability, sex, sexual orientation, or gender identity.

## 2018-09-10 NOTE — PROGRESS NOTES
"                                             Progress Note    Client Name: Ramiro Najera  Date: 9/10/2018          Service Type: Individual      Session Start Time: 315 Session End Time: 400      Session Length: 38-52     Session #: 33     Attendees: Client attended alone    Treatment Plan Last Reviewed: 6/18/2018   PHQ-9 / DEANNA-7 :      DATA      Progress Since Last Session (Related to Symptoms / Goals / Homework):   Symptoms: Stable    Homework: Partially completed   1.  SIFT meditation daily   2.  3 good things   3.  Meet in two weeks     Episode of Care Goals: Satisfactory progress - PREPARATION (Decided to change - considering how); Intervened by negotiating a change plan and determining options / strategies for behavior change, identifying triggers, exploring social supports, and working towards setting a date to begin behavior change     Current / Ongoing Stressors and Concerns:      Things have been \"its been a hard couple weeks.\"  Some trouble motivating himself for self care, also has had some trouble \"regulating emotions.\"  Negative thinking has been more visible lately.  Discussed automatic thoughts today in session.  Explored the role of automatic thoughts in increasing unhelpful thinking in depression, anxiety, and stress.  Explored techniques and strategies to increase awareness of unhelpful automatic thinking such as mindfulness.  Reviewed concept of challenging negative thinking.                       Treatment Objective(s) Addressed in This Session:   Client will complete at least 10 minutes of self-regulation practice (e.g.: yoga, meditation, relaxation breathing, etc.) per day.     Client will use identified behavioral and cognitive skills to challenge negative self talk 90% of the time.     Intervention:     CBT     Discussed physical, mental, and emotional boundaries and limit-setting with others. Explored management of boundaries through direct communication and limiting contact and " communication with others.  Discussed barriers to using boundary management skills including strong emotions and physical proximity.  Client given handout on boundaries today in session.         ASSESSMENT: Current Emotional / Mental Status (status of significant symptoms):   Risk status (Self / Other harm or suicidal ideation)   Client denies current fears or concerns for personal safety.   Client denies current or recent suicidal ideation or behaviors.   Client denies current or recent homicidal ideation or behaviors.   Client denies current or recent self injurious behavior or ideation.   Client denies other safety concerns.   A safety and risk management plan has not been developed at this time, however client was given the after-hours number / 911 should there be a change in any of these risk factors.     Appearance:   Appropriate    Eye Contact:   Good    Psychomotor Behavior: Retarded (Slowed)    Attitude:   Cooperative    Orientation:   All   Speech    Rate / Production: Monotone     Volume:  Normal    Mood:    Depressed    Affect:    Appropriate    Thought Content:  Clear    Thought Form:  Coherent  Logical    Insight:    Good      Medication Review:   No current psychiatric medications prescribed     Medication Compliance:   NA     Changes in Health Issues:   None reported     Chemical Use Review:   Substance Use: Chemical use reviewed, no active concerns identified      Tobacco Use: No current tobacco use.       Collateral Reports Completed:   Not Applicable    PLAN: (Client Tasks / Therapist Tasks / Other)  1.  SIFT meditation daily    2.  More schema work at next session    3.  Standing meditation next session      4.  Meet in two weeks        YUNIOR Bustos                                                         ________________________________________________________________________    Treatment Plan    Client's Name: Ramiro Najera  YOB: 1985    Date: 2/20/2017     DSM5  Diagnoses: (Sustained by DSM5 Criteria Listed Above)  Diagnoses: 296.89 Bipolar II Disorder With anxious distress        PTSD  Psychosocial & Contextual Factors: parents' health problems  WHODAS 2.0 (12 item)            This questionnaire asks about difficulties due to health conditions. Health conditions  include  disease or illnesses, other health problems that may be short or long lasting,  injuries, mental health or emotional problems, and problems with alcohol or drugs.                     Think back over the past 30 days and answer these questions, thinking about how much  difficulty you had doing the following activities. For each question, please [place] only  one response.    S1 Standing for long periods such as 30 minutes? None =         1   S2 Taking care of household responsibilities? Mild =           2   S3 Learning a new task, for example, learning how to get to a new place? None =         1   S4 How much of a problem do you have joining community activities (for example, festivals, Jehovah's witness or other activities) in the same way as anyone else can? Moderate =   3   S5 How much have you been emotionally affected by your health problems? Moderate =   3     In the past 30 days, how much difficulty did you have in:   S6 Concentrating on doing something for ten minutes? Mild =           2   S7 Walking a long distance such as a kilometer (or equivalent)? None =         1   S8 Washing your whole body? None =         1   S9 Getting dressed? None =         1   S10 Dealing with people you do not know? None =         1   S11 Maintaining a friendship? Moderate =   3   S12 Your day to day work? None =         1     H1 Overall, in the past 30 days, how many days were these difficulties present? Record number of days 30   H2 In the past 30 days, for how many days were you totally unable to carry out your usual activities or work because of any health condition? Record number of days  0   H3 In the past 30 days, not  counting the days that you were totally unable, for how many days did you cut back or reduce your usual activities or work because of any health condition? Record number of days 3       Referral / Collaboration:  Referral to another professional/service is not indicated at this time..    Anticipated number of session or this episode of care: 18-24      MeasurableTreatment Goal(s) related to diagnosis / functional impairment(s)  Goal-Emotional regulation: Client will effectively manage mood dysregulation    I will know I've met my goal when I am feeling less out of control.      Objective #A (Client Action)    Status: continued- Date: 5/14/2018     Client will learn at least 3 mindfulness skills and practice one daily    Intervention(s)  Therapist will provide mindfulness training, psychoeducation, behavioral activation, and cognitive restructuring.    Objective #B  Client will use at least 3 coping skills for anxiety management in the next 12 weeks.    Status: continued- Date: 5/14/2018     Intervention(s)  Therapist will provide psychoeducation, behavioral activation, and cognitive restructuring.      Objective #C  Client will identify three distraction and diversion activities and use those activities to improve distress tolerance and emotional regulation.  Status: continued- Date: 5/14/2018     Intervention(s)  Therapist will provide psychoeducation, behavioral activation, and cognitive restructuring.    MeasurableTreatment Goal(s) related to diagnosis / functional impairment(s)            Goal-Depression: Client will decrease depressed mood    I will know I've met my goal when I am less depressed.      Objective #A (Client Action)    Status: Continued- Date: 5/14/2018     Client will use identified behavioral and cognitive skills to challenge negative self talk 90% of the time.    Intervention(s)  Therapist will provide psychoeducation, behavioral activation, and cognitive restructuring.      Objective  #B  Client will complete at least 10 minutes of self-regulation practice (e.g.: yoga, meditation, relaxation breathing, etc.) per day.    Status: Continued- Date: 5/14/2018     Intervention(s)  Therapist will provide psychoeducation, behavioral activation, and cognitive restructuring.      Objective #C  Client will exercise 30 minutes 36 times in the next 12 weeks.  Status: Continued- Date: 5/14/2018     Intervention(s)  Therapist will provide psychoeducation, behavioral activation, and cognitive restructuring.                 Client has reviewed and agreed to the above plan.      Justo Dawson, [facility]  February 20, 2017

## 2018-09-24 ENCOUNTER — OFFICE VISIT (OUTPATIENT)
Dept: BEHAVIORAL HEALTH | Facility: CLINIC | Age: 33
End: 2018-09-24
Payer: COMMERCIAL

## 2018-09-24 DIAGNOSIS — F31.81 BIPOLAR 2 DISORDER (H): Primary | ICD-10-CM

## 2018-09-24 PROCEDURE — 90834 PSYTX W PT 45 MINUTES: CPT | Performed by: SOCIAL WORKER

## 2018-09-24 NOTE — MR AVS SNAPSHOT
MRN:[id number]                      After Visit Summary   9/24/2018    Ramiro Najera    MRN: [id number]           Visit Information        Provider Department      9/24/2018 8:30 AM Marcos Joseph, YUNIOR [facility]      Your next 10 appointments already scheduled     Oct 05, 2018 11:30 AM CDT   Return Visit with YUNIOR Olea   [facility]    [address] [place] [id number] [phone]              MyChart Information     [facility] gives you secure access to your electronic health record. If you see a primary care provider, you can also send messages to your care team and make appointments. If you have questions, please call your primary care clinic.  If you do not have a primary care provider, please call [phone] and they will assist you.        Care EveryWhere ID     This is your Care EveryWhere ID. This could be used by other organizations to access your [facility] medical records  [id number]        Equal Access to Services     CLAUS JOSEPH AH: Hadii aad ku hadasho Sojocelynali, waaxda luqadaha, qaybta kaalmada adeegyagladys, loly brown. So [facility] [phone].    ATENCIÓN: Si habla español, tiene a [place] disposición servicios gratuitos de asistencia lingüística. [facility] [phone].    We comply with applicable federal civil rights laws and [place] laws. We do not discriminate on the basis of race, color, national origin, age, disability, sex, sexual orientation, or gender identity.

## 2018-09-24 NOTE — PROGRESS NOTES
"                                             Progress Note    Client Name: Ramiro Najera  Date: 9/24/2018          Service Type: Individual      Session Start Time: 315 Session End Time: 400      Session Length: 38-52     Session #: 34     Attendees: Client attended alone    Treatment Plan Last Reviewed: 9/24/2018    PHQ-9 / DEANNA-7 :      DATA      Progress Since Last Session (Related to Symptoms / Goals / Homework):   Symptoms: Stable    Homework: Partially completed   1.  SIFT meditation daily   2.  3 good things   3.  Meet in two weeks     Episode of Care Goals: Satisfactory progress - PREPARATION (Decided to change - considering how); Intervened by negotiating a change plan and determining options / strategies for behavior change, identifying triggers, exploring social supports, and working towards setting a date to begin behavior change     Current / Ongoing Stressors and Concerns:      Things have been \"hard to be motivated.\"  Has been trying to maintain the \"basics\" that he had worked on: work, eating right, exercise, sleep.  Not so much ability to focus on self-care lately, and we discuss helping him \"find that balance and get it back.\"  Discussed automatic thoughts today in session.  Explored the role of automatic thoughts in increasing unhelpful thinking in depression, anxiety, and stress.  Explored techniques and strategies to increase awareness of unhelpful automatic thinking such as mindfulness.  Introduced concept of challenging negative thinking.                           Treatment Objective(s) Addressed in This Session:   Client will complete at least 10 minutes of self-regulation practice (e.g.: yoga, meditation, relaxation breathing, etc.) per day.     Client will use identified behavioral and cognitive skills to challenge negative self talk 90% of the time.     Intervention:     CBT     Discussed physical, mental, and emotional boundaries and limit-setting with others. Explored management of " boundaries through direct communication and limiting contact and communication with others.  Discussed barriers to using boundary management skills including strong emotions and physical proximity.  Client given handout on boundaries today in session.         ASSESSMENT: Current Emotional / Mental Status (status of significant symptoms):   Risk status (Self / Other harm or suicidal ideation)   Client denies current fears or concerns for personal safety.   Client denies current or recent suicidal ideation or behaviors.   Client denies current or recent homicidal ideation or behaviors.   Client denies current or recent self injurious behavior or ideation.   Client denies other safety concerns.   A safety and risk management plan has not been developed at this time, however client was given the after-hours number / 911 should there be a change in any of these risk factors.     Appearance:   Appropriate    Eye Contact:   Good    Psychomotor Behavior: Retarded (Slowed)    Attitude:   Cooperative    Orientation:   All   Speech    Rate / Production: Monotone     Volume:  Normal    Mood:    Depressed    Affect:    Appropriate    Thought Content:  Clear    Thought Form:  Coherent  Logical    Insight:    Good      Medication Review:   No current psychiatric medications prescribed     Medication Compliance:   NA     Changes in Health Issues:   None reported     Chemical Use Review:   Substance Use: Chemical use reviewed, no active concerns identified      Tobacco Use: No current tobacco use.       Collateral Reports Completed:   Not Applicable    PLAN: (Client Tasks / Therapist Tasks / Other)  1.  SIFT meditation daily    2.  More schema work at next session    3.  Standing meditation next session      4.  Meet in two weeks        YUNIOR Bustos                                                         ________________________________________________________________________    Treatment Plan    Client's Name: Ramiro TRAVIS  Reinaldo  YOB: 1985    Date: 2/20/2017     DSM5 Diagnoses: (Sustained by DSM5 Criteria Listed Above)  Diagnoses: 296.89 Bipolar II Disorder With anxious distress        PTSD  Psychosocial & Contextual Factors: parents' health problems  WHODAS 2.0 (12 item)            This questionnaire asks about difficulties due to health conditions. Health conditions  include  disease or illnesses, other health problems that may be short or long lasting,  injuries, mental health or emotional problems, and problems with alcohol or drugs.                     Think back over the past 30 days and answer these questions, thinking about how much  difficulty you had doing the following activities. For each question, please [place] only  one response.    S1 Standing for long periods such as 30 minutes? None =         1   S2 Taking care of household responsibilities? Mild =           2   S3 Learning a new task, for example, learning how to get to a new place? None =         1   S4 How much of a problem do you have joining community activities (for example, festivals, Gnosticist or other activities) in the same way as anyone else can? Moderate =   3   S5 How much have you been emotionally affected by your health problems? Moderate =   3     In the past 30 days, how much difficulty did you have in:   S6 Concentrating on doing something for ten minutes? Mild =           2   S7 Walking a long distance such as a kilometer (or equivalent)? None =         1   S8 Washing your whole body? None =         1   S9 Getting dressed? None =         1   S10 Dealing with people you do not know? None =         1   S11 Maintaining a friendship? Moderate =   3   S12 Your day to day work? None =         1     H1 Overall, in the past 30 days, how many days were these difficulties present? Record number of days 30   H2 In the past 30 days, for how many days were you totally unable to carry out your usual activities or work because of any health  condition? Record number of days  0   H3 In the past 30 days, not counting the days that you were totally unable, for how many days did you cut back or reduce your usual activities or work because of any health condition? Record number of days 3       Referral / Collaboration:  Referral to another professional/service is not indicated at this time..    Anticipated number of session or this episode of care: 18-24      MeasurableTreatment Goal(s) related to diagnosis / functional impairment(s)  Goal-Emotional regulation: Client will effectively manage mood dysregulation    I will know I've met my goal when I am feeling less out of control.      Objective #A (Client Action)    Status: continued- Date: 9/24/2018     Client will learn at least 3 mindfulness skills and practice one daily    Intervention(s)  Therapist will provide mindfulness training, psychoeducation, behavioral activation, and cognitive restructuring.    Objective #B  Client will use at least 3 coping skills for anxiety management in the next 12 weeks.    Status: continued- Date: 9/24/2018      Intervention(s)  Therapist will provide psychoeducation, behavioral activation, and cognitive restructuring.      Objective #C  Client will identify three distraction and diversion activities and use those activities to improve distress tolerance and emotional regulation.  Status: continued- Date: 9/24/2018     Intervention(s)  Therapist will provide psychoeducation, behavioral activation, and cognitive restructuring.    MeasurableTreatment Goal(s) related to diagnosis / functional impairment(s)            Goal-Depression: Client will decrease depressed mood    I will know I've met my goal when I am less depressed.      Objective #A (Client Action)    Status: Continued- Date: 9/24/2018     Client will use identified behavioral and cognitive skills to challenge negative self talk 90% of the time.    Intervention(s)  Therapist will provide psychoeducation,  behavioral activation, and cognitive restructuring.      Objective #B  Client will complete at least 10 minutes of self-regulation practice (e.g.: yoga, meditation, relaxation breathing, etc.) per day.    Status: Continued- Date: 9/24/2018     Intervention(s)  Therapist will provide psychoeducation, behavioral activation, and cognitive restructuring.      Objective #C  Client will exercise 30 minutes 36 times in the next 12 weeks.  Status: Continued- Date: 9/24/2018      Intervention(s)  Therapist will provide psychoeducation, behavioral activation, and cognitive restructuring.                 Client has reviewed and agreed to the above plan.      Justo Dawson, LICSW  February 20, 2017

## 2018-10-05 ENCOUNTER — OFFICE VISIT (OUTPATIENT)
Dept: BEHAVIORAL HEALTH | Facility: CLINIC | Age: 33
End: 2018-10-05
Payer: COMMERCIAL

## 2018-10-05 DIAGNOSIS — F32.1 MAJOR DEPRESSIVE DISORDER, SINGLE EPISODE, MODERATE (H): Primary | ICD-10-CM

## 2018-10-05 PROCEDURE — 90834 PSYTX W PT 45 MINUTES: CPT | Performed by: SOCIAL WORKER

## 2018-10-05 NOTE — PROGRESS NOTES
"                                             Progress Note    Client Name: Ramiro Najera  Date: 10/5/2018          Service Type: Individual      Session Start Time: 1130 Session End Time: 1215      Session Length: 38-52     Session #: 35     Attendees: Client attended alone    Treatment Plan Last Reviewed: 9/24/2018    PHQ-9 / DEANNA-7 :      DATA      Progress Since Last Session (Related to Symptoms / Goals / Homework):   Symptoms: Stable    Homework: Partially completed   1.  SIFT meditation daily   2.  3 good things   3.  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  Has been making an effort to get out a little more and to spend less time with Stacy.  He feels like this has been going well, felt len good.  Also has been \"pushing back\" against some of her requests, and she has accepted him wanting some distance.  Discussed physical, mental, and emotional boundaries and limit-setting with others. Explored management of boundaries through direct communication and limiting contact and communication with others.  Discussed barriers to using boundary management skills including strong emotions and physical proximity.                           Treatment Objective(s) Addressed in This Session:   Client will complete at least 10 minutes of self-regulation practice (e.g.: yoga, meditation, relaxation breathing, etc.) per day.     Client will use identified behavioral and cognitive skills to challenge negative self talk 90% of the " time.     Intervention:     CBT     Discussed physical, mental, and emotional boundaries and limit-setting with others. Explored management of boundaries through direct communication and limiting contact and communication with others.  Discussed barriers to using boundary management skills including strong emotions and physical proximity.  Client given handout on boundaries today in session.         ASSESSMENT: Current Emotional / Mental Status (status of significant symptoms):   Risk status (Self / Other harm or suicidal ideation)   Client denies current fears or concerns for personal safety.   Client denies current or recent suicidal ideation or behaviors.   Client denies current or recent homicidal ideation or behaviors.   Client denies current or recent self injurious behavior or ideation.   Client denies other safety concerns.   A safety and risk management plan has not been developed at this time, however client was given the after-hours number / 911 should there be a change in any of these risk factors.     Appearance:   Appropriate    Eye Contact:   Good    Psychomotor Behavior: Retarded (Slowed)    Attitude:   Cooperative    Orientation:   All   Speech    Rate / Production: Monotone     Volume:  Normal    Mood:    Depressed    Affect:    Appropriate    Thought Content:  Clear    Thought Form:  Coherent  Logical    Insight:    Good      Medication Review:   No current psychiatric medications prescribed     Medication Compliance:   NA     Changes in Health Issues:   None reported     Chemical Use Review:   Substance Use: Chemical use reviewed, no active concerns identified      Tobacco Use: No current tobacco use.       Collateral Reports Completed:   Not Applicable    PLAN: (Client Tasks / Therapist Tasks / Other)  1.  SIFT meditation daily    2.  More schema work at next session    3.  Standing meditation next session      4.  Meet in two weeks        YUNIOR Bustos                                                          ________________________________________________________________________    Treatment Plan    Client's Name: Ramiro Najera  YOB: 1985    Date: 2/20/2017     DSM5 Diagnoses: (Sustained by DSM5 Criteria Listed Above)  Diagnoses: 296.89 Bipolar II Disorder With anxious distress        PTSD  Psychosocial & Contextual Factors: parents' health problems  WHODAS 2.0 (12 item)            This questionnaire asks about difficulties due to health conditions. Health conditions  include  disease or illnesses, other health problems that may be short or long lasting,  injuries, mental health or emotional problems, and problems with alcohol or drugs.                     Think back over the past 30 days and answer these questions, thinking about how much  difficulty you had doing the following activities. For each question, please [place] only  one response.    S1 Standing for long periods such as 30 minutes? None =         1   S2 Taking care of household responsibilities? Mild =           2   S3 Learning a new task, for example, learning how to get to a new place? None =         1   S4 How much of a problem do you have joining community activities (for example, festivals, [employer] or other activities) in the same way as anyone else can? Moderate =   3   S5 How much have you been emotionally affected by your health problems? Moderate =   3     In the past 30 days, how much difficulty did you have in:   S6 Concentrating on doing something for ten minutes? Mild =           2   S7 Walking a long distance such as a kilometer (or equivalent)? None =         1   S8 Washing your whole body? None =         1   S9 Getting dressed? None =         1   S10 Dealing with people you do not know? None =         1   S11 Maintaining a friendship? Moderate =   3   S12 Your day to day work? None =         1     H1 Overall, in the past 30 days, how many days were these difficulties present? Record number of days  30   H2 In the past 30 days, for how many days were you totally unable to carry out your usual activities or work because of any health condition? Record number of days  0   H3 In the past 30 days, not counting the days that you were totally unable, for how many days did you cut back or reduce your usual activities or work because of any health condition? Record number of days 3       Referral / Collaboration:  Referral to another professional/service is not indicated at this time..    Anticipated number of session or this episode of care: 18-24      MeasurableTreatment Goal(s) related to diagnosis / functional impairment(s)  Goal-Emotional regulation: Client will effectively manage mood dysregulation    I will know I've met my goal when I am feeling less out of control.      Objective #A (Client Action)    Status: continued- Date: 9/24/2018     Client will learn at least 3 mindfulness skills and practice one daily    Intervention(s)  Therapist will provide mindfulness training, psychoeducation, behavioral activation, and cognitive restructuring.    Objective #B  Client will use at least 3 coping skills for anxiety management in the next 12 weeks.    Status: continued- Date: 9/24/2018      Intervention(s)  Therapist will provide psychoeducation, behavioral activation, and cognitive restructuring.      Objective #C  Client will identify three distraction and diversion activities and use those activities to improve distress tolerance and emotional regulation.  Status: continued- Date: 9/24/2018     Intervention(s)  Therapist will provide psychoeducation, behavioral activation, and cognitive restructuring.    MeasurableTreatment Goal(s) related to diagnosis / functional impairment(s)            Goal-Depression: Client will decrease depressed mood    I will know I've met my goal when I am less depressed.      Objective #A (Client Action)    Status: Continued- Date: 9/24/2018     Client will use identified behavioral and  cognitive skills to challenge negative self talk 90% of the time.    Intervention(s)  Therapist will provide psychoeducation, behavioral activation, and cognitive restructuring.      Objective #B  Client will complete at least 10 minutes of self-regulation practice (e.g.: yoga, meditation, relaxation breathing, etc.) per day.    Status: Continued- Date: 9/24/2018     Intervention(s)  Therapist will provide psychoeducation, behavioral activation, and cognitive restructuring.      Objective #C  Client will exercise 30 minutes 36 times in the next 12 weeks.  Status: Continued- Date: 9/24/2018      Intervention(s)  Therapist will provide psychoeducation, behavioral activation, and cognitive restructuring.                 Client has reviewed and agreed to the above plan.      Justo Dawson, LICSW  February 20, 2017

## 2018-10-05 NOTE — MR AVS SNAPSHOT
MRN:[id number]                      After Visit Summary   10/5/2018    Ramiro Najera    MRN: [id number]           Visit Information        Provider Department      10/5/2018 11:30 AM Marcos Joseph, Y[facility]      Your next 10 appointments already scheduled     Oct 22, 2018 10:30 AM CDT   Return Visit with YUNIOR Olea   [facility] ([facility])    [address] [place] [id number] [phone]              MyChart Information     [employer] gives you secure access to your electronic health record. If you see a primary care provider, you can also send messages to your care team and make appointments. If you have questions, please call your primary care clinic.  If you do not have a primary care provider, please call [phone] and they will assist you.        Care EveryWhere ID     This is your Care EveryWhere ID. This could be used by other organizations to access your [facility] medical records  [id number]        Equal Access to Services     CLAUS JOSEPH AH: Hadii aad ku hadasho Sojocelynali, waaxda luqadaha, qaybta kaalmada adeegyagladys, loly brown. So [facility] [phone].    ATENCIÓN: Si habla español, tiene a moffett disposición servicios gratuitos de asistencia lingüística. [facility] [phone].    We comply with applicable federal civil rights laws and [place] laws. We do not discriminate on the basis of race, color, national origin, age, disability, sex, sexual orientation, or gender identity.

## 2018-10-22 ENCOUNTER — OFFICE VISIT (OUTPATIENT)
Dept: BEHAVIORAL HEALTH | Facility: CLINIC | Age: 33
End: 2018-10-22
Payer: COMMERCIAL

## 2018-10-22 DIAGNOSIS — F31.81 BIPOLAR 2 DISORDER (H): Primary | ICD-10-CM

## 2018-10-22 PROCEDURE — 90834 PSYTX W PT 45 MINUTES: CPT | Performed by: SOCIAL WORKER

## 2018-10-22 NOTE — PROGRESS NOTES
"                                             Progress Note    Client Name: Ramiro Najera  Date: 10/22/2018          Service Type: Individual      Session Start Time: 1030 Session End Time: 1115      Session Length: 38-52     Session #: 36     Attendees: Client attended alone    Treatment Plan Last Reviewed: 9/24/2018    PHQ-9 / DEANNA-7 :      DATA      Progress Since Last Session (Related to Symptoms / Goals / Homework):   Symptoms: Stable    Homework: Partially completed   1.  SIFT meditation daily   2.  3 good things   3.  Meet in two weeks     Episode of Care Goals: Satisfactory progress - PREPARATION (Decided to change - considering how); Intervened by negotiating a change plan and determining options / strategies for behavior change, identifying triggers, exploring social supports, and working towards setting a date to begin behavior change     Current / Ongoing Stressors and Concerns:      Things have been \"pretty quiet.\"  Still making an effort to leave the house, sometimes it feels easier to stay in his bedroom and close the door.  Has been having some success getting out and seeing people.  Knows that he feels better when he does.  Finds it especially hard to go into uncertain situations.  Long discussion today about \"bids\" as an important way to form and maintain relationships.  Identified that the process of making and responding to bids strengthens connections to others.  Discussed using repair attempts to fix rifts in relationships.                               Treatment Objective(s) Addressed in This Session:   Client will complete at least 10 minutes of self-regulation practice (e.g.: yoga, meditation, relaxation breathing, etc.) per day.     Client will use identified behavioral and cognitive skills to challenge negative self talk 90% of the time.     Intervention:     CBT     Discussed physical, mental, and emotional boundaries and limit-setting with others. Explored management of boundaries " through direct communication and limiting contact and communication with others.  Discussed barriers to using boundary management skills including strong emotions and physical proximity.  Client given handout on boundaries today in session.         ASSESSMENT: Current Emotional / Mental Status (status of significant symptoms):   Risk status (Self / Other harm or suicidal ideation)   Client denies current fears or concerns for personal safety.   Client denies current or recent suicidal ideation or behaviors.   Client denies current or recent homicidal ideation or behaviors.   Client denies current or recent self injurious behavior or ideation.   Client denies other safety concerns.   A safety and risk management plan has not been developed at this time, however client was given the after-hours number / 911 should there be a change in any of these risk factors.     Appearance:   Appropriate    Eye Contact:   Good    Psychomotor Behavior: Retarded (Slowed)    Attitude:   Cooperative    Orientation:   All   Speech    Rate / Production: Monotone     Volume:  Normal    Mood:    Depressed    Affect:    Appropriate    Thought Content:  Clear    Thought Form:  Coherent  Logical    Insight:    Good      Medication Review:   No current psychiatric medications prescribed     Medication Compliance:   NA     Changes in Health Issues:   None reported     Chemical Use Review:   Substance Use: Chemical use reviewed, no active concerns identified      Tobacco Use: No current tobacco use.       Collateral Reports Completed:   Not Applicable    PLAN: (Client Tasks / Therapist Tasks / Other)  1.  SIFT meditation daily    2.  More schema work at next session    3.  Standing meditation next session      4.  Meet in two weeks        YUNIOR Bustos                                                         ________________________________________________________________________    Treatment Plan    Client's Name: Ramiro TRAVIS  Reinaldo  YOB: 1985    Date: 2/20/2017     DSM5 Diagnoses: (Sustained by DSM5 Criteria Listed Above)  Diagnoses: 296.89 Bipolar II Disorder With anxious distress        PTSD  Psychosocial & Contextual Factors: parents' health problems  WHODAS 2.0 (12 item)            This questionnaire asks about difficulties due to health conditions. Health conditions  include  disease or illnesses, other health problems that may be short or long lasting,  injuries, mental health or emotional problems, and problems with alcohol or drugs.                     Think back over the past 30 days and answer these questions, thinking about how much  difficulty you had doing the following activities. For each question, please [place] only  one response.    S1 Standing for long periods such as 30 minutes? None =         1   S2 Taking care of household responsibilities? Mild =           2   S3 Learning a new task, for example, learning how to get to a new place? None =         1   S4 How much of a problem do you have joining community activities (for example, festivals, [employer] or other activities) in the same way as anyone else can? Moderate =   3   S5 How much have you been emotionally affected by your health problems? Moderate =   3     In the past 30 days, how much difficulty did you have in:   S6 Concentrating on doing something for ten minutes? Mild =           2   S7 Walking a long distance such as a kilometer (or equivalent)? None =         1   S8 Washing your whole body? None =         1   S9 Getting dressed? None =         1   S10 Dealing with people you do not know? None =         1   S11 Maintaining a friendship? Moderate =   3   S12 Your day to day work? None =         1     H1 Overall, in the past 30 days, how many days were these difficulties present? Record number of days 30   H2 In the past 30 days, for how many days were you totally unable to carry out your usual activities or work because of any health  condition? Record number of days  0   H3 In the past 30 days, not counting the days that you were totally unable, for how many days did you cut back or reduce your usual activities or work because of any health condition? Record number of days 3       Referral / Collaboration:  Referral to another professional/service is not indicated at this time..    Anticipated number of session or this episode of care: 18-24      MeasurableTreatment Goal(s) related to diagnosis / functional impairment(s)  Goal-Emotional regulation: Client will effectively manage mood dysregulation    I will know I've met my goal when I am feeling less out of control.      Objective #A (Client Action)    Status: continued- Date: 9/24/2018     Client will learn at least 3 mindfulness skills and practice one daily    Intervention(s)  Therapist will provide mindfulness training, psychoeducation, behavioral activation, and cognitive restructuring.    Objective #B  Client will use at least 3 coping skills for anxiety management in the next 12 weeks.    Status: continued- Date: 9/24/2018      Intervention(s)  Therapist will provide psychoeducation, behavioral activation, and cognitive restructuring.      Objective #C  Client will identify three distraction and diversion activities and use those activities to improve distress tolerance and emotional regulation.  Status: continued- Date: 9/24/2018     Intervention(s)  Therapist will provide psychoeducation, behavioral activation, and cognitive restructuring.    MeasurableTreatment Goal(s) related to diagnosis / functional impairment(s)            Goal-Depression: Client will decrease depressed mood    I will know I've met my goal when I am less depressed.      Objective #A (Client Action)    Status: Continued- Date: 9/24/2018     Client will use identified behavioral and cognitive skills to challenge negative self talk 90% of the time.    Intervention(s)  Therapist will provide psychoeducation,  behavioral activation, and cognitive restructuring.      Objective #B  Client will complete at least 10 minutes of self-regulation practice (e.g.: yoga, meditation, relaxation breathing, etc.) per day.    Status: Continued- Date: 9/24/2018     Intervention(s)  Therapist will provide psychoeducation, behavioral activation, and cognitive restructuring.      Objective #C  Client will exercise 30 minutes 36 times in the next 12 weeks.  Status: Continued- Date: 9/24/2018      Intervention(s)  Therapist will provide psychoeducation, behavioral activation, and cognitive restructuring.                 Client has reviewed and agreed to the above plan.      Justo Dawson, LICSW  February 20, 2017

## 2018-10-22 NOTE — MR AVS SNAPSHOT
MRN:[id number]                      After Visit Summary   10/22/2018    Ramiro Najera    MRN: [id number]           Visit Information        Provider Department      10/22/2018 10:30 AM [facility]      Your next 10 appointments already scheduled     Nov 12, 2018  8:30 AM CST   Return Visit with YUNIOR Olea   [facility] ([facility])    [address] [place] [id number] [phone]              MyChart Information     [employer] gives you secure access to your electronic health record. If you see a primary care provider, you can also send messages to your care team and make appointments. If you have questions, please call your primary care clinic.  If you do not have a primary care provider, please call [phone] and they will assist you.        Care EveryWhere ID     This is your Care EveryWhere ID. This could be used by other organizations to access your [facility] medical records  [id number]        Equal Access to Services     CLAUS JOSEPH : Hadii aad ku hadasho Sojocelynali, waaxda luqadaha, qaybta kaalmada adeegyagladys, loly brown. So [facility] [phone].    ATENCIÓN: Si habla español, tiene a moffett disposición servicios gratuitos de asistencia lingüística. Llame al [phone].    We comply with applicable federal civil rights laws and [place] laws. We do not discriminate on the basis of race, color, national origin, age, disability, sex, sexual orientation, or gender identity.

## 2018-11-12 ENCOUNTER — OFFICE VISIT (OUTPATIENT)
Dept: FAMILY MEDICINE | Facility: CLINIC | Age: 33
End: 2018-11-12
Payer: COMMERCIAL

## 2018-11-12 ENCOUNTER — OFFICE VISIT (OUTPATIENT)
Dept: BEHAVIORAL HEALTH | Facility: CLINIC | Age: 33
End: 2018-11-12
Payer: COMMERCIAL

## 2018-11-12 VITALS
HEIGHT: 71 IN | BODY MASS INDEX: 31.92 KG/M2 | WEIGHT: 228 LBS | HEART RATE: 84 BPM | SYSTOLIC BLOOD PRESSURE: 138 MMHG | TEMPERATURE: 98 F | DIASTOLIC BLOOD PRESSURE: 82 MMHG

## 2018-11-12 DIAGNOSIS — F31.81 BIPOLAR 2 DISORDER (H): Primary | ICD-10-CM

## 2018-11-12 PROCEDURE — 90834 PSYTX W PT 45 MINUTES: CPT | Performed by: SOCIAL WORKER

## 2018-11-12 PROCEDURE — 99214 OFFICE O/P EST MOD 30 MIN: CPT | Performed by: FAMILY MEDICINE

## 2018-11-12 RX ORDER — TRAZODONE HYDROCHLORIDE 50 MG/1
50-150 TABLET, FILM COATED ORAL AT BEDTIME
Qty: 90 TABLET | Refills: 1 | Status: SHIPPED | OUTPATIENT
Start: 2018-11-12 | End: 2019-06-06

## 2018-11-12 ASSESSMENT — ANXIETY QUESTIONNAIRES
3. WORRYING TOO MUCH ABOUT DIFFERENT THINGS: MORE THAN HALF THE DAYS
IF YOU CHECKED OFF ANY PROBLEMS ON THIS QUESTIONNAIRE, HOW DIFFICULT HAVE THESE PROBLEMS MADE IT FOR YOU TO DO YOUR WORK, TAKE CARE OF THINGS AT HOME, OR GET ALONG WITH OTHER PEOPLE: SOMEWHAT DIFFICULT
2. NOT BEING ABLE TO STOP OR CONTROL WORRYING: MORE THAN HALF THE DAYS
1. FEELING NERVOUS, ANXIOUS, OR ON EDGE: NEARLY EVERY DAY
7. FEELING AFRAID AS IF SOMETHING AWFUL MIGHT HAPPEN: MORE THAN HALF THE DAYS
5. BEING SO RESTLESS THAT IT IS HARD TO SIT STILL: SEVERAL DAYS
6. BECOMING EASILY ANNOYED OR IRRITABLE: SEVERAL DAYS
GAD7 TOTAL SCORE: 13

## 2018-11-12 ASSESSMENT — PATIENT HEALTH QUESTIONNAIRE - PHQ9
5. POOR APPETITE OR OVEREATING: MORE THAN HALF THE DAYS
SUM OF ALL RESPONSES TO PHQ QUESTIONS 1-9: 17

## 2018-11-12 NOTE — PATIENT INSTRUCTIONS
Begin Trazodone. You can take 1-3 tablets each night. Start with one, and increase as needed.     You may be slightly drowsy in the morning for a few days, but this should resolve.     If trazodone is not working well, you can see a psychiatrist and consider Lithium.     Follow up with me in 1 month for mood.     If you can, it is helpful if you fill out a survey about your clinic visit if it is mailed to your house in a few weeks.

## 2018-11-12 NOTE — MR AVS SNAPSHOT
MRN:[id number]                      After Visit Summary   11/12/2018    Ramiro Najera    MRN: [id number]           Visit Information        Provider Department      11/12/2018 8:30 AM Marcos Joseph, [facility]      Your next 10 appointments already scheduled     Nov 12, 2018 11:00 AM CST   MyChart Long with Samantha Lopeswendy, DO   [facility]    [address] [place] [id number] [phone]            Nov 26, 2018  2:30 PM CST   Return Visit with YUNIOR Olea   [facility] ([facility])    [address] [place] [id number] [phone]              MyChart Information     MyChart gives you secure access to your electronic health record. If you see a primary care provider, you can also send messages to your care team and make appointments. If you have questions, please call your primary care clinic.  If you do not have a primary care provider, please call [phone] and they will assist you.        Care EveryWhere ID     This is your Care EveryWhere ID. This could be used by other organizations to access your [facility] medical records  [id number]        Equal Access to Services     CLAUS JOSEPH : Ann holmano Sodeanne, waaxda luqadaha, qaybta kaalmada adewalteryada, loly brown. So [facility] [phone].    ATENCIÓN: Si habla español, tiene a moffett disposición servicios gratuitos de asistencia lingüística. Llame al [phone].    We comply with applicable federal civil rights laws and [place] laws. We do not discriminate on the basis of race, color, national origin, age, disability, sex, sexual orientation, or gender identity.

## 2018-11-12 NOTE — MR AVS SNAPSHOT
After Visit Summary   11/12/2018    Ramiro Najera    MRN: [id number]           Patient Information     Date Of Birth          1985        Visit Information        Provider Department      11/12/2018 11:00 AM Samantha Belle DO [facility]        Today's Diagnoses     Bipolar 2 disorder (H)    -  1      Care Instructions    Begin Trazodone. You can take 1-3 tablets each night. Start with one, and increase as needed.     You may be slightly drowsy in the morning for a few days, but this should resolve.     If trazodone is not working well, you can see a psychiatrist and consider Lithium.     Follow up with me in 1 month for mood.     If you can, it is helpful if you fill out a survey about your clinic visit if it is mailed to your house in a few weeks.             Follow-ups after your visit        Follow-up notes from your care team     Return in about 4 weeks (around 12/10/2018) for Mood, Med Check.      Your next 10 appointments already scheduled     Nov 26, 2018  2:30 PM CST   Return Visit with Marcos oJseph [facility] ([facility])    [address] [place] [id number] [phone]              Who to contact     If you have questions or need follow up information about today's clinic visit or your schedule please contact [facility] directly at [phone].  Normal or non-critical lab and imaging results will be communicated to you by MyChart, letter or phone within 4 business days after the clinic has received the results. If you do not hear from us within 7 days, please contact the clinic through MyChart or phone. If you have a critical or abnormal lab result, we will notify you by phone as soon as possible.  Submit refill requests through [employer] or call your pharmacy and they will forward the refill request to us. Please allow 3 business days for your refill to be completed.           "Additional Information About Your Visit        MyChart Information     [employer] gives you secure access to your electronic health record. If you see a primary care provider, you can also send messages to your care team and make appointments. If you have questions, please call your primary care clinic.  If you do not have a primary care provider, please call [phone] and they will assist you.        Care EveryWhere ID     This is your Care EveryWhere ID. This could be used by other organizations to access your [place] medical records  [id number]        Your Vitals Were     Pulse Temperature Height BMI (Body Mass Index)          84 98  F (36.7  C) (Oral) 5' 11\" (1.803 m) 31.8 kg/m2         Blood Pressure from Last 3 Encounters:   11/12/18 150/88   07/31/18 130/76   03/14/17 (!) 143/95    Weight from Last 3 Encounters:   11/12/18 228 lb (103.4 kg)   07/31/18 225 lb (102.1 kg)   03/14/17 240 lb (108.9 kg)              Today, you had the following     No orders found for display         Today's Medication Changes          These changes are accurate as of 11/12/18 11:26 AM.  If you have any questions, ask your nurse or doctor.               Start taking these medicines.        Dose/Directions    traZODone 50 MG tablet   Commonly known as:  DESYREL   Used for:  Bipolar 2 disorder (H)   Started by:  Samantha Belle DO        Dose:   mg   Take 1-3 tablets ( mg) by mouth At Bedtime   Quantity:  90 tablet   Refills:  1            Where to get your medicines      These medications were sent to [id number] - [facility] [address] AT [facility] [address]  [address], [facility] [address]     Phone:  [phone]     traZODone 50 MG tablet                Primary Care Provider Office Phone # Fax #    Samantha Belle [phone] [address] [facility] [address]        Equal Access to Services     CLAUS JOSEPH AH: Ann melgar " Alex, burt wildersurinder, adityata kasonido ramires, loly angelinain hayaan rosariowalter yaredandrea laKarelleonides kevin. So [facility] [phone].    ATENCIÓN: Si warner patten, tiene a moffett disposición servicios gratuitos de asistencia lingüística. Luh al [phone].    We comply with applicable federal civil rights laws and [place] laws. We do not discriminate on the basis of race, color, national origin, age, disability, sex, sexual orientation, or gender identity.            Thank you!     Thank you for choosing [facility]  for your care. Our goal is always to provide you with excellent care. Hearing back from our patients is one way we can continue to improve our services. Please take a few minutes to complete the written survey that you may receive in the mail after your visit with us. Thank you!             Your Updated Medication List - Protect others around you: Learn how to safely use, store and throw away your medicines at www.disposemymeds.org.          This list is accurate as of 11/12/18 11:26 AM.  Always use your most recent med list.                   Brand Name Dispense Instructions for use Diagnosis    IRON SUPPLEMENT PO      Taking every other day        traZODone 50 MG tablet    DESYREL    90 tablet    Take 1-3 tablets ( mg) by mouth At Bedtime    Bipolar 2 disorder (H)       vitamin B complex with vitamin C Tabs tablet      Take 1 tablet by mouth daily        VITAMIN D (CHOLECALCIFEROL) PO      Take by mouth daily

## 2018-11-12 NOTE — PROGRESS NOTES
SUBJECTIVE:   Ramiro Najera is a 32 year old male who presents to clinic today for the following health issues:      Depression Followup    Status since last visit: Worsened     See PHQ-9 for current symptoms.  Other associated symptoms: None    Complicating factors:   Significant life event:  Yes-  Recently moved    Current substance abuse:  Alcohol  Anxiety or Panic symptoms:  Yes    Patient as been working with our therapist Ayden regularly. He has been seeing him for about 2 years.   He has been on Lamictal in the past, but developed dry mouth and sores in his gums and discontinued this. His side effects resolved after discontinuing. When patient was seen 4 months ago he was not interested in medication for his bipolar disorder. He has also been on anti-depressants in the past.   Patient reports that he recently moved in with someone that he was considering dating, but not yet dating. He reports that this has turned into a toxic situation and he is now locked into a lease with this person. He states that now there has been too many sleepless nights and some suicidal ideation. He reports that he doesn't have plans for suicide, he describes this as just nagging thoughts that he can resolve logically. He does not have a gun in his home. He reports that CBT, exercise, and proper diet have helped control his mood. He feels medication may be helpful now due to his current situation.    PHQ 2/6/2017 7/31/2018   PHQ-9 Total Score 13 10   Q9: Suicide Ideation Not at all Not at all     PHQ-9  English  PHQ-9   Any Language  Suicide Assessment Five-step Evaluation and Treatment (SAFE-T)    Amount of exercise or physical activity: 4-5 days/week for an average of greater than 60 minutes    Problems taking medications regularly: No    Medication side effects: none    Diet: regular (no restrictions)        Problem list and histories reviewed & adjusted, as indicated.  Additional history: as documented    BP Readings from  "Last 3 Encounters:   11/12/18 150/88   07/31/18 130/76   03/14/17 (!) 143/95    Wt Readings from Last 3 Encounters:   11/12/18 103.4 kg (228 lb)   07/31/18 102.1 kg (225 lb)   03/14/17 108.9 kg (240 lb)                    Reviewed and updated as needed this visit by clinical staff  Tobacco  Allergies  Meds  Med Hx  Surg Hx  Fam Hx  Soc Hx      Reviewed and updated as needed this visit by Provider         ROS:  Constitutional, HEENT, cardiovascular, pulmonary, gi and gu systems are negative, except as otherwise noted.    This document serves as a record of the services and decisions personally performed by TOM SIMMS. It was created on his/her behalf by Deloris Rivas, a trained medical scribe. The creation of this document is based on the provider's statements to the medical scribe. Deloris Rivas, November 12, 2018 11:09 AM    OBJECTIVE:     /88 (Cuff Size: Adult Large)  Pulse 84  Temp 98  F (36.7  C) (Oral)  Ht 1.803 m (5' 11\")  Wt 103.4 kg (228 lb)  BMI 31.8 kg/m2  Body mass index is 31.8 kg/(m^2).  GENERAL: healthy, alert and no distress  HENT: ear canals and TM's normal, nose and mouth without ulcers or lesions  NECK: no adenopathy, no asymmetry, masses, or scars and thyroid normal to palpation  RESP: lungs clear to auscultation - no rales, rhonchi or wheezes  CV: regular rate and rhythm, normal S1 S2, no S3 or S4, no murmur, click or rub, no peripheral edema and peripheral pulses strong  PSYCH: mentation appears normal, affect normal/bright    Diagnostic Test Results:  No results found for this or any previous visit (from the past 24 hour(s)).    ASSESSMENT/PLAN:     Depression; recurrent episode-- Moderate   Associated with the following complications:    None   Plan:  Medications:   Sleep medication - Trazodone prescribed        ICD-10-CM    1. Bipolar 2 disorder (H) F31.81 traZODone (DESYREL) 50 MG tablet     I prescribed the patient trazodone to help manage his sleep and bipolar 2 disorder. I " recommended he try 1 tablet first, but he can take up to 3 tablets if needed. If trazodone is not successful will consider a referral to psych and consider lithium. He will follow up with me in 1 month for mood.     Patient Instructions   Begin Trazodone. You can take 1-3 tablets each night. Start with one, and increase as needed.     You may be slightly drowsy in the morning for a few days, but this should resolve.     If trazodone is not working well, you can see a psychiatrist and consider Lithium.     Follow up with me in 1 month for mood.     If you can, it is helpful if you fill out a survey about your clinic visit if it is mailed to your house in a few weeks.         Samantha Belle, DO  [facility]    The information in this document, created by the medical scribe Deloris Rivas for me, accurately reflects the services I personally performed and the decisions made by me. I have reviewed and approved this document for accuracy prior to leaving the patient care area.

## 2018-11-12 NOTE — PROGRESS NOTES
"                                             Progress Note    Client Name: Ramiro Najera  Date: 11/12/2018          Service Type: Individual      Session Start Time: 1030 Session End Time: 1115      Session Length: 38-52     Session #: 37     Attendees: Client attended alone    Treatment Plan Last Reviewed: 9/24/2018    PHQ-9 / DEANNA-7 :      DATA      Progress Since Last Session (Related to Symptoms / Goals / Homework):   Symptoms: Stable    Homework: Partially completed   1.  SIFT meditation daily   2.  3 good things   3.  Meet in two weeks     Episode of Care Goals: Satisfactory progress - PREPARATION (Decided to change - considering how); Intervened by negotiating a change plan and determining options / strategies for behavior change, identifying triggers, exploring social supports, and working towards setting a date to begin behavior change     Current / Ongoing Stressors and Concerns:      Things have been \"positiver things are happening, but it has also been extremely difficult.\"  Has been getting out and having some success with making friends in the area he lives in.  Living with Stacy has been more challenging.  Discussed Stanley Larson and Rational Emotive Therapy.  Explored Neo' 3 Core Dysfunctional Beliefs today in session:      About me-I must perform well always and win the approval of others.  If I fail, I am a terrible, incompetent, and unworthy person who will probably always fail and who deserves to suffer and be miserable.  o Holding this belief tends to lead to feelings of anxiety, panic, depression, and despair.    About others-Other people must treat me nicely, considerately, and fairly.  If they do not, they are terrible people who will never ever treat me fairly, and they deserve to be punished.  o Holding this belief tends to lead to feelings of anger, rage, fury, and vindictiveness.    About life-Life must always be safe, pleasant, hassle-free, and enjoyable.  If it is not, then I " cannot bear it, and my life will always be terrible and hardly worth living at all.  o Holding this belief tends to lead to feelings of frustration and discomfort and can result in patterns of procrastination, avoidance, and inaction.                   Treatment Objective(s) Addressed in This Session:   Client will complete at least 10 minutes of self-regulation practice (e.g.: yoga, meditation, relaxation breathing, etc.) per day.     Client will use identified behavioral and cognitive skills to challenge negative self talk 90% of the time.     Intervention:     CBT     Discussed physical, mental, and emotional boundaries and limit-setting with others. Explored management of boundaries through direct communication and limiting contact and communication with others.  Discussed barriers to using boundary management skills including strong emotions and physical proximity.  Client given handout on boundaries today in session.         ASSESSMENT: Current Emotional / Mental Status (status of significant symptoms):   Risk status (Self / Other harm or suicidal ideation)   Client denies current fears or concerns for personal safety.   Client denies current or recent suicidal ideation or behaviors.   Client denies current or recent homicidal ideation or behaviors.   Client denies current or recent self injurious behavior or ideation.   Client denies other safety concerns.   A safety and risk management plan has not been developed at this time, however client was given the after-hours number / 911 should there be a change in any of these risk factors.     Appearance:   Appropriate    Eye Contact:   Good    Psychomotor Behavior: Retarded (Slowed)    Attitude:   Cooperative    Orientation:   All   Speech    Rate / Production: Monotone     Volume:  Normal    Mood:    Depressed    Affect:    Appropriate    Thought Content:  Clear    Thought Form:  Coherent  Logical    Insight:    Good      Medication Review:   No current  psychiatric medications prescribed     Medication Compliance:   NA     Changes in Health Issues:   None reported     Chemical Use Review:   Substance Use: Chemical use reviewed, no active concerns identified      Tobacco Use: No current tobacco use.       Collateral Reports Completed:   Not Applicable    PLAN: (Client Tasks / Therapist Tasks / Other)  1.  SIFT meditation daily    2.  More schema work at next session    3.  Standing meditation next session      4.  Meet in two weeks        Justo Dawson, LICSW                                                         ________________________________________________________________________    Treatment Plan    Client's Name: Ramiro Najera  YOB: 1985    Date: 2/20/2017     DSM5 Diagnoses: (Sustained by DSM5 Criteria Listed Above)  Diagnoses: 296.89 Bipolar II Disorder With anxious distress        PTSD  Psychosocial & Contextual Factors: parents' health problems  WHODAS 2.0 (12 item)            This questionnaire asks about difficulties due to health conditions. Health conditions  include  disease or illnesses, other health problems that may be short or long lasting,  injuries, mental health or emotional problems, and problems with alcohol or drugs.                     Think back over the past 30 days and answer these questions, thinking about how much  difficulty you had doing the following activities. For each question, please [place] only  one response.    S1 Standing for long periods such as 30 minutes? None =         1   S2 Taking care of household responsibilities? Mild =           2   S3 Learning a new task, for example, learning how to get to a new place? None =         1   S4 How much of a problem do you have joining community activities (for example, festivals, Buddhism or other activities) in the same way as anyone else can? Moderate =   3   S5 How much have you been emotionally affected by your health problems? Moderate =   3     In the  past 30 days, how much difficulty did you have in:   S6 Concentrating on doing something for ten minutes? Mild =           2   S7 Walking a long distance such as a kilometer (or equivalent)? None =         1   S8 Washing your whole body? None =         1   S9 Getting dressed? None =         1   S10 Dealing with people you do not know? None =         1   S11 Maintaining a friendship? Moderate =   3   S12 Your day to day work? None =         1     H1 Overall, in the past 30 days, how many days were these difficulties present? Record number of days 30   H2 In the past 30 days, for how many days were you totally unable to carry out your usual activities or work because of any health condition? Record number of days  0   H3 In the past 30 days, not counting the days that you were totally unable, for how many days did you cut back or reduce your usual activities or work because of any health condition? Record number of days 3       Referral / Collaboration:  Referral to another professional/service is not indicated at this time..    Anticipated number of session or this episode of care: 18-24      MeasurableTreatment Goal(s) related to diagnosis / functional impairment(s)  Goal-Emotional regulation: Client will effectively manage mood dysregulation    I will know I've met my goal when I am feeling less out of control.      Objective #A (Client Action)    Status: continued- Date: 9/24/2018     Client will learn at least 3 mindfulness skills and practice one daily    Intervention(s)  Therapist will provide mindfulness training, psychoeducation, behavioral activation, and cognitive restructuring.    Objective #B  Client will use at least 3 coping skills for anxiety management in the next 12 weeks.    Status: continued- Date: 9/24/2018      Intervention(s)  Therapist will provide psychoeducation, behavioral activation, and cognitive restructuring.      Objective #C  Client will identify three distraction and diversion  activities and use those activities to improve distress tolerance and emotional regulation.  Status: continued- Date: 9/24/2018     Intervention(s)  Therapist will provide psychoeducation, behavioral activation, and cognitive restructuring.    MeasurableTreatment Goal(s) related to diagnosis / functional impairment(s)            Goal-Depression: Client will decrease depressed mood    I will know I've met my goal when I am less depressed.      Objective #A (Client Action)    Status: Continued- Date: 9/24/2018     Client will use identified behavioral and cognitive skills to challenge negative self talk 90% of the time.    Intervention(s)  Therapist will provide psychoeducation, behavioral activation, and cognitive restructuring.      Objective #B  Client will complete at least 10 minutes of self-regulation practice (e.g.: yoga, meditation, relaxation breathing, etc.) per day.    Status: Continued- Date: 9/24/2018     Intervention(s)  Therapist will provide psychoeducation, behavioral activation, and cognitive restructuring.      Objective #C  Client will exercise 30 minutes 36 times in the next 12 weeks.  Status: Continued- Date: 9/24/2018      Intervention(s)  Therapist will provide psychoeducation, behavioral activation, and cognitive restructuring.                 Client has reviewed and agreed to the above plan.      Justo Dawson, [facility]  February 20, 2017

## 2018-11-13 ASSESSMENT — ANXIETY QUESTIONNAIRES: GAD7 TOTAL SCORE: 13

## 2018-11-26 ENCOUNTER — OFFICE VISIT (OUTPATIENT)
Dept: BEHAVIORAL HEALTH | Facility: CLINIC | Age: 33
End: 2018-11-26
Payer: COMMERCIAL

## 2018-11-26 DIAGNOSIS — F31.81 BIPOLAR 2 DISORDER (H): Primary | ICD-10-CM

## 2018-11-26 PROCEDURE — 90834 PSYTX W PT 45 MINUTES: CPT | Performed by: SOCIAL WORKER

## 2018-11-26 NOTE — MR AVS SNAPSHOT
MRN:[id number]                      After Visit Summary   11/26/2018    Ramiro Najera    MRN: [id number]           Visit Information        Provider Department      11/26/2018 2:30 PM Marcos Joseph, [facility]      Your next 10 appointments already scheduled     Dec 12, 2018  8:30 AM CST   Return Visit with YUNIOR Olea   [facility] ([facility])    [address] [place] [id number] [phone]              MyChart Information     [facility] gives you secure access to your electronic health record. If you see a primary care provider, you can also send messages to your care team and make appointments. If you have questions, please call your primary care clinic.  If you do not have a primary care provider, please call [phone] and they will assist you.        Care EveryWhere ID     This is your Care EveryWhere ID. This could be used by other organizations to access your [place] medical records  [id number]        Equal Access to Services     CLAUS JOSEPH : Hadii aad ku hadasho Sojocelynali, waaxda luqadaha, qaybta kaalmada adeegyagladys, loly brown. So [facility] [phone].    ATENCIÓN: Si habla español, tiene a moffett disposición servicios gratuitos de asistencia lingüística. Llame al [phone].    We comply with applicable federal civil rights laws and [place] laws. We do not discriminate on the basis of race, color, national origin, age, disability, sex, sexual orientation, or gender identity.

## 2018-11-26 NOTE — PROGRESS NOTES
"                                             Progress Note    Client Name: Ramiro Najera  Date: 11/26/2018          Service Type: Individual      Session Start Time: 215 Session End Time: 300      Session Length: 38-52     Session #: 38     Attendees: Client attended alone    Treatment Plan Last Reviewed: 9/24/2018    PHQ-9 / DEANNA-7 :      DATA      Progress Since Last Session (Related to Symptoms / Goals / Homework):   Symptoms: Stable    Homework: Partially completed   1.  SIFT meditation daily   2.  3 good things   3.  Meet in two weeks     Episode of Care Goals: Satisfactory progress - PREPARATION (Decided to change - considering how); Intervened by negotiating a change plan and determining options / strategies for behavior change, identifying triggers, exploring social supports, and working towards setting a date to begin behavior change     Current / Ongoing Stressors and Concerns:      Things have been \"ok, not great, but not terrible.\"  Mood has been \"more stable and easier despite it being an eventful few weeks.\"  Some stress in with living arrangment with [place], but he feels like he has been handling it ok.  Got out of town and spent a few days up in [place] and enjoyed his time there.  Wrote and read for a day up there as well and felt like he got lots accomplished.  Discussed productive and unproductive worry.  Framed productive worry as prudent, focused on events or problems, might reasonably cause a problem if left unattended, and that lead to a potential solution of a problem.  Explained productive worry as a \"to do\" list and unproductive worry as a \"what if\" list.  Used metaphor of car trip to describe the differences.  Explored how client has distinguish between the two in the past and might do this better in the future.  Discussed logging productive and unproductive worry between sessions.                  Treatment Objective(s) Addressed in This Session:   Client will complete at least 10 " minutes of self-regulation practice (e.g.: yoga, meditation, relaxation breathing, etc.) per day.     Client will use identified behavioral and cognitive skills to challenge negative self talk 90% of the time.     Intervention:     CBT     Discussed physical, mental, and emotional boundaries and limit-setting with others. Explored management of boundaries through direct communication and limiting contact and communication with others.  Discussed barriers to using boundary management skills including strong emotions and physical proximity.  Client given handout on boundaries today in session.         ASSESSMENT: Current Emotional / Mental Status (status of significant symptoms):   Risk status (Self / Other harm or suicidal ideation)   Client denies current fears or concerns for personal safety.   Client denies current or recent suicidal ideation or behaviors.   Client denies current or recent homicidal ideation or behaviors.   Client denies current or recent self injurious behavior or ideation.   Client denies other safety concerns.   A safety and risk management plan has not been developed at this time, however client was given the after-hours number / 911 should there be a change in any of these risk factors.     Appearance:   Appropriate    Eye Contact:   Good    Psychomotor Behavior: Retarded (Slowed)    Attitude:   Cooperative    Orientation:   All   Speech    Rate / Production: Monotone     Volume:  Normal    Mood:    Depressed    Affect:    Appropriate    Thought Content:  Clear    Thought Form:  Coherent  Logical    Insight:    Good      Medication Review:   No current psychiatric medications prescribed     Medication Compliance:   NA     Changes in Health Issues:   None reported     Chemical Use Review:   Substance Use: Chemical use reviewed, no active concerns identified      Tobacco Use: No current tobacco use.       Collateral Reports Completed:   Not Applicable    PLAN: (Client Tasks / Therapist Tasks /  Other)  1.  SIFT meditation daily    2.  More schema work at next session    3.  Standing meditation next session      4.  Meet in two weeks        Justo Dawson, LICSW                                                         ________________________________________________________________________    Treatment Plan    Client's Name: Ramiro Najera  YOB: 1985    Date: 2/20/2017     DSM5 Diagnoses: (Sustained by DSM5 Criteria Listed Above)  Diagnoses: 296.89 Bipolar II Disorder With anxious distress        PTSD  Psychosocial & Contextual Factors: parents' health problems  WHODAS 2.0 (12 item)            This questionnaire asks about difficulties due to health conditions. Health conditions  include  disease or illnesses, other health problems that may be short or long lasting,  injuries, mental health or emotional problems, and problems with alcohol or drugs.                     Think back over the past 30 days and answer these questions, thinking about how much  difficulty you had doing the following activities. For each question, please [place] only  one response.    S1 Standing for long periods such as 30 minutes? None =         1   S2 Taking care of household responsibilities? Mild =           2   S3 Learning a new task, for example, learning how to get to a new place? None =         1   S4 How much of a problem do you have joining community activities (for example, festivals, Sikh or other activities) in the same way as anyone else can? Moderate =   3   S5 How much have you been emotionally affected by your health problems? Moderate =   3     In the past 30 days, how much difficulty did you have in:   S6 Concentrating on doing something for ten minutes? Mild =           2   S7 Walking a long distance such as a kilometer (or equivalent)? None =         1   S8 Washing your whole body? None =         1   S9 Getting dressed? None =         1   S10 Dealing with people you do not know? None =          1   S11 Maintaining a friendship? Moderate =   3   S12 Your day to day work? None =         1     H1 Overall, in the past 30 days, how many days were these difficulties present? Record number of days 30   H2 In the past 30 days, for how many days were you totally unable to carry out your usual activities or work because of any health condition? Record number of days  0   H3 In the past 30 days, not counting the days that you were totally unable, for how many days did you cut back or reduce your usual activities or work because of any health condition? Record number of days 3       Referral / Collaboration:  Referral to another professional/service is not indicated at this time..    Anticipated number of session or this episode of care: 18-24      MeasurableTreatment Goal(s) related to diagnosis / functional impairment(s)  Goal-Emotional regulation: Client will effectively manage mood dysregulation    I will know I've met my goal when I am feeling less out of control.      Objective #A (Client Action)    Status: continued- Date: 9/24/2018     Client will learn at least 3 mindfulness skills and practice one daily    Intervention(s)  Therapist will provide mindfulness training, psychoeducation, behavioral activation, and cognitive restructuring.    Objective #B  Client will use at least 3 coping skills for anxiety management in the next 12 weeks.    Status: continued- Date: 9/24/2018      Intervention(s)  Therapist will provide psychoeducation, behavioral activation, and cognitive restructuring.      Objective #C  Client will identify three distraction and diversion activities and use those activities to improve distress tolerance and emotional regulation.  Status: continued- Date: 9/24/2018     Intervention(s)  Therapist will provide psychoeducation, behavioral activation, and cognitive restructuring.    MeasurableTreatment Goal(s) related to diagnosis / functional impairment(s)            Goal-Depression: Client  will decrease depressed mood    I will know I've met my goal when I am less depressed.      Objective #A (Client Action)    Status: Continued- Date: 9/24/2018     Client will use identified behavioral and cognitive skills to challenge negative self talk 90% of the time.    Intervention(s)  Therapist will provide psychoeducation, behavioral activation, and cognitive restructuring.      Objective #B  Client will complete at least 10 minutes of self-regulation practice (e.g.: yoga, meditation, relaxation breathing, etc.) per day.    Status: Continued- Date: 9/24/2018     Intervention(s)  Therapist will provide psychoeducation, behavioral activation, and cognitive restructuring.      Objective #C  Client will exercise 30 minutes 36 times in the next 12 weeks.  Status: Continued- Date: 9/24/2018      Intervention(s)  Therapist will provide psychoeducation, behavioral activation, and cognitive restructuring.                 Client has reviewed and agreed to the above plan.      Justo Dawson, [facility]  February 20, 2017

## 2018-12-12 ENCOUNTER — OFFICE VISIT (OUTPATIENT)
Dept: BEHAVIORAL HEALTH | Facility: CLINIC | Age: 33
End: 2018-12-12
Payer: COMMERCIAL

## 2018-12-12 DIAGNOSIS — F31.81 BIPOLAR 2 DISORDER (H): Primary | ICD-10-CM

## 2018-12-12 PROCEDURE — 90834 PSYTX W PT 45 MINUTES: CPT | Performed by: SOCIAL WORKER

## 2018-12-12 NOTE — PROGRESS NOTES
"                                             Progress Note    Client Name: Ramiro Najera  Date: 12/12/2018          Service Type: Individual      Session Start Time: 830 Session End Time: 915      Session Length: 38-52     Session #: 39     Attendees: Client attended alone    Treatment Plan Last Reviewed: 9/24/2018    PHQ-9 / DEANNA-7 :      DATA      Progress Since Last Session (Related to Symptoms / Goals / Homework):   Symptoms: Stable    Homework: Partially completed   1.  SIFT meditation daily   2.  3 good things   3.  Meet in two weeks     Episode of Care Goals: Satisfactory progress - PREPARATION (Decided to change - considering how); Intervened by negotiating a change plan and determining options / strategies for behavior change, identifying triggers, exploring social supports, and working towards setting a date to begin behavior change     Current / Ongoing Stressors and Concerns:      Things have been \"confusing and difficult.\"  Living situation remains a challenge.  Has been more stress lately with his roommate, and has been struggling with how to handle it.  Long discussion today about \"bids\" as an important way to form and maintain relationships.  Identified that the process of making and responding to bids strengthens connections to others.  Discussed using repair attempts to fix rifts in relationships.              Treatment Objective(s) Addressed in This Session:   Client will complete at least 10 minutes of self-regulation practice (e.g.: yoga, meditation, relaxation breathing, etc.) per day.     Client will use identified behavioral and cognitive skills to challenge negative self talk 90% of the time.     Intervention:     CBT     Discussed physical, mental, and emotional boundaries and limit-setting with others. Explored management of boundaries through direct communication and limiting contact and communication with others.  Discussed barriers to using boundary management skills including strong " emotions and physical proximity.  Client given handout on boundaries today in session.         ASSESSMENT: Current Emotional / Mental Status (status of significant symptoms):   Risk status (Self / Other harm or suicidal ideation)   Client denies current fears or concerns for personal safety.   Client denies current or recent suicidal ideation or behaviors.   Client denies current or recent homicidal ideation or behaviors.   Client denies current or recent self injurious behavior or ideation.   Client denies other safety concerns.   A safety and risk management plan has not been developed at this time, however client was given the after-hours number / 911 should there be a change in any of these risk factors.     Appearance:   Appropriate    Eye Contact:   Good    Psychomotor Behavior: Retarded (Slowed)    Attitude:   Cooperative    Orientation:   All   Speech    Rate / Production: Monotone     Volume:  Normal    Mood:    Depressed    Affect:    Appropriate    Thought Content:  Clear    Thought Form:  Coherent  Logical    Insight:    Good      Medication Review:   No current psychiatric medications prescribed     Medication Compliance:   NA     Changes in Health Issues:   None reported     Chemical Use Review:   Substance Use: Chemical use reviewed, no active concerns identified      Tobacco Use: No current tobacco use.       Collateral Reports Completed:   Not Applicable    PLAN: (Client Tasks / Therapist Tasks / Other)  1.  SIFT meditation daily    2.  More schema work at next session    3.  Standing meditation next session      4.  Meet in two weeks        YUNIOR Bustos                                                         ________________________________________________________________________    Treatment Plan    Client's Name: Ramiro Najera  YOB: 1985    Date: 2/20/2017     DSM5 Diagnoses: (Sustained by DSM5 Criteria Listed Above)  Diagnoses: 296.89 Bipolar II Disorder With  anxious distress        PTSD  Psychosocial & Contextual Factors: parents' health problems  WHODAS 2.0 (12 item)            This questionnaire asks about difficulties due to health conditions. Health conditions  include  disease or illnesses, other health problems that may be short or long lasting,  injuries, mental health or emotional problems, and problems with alcohol or drugs.                     Think back over the past 30 days and answer these questions, thinking about how much  difficulty you had doing the following activities. For each question, please [place] only  one response.    S1 Standing for long periods such as 30 minutes? None =         1   S2 Taking care of household responsibilities? Mild =           2   S3 Learning a new task, for example, learning how to get to a new place? None =         1   S4 How much of a problem do you have joining community activities (for example, festivals, Alevism or other activities) in the same way as anyone else can? Moderate =   3   S5 How much have you been emotionally affected by your health problems? Moderate =   3     In the past 30 days, how much difficulty did you have in:   S6 Concentrating on doing something for ten minutes? Mild =           2   S7 Walking a long distance such as a kilometer (or equivalent)? None =         1   S8 Washing your whole body? None =         1   S9 Getting dressed? None =         1   S10 Dealing with people you do not know? None =         1   S11 Maintaining a friendship? Moderate =   3   S12 Your day to day work? None =         1     H1 Overall, in the past 30 days, how many days were these difficulties present? Record number of days 30   H2 In the past 30 days, for how many days were you totally unable to carry out your usual activities or work because of any health condition? Record number of days  0   H3 In the past 30 days, not counting the days that you were totally unable, for how many days did you cut back or reduce your  usual activities or work because of any health condition? Record number of days 3       Referral / Collaboration:  Referral to another professional/service is not indicated at this time..    Anticipated number of session or this episode of care: 18-24      MeasurableTreatment Goal(s) related to diagnosis / functional impairment(s)  Goal-Emotional regulation: Client will effectively manage mood dysregulation    I will know I've met my goal when I am feeling less out of control.      Objective #A (Client Action)    Status: continued- Date: 9/24/2018     Client will learn at least 3 mindfulness skills and practice one daily    Intervention(s)  Therapist will provide mindfulness training, psychoeducation, behavioral activation, and cognitive restructuring.    Objective #B  Client will use at least 3 coping skills for anxiety management in the next 12 weeks.    Status: continued- Date: 9/24/2018      Intervention(s)  Therapist will provide psychoeducation, behavioral activation, and cognitive restructuring.      Objective #C  Client will identify three distraction and diversion activities and use those activities to improve distress tolerance and emotional regulation.  Status: continued- Date: 9/24/2018     Intervention(s)  Therapist will provide psychoeducation, behavioral activation, and cognitive restructuring.    MeasurableTreatment Goal(s) related to diagnosis / functional impairment(s)            Goal-Depression: Client will decrease depressed mood    I will know I've met my goal when I am less depressed.      Objective #A (Client Action)    Status: Continued- Date: 9/24/2018     Client will use identified behavioral and cognitive skills to challenge negative self talk 90% of the time.    Intervention(s)  Therapist will provide psychoeducation, behavioral activation, and cognitive restructuring.      Objective #B  Client will complete at least 10 minutes of self-regulation practice (e.g.: yoga, meditation,  relaxation breathing, etc.) per day.    Status: Continued- Date: 9/24/2018     Intervention(s)  Therapist will provide psychoeducation, behavioral activation, and cognitive restructuring.      Objective #C  Client will exercise 30 minutes 36 times in the next 12 weeks.  Status: Continued- Date: 9/24/2018      Intervention(s)  Therapist will provide psychoeducation, behavioral activation, and cognitive restructuring.                 Client has reviewed and agreed to the above plan.      Justo Dawson, [facility]  February 20, 2017

## 2018-12-14 ENCOUNTER — OFFICE VISIT (OUTPATIENT)
Dept: FAMILY MEDICINE | Facility: CLINIC | Age: 33
End: 2018-12-14
Payer: COMMERCIAL

## 2018-12-14 VITALS
WEIGHT: 227 LBS | TEMPERATURE: 98 F | DIASTOLIC BLOOD PRESSURE: 76 MMHG | BODY MASS INDEX: 31.78 KG/M2 | HEART RATE: 68 BPM | SYSTOLIC BLOOD PRESSURE: 124 MMHG | HEIGHT: 71 IN

## 2018-12-14 DIAGNOSIS — F31.81 BIPOLAR 2 DISORDER (H): ICD-10-CM

## 2018-12-14 PROCEDURE — 99214 OFFICE O/P EST MOD 30 MIN: CPT | Performed by: FAMILY MEDICINE

## 2018-12-14 ASSESSMENT — MIFFLIN-ST. JEOR: SCORE: 2001.8

## 2018-12-14 ASSESSMENT — ANXIETY QUESTIONNAIRES
1. FEELING NERVOUS, ANXIOUS, OR ON EDGE: SEVERAL DAYS
GAD7 TOTAL SCORE: 10
7. FEELING AFRAID AS IF SOMETHING AWFUL MIGHT HAPPEN: SEVERAL DAYS
2. NOT BEING ABLE TO STOP OR CONTROL WORRYING: MORE THAN HALF THE DAYS
6. BECOMING EASILY ANNOYED OR IRRITABLE: SEVERAL DAYS
3. WORRYING TOO MUCH ABOUT DIFFERENT THINGS: MORE THAN HALF THE DAYS
IF YOU CHECKED OFF ANY PROBLEMS ON THIS QUESTIONNAIRE, HOW DIFFICULT HAVE THESE PROBLEMS MADE IT FOR YOU TO DO YOUR WORK, TAKE CARE OF THINGS AT HOME, OR GET ALONG WITH OTHER PEOPLE: SOMEWHAT DIFFICULT
5. BEING SO RESTLESS THAT IT IS HARD TO SIT STILL: SEVERAL DAYS

## 2018-12-14 ASSESSMENT — PATIENT HEALTH QUESTIONNAIRE - PHQ9
5. POOR APPETITE OR OVEREATING: MORE THAN HALF THE DAYS
SUM OF ALL RESPONSES TO PHQ QUESTIONS 1-9: 10

## 2018-12-14 NOTE — PROGRESS NOTES
SUBJECTIVE:   Ramiro Najera is a 33 year old adult who presents to clinic today for the following health issues:      Depression Followup    Status since last visit: Improved slightly    See PHQ-9 for current symptoms.  Other associated symptoms: unsure    Complicating factors:   Significant life event:  No   Current substance abuse:  None  Anxiety or Panic symptoms:  Yes    Patient started trazodone when he was last seen about 1 month ago. Patient has been taking 50 mg daily. He reports that he has noticed a more stabilized mood and has more energy to get out and do things. He states that he has been sleeping 6.5-7.5 hours each night. He states that he has been waking up with headaches, but about an hour after with eating and hydrating. He does drink about a gallon of water a day.   Patient reports additional side effects of impotence and constipation. Despite the side effects, he has been happy with the way that trazodone has been working for him. He states that this feels like a good normal for him.   He has been seeing a counselor for CBT for about 1-1.5 years, and this has been going well.     PHQ 7/31/2018 11/12/2018 12/14/2018   PHQ-9 Total Score 10 17 10   Q9: Suicide Ideation Not at all More than half the days Several days     PHQ-9  English  PHQ-9   Any Language  Suicide Assessment Five-step Evaluation and Treatment (SAFE-T)        Amount of exercise or physical activity: 6-7 days/week for an average of greater than 60 minutes    Problems taking medications regularly: No    Medication side effects: headaches every morning, unable to obtain/maintain an erection, constipation    Diet: regular (no restrictions)        Problem list and histories reviewed & adjusted, as indicated.  Additional history: as documented    BP Readings from Last 3 Encounters:   12/14/18 124/76   11/12/18 138/82   07/31/18 130/76    Wt Readings from Last 3 Encounters:   12/14/18 103 kg (227 lb)   11/12/18 103.4 kg (228 lb)  "  07/31/18 102.1 kg (225 lb)                    Reviewed and updated as needed this visit by clinical staff  Tobacco  Allergies  Meds  Med Hx  Surg Hx  Fam Hx  Soc Hx      Reviewed and updated as needed this visit by Provider         ROS:  Constitutional, HEENT, cardiovascular, pulmonary, gi and gu systems are negative, except as otherwise noted.    This document serves as a record of the services and decisions personally performed by TOM SIMMS. It was created on his/her behalf by Deloris Rivas, a trained medical scribe. The creation of this document is based on the provider's statements to the medical scribe. Deloris Rivas, December 14, 2018 2:22 PM     OBJECTIVE:     /76 (Cuff Size: Adult Large)   Pulse 68   Temp 98  F (36.7  C) (Oral)   Ht 1.803 m (5' 11\")   Wt 103 kg (227 lb)   BMI 31.66 kg/m    Body mass index is 31.66 kg/m .  GENERAL: healthy, alert and no distress  HENT: ear canals and TM's normal, nose and mouth without ulcers or lesions  NECK: no adenopathy, no asymmetry, masses, or scars and thyroid normal to palpation  RESP: lungs clear to auscultation - no rales, rhonchi or wheezes  CV: regular rate and rhythm, normal S1 S2, no S3 or S4, no murmur, click or rub, no peripheral edema and peripheral pulses strong  PSYCH: mentation appears normal, affect normal/bright    Diagnostic Test Results:  No results found for this or any previous visit (from the past 24 hour(s)).    ASSESSMENT/PLAN:     Depression; recurrent episode-- Moderate   Associated with the following complications:    None   Plan:  No changes in the patient's current treatment plan        ICD-10-CM    1. Bipolar 2 disorder (H) F31.81      Patient's bipolar and depression are well managed with Trazodone and counseling. He will follow up with me in 5 months or sooner as needed.     Patient Instructions   Eating 4-5 prunes daily can help with constipation.     If you can, it is helpful if you fill out a survey about your clinic " visit if it is mailed to your house in a few weeks.       Samantha Belle, DO  [facility]    The information in this document, created by the medical scribe Deloris Rivas for me, accurately reflects the services I personally performed and the decisions made by me. I have reviewed and approved this document for accuracy prior to leaving the patient care area.

## 2018-12-14 NOTE — PATIENT INSTRUCTIONS
Eating 4-5 prunes daily can help with constipation.     If you can, it is helpful if you fill out a survey about your clinic visit if it is mailed to your house in a few weeks.

## 2018-12-15 ASSESSMENT — ANXIETY QUESTIONNAIRES: GAD7 TOTAL SCORE: 10

## 2019-01-07 ENCOUNTER — OFFICE VISIT (OUTPATIENT)
Dept: BEHAVIORAL HEALTH | Facility: CLINIC | Age: 34
End: 2019-01-07
Payer: COMMERCIAL

## 2019-01-07 DIAGNOSIS — F31.81 BIPOLAR 2 DISORDER (H): Primary | ICD-10-CM

## 2019-01-07 PROCEDURE — 90834 PSYTX W PT 45 MINUTES: CPT | Performed by: SOCIAL WORKER

## 2019-01-07 NOTE — PROGRESS NOTES
"                                             Progress Note    Client Name: Ramiro Najera  Date: 1/7/2019          Service Type: Individual      Session Start Time: 730 Session End Time: 815      Session Length: 38-52     Session #: 40     Attendees: Client attended alone    Treatment Plan Last Reviewed: 1/7/2019   PHQ-9 / DEANNA-7 :      DATA      Progress Since Last Session (Related to Symptoms / Goals / Homework):   Symptoms: Stable    Homework: Partially completed   1.  SIFT meditation daily   2.  3 good things   3.  Meet in two weeks     Episode of Care Goals: Satisfactory progress - PREPARATION (Decided to change - considering how); Intervened by negotiating a change plan and determining options / strategies for behavior change, identifying triggers, exploring social supports, and working towards setting a date to begin behavior change     Current / Ongoing Stressors and Concerns:      Things have been \"fine in a lot of ways, but tough in some other ways.\"  Stress continues with his living situation.  Also has been having to \"grapple with emotions and thoughts that I was not expecting.\"  Evenings a dn nights have been the hardest.  Long discussion today about behavioral activation.  Reviewed basic tracking options, identified differences between long-term v short-term planning, and discussed setting and achieving SMART goals as an effective method for dealing with depression, anxiety, and many other mental health concerns.  Set behavioral goals in session today.                    Treatment Objective(s) Addressed in This Session:   Client will complete at least 10 minutes of self-regulation practice (e.g.: yoga, meditation, relaxation breathing, etc.) per day.     Client will use identified behavioral and cognitive skills to challenge negative self talk 90% of the time.     Intervention:     CBT     Discussed physical, mental, and emotional boundaries and limit-setting with others. Explored management of " boundaries through direct communication and limiting contact and communication with others.  Discussed barriers to using boundary management skills including strong emotions and physical proximity.  Client given handout on boundaries today in session.         ASSESSMENT: Current Emotional / Mental Status (status of significant symptoms):   Risk status (Self / Other harm or suicidal ideation)   Client denies current fears or concerns for personal safety.   Client denies current or recent suicidal ideation or behaviors.   Client denies current or recent homicidal ideation or behaviors.   Client denies current or recent self injurious behavior or ideation.   Client denies other safety concerns.   A safety and risk management plan has not been developed at this time, however client was given the after-hours number / 911 should there be a change in any of these risk factors.     Appearance:   Appropriate    Eye Contact:   Good    Psychomotor Behavior: Retarded (Slowed)    Attitude:   Cooperative    Orientation:   All   Speech    Rate / Production: Monotone     Volume:  Normal    Mood:    Depressed    Affect:    Appropriate    Thought Content:  Clear    Thought Form:  Coherent  Logical    Insight:    Good      Medication Review:   No current psychiatric medications prescribed     Medication Compliance:   NA     Changes in Health Issues:   None reported     Chemical Use Review:   Substance Use: Chemical use reviewed, no active concerns identified      Tobacco Use: No current tobacco use.       Collateral Reports Completed:   Not Applicable    PLAN: (Client Tasks / Therapist Tasks / Other)  1.  SIFT meditation daily    2.  More schema work at next session    3.  Standing meditation next session      4.  Meet in two weeks        YUNIOR Bustos                                                         ________________________________________________________________________    Treatment Plan    Client's Name: Ramiro TRAVIS  Reinaldo  YOB: 1985    Date: 2/20/2017     DSM5 Diagnoses: (Sustained by DSM5 Criteria Listed Above)  Diagnoses: 296.89 Bipolar II Disorder With anxious distress        PTSD  Psychosocial & Contextual Factors: parents' health problems  WHODAS 2.0 (12 item)            This questionnaire asks about difficulties due to health conditions. Health conditions  include  disease or illnesses, other health problems that may be short or long lasting,  injuries, mental health or emotional problems, and problems with alcohol or drugs.                     Think back over the past 30 days and answer these questions, thinking about how much  difficulty you had doing the following activities. For each question, please [place] only  one response.    S1 Standing for long periods such as 30 minutes? None =         1   S2 Taking care of household responsibilities? Mild =           2   S3 Learning a new task, for example, learning how to get to a new place? None =         1   S4 How much of a problem do you have joining community activities (for example, festivals, [employer] or other activities) in the same way as anyone else can? Moderate =   3   S5 How much have you been emotionally affected by your health problems? Moderate =   3     In the past 30 days, how much difficulty did you have in:   S6 Concentrating on doing something for ten minutes? Mild =           2   S7 Walking a long distance such as a kilometer (or equivalent)? None =         1   S8 Washing your whole body? None =         1   S9 Getting dressed? None =         1   S10 Dealing with people you do not know? None =         1   S11 Maintaining a friendship? Moderate =   3   S12 Your day to day work? None =         1     H1 Overall, in the past 30 days, how many days were these difficulties present? Record number of days 30   H2 In the past 30 days, for how many days were you totally unable to carry out your usual activities or work because of any health  condition? Record number of days  0   H3 In the past 30 days, not counting the days that you were totally unable, for how many days did you cut back or reduce your usual activities or work because of any health condition? Record number of days 3       Referral / Collaboration:  Referral to another professional/service is not indicated at this time..    Anticipated number of session or this episode of care: 18-24      MeasurableTreatment Goal(s) related to diagnosis / functional impairment(s)  Goal-Emotional regulation: Client will effectively manage mood dysregulation    I will know I've met my goal when I am feeling less out of control.      Objective #A (Client Action)    Status: continued- Date: 1/7/2019     Client will learn at least 3 mindfulness skills and practice one daily    Intervention(s)  Therapist will provide mindfulness training, psychoeducation, behavioral activation, and cognitive restructuring.    Objective #B  Client will use at least 3 coping skills for anxiety management in the next 12 weeks.    Status: continued- Date: 1/7/2019      Intervention(s)  Therapist will provide psychoeducation, behavioral activation, and cognitive restructuring.      Objective #C  Client will identify three distraction and diversion activities and use those activities to improve distress tolerance and emotional regulation.  Status: continued- Date: 1/7/2019     Intervention(s)  Therapist will provide psychoeducation, behavioral activation, and cognitive restructuring.    MeasurableTreatment Goal(s) related to diagnosis / functional impairment(s)            Goal-Depression: Client will decrease depressed mood    I will know I've met my goal when I am less depressed.      Objective #A (Client Action)    Status: Continued- Date: 1/7/2019     Client will use identified behavioral and cognitive skills to challenge negative self talk 90% of the time.    Intervention(s)  Therapist will provide psychoeducation, behavioral  activation, and cognitive restructuring.      Objective #B  Client will complete at least 10 minutes of self-regulation practice (e.g.: yoga, meditation, relaxation breathing, etc.) per day.    Status: Continued- Date: 1/7/2019     Intervention(s)  Therapist will provide psychoeducation, behavioral activation, and cognitive restructuring.      Objective #C  Client will exercise 30 minutes 36 times in the next 12 weeks.  Status: Continued- Date: 1/7/2019     Intervention(s)  Therapist will provide psychoeducation, behavioral activation, and cognitive restructuring.                 Client has reviewed and agreed to the above plan.      Justo Dawson, Northern Light Mayo HospitalSW  February 20, 2017

## 2019-01-30 ENCOUNTER — OFFICE VISIT (OUTPATIENT)
Dept: BEHAVIORAL HEALTH | Facility: CLINIC | Age: 34
End: 2019-01-30
Payer: COMMERCIAL

## 2019-01-30 DIAGNOSIS — F31.81 BIPOLAR 2 DISORDER (H): Primary | ICD-10-CM

## 2019-01-30 PROCEDURE — 90834 PSYTX W PT 45 MINUTES: CPT | Performed by: SOCIAL WORKER

## 2019-01-30 NOTE — PROGRESS NOTES
"                                             Progress Note    Client Name: Ramiro Najera  Date: 1/30/2019          Service Type: Individual      Session Start Time: 730 Session End Time: 815      Session Length: 38-52     Session #: 41     Attendees: Client attended alone    Treatment Plan Last Reviewed: 1/7/2019   PHQ-9 / DEANNA-7 :      DATA      Progress Since Last Session (Related to Symptoms / Goals / Homework):   Symptoms: Stable    Homework: Partially completed   1.  SIFT meditation daily   2.  3 good things   3.  Meet in two weeks     Episode of Care Goals: Satisfactory progress - PREPARATION (Decided to change - considering how); Intervened by negotiating a change plan and determining options / strategies for behavior change, identifying triggers, exploring social supports, and working towards setting a date to begin behavior change     Current / Ongoing Stressors and Concerns:      Things have been \"kind of a mixed bag.\"  Has been finding it hard to get the motivation to do he needs to do.  Stress too with mixed emotions around Stacy, realizing that moving in with Stacy was the wrong thing to do.   Starting taking Lao jiu jitsu and enjoys it.  Practicing the guitar once a week and would like for this to increase.             Treatment Objective(s) Addressed in This Session:   Client will complete at least 10 minutes of self-regulation practice (e.g.: yoga, meditation, relaxation breathing, etc.) per day.    -    Client will use identified behavioral and cognitive skills to challenge negative self talk 90% of the time.   -reviewed recognizing automatic thoughts today in session     Intervention:     CBT     Discussed physical, mental, and emotional boundaries and limit-setting with others. Explored management of boundaries through direct communication and limiting contact and communication with others.  Discussed barriers to using boundary management skills including strong emotions and physical " proximity.  Client given handout on boundaries today in session.         ASSESSMENT: Current Emotional / Mental Status (status of significant symptoms):   Risk status (Self / Other harm or suicidal ideation)   Client denies current fears or concerns for personal safety.   Client denies current or recent suicidal ideation or behaviors.   Client denies current or recent homicidal ideation or behaviors.   Client denies current or recent self injurious behavior or ideation.   Client denies other safety concerns.   A safety and risk management plan has not been developed at this time, however client was given the after-hours number / 911 should there be a change in any of these risk factors.     Appearance:   Appropriate    Eye Contact:   Good    Psychomotor Behavior: Retarded (Slowed)    Attitude:   Cooperative    Orientation:   All   Speech    Rate / Production: Monotone     Volume:  Normal    Mood:    Depressed    Affect:    Appropriate    Thought Content:  Clear    Thought Form:  Coherent  Logical    Insight:    Good      Medication Review:   No current psychiatric medications prescribed     Medication Compliance:   NA     Changes in Health Issues:   None reported     Chemical Use Review:   Substance Use: Chemical use reviewed, no active concerns identified      Tobacco Use: No current tobacco use.       Collateral Reports Completed:   Not Applicable    PLAN: (Client Tasks / Therapist Tasks / Other)  1.  SIFT meditation daily    2.  Client will practice recognizing automatic thoughts once per day by next session.  Client will document this and bring to next session.    3.   Meet in two weeks        YUNIOR Bustos                                                         ________________________________________________________________________    Treatment Plan    Client's Name: Ramiro Najera  YOB: 1985    Date: 2/20/2017     DSM5 Diagnoses: (Sustained by DSM5 Criteria Listed  Above)  Diagnoses: 296.89 Bipolar II Disorder With anxious distress        PTSD  Psychosocial & Contextual Factors: parents' health problems  WHODAS 2.0 (12 item)            This questionnaire asks about difficulties due to health conditions. Health conditions  include  disease or illnesses, other health problems that may be short or long lasting,  injuries, mental health or emotional problems, and problems with alcohol or drugs.                     Think back over the past 30 days and answer these questions, thinking about how much  difficulty you had doing the following activities. For each question, please [place] only  one response.    S1 Standing for long periods such as 30 minutes? None =         1   S2 Taking care of household responsibilities? Mild =           2   S3 Learning a new task, for example, learning how to get to a new place? None =         1   S4 How much of a problem do you have joining community activities (for example, festivals, [employer] or other activities) in the same way as anyone else can? Moderate =   3   S5 How much have you been emotionally affected by your health problems? Moderate =   3     In the past 30 days, how much difficulty did you have in:   S6 Concentrating on doing something for ten minutes? Mild =           2   S7 Walking a long distance such as a kilometer (or equivalent)? None =         1   S8 Washing your whole body? None =         1   S9 Getting dressed? None =         1   S10 Dealing with people you do not know? None =         1   S11 Maintaining a friendship? Moderate =   3   S12 Your day to day work? None =         1     H1 Overall, in the past 30 days, how many days were these difficulties present? Record number of days 30   H2 In the past 30 days, for how many days were you totally unable to carry out your usual activities or work because of any health condition? Record number of days  0   H3 In the past 30 days, not counting the days that you were totally unable,  for how many days did you cut back or reduce your usual activities or work because of any health condition? Record number of days 3       Referral / Collaboration:  Referral to another professional/service is not indicated at this time..    Anticipated number of session or this episode of care: 18-24      MeasurableTreatment Goal(s) related to diagnosis / functional impairment(s)  Goal-Emotional regulation: Client will effectively manage mood dysregulation    I will know I've met my goal when I am feeling less out of control.      Objective #A (Client Action)    Status: continued- Date: 1/7/2019     Client will learn at least 3 mindfulness skills and practice one daily    Intervention(s)  Therapist will provide mindfulness training, psychoeducation, behavioral activation, and cognitive restructuring.    Objective #B  Client will use at least 3 coping skills for anxiety management in the next 12 weeks.    Status: continued- Date: 1/7/2019      Intervention(s)  Therapist will provide psychoeducation, behavioral activation, and cognitive restructuring.      Objective #C  Client will identify three distraction and diversion activities and use those activities to improve distress tolerance and emotional regulation.  Status: continued- Date: 1/7/2019     Intervention(s)  Therapist will provide psychoeducation, behavioral activation, and cognitive restructuring.    MeasurableTreatment Goal(s) related to diagnosis / functional impairment(s)            Goal-Depression: Client will decrease depressed mood    I will know I've met my goal when I am less depressed.      Objective #A (Client Action)    Status: Continued- Date: 1/7/2019     Client will use identified behavioral and cognitive skills to challenge negative self talk 90% of the time.    Intervention(s)  Therapist will provide psychoeducation, behavioral activation, and cognitive restructuring.      Objective #B  Client will complete at least 10 minutes of  self-regulation practice (e.g.: yoga, meditation, relaxation breathing, etc.) per day.    Status: Continued- Date: 1/7/2019     Intervention(s)  Therapist will provide psychoeducation, behavioral activation, and cognitive restructuring.      Objective #C  Client will exercise 30 minutes 36 times in the next 12 weeks.  Status: Continued- Date: 1/7/2019     Intervention(s)  Therapist will provide psychoeducation, behavioral activation, and cognitive restructuring.                 Client has reviewed and agreed to the above plan.      Justo Dawson, LICSW  February 20, 2017

## 2019-02-13 ENCOUNTER — OFFICE VISIT (OUTPATIENT)
Dept: BEHAVIORAL HEALTH | Facility: CLINIC | Age: 34
End: 2019-02-13
Payer: COMMERCIAL

## 2019-02-13 DIAGNOSIS — F31.81 BIPOLAR 2 DISORDER (H): Primary | ICD-10-CM

## 2019-02-13 PROCEDURE — 90834 PSYTX W PT 45 MINUTES: CPT | Performed by: SOCIAL WORKER

## 2019-02-13 NOTE — PROGRESS NOTES
"                                             Progress Note    Client Name: Ramiro Najera  Date: 2/13/2019          Service Type: Individual      Session Start Time: 330 Session End Time: 415      Session Length: 38-52     Session #: 42     Attendees: Client attended alone    Treatment Plan Last Reviewed: 1/7/2019   PHQ-9 / DEANNA-7 :      DATA      Progress Since Last Session (Related to Symptoms / Goals / Homework):   Symptoms: Stable    Homework: Partially completed   1.  SIFT meditation daily   2.  3 good things   3.  Meet in two weeks     Episode of Care Goals: Satisfactory progress - PREPARATION (Decided to change - considering how); Intervened by negotiating a change plan and determining options / strategies for behavior change, identifying triggers, exploring social supports, and working towards setting a date to begin behavior change     Current / Ongoing Stressors and Concerns:      Things have been \"mentally exhausting and emotionally draining.\"  Has been able to maintain a schedule with the activities that he has been doing for a while, but has been struggling to get back into [facility].  Has been having a hard time making time for this, and this has been frustrating.  Feeling frustrated and lonely with his current living situation.              Treatment Objective(s) Addressed in This Session:   Client will complete at least 10 minutes of self-regulation practice (e.g.: yoga, meditation, relaxation breathing, etc.) per day.    -in process, trying to get to bjj    Client will use identified behavioral and cognitive skills to challenge negative self talk 90% of the time.   -reviewed recognizing cognitive disortions today in session     Intervention:     CBT     Discussed concept of cognitive distortions today in session.  Explored connection between automatic thinking and cognitive distortions.  Discussed common examples (e.g., catastrophizing, mindreading, dichotomous thinking) of cognitive distortions in " "session.  Handout provided.  Discussed connection between cognitive distortions and depression, anxiety, and stress.  Introduced concept of challenging cognitive distortions by asking \"is this reaction logical?\" , \"is this reaction helpful?\" , \"am I overreacting?\" , and \"how can I respond to make this situation better?\"           ASSESSMENT: Current Emotional / Mental Status (status of significant symptoms):   Risk status (Self / Other harm or suicidal ideation)   Client denies current fears or concerns for personal safety.   Client denies current or recent suicidal ideation or behaviors.   Client denies current or recent homicidal ideation or behaviors.   Client denies current or recent self injurious behavior or ideation.   Client denies other safety concerns.   A safety and risk management plan has not been developed at this time, however client was given the after-hours number / 911 should there be a change in any of these risk factors.     Appearance:   Appropriate    Eye Contact:   Good    Psychomotor Behavior: Retarded (Slowed)    Attitude:   Cooperative    Orientation:   All   Speech    Rate / Production: Monotone     Volume:  Normal    Mood:    Depressed    Affect:    Appropriate    Thought Content:  Clear    Thought Form:  Coherent  Logical    Insight:    Good      Medication Review:   No current psychiatric medications prescribed     Medication Compliance:   NA     Changes in Health Issues:   None reported     Chemical Use Review:   Substance Use: Chemical use reviewed, no active concerns identified      Tobacco Use: No current tobacco use.       Collateral Reports Completed:   Not Applicable    PLAN: (Client Tasks / Therapist Tasks / Other)  1.  SIFT meditation daily    2.  Client will track and log cognitive distortions and bring to next session     3.   Meet in two weeks        YUNIOR Bustos                                                     "     ________________________________________________________________________    Treatment Plan    Client's Name: Ramiro Najera  YOB: 1985    Date: 2/20/2017     DSM5 Diagnoses: (Sustained by DSM5 Criteria Listed Above)  Diagnoses: 296.89 Bipolar II Disorder With anxious distress        PTSD  Psychosocial & Contextual Factors: parents' health problems  WHODAS 2.0 (12 item)            This questionnaire asks about difficulties due to health conditions. Health conditions  include  disease or illnesses, other health problems that may be short or long lasting,  injuries, mental health or emotional problems, and problems with alcohol or drugs.                     Think back over the past 30 days and answer these questions, thinking about how much  difficulty you had doing the following activities. For each question, please [place] only  one response.    S1 Standing for long periods such as 30 minutes? None =         1   S2 Taking care of household responsibilities? Mild =           2   S3 Learning a new task, for example, learning how to get to a new place? None =         1   S4 How much of a problem do you have joining community activities (for example, festivals, [place] or other activities) in the same way as anyone else can? Moderate =   3   S5 How much have you been emotionally affected by your health problems? Moderate =   3     In the past 30 days, how much difficulty did you have in:   S6 Concentrating on doing something for ten minutes? Mild =           2   S7 Walking a long distance such as a kilometer (or equivalent)? None =         1   S8 Washing your whole body? None =         1   S9 Getting dressed? None =         1   S10 Dealing with people you do not know? None =         1   S11 Maintaining a friendship? Moderate =   3   S12 Your day to day work? None =         1     H1 Overall, in the past 30 days, how many days were these difficulties present? Record number of days 30   H2 In the past  30 days, for how many days were you totally unable to carry out your usual activities or work because of any health condition? Record number of days  0   H3 In the past 30 days, not counting the days that you were totally unable, for how many days did you cut back or reduce your usual activities or work because of any health condition? Record number of days 3       Referral / Collaboration:  Referral to another professional/service is not indicated at this time..    Anticipated number of session or this episode of care: 18-24      MeasurableTreatment Goal(s) related to diagnosis / functional impairment(s)  Goal-Emotional regulation: Client will effectively manage mood dysregulation    I will know I've met my goal when I am feeling less out of control.      Objective #A (Client Action)    Status: continued- Date: 1/7/2019     Client will learn at least 3 mindfulness skills and practice one daily    Intervention(s)  Therapist will provide mindfulness training, psychoeducation, behavioral activation, and cognitive restructuring.    Objective #B  Client will use at least 3 coping skills for anxiety management in the next 12 weeks.    Status: continued- Date: 1/7/2019      Intervention(s)  Therapist will provide psychoeducation, behavioral activation, and cognitive restructuring.      Objective #C  Client will identify three distraction and diversion activities and use those activities to improve distress tolerance and emotional regulation.  Status: continued- Date: 1/7/2019     Intervention(s)  Therapist will provide psychoeducation, behavioral activation, and cognitive restructuring.    MeasurableTreatment Goal(s) related to diagnosis / functional impairment(s)            Goal-Depression: Client will decrease depressed mood    I will know I've met my goal when I am less depressed.      Objective #A (Client Action)    Status: Continued- Date: 1/7/2019     Client will use identified behavioral and cognitive skills to  challenge negative self talk 90% of the time.    Intervention(s)  Therapist will provide psychoeducation, behavioral activation, and cognitive restructuring.      Objective #B  Client will complete at least 10 minutes of self-regulation practice (e.g.: yoga, meditation, relaxation breathing, etc.) per day.    Status: Continued- Date: 1/7/2019     Intervention(s)  Therapist will provide psychoeducation, behavioral activation, and cognitive restructuring.      Objective #C  Client will exercise 30 minutes 36 times in the next 12 weeks.  Status: Continued- Date: 1/7/2019     Intervention(s)  Therapist will provide psychoeducation, behavioral activation, and cognitive restructuring.                 Client has reviewed and agreed to the above plan.      Justo Dawson, LICSW  February 20, 2017

## 2019-03-04 ENCOUNTER — OFFICE VISIT (OUTPATIENT)
Dept: BEHAVIORAL HEALTH | Facility: CLINIC | Age: 34
End: 2019-03-04
Payer: COMMERCIAL

## 2019-03-04 DIAGNOSIS — F31.81 BIPOLAR 2 DISORDER (H): Primary | ICD-10-CM

## 2019-03-04 PROCEDURE — 90834 PSYTX W PT 45 MINUTES: CPT | Performed by: SOCIAL WORKER

## 2019-03-04 NOTE — PROGRESS NOTES
"                                             Progress Note    Client Name: Ramiro Najera  Date: 3/4/2019          Service Type: Individual      Session Start Time: 730 Session End Time: 815      Session Length: 38-52     Session #: 43     Attendees: Client attended alone    Treatment Plan Last Reviewed: 1/7/2019   PHQ-9 / DEANNA-7 :      DATA      Progress Since Last Session (Related to Symptoms / Goals / Homework):   Symptoms: Stable    Homework: Partially completed  1.  SIFT meditation daily    2.  Client will track and log cognitive distortions and bring to next session     3.   Meet in two weeks     Episode of Care Goals: Satisfactory progress - ACTION (Actively working towards change); Intervened by reinforcing change plan / affirming steps taken     Current / Ongoing Stressors and Concerns:      Things have been \"good and bad, complicated.\"  Feels like he is working to accomplish lots of things in his life, and finds it hard to pay attention to these things, rather focuses on the bad in life.  Did a speed dating night a few weeks ago and met a few women.  Went out on two dates with one and continues talking with her.  Feels like this experience has built up his confidence.              Treatment Objective(s) Addressed in This Session:   Client will complete at least 10 minutes of self-regulation practice (e.g.: yoga, meditation, relaxation breathing, etc.) per day.    -working out at the gym 3-4 times weekly    Client will use identified behavioral and cognitive skills to challenge negative self talk 90% of the time.   -reviewed recognizing cognitive disortions today in session     Intervention:     CBT     Discussed physical, mental, and emotional boundaries and limit-setting with others. Explored management of boundaries through direct communication and limiting contact and communication with others.  Discussed barriers to using boundary management skills including strong emotions and physical proximity.  " Client given handout on boundaries today in session.     ASSESSMENT: Current Emotional / Mental Status (status of significant symptoms):   Risk status (Self / Other harm or suicidal ideation)   Client denies current fears or concerns for personal safety.   Client denies current or recent suicidal ideation or behaviors.   Client denies current or recent homicidal ideation or behaviors.   Client denies current or recent self injurious behavior or ideation.   Client denies other safety concerns.   A safety and risk management plan has not been developed at this time, however client was given the after-hours number / 911 should there be a change in any of these risk factors.     Appearance:   Appropriate    Eye Contact:   Good    Psychomotor Behavior: Retarded (Slowed)    Attitude:   Cooperative    Orientation:   All   Speech    Rate / Production: Monotone     Volume:  Normal    Mood:    Depressed    Affect:    Appropriate    Thought Content:  Clear    Thought Form:  Coherent  Logical    Insight:    Good      Medication Review:   No current psychiatric medications prescribed     Medication Compliance:   NA     Changes in Health Issues:   None reported     Chemical Use Review:   Substance Use: Chemical use reviewed, no active concerns identified      Tobacco Use: No current tobacco use.       Collateral Reports Completed:   Not Applicable    PLAN: (Client Tasks / Therapist Tasks / Other)  1.  SIFT meditation daily    2.  Client will use one boundary management skill (i.e., direct communication, saying no to a request) by next session.    3.   Meet in two weeks        YUNIOR Bustos                                                         ________________________________________________________________________    Treatment Plan    Client's Name: Ramiro Najera  YOB: 1985    Date: 2/20/2017     DSM5 Diagnoses: (Sustained by DSM5 Criteria Listed Above)  Diagnoses: 296.89 Bipolar II Disorder With  anxious distress        PTSD  Psychosocial & Contextual Factors: parents' health problems  WHODAS 2.0 (12 item)            This questionnaire asks about difficulties due to health conditions. Health conditions  include  disease or illnesses, other health problems that may be short or long lasting,  injuries, mental health or emotional problems, and problems with alcohol or drugs.                     Think back over the past 30 days and answer these questions, thinking about how much  difficulty you had doing the following activities. For each question, please [place] only  one response.    S1 Standing for long periods such as 30 minutes? None =         1   S2 Taking care of household responsibilities? Mild =           2   S3 Learning a new task, for example, learning how to get to a new place? None =         1   S4 How much of a problem do you have joining community activities (for example, festivals, Islam or other activities) in the same way as anyone else can? Moderate =   3   S5 How much have you been emotionally affected by your health problems? Moderate =   3     In the past 30 days, how much difficulty did you have in:   S6 Concentrating on doing something for ten minutes? Mild =           2   S7 Walking a long distance such as a kilometer (or equivalent)? None =         1   S8 Washing your whole body? None =         1   S9 Getting dressed? None =         1   S10 Dealing with people you do not know? None =         1   S11 Maintaining a friendship? Moderate =   3   S12 Your day to day work? None =         1     H1 Overall, in the past 30 days, how many days were these difficulties present? Record number of days 30   H2 In the past 30 days, for how many days were you totally unable to carry out your usual activities or work because of any health condition? Record number of days  0   H3 In the past 30 days, not counting the days that you were totally unable, for how many days did you cut back or reduce your  usual activities or work because of any health condition? Record number of days 3       Referral / Collaboration:  Referral to another professional/service is not indicated at this time..    Anticipated number of session or this episode of care: 18-24      MeasurableTreatment Goal(s) related to diagnosis / functional impairment(s)  Goal-Emotional regulation: Client will effectively manage mood dysregulation    I will know I've met my goal when I am feeling less out of control.      Objective #A (Client Action)    Status: continued- Date: 1/7/2019     Client will learn at least 3 mindfulness skills and practice one daily    Intervention(s)  Therapist will provide mindfulness training, psychoeducation, behavioral activation, and cognitive restructuring.    Objective #B  Client will use at least 3 coping skills for anxiety management in the next 12 weeks.    Status: continued- Date: 1/7/2019      Intervention(s)  Therapist will provide psychoeducation, behavioral activation, and cognitive restructuring.      Objective #C  Client will identify three distraction and diversion activities and use those activities to improve distress tolerance and emotional regulation.  Status: continued- Date: 1/7/2019     Intervention(s)  Therapist will provide psychoeducation, behavioral activation, and cognitive restructuring.    MeasurableTreatment Goal(s) related to diagnosis / functional impairment(s)            Goal-Depression: Client will decrease depressed mood    I will know I've met my goal when I am less depressed.      Objective #A (Client Action)    Status: Continued- Date: 1/7/2019     Client will use identified behavioral and cognitive skills to challenge negative self talk 90% of the time.    Intervention(s)  Therapist will provide psychoeducation, behavioral activation, and cognitive restructuring.      Objective #B  Client will complete at least 10 minutes of self-regulation practice (e.g.: yoga, meditation, relaxation  breathing, etc.) per day.    Status: Continued- Date: 1/7/2019     Intervention(s)  Therapist will provide psychoeducation, behavioral activation, and cognitive restructuring.      Objective #C  Client will exercise 30 minutes 36 times in the next 12 weeks.  Status: Continued- Date: 1/7/2019     Intervention(s)  Therapist will provide psychoeducation, behavioral activation, and cognitive restructuring.                 Client has reviewed and agreed to the above plan.      Justo Dawson, [facility] HospitalSW  February 20, 2017

## 2019-03-19 ENCOUNTER — OFFICE VISIT (OUTPATIENT)
Dept: BEHAVIORAL HEALTH | Facility: CLINIC | Age: 34
End: 2019-03-19
Payer: COMMERCIAL

## 2019-03-19 DIAGNOSIS — F31.81 BIPOLAR 2 DISORDER (H): Primary | ICD-10-CM

## 2019-03-19 PROCEDURE — 90834 PSYTX W PT 45 MINUTES: CPT | Performed by: SOCIAL WORKER

## 2019-03-19 NOTE — PROGRESS NOTES
"                                             Progress Note    Client Name: Ramiro Najera  Date: 3/19/2019          Service Type: Individual      Session Start Time: 400 Session End Time: 445      Session Length: 38-52     Session #: 44     Attendees: Client attended alone    Treatment Plan Last Reviewed: 1/7/2019   PHQ-9 / DEANNA-7 :      DATA      Progress Since Last Session (Related to Symptoms / Goals / Homework):   Symptoms: Stable    Homework: Partially completed  1.  SIFT meditation daily    2.  Client will track and log cognitive distortions and bring to next session     3.   Meet in two weeks     Episode of Care Goals: Satisfactory progress - ACTION (Actively working towards change); Intervened by reinforcing change plan / affirming steps taken     Current / Ongoing Stressors and Concerns:     Things have been \"an eventful two weeks.\"  Work has been good, workouts too.  Has been spending time with Sherita, the woman he met at [employer].  Living with Stacy has become \"a nightmare.\"         Treatment Objective(s) Addressed in This Session:   Client will complete at least 10 minutes of self-regulation practice (e.g.: yoga, meditation, relaxation breathing, etc.) per day.    -working out at the gym 3-4 times weekly    Client will use identified behavioral and cognitive skills to challenge negative self talk 90% of the time.   -reviewed recognizing cognitive disortions today in session     Intervention:     CBT     Discussed physical, mental, and emotional boundaries and limit-setting with others. Explored management of boundaries through direct communication and limiting contact and communication with others.  Discussed barriers to using boundary management skills including strong emotions and physical proximity.  Client given handout on boundaries today in session.     ASSESSMENT: Current Emotional / Mental Status (status of significant symptoms):   Risk status (Self / Other harm or suicidal " ideation)   Client denies current fears or concerns for personal safety.   Client denies current or recent suicidal ideation or behaviors.   Client denies current or recent homicidal ideation or behaviors.   Client denies current or recent self injurious behavior or ideation.   Client denies other safety concerns.   A safety and risk management plan has not been developed at this time, however client was given the after-hours number / 911 should there be a change in any of these risk factors.     Appearance:   Appropriate    Eye Contact:   Good    Psychomotor Behavior: Retarded (Slowed)    Attitude:   Cooperative    Orientation:   All   Speech    Rate / Production: Monotone     Volume:  Normal    Mood:    Depressed    Affect:    Appropriate    Thought Content:  Clear    Thought Form:  Coherent  Logical    Insight:    Good      Medication Review:   No current psychiatric medications prescribed     Medication Compliance:   NA     Changes in Health Issues:   None reported     Chemical Use Review:   Substance Use: Chemical use reviewed, no active concerns identified      Tobacco Use: No current tobacco use.       Collateral Reports Completed:   Not Applicable    PLAN: (Client Tasks / Therapist Tasks / Other)  1.  SIFT meditation daily    2.  Client will use one boundary management skill (i.e., direct communication, saying no to a request) by next session.    3.   Meet in two weeks        YUNIOR Bustos                                                         ________________________________________________________________________    Treatment Plan    Client's Name: Ramiro Najera  YOB: 1985    Date: 2/20/2017     DSM5 Diagnoses: (Sustained by DSM5 Criteria Listed Above)  Diagnoses: 296.89 Bipolar II Disorder With anxious distress        PTSD  Psychosocial & Contextual Factors: parents' health problems  WHODAS 2.0 (12 item)            This questionnaire asks about difficulties due to health  conditions. Health conditions  include  disease or illnesses, other health problems that may be short or long lasting,  injuries, mental health or emotional problems, and problems with alcohol or drugs.                     Think back over the past 30 days and answer these questions, thinking about how much  difficulty you had doing the following activities. For each question, please [place] only  one response.    S1 Standing for long periods such as 30 minutes? None =         1   S2 Taking care of household responsibilities? Mild =           2   S3 Learning a new task, for example, learning how to get to a new place? None =         1   S4 How much of a problem do you have joining community activities (for example, festivals, [place] or other activities) in the same way as anyone else can? Moderate =   3   S5 How much have you been emotionally affected by your health problems? Moderate =   3     In the past 30 days, how much difficulty did you have in:   S6 Concentrating on doing something for ten minutes? Mild =           2   S7 Walking a long distance such as a kilometer (or equivalent)? None =         1   S8 Washing your whole body? None =         1   S9 Getting dressed? None =         1   S10 Dealing with people you do not know? None =         1   S11 Maintaining a friendship? Moderate =   3   S12 Your day to day work? None =         1     H1 Overall, in the past 30 days, how many days were these difficulties present? Record number of days 30   H2 In the past 30 days, for how many days were you totally unable to carry out your usual activities or work because of any health condition? Record number of days  0   H3 In the past 30 days, not counting the days that you were totally unable, for how many days did you cut back or reduce your usual activities or work because of any health condition? Record number of days 3       Referral / Collaboration:  Referral to another professional/service is not indicated at this  time..    Anticipated number of session or this episode of care: 18-24      MeasurableTreatment Goal(s) related to diagnosis / functional impairment(s)  Goal-Emotional regulation: Client will effectively manage mood dysregulation    I will know I've met my goal when I am feeling less out of control.      Objective #A (Client Action)    Status: continued- Date: 1/7/2019     Client will learn at least 3 mindfulness skills and practice one daily    Intervention(s)  Therapist will provide mindfulness training, psychoeducation, behavioral activation, and cognitive restructuring.    Objective #B  Client will use at least 3 coping skills for anxiety management in the next 12 weeks.    Status: continued- Date: 1/7/2019      Intervention(s)  Therapist will provide psychoeducation, behavioral activation, and cognitive restructuring.      Objective #C  Client will identify three distraction and diversion activities and use those activities to improve distress tolerance and emotional regulation.  Status: continued- Date: 1/7/2019     Intervention(s)  Therapist will provide psychoeducation, behavioral activation, and cognitive restructuring.    MeasurableTreatment Goal(s) related to diagnosis / functional impairment(s)            Goal-Depression: Client will decrease depressed mood    I will know I've met my goal when I am less depressed.      Objective #A (Client Action)    Status: Continued- Date: 1/7/2019     Client will use identified behavioral and cognitive skills to challenge negative self talk 90% of the time.    Intervention(s)  Therapist will provide psychoeducation, behavioral activation, and cognitive restructuring.      Objective #B  Client will complete at least 10 minutes of self-regulation practice (e.g.: yoga, meditation, relaxation breathing, etc.) per day.    Status: Continued- Date: 1/7/2019     Intervention(s)  Therapist will provide psychoeducation, behavioral activation, and cognitive  restructuring.      Objective #C  Client will exercise 30 minutes 36 times in the next 12 weeks.  Status: Continued- Date: 1/7/2019     Intervention(s)  Therapist will provide psychoeducation, behavioral activation, and cognitive restructuring.                 Client has reviewed and agreed to the above plan.      Justo Dawson, [facility]  February 20, 2017

## 2019-04-08 ENCOUNTER — OFFICE VISIT (OUTPATIENT)
Dept: BEHAVIORAL HEALTH | Facility: CLINIC | Age: 34
End: 2019-04-08
Payer: COMMERCIAL

## 2019-04-08 DIAGNOSIS — F31.81 BIPOLAR 2 DISORDER (H): Primary | ICD-10-CM

## 2019-04-08 PROCEDURE — 90834 PSYTX W PT 45 MINUTES: CPT | Performed by: SOCIAL WORKER

## 2019-04-08 NOTE — PROGRESS NOTES
"                                             Progress Note    Client Name: Ramiro Najera  Date: 4/8/2019          Service Type: Individual      Session Start Time: 430 Session End Time: 515      Session Length: 38-52     Session #: 44     Attendees: Client attended alone    Treatment Plan Last Reviewed: 1/7/2019   PHQ-9 / DEANNA-7 :      DATA      Progress Since Last Session (Related to Symptoms / Goals / Homework):   Symptoms: Stable    Homework: Partially completed  1.  SIFT meditation daily    2.  Client will track and log cognitive distortions and bring to next session     3.   Meet in two weeks     Episode of Care Goals: Satisfactory progress - ACTION (Actively working towards change); Intervened by reinforcing change plan / affirming steps taken     Current / Ongoing Stressors and Concerns:     Things have been \"some parts of life are filled with nicol, other parts are not.\"  Spending more time with the people that make him happier, healthier, and more content.  Also has been thinking about what he can control and what he cannot.            Treatment Objective(s) Addressed in This Session:   Client will complete at least 10 minutes of self-regulation practice (e.g.: yoga, meditation, relaxation breathing, etc.) per day.    -working out at the gym 3-4 times weekly    Client will use identified behavioral and cognitive skills to challenge negative self talk 90% of the time.   -reviewed recognizing cognitive disortions today in session     Intervention:     CBT   Discussed automatic thoughts today in session.  Explored the role of automatic thoughts in increasing unhelpful thinking in depression, anxiety, and stress.  Explored techniques and strategies to increase awareness of unhelpful automatic thinking such as mindfulness.  Introduced concept of challenging negative thinking.     ASSESSMENT: Current Emotional / Mental Status (status of significant symptoms):   Risk status (Self / Other harm or suicidal " ideation)   Client denies current fears or concerns for personal safety.   Client denies current or recent suicidal ideation or behaviors.   Client denies current or recent homicidal ideation or behaviors.   Client denies current or recent self injurious behavior or ideation.   Client denies other safety concerns.   A safety and risk management plan has not been developed at this time, however client was given the after-hours number / 911 should there be a change in any of these risk factors.     Appearance:   Appropriate    Eye Contact:   Good    Psychomotor Behavior: Retarded (Slowed)    Attitude:   Cooperative    Orientation:   All   Speech    Rate / Production: Monotone     Volume:  Normal    Mood:    Depressed    Affect:    Appropriate    Thought Content:  Clear    Thought Form:  Coherent  Logical    Insight:    Good      Medication Review:   No current psychiatric medications prescribed     Medication Compliance:   NA     Changes in Health Issues:   None reported     Chemical Use Review:   Substance Use: Chemical use reviewed, no active concerns identified      Tobacco Use: No current tobacco use.       Collateral Reports Completed:   Not Applicable    PLAN: (Client Tasks / Therapist Tasks / Other)  1.  SIFT meditation daily    2.  Client will use one boundary management skill (i.e., direct communication, saying no to a request) by next session.    3.  Client will practice recognizing automatic thoughts once per day by next session.  Client will document this and bring to next session.     4.  Meet in two weeks        YUNIOR Bustos                                                         ________________________________________________________________________    Treatment Plan    Client's Name: Ramiro Najera  YOB: 1985    Date: 2/20/2017     DSM5 Diagnoses: (Sustained by DSM5 Criteria Listed Above)  Diagnoses: 296.89 Bipolar II Disorder With anxious distress         PTSD  Psychosocial & Contextual Factors: parents' health problems  WHODAS 2.0 (12 item)            This questionnaire asks about difficulties due to health conditions. Health conditions  include  disease or illnesses, other health problems that may be short or long lasting,  injuries, mental health or emotional problems, and problems with alcohol or drugs.                     Think back over the past 30 days and answer these questions, thinking about how much  difficulty you had doing the following activities. For each question, please [place] only  one response.    S1 Standing for long periods such as 30 minutes? None =         1   S2 Taking care of household responsibilities? Mild =           2   S3 Learning a new task, for example, learning how to get to a new place? None =         1   S4 How much of a problem do you have joining community activities (for example, festivals, [employer] or other activities) in the same way as anyone else can? Moderate =   3   S5 How much have you been emotionally affected by your health problems? Moderate =   3     In the past 30 days, how much difficulty did you have in:   S6 Concentrating on doing something for ten minutes? Mild =           2   S7 Walking a long distance such as a kilometer (or equivalent)? None =         1   S8 Washing your whole body? None =         1   S9 Getting dressed? None =         1   S10 Dealing with people you do not know? None =         1   S11 Maintaining a friendship? Moderate =   3   S12 Your day to day work? None =         1     H1 Overall, in the past 30 days, how many days were these difficulties present? Record number of days 30   H2 In the past 30 days, for how many days were you totally unable to carry out your usual activities or work because of any health condition? Record number of days  0   H3 In the past 30 days, not counting the days that you were totally unable, for how many days did you cut back or reduce your usual activities or work  because of any health condition? Record number of days 3       Referral / Collaboration:  Referral to another professional/service is not indicated at this time..    Anticipated number of session or this episode of care: 18-24      MeasurableTreatment Goal(s) related to diagnosis / functional impairment(s)  Goal-Emotional regulation: Client will effectively manage mood dysregulation    I will know I've met my goal when I am feeling less out of control.      Objective #A (Client Action)    Status: continued- Date: 1/7/2019     Client will learn at least 3 mindfulness skills and practice one daily    Intervention(s)  Therapist will provide mindfulness training, psychoeducation, behavioral activation, and cognitive restructuring.    Objective #B  Client will use at least 3 coping skills for anxiety management in the next 12 weeks.    Status: continued- Date: 1/7/2019      Intervention(s)  Therapist will provide psychoeducation, behavioral activation, and cognitive restructuring.      Objective #C  Client will identify three distraction and diversion activities and use those activities to improve distress tolerance and emotional regulation.  Status: continued- Date: 1/7/2019     Intervention(s)  Therapist will provide psychoeducation, behavioral activation, and cognitive restructuring.    MeasurableTreatment Goal(s) related to diagnosis / functional impairment(s)            Goal-Depression: Client will decrease depressed mood    I will know I've met my goal when I am less depressed.      Objective #A (Client Action)    Status: Continued- Date: 1/7/2019     Client will use identified behavioral and cognitive skills to challenge negative self talk 90% of the time.    Intervention(s)  Therapist will provide psychoeducation, behavioral activation, and cognitive restructuring.      Objective #B  Client will complete at least 10 minutes of self-regulation practice (e.g.: yoga, meditation, relaxation breathing, etc.) per  day.    Status: Continued- Date: 1/7/2019     Intervention(s)  Therapist will provide psychoeducation, behavioral activation, and cognitive restructuring.      Objective #C  Client will exercise 30 minutes 36 times in the next 12 weeks.  Status: Continued- Date: 1/7/2019     Intervention(s)  Therapist will provide psychoeducation, behavioral activation, and cognitive restructuring.                 Client has reviewed and agreed to the above plan.      Justo Dawson, [facility]  February 20, 2017

## 2019-04-22 ENCOUNTER — OFFICE VISIT (OUTPATIENT)
Dept: BEHAVIORAL HEALTH | Facility: CLINIC | Age: 34
End: 2019-04-22
Payer: COMMERCIAL

## 2019-04-22 DIAGNOSIS — F31.81 BIPOLAR 2 DISORDER (H): Primary | ICD-10-CM

## 2019-04-22 PROCEDURE — 90834 PSYTX W PT 45 MINUTES: CPT | Performed by: SOCIAL WORKER

## 2019-05-20 ENCOUNTER — OFFICE VISIT (OUTPATIENT)
Dept: BEHAVIORAL HEALTH | Facility: CLINIC | Age: 34
End: 2019-05-20
Payer: COMMERCIAL

## 2019-05-20 DIAGNOSIS — F31.81 BIPOLAR 2 DISORDER (H): Primary | ICD-10-CM

## 2019-05-20 PROCEDURE — 90834 PSYTX W PT 45 MINUTES: CPT | Performed by: SOCIAL WORKER

## 2019-05-20 NOTE — PROGRESS NOTES
"                                             Progress Note    Client Name: Ramiro Najera  Date: 5/20/2019          Service Type: Individual      Session Start Time: 430 Session End Time: 515      Session Length: 38-52     Session #: 46     Attendees: Client attended alone    Treatment Plan Last Reviewed: 4/22/2019   PHQ-9 / DEANNA-7 :      DATA      Progress Since Last Session (Related to Symptoms / Goals / Homework):   Symptoms: Stable    Homework: Partially completed  1.  SIFT meditation daily    2.  Client will track and log cognitive distortions and bring to next session     3.   Meet in two weeks     Episode of Care Goals: Satisfactory progress - ACTION (Actively working towards change); Intervened by reinforcing change plan / affirming steps taken     Current / Ongoing Stressors and Concerns:     Things have been \"not great, home life has been hard.\"  Relationship with gf continues to grow and is good, families and friends have met.  They are spending a lot of time together.  Mother retired and she and her father and she are planning to move.  Stress with roommate continues and he is looking forward to moving out.               Treatment Objective(s) Addressed in This Session:   Client will complete at least 10 minutes of self-regulation practice (e.g.: yoga, meditation, relaxation breathing, etc.) per day.    -working out at the gym 3-4 times weekly    Client will use identified behavioral and cognitive skills to challenge negative self talk 90% of the time.   -reviewed alternative explanations     Intervention:     CBT   Discussed alternative explanations today in session.  Identified alternative explanations as primary method of challenging unhelpful thinking.  Provided handout of alternative explanations in session.  Reviewed concept of challenging cognitive distortions by recognizing strong emotions and asking challenging questions like \"is my reaction logical?\" , \"is my reaction helpful?\" , \"am I " "overreacting?\" , and \"what else could be going on that I am not considering?\"                       ASSESSMENT: Current Emotional / Mental Status (status of significant symptoms):   Risk status (Self / Other harm or suicidal ideation)   Client denies current fears or concerns for personal safety.   Client denies current or recent suicidal ideation or behaviors.   Client denies current or recent homicidal ideation or behaviors.   Client denies current or recent self injurious behavior or ideation.   Client denies other safety concerns.   A safety and risk management plan has not been developed at this time, however client was given the after-hours number / 911 should there be a change in any of these risk factors.     Appearance:   Appropriate    Eye Contact:   Good    Psychomotor Behavior: Retarded (Slowed)    Attitude:   Cooperative    Orientation:   All   Speech    Rate / Production: Monotone     Volume:  Normal    Mood:    Depressed    Affect:    Appropriate    Thought Content:  Clear    Thought Form:  Coherent  Logical    Insight:    Good      Medication Review:   No current psychiatric medications prescribed     Medication Compliance:   NA     Changes in Health Issues:   None reported     Chemical Use Review:   Substance Use: Chemical use reviewed, no active concerns identified      Tobacco Use: No current tobacco use.       Collateral Reports Completed:   Not Applicable    PLAN: (Client Tasks / Therapist Tasks / Other)  1.  SIFT meditation daily    2.  Client will use one boundary management skill (i.e., direct communication, saying no to a request) by next session.    3.  Client will track and log cognitive distortions and bring to next session      4.  Meet in 4 weeks        YUNIOR Bustos                                                         ________________________________________________________________________    Treatment Plan    Client's Name: Ramiro TRAVIS Najera  Date Of " Birth: 1985    Date: 2/20/2017     DSM5 Diagnoses: (Sustained by DSM5 Criteria Listed Above)  Diagnoses: 296.89 Bipolar II Disorder With anxious distress        PTSD  Psychosocial & Contextual Factors: parents' health problems  WHODAS 2.0 (12 item)            This questionnaire asks about difficulties due to health conditions. Health conditions  include  disease or illnesses, other health problems that may be short or long lasting,  injuries, mental health or emotional problems, and problems with alcohol or drugs.                     Think back over the past 30 days and answer these questions, thinking about how much  difficulty you had doing the following activities. For each question, please [place] only  one response.    S1 Standing for long periods such as 30 minutes? None =         1   S2 Taking care of household responsibilities? Mild =           2   S3 Learning a new task, for example, learning how to get to a new place? None =         1   S4 How much of a problem do you have joining community activities (for example, festivals, Catholic or other activities) in the same way as anyone else can? Moderate =   3   S5 How much have you been emotionally affected by your health problems? Moderate =   3     In the past 30 days, how much difficulty did you have in:   S6 Concentrating on doing something for ten minutes? Mild =           2   S7 Walking a long distance such as a kilometer (or equivalent)? None =         1   S8 Washing your whole body? None =         1   S9 Getting dressed? None =         1   S10 Dealing with people you do not know? None =         1   S11 Maintaining a friendship? Moderate =   3   S12 Your day to day work? None =         1     H1 Overall, in the past 30 days, how many days were these difficulties present? Record number of days 30   H2 In the past 30 days, for how many days were you totally unable to carry out your usual activities or work because of any health condition? Record  number of days  0   H3 In the past 30 days, not counting the days that you were totally unable, for how many days did you cut back or reduce your usual activities or work because of any health condition? Record number of days 3       Referral / Collaboration:  Referral to another professional/service is not indicated at this time..    Anticipated number of session or this episode of care: 18-24      MeasurableTreatment Goal(s) related to diagnosis / functional impairment(s)  Goal-Emotional regulation: Client will effectively manage mood dysregulation    I will know I've met my goal when I am feeling less out of control.      Objective #A (Client Action)    Status: continued- Date: 4/22/2019     Client will learn at least 3 mindfulness skills and practice one daily    Intervention(s)  Therapist will provide mindfulness training, psychoeducation, behavioral activation, and cognitive restructuring.    Objective #B  Client will use at least 3 coping skills for anxiety management in the next 12 weeks.    Status: continued- Date: 4/22/2019      Intervention(s)  Therapist will provide psychoeducation, behavioral activation, and cognitive restructuring.      Objective #C  Client will identify three distraction and diversion activities and use those activities to improve distress tolerance and emotional regulation.  Status: continued- Date: 4/22/2019     Intervention(s)  Therapist will provide psychoeducation, behavioral activation, and cognitive restructuring.    MeasurableTreatment Goal(s) related to diagnosis / functional impairment(s)            Goal-Depression: Client will decrease depressed mood    I will know I've met my goal when I am less depressed.      Objective #A (Client Action)    Status: Continued- Date: 4/22/2019     Client will use identified behavioral and cognitive skills to challenge negative self talk 90% of the time.    Intervention(s)  Therapist will provide psychoeducation, behavioral activation, and  cognitive restructuring.      Objective #B  Client will complete at least 10 minutes of self-regulation practice (e.g.: yoga, meditation, relaxation breathing, etc.) per day.    Status: Continued- Date: 4/22/2019     Intervention(s)  Therapist will provide psychoeducation, behavioral activation, and cognitive restructuring.      Objective #C  Client will exercise 30 minutes 36 times in the next 12 weeks.  Status: Continued- Date: 4/22/2019      Intervention(s)  Therapist will provide psychoeducation, behavioral activation, and cognitive restructuring.                 Client has reviewed and agreed to the above plan.      Justo Dawson, [facility] HospitalSW  February 20, 2017

## 2019-05-30 ASSESSMENT — ENCOUNTER SYMPTOMS
HEMATURIA: 0
DYSURIA: 0
PARESTHESIAS: 0
HEARTBURN: 0
HEMATOCHEZIA: 0
PALPITATIONS: 0
COUGH: 0
CONSTIPATION: 1
SHORTNESS OF BREATH: 0
HEADACHES: 0
WEAKNESS: 0
MYALGIAS: 0
NAUSEA: 0
CHILLS: 0
JOINT SWELLING: 0
FREQUENCY: 0
NERVOUS/ANXIOUS: 1
FEVER: 0
ARTHRALGIAS: 0
DIZZINESS: 0
EYE PAIN: 0
DIARRHEA: 1
SORE THROAT: 0

## 2019-06-06 ENCOUNTER — OFFICE VISIT (OUTPATIENT)
Dept: FAMILY MEDICINE | Facility: CLINIC | Age: 34
End: 2019-06-06
Payer: COMMERCIAL

## 2019-06-06 VITALS
HEART RATE: 70 BPM | DIASTOLIC BLOOD PRESSURE: 80 MMHG | TEMPERATURE: 98 F | BODY MASS INDEX: 30.21 KG/M2 | SYSTOLIC BLOOD PRESSURE: 124 MMHG | HEIGHT: 71 IN | WEIGHT: 215.8 LBS

## 2019-06-06 DIAGNOSIS — F31.81 BIPOLAR 2 DISORDER (H): ICD-10-CM

## 2019-06-06 DIAGNOSIS — Z11.3 SCREEN FOR STD (SEXUALLY TRANSMITTED DISEASE): ICD-10-CM

## 2019-06-06 DIAGNOSIS — Z00.01 ENCOUNTER FOR ROUTINE ADULT MEDICAL EXAM WITH ABNORMAL FINDINGS: Primary | ICD-10-CM

## 2019-06-06 DIAGNOSIS — Z13.6 CARDIOVASCULAR SCREENING; LDL GOAL LESS THAN 130: ICD-10-CM

## 2019-06-06 DIAGNOSIS — F32.1 MAJOR DEPRESSIVE DISORDER, SINGLE EPISODE, MODERATE (H): ICD-10-CM

## 2019-06-06 DIAGNOSIS — Z23 NEED FOR TDAP VACCINATION: ICD-10-CM

## 2019-06-06 DIAGNOSIS — K12.1 MOUTH ULCER: ICD-10-CM

## 2019-06-06 DIAGNOSIS — Z86.2 HISTORY OF ANEMIA: ICD-10-CM

## 2019-06-06 LAB
CHOLEST SERPL-MCNC: 150 MG/DL
ERYTHROCYTE [DISTWIDTH] IN BLOOD BY AUTOMATED COUNT: 13.3 % (ref 10–15)
FERRITIN SERPL-MCNC: 106 NG/ML (ref 26–388)
HCT VFR BLD AUTO: 45.1 % (ref 40–53)
HDLC SERPL-MCNC: 47 MG/DL
HGB BLD-MCNC: 15.7 G/DL (ref 13.3–17.7)
HIV 1+2 AB+HIV1 P24 AG SERPL QL IA: NONREACTIVE
LDLC SERPL CALC-MCNC: 81 MG/DL
MCH RBC QN AUTO: 29.1 PG (ref 26.5–33)
MCHC RBC AUTO-ENTMCNC: 34.8 G/DL (ref 31.5–36.5)
MCV RBC AUTO: 84 FL (ref 78–100)
NONHDLC SERPL-MCNC: 103 MG/DL
PLATELET # BLD AUTO: 189 10E9/L (ref 150–450)
RBC # BLD AUTO: 5.4 10E12/L (ref 4.4–5.9)
TRIGL SERPL-MCNC: 111 MG/DL
WBC # BLD AUTO: 6.5 10E9/L (ref 4–11)

## 2019-06-06 PROCEDURE — 99395 PREV VISIT EST AGE 18-39: CPT | Mod: 25 | Performed by: FAMILY MEDICINE

## 2019-06-06 PROCEDURE — 90471 IMMUNIZATION ADMIN: CPT | Performed by: FAMILY MEDICINE

## 2019-06-06 PROCEDURE — 90715 TDAP VACCINE 7 YRS/> IM: CPT | Performed by: FAMILY MEDICINE

## 2019-06-06 PROCEDURE — 87491 CHLMYD TRACH DNA AMP PROBE: CPT | Performed by: FAMILY MEDICINE

## 2019-06-06 PROCEDURE — 99213 OFFICE O/P EST LOW 20 MIN: CPT | Mod: 25 | Performed by: FAMILY MEDICINE

## 2019-06-06 PROCEDURE — 36415 COLL VENOUS BLD VENIPUNCTURE: CPT | Performed by: FAMILY MEDICINE

## 2019-06-06 PROCEDURE — 87389 HIV-1 AG W/HIV-1&-2 AB AG IA: CPT | Performed by: FAMILY MEDICINE

## 2019-06-06 PROCEDURE — 87591 N.GONORRHOEAE DNA AMP PROB: CPT | Performed by: FAMILY MEDICINE

## 2019-06-06 PROCEDURE — 85027 COMPLETE CBC AUTOMATED: CPT | Performed by: FAMILY MEDICINE

## 2019-06-06 PROCEDURE — 86780 TREPONEMA PALLIDUM: CPT | Performed by: FAMILY MEDICINE

## 2019-06-06 PROCEDURE — 80061 LIPID PANEL: CPT | Performed by: FAMILY MEDICINE

## 2019-06-06 PROCEDURE — 82728 ASSAY OF FERRITIN: CPT | Performed by: FAMILY MEDICINE

## 2019-06-06 RX ORDER — TRAZODONE HYDROCHLORIDE 50 MG/1
50 TABLET, FILM COATED ORAL AT BEDTIME
Qty: 90 TABLET | Refills: 1 | Status: SHIPPED | OUTPATIENT
Start: 2019-06-06 | End: 2020-01-24

## 2019-06-06 ASSESSMENT — ENCOUNTER SYMPTOMS
HEMATOCHEZIA: 0
CHILLS: 0
DYSURIA: 0
ARTHRALGIAS: 0
CONSTIPATION: 1
FREQUENCY: 0
EYE PAIN: 0
MYALGIAS: 0
DIZZINESS: 0
NAUSEA: 0
DIARRHEA: 1
FEVER: 0
PARESTHESIAS: 0
HEMATURIA: 0
HEADACHES: 0
HEARTBURN: 0
SHORTNESS OF BREATH: 0
NERVOUS/ANXIOUS: 1
WEAKNESS: 0
SORE THROAT: 0
COUGH: 0
JOINT SWELLING: 0
PALPITATIONS: 0

## 2019-06-06 ASSESSMENT — PATIENT HEALTH QUESTIONNAIRE - PHQ9: SUM OF ALL RESPONSES TO PHQ QUESTIONS 1-9: 10

## 2019-06-06 ASSESSMENT — MIFFLIN-ST. JEOR: SCORE: 1947.02

## 2019-06-06 NOTE — PROGRESS NOTES
"Subjective     Ramiro Najera is a 33 year old male who presents to clinic today for the following health issues:    HPI   Hypertension Follow-up      Do you check your blood pressure regularly outside of the clinic? { :197165}     Are you following a low salt diet? { :763190}    Are your blood pressures ever more than 140 on the top number (systolic) OR more   than 90 on the bottom number (diastolic), for example 140/90? { :703058}    Amount of exercise or physical activity: {Exercise frequency days per week:084530}    Problems taking medications regularly: {Med Problems:617653::\"No\"}    Medication side effects: {CHRONIC MED SIDE EFFECTS:287516::\"none\"}    Diet: { :966786}      {additonal problems for provider to add (Optional):762357}    {HIST REVIEW/ LINKS 2 (Optional):985433}    {Additional problems for the provider to add (optional):273358}  Reviewed and updated as needed this visit by Provider         Review of Systems   {ROS COMP (Optional):020433}      Objective    There were no vitals taken for this visit.  There is no height or weight on file to calculate BMI.  Physical Exam   {Exam List (Optional):130004}    {Diagnostic Test Results (Optional):527424::\"Diagnostic Test Results:\",\"Labs reviewed in Epic\"}        {PROVIDER CHARTING PREFERENCE:130883}      "

## 2019-06-06 NOTE — PATIENT INSTRUCTIONS
Preventive Health Recommendations  Male Ages 26 - 39    Yearly exam:             See your health care provider every year in order to  o   Review health changes.   o   Discuss preventive care.    o   Review your medicines if your doctor has prescribed any.    You should be tested each year for STDs (sexually transmitted diseases), if you re at risk.     After age 35, talk to your provider about cholesterol testing. If you are at risk for heart disease, have your cholesterol tested at least every 5 years.     If you are at risk for diabetes, you should have a diabetes test (fasting glucose).  Shots: Get a flu shot each year. Get a tetanus shot every 10 years.     Nutrition:    Eat at least 5 servings of fruits and vegetables daily.     Eat whole-grain bread, whole-wheat pasta and brown rice instead of white grains and rice.     Get adequate Calcium and Vitamin D.     Lifestyle    Exercise for at least 150 minutes a week (30 minutes a day, 5 days a week). This will help you control your weight and prevent disease.     Limit alcohol to one drink per day.     No smoking.     Wear sunscreen to prevent skin cancer.     See your dentist every six months for an exam and cleaning.     
Yes
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
## 2019-06-07 LAB
C TRACH DNA SPEC QL NAA+PROBE: NEGATIVE
N GONORRHOEA DNA SPEC QL NAA+PROBE: NEGATIVE
SPECIMEN SOURCE: NORMAL
SPECIMEN SOURCE: NORMAL
T PALLIDUM AB SER QL: NONREACTIVE

## 2019-06-10 ENCOUNTER — OFFICE VISIT (OUTPATIENT)
Dept: BEHAVIORAL HEALTH | Facility: CLINIC | Age: 34
End: 2019-06-10
Payer: COMMERCIAL

## 2019-06-10 DIAGNOSIS — F31.81 BIPOLAR 2 DISORDER (H): Primary | ICD-10-CM

## 2019-06-10 PROCEDURE — 90834 PSYTX W PT 45 MINUTES: CPT | Performed by: SOCIAL WORKER

## 2019-06-10 NOTE — PROGRESS NOTES
"                                             Progress Note    Client Name: Ramiro Najera  Date: 6/10/2019          Service Type: Individual      Session Start Time: 330 Session End Time: 415      Session Length: 38-52     Session #: 46     Attendees: Client attended alone    Treatment Plan Last Reviewed: 4/22/2019   PHQ-9 / DEANNA-7 :      DATA      Progress Since Last Session (Related to Symptoms / Goals / Homework):   Symptoms: Stable    Homework: Partially completed  1.  SIFT meditation daily    2.  Client will use one boundary management skill (i.e., direct communication, saying no to a request) by next session.    3.  Client will track and log cognitive distortions and bring to next session      4.  Meet in 4 weeks     Episode of Care Goals: Satisfactory progress - ACTION (Actively working towards change); Intervened by reinforcing change plan / affirming steps taken     Current / Ongoing Stressors and Concerns:     Things have been \"stressful and difficult.\"  Coming up on the end of his lease with [employer]acy.  Thinking about starting his next lease in July and paying two rents for one month.  Some frustration in that his first lead on an apartment fell through.  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  Stress too with roommate Stacy and we talk in detail about this today in session.               Treatment Objective(s) Addressed in This Session:   Client will complete at least 10 minutes of self-regulation practice (e.g.: yoga, meditation, relaxation breathing, etc.) per day.    -working out at the gym 3-4 times weekly    Client will use identified behavioral and cognitive skills to challenge negative self talk 90% of the time.   -personal domain      Intervention:     CBT  Reviewed thought-feeling-action connection.  Discussed Sohan Monsalve's concept of a personal domain and its connection to our emotions.  Explained personal " domain as being comprised of our physical bodies, personal space, friends and loved ones, and significant beliefs.  Discussed four emotions and their relationship to the domain:      Estefany: Results from a perceived gain to the domain    Sadness: Results from a perceived loss to the domain    Fear: Results from a perceived threat to the domain    Anger: Results from a perceived harm to or transgression upon the domain           ASSESSMENT: Current Emotional / Mental Status (status of significant symptoms):   Risk status (Self / Other harm or suicidal ideation)   Client denies current fears or concerns for personal safety.   Client denies current or recent suicidal ideation or behaviors.   Client denies current or recent homicidal ideation or behaviors.   Client denies current or recent self injurious behavior or ideation.   Client denies other safety concerns.   A safety and risk management plan has not been developed at this time, however client was given the after-hours number / 911 should there be a change in any of these risk factors.     Appearance:   Appropriate    Eye Contact:   Good    Psychomotor Behavior: Retarded (Slowed)    Attitude:   Cooperative    Orientation:   All   Speech    Rate / Production: Monotone     Volume:  Normal    Mood:    Depressed    Affect:    Appropriate    Thought Content:  Clear    Thought Form:  Coherent  Logical    Insight:    Good      Medication Review:   No current psychiatric medications prescribed     Medication Compliance:   NA     Changes in Health Issues:   None reported     Chemical Use Review:   Substance Use: Chemical use reviewed, no active concerns identified      Tobacco Use: No current tobacco use.       Collateral Reports Completed:   Not Applicable    PLAN: (Client Tasks / Therapist Tasks / Other)  1.  SIFT meditation daily    2.  Client will use one boundary management skill (i.e., direct communication, saying no to a request) by next session.    3.  Client will  track and log cognitive distortions and bring to next session      4.  Personal Domain journaling 1+x/week by next session.      5.  Meet in 4 weeks        Justo Dawson, LINETTESW                                                         ________________________________________________________________________    Treatment Plan    Client's Name: Ramiro Najera  YOB: 1985    Date: 2/20/2017     DSM5 Diagnoses: (Sustained by DSM5 Criteria Listed Above)  Diagnoses: 296.89 Bipolar II Disorder With anxious distress        PTSD  Psychosocial & Contextual Factors: parents' health problems  WHODAS 2.0 (12 item)            This questionnaire asks about difficulties due to health conditions. Health conditions  include  disease or illnesses, other health problems that may be short or long lasting,  injuries, mental health or emotional problems, and problems with alcohol or drugs.                     Think back over the past 30 days and answer these questions, thinking about how much  difficulty you had doing the following activities. For each question, please [place] only  one response.    S1 Standing for long periods such as 30 minutes? None =         1   S2 Taking care of household responsibilities? Mild =           2   S3 Learning a new task, for example, learning how to get to a new place? None =         1   S4 How much of a problem do you have joining community activities (for example, festivals, [employer]onden[employer] or other activities) in the same way as anyone else can? Moderate =   3   S5 How much have you been emotionally affected by your health problems? Moderate =   3     In the past 30 days, how much difficulty did you have in:   S6 Concentrating on doing something for ten minutes? Mild =           2   S7 Walking a long distance such as a kilometer (or equivalent)? None =         1   S8 Washing your whole body? None =         1   S9 Getting dressed? None =         1   S10 Dealing with people you do not know?  None =         1   S11 Maintaining a friendship? Moderate =   3   S12 Your day to day work? None =         1     H1 Overall, in the past 30 days, how many days were these difficulties present? Record number of days 30   H2 In the past 30 days, for how many days were you totally unable to carry out your usual activities or work because of any health condition? Record number of days  0   H3 In the past 30 days, not counting the days that you were totally unable, for how many days did you cut back or reduce your usual activities or work because of any health condition? Record number of days 3       Referral / Collaboration:  Referral to another professional/service is not indicated at this time..    Anticipated number of session or this episode of care: 18-24      MeasurableTreatment Goal(s) related to diagnosis / functional impairment(s)  Goal-Emotional regulation: Client will effectively manage mood dysregulation    I will know I've met my goal when I am feeling less out of control.      Objective #A (Client Action)    Status: continued- Date: 4/22/2019     Client will learn at least 3 mindfulness skills and practice one daily    Intervention(s)  Therapist will provide mindfulness training, psychoeducation, behavioral activation, and cognitive restructuring.    Objective #B  Client will use at least 3 coping skills for anxiety management in the next 12 weeks.    Status: continued- Date: 4/22/2019      Intervention(s)  Therapist will provide psychoeducation, behavioral activation, and cognitive restructuring.      Objective #C  Client will identify three distraction and diversion activities and use those activities to improve distress tolerance and emotional regulation.  Status: continued- Date: 4/22/2019     Intervention(s)  Therapist will provide psychoeducation, behavioral activation, and cognitive restructuring.    MeasurableTreatment Goal(s) related to diagnosis / functional  impairment(s)            Goal-Depression: Client will decrease depressed mood    I will know I've met my goal when I am less depressed.      Objective #A (Client Action)    Status: Continued- Date: 4/22/2019     Client will use identified behavioral and cognitive skills to challenge negative self talk 90% of the time.    Intervention(s)  Therapist will provide psychoeducation, behavioral activation, and cognitive restructuring.      Objective #B  Client will complete at least 10 minutes of self-regulation practice (e.g.: yoga, meditation, relaxation breathing, etc.) per day.    Status: Continued- Date: 4/22/2019     Intervention(s)  Therapist will provide psychoeducation, behavioral activation, and cognitive restructuring.      Objective #C  Client will exercise 30 minutes 36 times in the next 12 weeks.  Status: Continued- Date: 4/22/2019      Intervention(s)  Therapist will provide psychoeducation, behavioral activation, and cognitive restructuring.                 Client has reviewed and agreed to the above plan.      Justo Dawson, [facility]  February 20, 2017

## 2019-06-24 ENCOUNTER — OFFICE VISIT (OUTPATIENT)
Dept: BEHAVIORAL HEALTH | Facility: CLINIC | Age: 34
End: 2019-06-24
Payer: COMMERCIAL

## 2019-06-24 DIAGNOSIS — F31.81 BIPOLAR 2 DISORDER (H): Primary | ICD-10-CM

## 2019-06-24 PROCEDURE — 90834 PSYTX W PT 45 MINUTES: CPT | Performed by: SOCIAL WORKER

## 2019-06-24 NOTE — PROGRESS NOTES
"                                             Progress Note    Client Name: Ramiro Najera  Date: 6/24/2019          Service Type: Individual      Session Start Time: 330 Session End Time: 415      Session Length: 38-52     Session #: 47     Attendees: Client attended alone    Treatment Plan Last Reviewed: 4/22/2019   PHQ-9 / DEANNA-7 :      DATA      Progress Since Last Session (Related to Symptoms / Goals / Homework):   Symptoms: Stable    Homework: Partially completed  1.  SIFT meditation daily    2.  Client will use one boundary management skill (i.e., direct communication, saying no to a request) by next session.    3.  Client will track and log cognitive distortions and bring to next session      4.  Personal Domain journaling 1+x/week by next session.      5.  Meet in 4 weeks     Episode of Care Goals: Satisfactory progress - ACTION (Actively working towards change); Intervened by reinforcing change plan / affirming steps taken     Current / Ongoing Stressors and Concerns:     Things have been \"overwhlmingly good, some stress.\"  Wants to get out of his apartment sooner rather than later.  Actually did not speak to his roommate for about two weeks and had some peace, but they are talking now.  Conversations with her \"leave me feeling like I am pedantic, self-rightous, or a complete asshole.\"              Treatment Objective(s) Addressed in This Session:   Client will complete at least 10 minutes of self-regulation practice (e.g.: yoga, meditation, relaxation breathing, etc.) per day.    -working out at the gym 3-4 times weekly    Client will use identified behavioral and cognitive skills to challenge negative self talk 90% of the time.   -personal domain      Intervention:     CBT  Reviewed thought-feeling-action connection.  Discussed Sohan Monsalve's concept of a personal domain and its connection to our emotions.  Explained personal domain as being comprised of our physical bodies, personal space, friends and " loved ones, and significant beliefs.  Discussed four emotions and their relationship to the domain:      Estefany: Results from a perceived gain to the domain    Sadness: Results from a perceived loss to the domain    Fear: Results from a perceived threat to the domain    Anger: Results from a perceived harm to or transgression upon the domain           ASSESSMENT: Current Emotional / Mental Status (status of significant symptoms):   Risk status (Self / Other harm or suicidal ideation)   Client denies current fears or concerns for personal safety.   Client denies current or recent suicidal ideation or behaviors.   Client denies current or recent homicidal ideation or behaviors.   Client denies current or recent self injurious behavior or ideation.   Client denies other safety concerns.   A safety and risk management plan has not been developed at this time, however client was given the after-hours number / 911 should there be a change in any of these risk factors.     Appearance:   Appropriate    Eye Contact:   Good    Psychomotor Behavior: Retarded (Slowed)    Attitude:   Cooperative    Orientation:   All   Speech    Rate / Production: Monotone     Volume:  Normal    Mood:    Depressed    Affect:    Appropriate    Thought Content:  Clear    Thought Form:  Coherent  Logical    Insight:    Good      Medication Review:   No current psychiatric medications prescribed     Medication Compliance:   NA     Changes in Health Issues:   None reported     Chemical Use Review:   Substance Use: Chemical use reviewed, no active concerns identified      Tobacco Use: No current tobacco use.       Collateral Reports Completed:   Not Applicable    PLAN: (Client Tasks / Therapist Tasks / Other)  1.  SIFT meditation daily    2.  Client will use one boundary management skill (i.e., direct communication, saying no to a request) by next session.    3.  Client will track and log cognitive distortions and bring to next session      4.  Personal  Domain journaling 1+x/week by next session.      5.  Meet in 4 weeks        Justo Dawson, LICSW                                                         ________________________________________________________________________    Treatment Plan    Client's Name: Ramiro Najera  YOB: 1985    Date: 2/20/2017     DSM5 Diagnoses: (Sustained by DSM5 Criteria Listed Above)  Diagnoses: 296.89 Bipolar II Disorder With anxious distress        PTSD  Psychosocial & Contextual Factors: parents' health problems  WHODAS 2.0 (12 item)            This questionnaire asks about difficulties due to health conditions. Health conditions  include  disease or illnesses, other health problems that may be short or long lasting,  injuries, mental health or emotional problems, and problems with alcohol or drugs.                     Think back over the past 30 days and answer these questions, thinking about how much  difficulty you had doing the following activities. For each question, please [place] only  one response.    S1 Standing for long periods such as 30 minutes? None =         1   S2 Taking care of household responsibilities? Mild =           2   S3 Learning a new task, for example, learning how to get to a new place? None =         1   S4 How much of a problem do you have joining community activities (for example, festivals, Voodoo or other activities) in the same way as anyone else can? Moderate =   3   S5 How much have you been emotionally affected by your health problems? Moderate =   3     In the past 30 days, how much difficulty did you have in:   S6 Concentrating on doing something for ten minutes? Mild =           2   S7 Walking a long distance such as a kilometer (or equivalent)? None =         1   S8 Washing your whole body? None =         1   S9 Getting dressed? None =         1   S10 Dealing with people you do not know? None =         1   S11 Maintaining a friendship? Moderate =   3   S12 Your day to  day work? None =         1     H1 Overall, in the past 30 days, how many days were these difficulties present? Record number of days 30   H2 In the past 30 days, for how many days were you totally unable to carry out your usual activities or work because of any health condition? Record number of days  0   H3 In the past 30 days, not counting the days that you were totally unable, for how many days did you cut back or reduce your usual activities or work because of any health condition? Record number of days 3       Referral / Collaboration:  Referral to another professional/service is not indicated at this time..    Anticipated number of session or this episode of care: 18-24      MeasurableTreatment Goal(s) related to diagnosis / functional impairment(s)  Goal-Emotional regulation: Client will effectively manage mood dysregulation    I will know I've met my goal when I am feeling less out of control.      Objective #A (Client Action)    Status: continued- Date: 4/22/2019     Client will learn at least 3 mindfulness skills and practice one daily    Intervention(s)  Therapist will provide mindfulness training, psychoeducation, behavioral activation, and cognitive restructuring.    Objective #B  Client will use at least 3 coping skills for anxiety management in the next 12 weeks.    Status: continued- Date: 4/22/2019      Intervention(s)  Therapist will provide psychoeducation, behavioral activation, and cognitive restructuring.      Objective #C  Client will identify three distraction and diversion activities and use those activities to improve distress tolerance and emotional regulation.  Status: continued- Date: 4/22/2019     Intervention(s)  Therapist will provide psychoeducation, behavioral activation, and cognitive restructuring.    MeasurableTreatment Goal(s) related to diagnosis / functional impairment(s)            Goal-Depression: Client will decrease depressed mood    I will know I've met my goal when I am  less depressed.      Objective #A (Client Action)    Status: Continued- Date: 4/22/2019     Client will use identified behavioral and cognitive skills to challenge negative self talk 90% of the time.    Intervention(s)  Therapist will provide psychoeducation, behavioral activation, and cognitive restructuring.      Objective #B  Client will complete at least 10 minutes of self-regulation practice (e.g.: yoga, meditation, relaxation breathing, etc.) per day.    Status: Continued- Date: 4/22/2019     Intervention(s)  Therapist will provide psychoeducation, behavioral activation, and cognitive restructuring.      Objective #C  Client will exercise 30 minutes 36 times in the next 12 weeks.  Status: Continued- Date: 4/22/2019      Intervention(s)  Therapist will provide psychoeducation, behavioral activation, and cognitive restructuring.                 Client has reviewed and agreed to the above plan.      Justo Dawson, [facility]  February 20, 2017

## 2019-07-15 ENCOUNTER — OFFICE VISIT (OUTPATIENT)
Dept: BEHAVIORAL HEALTH | Facility: CLINIC | Age: 34
End: 2019-07-15
Payer: COMMERCIAL

## 2019-07-15 DIAGNOSIS — F31.81 BIPOLAR 2 DISORDER (H): Primary | ICD-10-CM

## 2019-07-15 PROCEDURE — 90834 PSYTX W PT 45 MINUTES: CPT | Performed by: SOCIAL WORKER

## 2019-07-15 NOTE — PROGRESS NOTES
"                                             Progress Note    Client Name: Ramiro Najera  Date: 7/15/2019          Service Type: Individual      Session Start Time: 1230 Session End Time: 315      Session Length: 38-52     Session #: 48     Attendees: Client attended alone    Treatment Plan Last Reviewed: 7/15/2019   PHQ-9 / DEANNA-7 :      DATA      Progress Since Last Session (Related to Symptoms / Goals / Homework):   Symptoms: Stable    Homework: Partially completed   1.  SIFT meditation daily    2.  Client will use one boundary management skill (i.e., direct communication, saying no to a request) by next session.    3.  Client will track and log cognitive distortions and bring to next session      4.  Personal Domain journaling 1+x/week by next session.      5.  Meet in 4 weeks     Episode of Care Goals: Satisfactory progress - ACTION (Actively working towards change); Intervened by reinforcing change plan / affirming steps taken     Current / Ongoing Stressors and Concerns:     Things have been \"I'm struggling.\"  In the process of getting ready to move out of his apartment and into his new apartment.  Lots of challenges with moving out and we discuss this in session today.  Discussed physical, mental, and emotional boundaries and limit-setting with others. Explored management of boundaries through direct communication and limiting contact and communication with others.  Discussed barriers to using boundary management skills including strong emotions and physical proximity.  Client given handout on boundaries today in session.             Treatment Objective(s) Addressed in This Session:   Client will complete at least 10 minutes of self-regulation practice (e.g.: yoga, meditation, relaxation breathing, etc.) per day.    -working out at the gym 3-4 times weekly    Client will use identified behavioral and cognitive skills to challenge negative self talk 90% of the time.   -personal domain "      Intervention:     CBT  Reviewed Stanley Larson' 3 Core Dysfunctional Beliefs and connection to anxiety and depression.  Discussed acceptance as the key to challenging these beliefs:    Self acceptance    I have flaws like everyone else    There is no reason I have to be perfect, no one else is    I am no more or less worthy than anybody else  Other acceptance    Other people will treat me unfairly sometimes    There is no reason they will always treat me fairly    Others who treat me unfairly are no more or less worthy than anybody else  Life acceptance    Life does not always work the way I want it to    There is no reason why life should work the way I want it to    Life may not always be pleasant, but it is never really that terrible and is almost always bearable             ASSESSMENT: Current Emotional / Mental Status (status of significant symptoms):   Risk status (Self / Other harm or suicidal ideation)   Client denies current fears or concerns for personal safety.   Client denies current or recent suicidal ideation or behaviors.   Client denies current or recent homicidal ideation or behaviors.   Client denies current or recent self injurious behavior or ideation.   Client denies other safety concerns.   A safety and risk management plan has not been developed at this time, however client was given the after-hours number / 911 should there be a change in any of these risk factors.     Appearance:   Appropriate    Eye Contact:   Good    Psychomotor Behavior: Retarded (Slowed)    Attitude:   Cooperative    Orientation:   All   Speech    Rate / Production: Monotone     Volume:  Normal    Mood:    Depressed    Affect:    Appropriate    Thought Content:  Clear    Thought Form:  Coherent  Logical    Insight:    Good      Medication Review:   No current psychiatric medications prescribed     Medication Compliance:   NA     Changes in Health Issues:   None reported     Chemical Use Review:   Substance Use:  Chemical use reviewed, no active concerns identified      Tobacco Use: No current tobacco use.       Collateral Reports Completed:   Not Applicable    PLAN: (Client Tasks / Therapist Tasks / Other)  1.  SIFT meditation daily    2.  Client will use one boundary management skill (i.e., direct communication, saying no to a request) by next session.    3.  Client will track and log cognitive distortions and bring to next session      4.  Personal Domain or Larson acceptance journaling 1+x/week by next session.      5.  Meet in 4 weeks        YUNIOR Bustos                                                         ________________________________________________________________________    Treatment Plan    Client's Name: Ramiro Najera  YOB: 1985    Date: 2/20/2017     DSM5 Diagnoses: (Sustained by DSM5 Criteria Listed Above)  Diagnoses: 296.89 Bipolar II Disorder With anxious distress        PTSD  Psychosocial & Contextual Factors: parents' health problems  WHODAS 2.0 (12 item)            This questionnaire asks about difficulties due to health conditions. Health conditions  include  disease or illnesses, other health problems that may be short or long lasting,  injuries, mental health or emotional problems, and problems with alcohol or drugs.                     Think back over the past 30 days and answer these questions, thinking about how much  difficulty you had doing the following activities. For each question, please [place] only  one response.    S1 Standing for long periods such as 30 minutes? None =         1   S2 Taking care of household responsibilities? Mild =           2   S3 Learning a new task, for example, learning how to get to a new place? None =         1   S4 How much of a problem do you have joining community activities (for example, festivals, [employer]ian[employer] or other activities) in the same way as anyone else can? Moderate =   3   S5 How much have you been emotionally affected by  your health problems? Moderate =   3     In the past 30 days, how much difficulty did you have in:   S6 Concentrating on doing something for ten minutes? Mild =           2   S7 Walking a long distance such as a kilometer (or equivalent)? None =         1   S8 Washing your whole body? None =         1   S9 Getting dressed? None =         1   S10 Dealing with people you do not know? None =         1   S11 Maintaining a friendship? Moderate =   3   S12 Your day to day work? None =         1     H1 Overall, in the past 30 days, how many days were these difficulties present? Record number of days 30   H2 In the past 30 days, for how many days were you totally unable to carry out your usual activities or work because of any health condition? Record number of days  0   H3 In the past 30 days, not counting the days that you were totally unable, for how many days did you cut back or reduce your usual activities or work because of any health condition? Record number of days 3       Referral / Collaboration:  Referral to another professional/service is not indicated at this time..    Anticipated number of session or this episode of care: 18-24      MeasurableTreatment Goal(s) related to diagnosis / functional impairment(s)  Goal-Emotional regulation: Client will effectively manage mood dysregulation    I will know I've met my goal when I am feeling less out of control.      Objective #A (Client Action)    Status: continued- Date: 7/15/2019     Client will learn at least 3 mindfulness skills and practice one daily    Intervention(s)  Therapist will provide mindfulness training, psychoeducation, behavioral activation, and cognitive restructuring.    Objective #B  Client will use at least 3 coping skills for anxiety management in the next 12 weeks.    Status: continued- Date: 7/15/2019     Intervention(s)  Therapist will provide psychoeducation, behavioral activation, and cognitive restructuring.      Objective #C  Client will  identify three distraction and diversion activities and use those activities to improve distress tolerance and emotional regulation.  Status: continued- Date: 7/15/2019     Intervention(s)  Therapist will provide psychoeducation, behavioral activation, and cognitive restructuring.    MeasurableTreatment Goal(s) related to diagnosis / functional impairment(s)            Goal-Depression: Client will decrease depressed mood    I will know I've met my goal when I am less depressed.      Objective #A (Client Action)    Status: Continued- Date: 7/15/2019     Client will use identified behavioral and cognitive skills to challenge negative self talk 90% of the time.    Intervention(s)  Therapist will provide psychoeducation, behavioral activation, and cognitive restructuring.      Objective #B  Client will complete at least 10 minutes of self-regulation practice (e.g.: yoga, meditation, relaxation breathing, etc.) per day.    Status: Continued- Date: 7/15/2019     Intervention(s)  Therapist will provide psychoeducation, behavioral activation, and cognitive restructuring.      Objective #C  Client will exercise 30 minutes 36 times in the next 12 weeks.  Status: Continued- Date: 7/15/2019     Intervention(s)  Therapist will provide psychoeducation, behavioral activation, and cognitive restructuring.                 Client has reviewed and agreed to the above plan.      Justo Dawson, [facility]  February 20, 2017

## 2019-07-30 ENCOUNTER — OFFICE VISIT (OUTPATIENT)
Dept: BEHAVIORAL HEALTH | Facility: CLINIC | Age: 34
End: 2019-07-30
Payer: COMMERCIAL

## 2019-07-30 DIAGNOSIS — F31.81 BIPOLAR 2 DISORDER (H): Primary | ICD-10-CM

## 2019-07-30 PROCEDURE — 90834 PSYTX W PT 45 MINUTES: CPT | Performed by: SOCIAL WORKER

## 2019-07-30 NOTE — PROGRESS NOTES
"                                             Progress Note    Client Name: Ramiro Najera  Date: 7/30/2019          Service Type: Individual      Session Start Time: 1230 Session End Time: 315      Session Length: 38-52     Session #: 49     Attendees: Client attended alone    Treatment Plan Last Reviewed: 7/15/2019   PHQ-9 / DEANNA-7 :      DATA      Progress Since Last Session (Related to Symptoms / Goals / Homework):   Symptoms: Stable    Homework: Partially completed   1.  SIFT meditation daily    2.  Client will use one boundary management skill (i.e., direct communication, saying no to a request) by next session.    3.  Client will track and log cognitive distortions and bring to next session      4.  Personal Domain or Neo acceptance journaling 1+x/week by next session.      5.  Meet in 4 weeks         Episode of Care Goals: Satisfactory progress - ACTION (Actively working towards change); Intervened by reinforcing change plan / affirming steps taken     Current / Ongoing Stressors and Concerns:     Things have been \"stressful and difficult, but the end is 12 hours in sight, feel hopeful and good getting to this point has been hard.\"  Moves out of his apartment soon and is looking forward to this.  Lots of stress with roommate over the last few weeks at home.  Some stress with work as well, a few big projects that the feels like he has not put all of his attention toward.  Also, he reports that his father went back into the hospital a few weeks ago.             Treatment Objective(s) Addressed in This Session:   Client will complete at least 10 minutes of self-regulation practice (e.g.: yoga, meditation, relaxation breathing, etc.) per day.    -working out at the gym 3-4 times weekly    Client will use identified behavioral and cognitive skills to challenge negative self talk 90% of the time.   -personal domain      Intervention:     CBT  Reviewed Stanley Larson' 3 Core Dysfunctional Beliefs and connection to " anxiety and depression.  Discussed acceptance as the key to challenging these beliefs:    Self acceptance    I have flaws like everyone else    There is no reason I have to be perfect, no one else is    I am no more or less worthy than anybody else  Other acceptance    Other people will treat me unfairly sometimes    There is no reason they will always treat me fairly    Others who treat me unfairly are no more or less worthy than anybody else  Life acceptance    Life does not always work the way I want it to    There is no reason why life should work the way I want it to    Life may not always be pleasant, but it is never really that terrible and is almost always bearable             ASSESSMENT: Current Emotional / Mental Status (status of significant symptoms):   Risk status (Self / Other harm or suicidal ideation)   Client denies current fears or concerns for personal safety.   Client denies current or recent suicidal ideation or behaviors.   Client denies current or recent homicidal ideation or behaviors.   Client denies current or recent self injurious behavior or ideation.   Client denies other safety concerns.   A safety and risk management plan has not been developed at this time, however client was given the after-hours number / 911 should there be a change in any of these risk factors.     Appearance:   Appropriate    Eye Contact:   Good    Psychomotor Behavior: Retarded (Slowed)    Attitude:   Cooperative    Orientation:   All   Speech    Rate / Production: Monotone     Volume:  Normal    Mood:    Depressed    Affect:    Appropriate    Thought Content:  Clear    Thought Form:  Coherent  Logical    Insight:    Good      Medication Review:   No current psychiatric medications prescribed     Medication Compliance:   NA     Changes in Health Issues:   None reported     Chemical Use Review:   Substance Use: Chemical use reviewed, no active concerns identified      Tobacco Use: No current tobacco use.        Collateral Reports Completed:   Not Applicable    PLAN: (Client Tasks / Therapist Tasks / Other)  1.  SIFT meditation daily    2.  Client will use one boundary management skill (i.e., direct communication, saying no to a request) by next session.    3.  Client will track and log cognitive distortions and bring to next session      4.  Personal Domain or Larson acceptance journaling 1+x/week by next session.      5.  Meet in 4 weeks        Justo Dawson, YUNIOR                                                         ________________________________________________________________________    Treatment Plan    Client's Name: Ramiro Najera  YOB: 1985    Date: 2/20/2017     DSM5 Diagnoses: (Sustained by DSM5 Criteria Listed Above)  Diagnoses: 296.89 Bipolar II Disorder With anxious distress        PTSD  Psychosocial & Contextual Factors: parents' health problems  WHODAS 2.0 (12 item)            This questionnaire asks about difficulties due to health conditions. Health conditions  include  disease or illnesses, other health problems that may be short or long lasting,  injuries, mental health or emotional problems, and problems with alcohol or drugs.                     Think back over the past 30 days and answer these questions, thinking about how much  difficulty you had doing the following activities. For each question, please [place] only  one response.    S1 Standing for long periods such as 30 minutes? None =         1   S2 Taking care of household responsibilities? Mild =           2   S3 Learning a new task, for example, learning how to get to a new place? None =         1   S4 How much of a problem do you have joining community activities (for example, festivals, [employer] or other activities) in the same way as anyone else can? Moderate =   3   S5 How much have you been emotionally affected by your health problems? Moderate =   3     In the past 30 days, how much difficulty did you have  in:   S6 Concentrating on doing something for ten minutes? Mild =           2   S7 Walking a long distance such as a kilometer (or equivalent)? None =         1   S8 Washing your whole body? None =         1   S9 Getting dressed? None =         1   S10 Dealing with people you do not know? None =         1   S11 Maintaining a friendship? Moderate =   3   S12 Your day to day work? None =         1     H1 Overall, in the past 30 days, how many days were these difficulties present? Record number of days 30   H2 In the past 30 days, for how many days were you totally unable to carry out your usual activities or work because of any health condition? Record number of days  0   H3 In the past 30 days, not counting the days that you were totally unable, for how many days did you cut back or reduce your usual activities or work because of any health condition? Record number of days 3       Referral / Collaboration:  Referral to another professional/service is not indicated at this time..    Anticipated number of session or this episode of care: 18-24      MeasurableTreatment Goal(s) related to diagnosis / functional impairment(s)  Goal-Emotional regulation: Client will effectively manage mood dysregulation    I will know I've met my goal when I am feeling less out of control.      Objective #A (Client Action)    Status: continued- Date: 7/15/2019     Client will learn at least 3 mindfulness skills and practice one daily    Intervention(s)  Therapist will provide mindfulness training, psychoeducation, behavioral activation, and cognitive restructuring.    Objective #B  Client will use at least 3 coping skills for anxiety management in the next 12 weeks.    Status: continued- Date: 7/15/2019     Intervention(s)  Therapist will provide psychoeducation, behavioral activation, and cognitive restructuring.      Objective #C  Client will identify three distraction and diversion activities and use those activities to improve distress  tolerance and emotional regulation.  Status: continued- Date: 7/15/2019     Intervention(s)  Therapist will provide psychoeducation, behavioral activation, and cognitive restructuring.    MeasurableTreatment Goal(s) related to diagnosis / functional impairment(s)            Goal-Depression: Client will decrease depressed mood    I will know I've met my goal when I am less depressed.      Objective #A (Client Action)    Status: Continued- Date: 7/15/2019     Client will use identified behavioral and cognitive skills to challenge negative self talk 90% of the time.    Intervention(s)  Therapist will provide psychoeducation, behavioral activation, and cognitive restructuring.      Objective #B  Client will complete at least 10 minutes of self-regulation practice (e.g.: yoga, meditation, relaxation breathing, etc.) per day.    Status: Continued- Date: 7/15/2019     Intervention(s)  Therapist will provide psychoeducation, behavioral activation, and cognitive restructuring.      Objective #C  Client will exercise 30 minutes 36 times in the next 12 weeks.  Status: Continued- Date: 7/15/2019     Intervention(s)  Therapist will provide psychoeducation, behavioral activation, and cognitive restructuring.                 Client has reviewed and agreed to the above plan.      Justo Dawson, [facility] HospitalSW  February 20, 2017

## 2019-08-29 ENCOUNTER — OFFICE VISIT (OUTPATIENT)
Dept: BEHAVIORAL HEALTH | Facility: CLINIC | Age: 34
End: 2019-08-29
Payer: COMMERCIAL

## 2019-08-29 DIAGNOSIS — F31.81 BIPOLAR 2 DISORDER (H): Primary | ICD-10-CM

## 2019-08-29 PROCEDURE — 90834 PSYTX W PT 45 MINUTES: CPT | Performed by: SOCIAL WORKER

## 2019-08-29 NOTE — PROGRESS NOTES
"                                             Progress Note    Client Name: Ramiro Najera  Date: 8/29/2019          Service Type: Individual      Session Start Time: 345 Session End Time: 430      Session Length: 38-52     Session #: 49     Attendees: Client attended alone    Treatment Plan Last Reviewed: 7/15/2019   PHQ-9 / DEANNA-7 :      DATA      Progress Since Last Session (Related to Symptoms / Goals / Homework):   Symptoms: Stable    Homework: Partially completed   1.  SIFT meditation daily    2.  Client will use one boundary management skill (i.e., direct communication, saying no to a request) by next session.    3.  Client will track and log cognitive distortions and bring to next session      4.  Personal Domain or Neo acceptance journaling 1+x/week by next session.      5.  Meet in 4 weeks         Episode of Care Goals: Satisfactory progress - ACTION (Actively working towards change); Intervened by reinforcing change plan / affirming steps taken     Current / Ongoing Stressors and Concerns:     Things have been \"moved successfully, the moving days were pretty orderly.\"  Since moving he has been getting focused on working out and eating right, practicing his obey.  Feels like he has been making some efforts to be more intentional and mindful in his interactions with others.  Relationship with Sherita continues to be good.  They are talking about moving in together and getting .          Treatment Objective(s) Addressed in This Session:   Client will complete at least 10 minutes of self-regulation practice (e.g.: yoga, meditation, relaxation breathing, etc.) per day.    -working out at the gym 3-4 times weekly    Client will use identified behavioral and cognitive skills to challenge negative self talk 90% of the time.   -personal domain      Intervention:     CBT  Reviewed Stanley Larson' 3 Core Dysfunctional Beliefs and connection to anxiety and depression.  Discussed acceptance as the key to " challenging these beliefs:    Self acceptance    I have flaws like everyone else    There is no reason I have to be perfect, no one else is    I am no more or less worthy than anybody else  Other acceptance    Other people will treat me unfairly sometimes    There is no reason they will always treat me fairly    Others who treat me unfairly are no more or less worthy than anybody else  Life acceptance    Life does not always work the way I want it to    There is no reason why life should work the way I want it to    Life may not always be pleasant, but it is never really that terrible and is almost always bearable             ASSESSMENT: Current Emotional / Mental Status (status of significant symptoms):   Risk status (Self / Other harm or suicidal ideation)   Client denies current fears or concerns for personal safety.   Client denies current or recent suicidal ideation or behaviors.   Client denies current or recent homicidal ideation or behaviors.   Client denies current or recent self injurious behavior or ideation.   Client denies other safety concerns.   A safety and risk management plan has not been developed at this time, however client was given the after-hours number / 911 should there be a change in any of these risk factors.     Appearance:   Appropriate    Eye Contact:   Good    Psychomotor Behavior: Retarded (Slowed)    Attitude:   Cooperative    Orientation:   All   Speech    Rate / Production: Monotone     Volume:  Normal    Mood:    Depressed    Affect:    Appropriate    Thought Content:  Clear    Thought Form:  Coherent  Logical    Insight:    Good      Medication Review:   No current psychiatric medications prescribed     Medication Compliance:   NA     Changes in Health Issues:   None reported     Chemical Use Review:   Substance Use: Chemical use reviewed, no active concerns identified      Tobacco Use: No current tobacco use.       Collateral Reports Completed:   Not Applicable    PLAN: (Client  Tasks / Therapist Tasks / Other)  1.  SIFT meditation daily    2.  Client will use one boundary management skill (i.e., direct communication, saying no to a request) by next session.    3.  Client will track and log cognitive distortions and bring to next session      4.  Personal Domain or Larson acceptance journaling 1+x/week by next session.      5.  Meet in 4 weeks        Justo Dawson, LICSW                                                         ________________________________________________________________________    Treatment Plan    Client's Name: Ramiro Najera  YOB: 1985    Date: 2/20/2017     DSM5 Diagnoses: (Sustained by DSM5 Criteria Listed Above)  Diagnoses: 296.89 Bipolar II Disorder With anxious distress        PTSD  Psychosocial & Contextual Factors: parents' health problems  WHODAS 2.0 (12 item)            This questionnaire asks about difficulties due to health conditions. Health conditions  include  disease or illnesses, other health problems that may be short or long lasting,  injuries, mental health or emotional problems, and problems with alcohol or drugs.                     Think back over the past 30 days and answer these questions, thinking about how much  difficulty you had doing the following activities. For each question, please [place] only  one response.    S1 Standing for long periods such as 30 minutes? None =         1   S2 Taking care of household responsibilities? Mild =           2   S3 Learning a new task, for example, learning how to get to a new place? None =         1   S4 How much of a problem do you have joining community activities (for example, festivals, L[employer]-day or other activities) in the same way as anyone else can? Moderate =   3   S5 How much have you been emotionally affected by your health problems? Moderate =   3     In the past 30 days, how much difficulty did you have in:   S6 Concentrating on doing something for ten minutes? Mild =            2   S7 Walking a long distance such as a kilometer (or equivalent)? None =         1   S8 Washing your whole body? None =         1   S9 Getting dressed? None =         1   S10 Dealing with people you do not know? None =         1   S11 Maintaining a friendship? Moderate =   3   S12 Your day to day work? None =         1     H1 Overall, in the past 30 days, how many days were these difficulties present? Record number of days 30   H2 In the past 30 days, for how many days were you totally unable to carry out your usual activities or work because of any health condition? Record number of days  0   H3 In the past 30 days, not counting the days that you were totally unable, for how many days did you cut back or reduce your usual activities or work because of any health condition? Record number of days 3       Referral / Collaboration:  Referral to another professional/service is not indicated at this time..    Anticipated number of session or this episode of care: 18-24      MeasurableTreatment Goal(s) related to diagnosis / functional impairment(s)  Goal-Emotional regulation: Client will effectively manage mood dysregulation    I will know I've met my goal when I am feeling less out of control.      Objective #A (Client Action)    Status: continued- Date: 7/15/2019     Client will learn at least 3 mindfulness skills and practice one daily    Intervention(s)  Therapist will provide mindfulness training, psychoeducation, behavioral activation, and cognitive restructuring.    Objective #B  Client will use at least 3 coping skills for anxiety management in the next 12 weeks.    Status: continued- Date: 7/15/2019     Intervention(s)  Therapist will provide psychoeducation, behavioral activation, and cognitive restructuring.      Objective #C  Client will identify three distraction and diversion activities and use those activities to improve distress tolerance and emotional regulation.  Status: continued- Date:  7/15/2019     Intervention(s)  Therapist will provide psychoeducation, behavioral activation, and cognitive restructuring.    MeasurableTreatment Goal(s) related to diagnosis / functional impairment(s)            Goal-Depression: Client will decrease depressed mood    I will know I've met my goal when I am less depressed.      Objective #A (Client Action)    Status: Continued- Date: 7/15/2019     Client will use identified behavioral and cognitive skills to challenge negative self talk 90% of the time.    Intervention(s)  Therapist will provide psychoeducation, behavioral activation, and cognitive restructuring.      Objective #B  Client will complete at least 10 minutes of self-regulation practice (e.g.: yoga, meditation, relaxation breathing, etc.) per day.    Status: Continued- Date: 7/15/2019     Intervention(s)  Therapist will provide psychoeducation, behavioral activation, and cognitive restructuring.      Objective #C  Client will exercise 30 minutes 36 times in the next 12 weeks.  Status: Continued- Date: 7/15/2019     Intervention(s)  Therapist will provide psychoeducation, behavioral activation, and cognitive restructuring.                 Client has reviewed and agreed to the above plan.      Justo Dawson, [facility] HospitalSW  February 20, 2017

## 2019-09-18 ENCOUNTER — MYC REFILL (OUTPATIENT)
Dept: FAMILY MEDICINE | Facility: CLINIC | Age: 34
End: 2019-09-18

## 2019-09-18 DIAGNOSIS — F31.81 BIPOLAR 2 DISORDER (H): ICD-10-CM

## 2019-09-18 NOTE — TELEPHONE ENCOUNTER
"Requested Prescriptions   Pending Prescriptions Disp Refills     traZODone (DESYREL) 50 MG tablet  This medication may not be due for a refill.    Last Written Prescription Date:  6/6/2019  Last Fill Quantity: 90 tablet,  # refills: 1   Last office visit: 6/6/2019 with prescribing provider:  TRAVIS Belle   Future Office Visit:   Next 5 appointments (look out 90 days)    Sep 25, 2019  8:30 AM CDT  Return Visit with Marcos Joseph [address] [address]  [phone]          90 tablet 1     Sig: Take 1 tablet (50 mg) by mouth At Bedtime       Serotonin Modulators Passed - 9/18/2019 12:04 PM        Passed - Recent (12 mo) or future (30 days) visit within the authorizing provider's specialty     Patient had office visit in the last 12 months or has a visit in the next 30 days with authorizing provider or within the authorizing provider's specialty.  See \"Patient Info\" tab in inbasket, or \"Choose Columns\" in Meds & Orders section of the refill encounter.              Passed - Medication is active on med list        Passed - Patient is age 18 or older          "

## 2019-09-19 RX ORDER — TRAZODONE HYDROCHLORIDE 50 MG/1
50 TABLET, FILM COATED ORAL AT BEDTIME
Qty: 90 TABLET | Refills: 1 | OUTPATIENT
Start: 2019-09-19

## 2019-09-25 ENCOUNTER — OFFICE VISIT (OUTPATIENT)
Dept: BEHAVIORAL HEALTH | Facility: CLINIC | Age: 34
End: 2019-09-25
Payer: COMMERCIAL

## 2019-09-25 DIAGNOSIS — F31.81 BIPOLAR 2 DISORDER (H): Primary | ICD-10-CM

## 2019-09-25 PROCEDURE — 90834 PSYTX W PT 45 MINUTES: CPT | Performed by: SOCIAL WORKER

## 2019-09-25 NOTE — PROGRESS NOTES
"                                             Progress Note    Client Name: Ramiro Najera  Date: 9/25/2019          Service Type: Individual      Session Start Time: 830 Session End Time: 915      Session Length: 38-52     Session #: 50     Attendees: Client attended alone    Treatment Plan Last Reviewed: 7/15/2019   PHQ-9 / DEANNA-7 :      DATA      Progress Since Last Session (Related to Symptoms / Goals / Homework):   Symptoms: Stable    Homework: Partially completed   1.  SIFT meditation daily    2.  Client will use one boundary management skill (i.e., direct communication, saying no to a request) by next session.    3.  Client will track and log cognitive distortions and bring to next session      4.  Personal Domain or Larson acceptance journaling 1+x/week by next session.      5.  Meet in 4 weeks         Episode of Care Goals: Satisfactory progress - ACTION (Actively working towards change); Intervened by reinforcing change plan / affirming steps taken     Current / Ongoing Stressors and Concerns:     Things have been \"good overall, some issues around disbelief that I am worthy of this.\"  Discussed negative thinking as part of the pathology of depression and reviewed ways of coping with this including identifying alternative explanations.  Started to look for a house with his girlfriend and they have met with a .           Treatment Objective(s) Addressed in This Session:   Client will complete at least 10 minutes of self-regulation practice (e.g.: yoga, meditation, relaxation breathing, etc.) per day.    -working out at the gym 3-4 times weekly    Client will use identified behavioral and cognitive skills to challenge negative self talk 90% of the time.   -personal domain      Intervention:     CBT  Discussed alternative explanations today in session.  Identified alternative explanations as primary method of challenging unhelpful thinking.  Provided handout of alternative explanations in " "session.  Reviewed concept of challenging cognitive distortions by recognizing strong emotions and asking challenging questions like \"is my reaction logical?\" , \"is my reaction helpful?\" , \"am I overreacting?\" , and \"what else could be going on that I am not considering?\"             ASSESSMENT: Current Emotional / Mental Status (status of significant symptoms):   Risk status (Self / Other harm or suicidal ideation)   Client denies current fears or concerns for personal safety.   Client denies current or recent suicidal ideation or behaviors.   Client denies current or recent homicidal ideation or behaviors.   Client denies current or recent self injurious behavior or ideation.   Client denies other safety concerns.   A safety and risk management plan has not been developed at this time, however client was given the after-hours number / 911 should there be a change in any of these risk factors.     Appearance:   Appropriate    Eye Contact:   Good    Psychomotor Behavior: Retarded (Slowed)    Attitude:   Cooperative    Orientation:   All   Speech    Rate / Production: Monotone     Volume:  Normal    Mood:    Depressed    Affect:    Appropriate    Thought Content:  Clear    Thought Form:  Coherent  Logical    Insight:    Good      Medication Review:   No current psychiatric medications prescribed     Medication Compliance:   NA     Changes in Health Issues:   None reported     Chemical Use Review:   Substance Use: Chemical use reviewed, no active concerns identified      Tobacco Use: No current tobacco use.       Collateral Reports Completed:   Not Applicable    PLAN: (Client Tasks / Therapist Tasks / Other)  1.  SIFT meditation daily    2.  Client will use alternative explanations at least once per day by next session.  Client will also rate mood and intensity of mood on a SUDS scale (1-10) before and after using alternative explanation at least once by next session.    3.  Client will track and log cognitive " distortions and bring to next session      4.  Personal Domain or Larson acceptance journaling 1+x/week by next session.      5.  Meet in 4 weeks        Justo Dawson, LICSW                                                         ________________________________________________________________________    Treatment Plan    Client's Name: Ramiro Najera  YOB: 1985    Date: 2/20/2017     DSM5 Diagnoses: (Sustained by DSM5 Criteria Listed Above)  Diagnoses: 296.89 Bipolar II Disorder With anxious distress        PTSD  Psychosocial & Contextual Factors: parents' health problems  WHODAS 2.0 (12 item)            This questionnaire asks about difficulties due to health conditions. Health conditions  include  disease or illnesses, other health problems that may be short or long lasting,  injuries, mental health or emotional problems, and problems with alcohol or drugs.                     Think back over the past 30 days and answer these questions, thinking about how much  difficulty you had doing the following activities. For each question, please [place] only  one response.    S1 Standing for long periods such as 30 minutes? None =         1   S2 Taking care of household responsibilities? Mild =           2   S3 Learning a new task, for example, learning how to get to a new place? None =         1   S4 How much of a problem do you have joining community activities (for example, festivals, Yazidi or other activities) in the same way as anyone else can? Moderate =   3   S5 How much have you been emotionally affected by your health problems? Moderate =   3     In the past 30 days, how much difficulty did you have in:   S6 Concentrating on doing something for ten minutes? Mild =           2   S7 Walking a long distance such as a kilometer (or equivalent)? None =         1   S8 Washing your whole body? None =         1   S9 Getting dressed? None =         1   S10 Dealing with people you do not know? None  =         1   S11 Maintaining a friendship? Moderate =   3   S12 Your day to day work? None =         1     H1 Overall, in the past 30 days, how many days were these difficulties present? Record number of days 30   H2 In the past 30 days, for how many days were you totally unable to carry out your usual activities or work because of any health condition? Record number of days  0   H3 In the past 30 days, not counting the days that you were totally unable, for how many days did you cut back or reduce your usual activities or work because of any health condition? Record number of days 3       Referral / Collaboration:  Referral to another professional/service is not indicated at this time..    Anticipated number of session or this episode of care: 18-24      MeasurableTreatment Goal(s) related to diagnosis / functional impairment(s)  Goal-Emotional regulation: Client will effectively manage mood dysregulation    I will know I've met my goal when I am feeling less out of control.      Objective #A (Client Action)    Status: continued- Date: 7/15/2019     Client will learn at least 3 mindfulness skills and practice one daily    Intervention(s)  Therapist will provide mindfulness training, psychoeducation, behavioral activation, and cognitive restructuring.    Objective #B  Client will use at least 3 coping skills for anxiety management in the next 12 weeks.    Status: continued- Date: 7/15/2019     Intervention(s)  Therapist will provide psychoeducation, behavioral activation, and cognitive restructuring.      Objective #C  Client will identify three distraction and diversion activities and use those activities to improve distress tolerance and emotional regulation.  Status: continued- Date: 7/15/2019     Intervention(s)  Therapist will provide psychoeducation, behavioral activation, and cognitive restructuring.    MeasurableTreatment Goal(s) related to diagnosis / functional impairment(s)            Goal-Depression:  Client will decrease depressed mood    I will know I've met my goal when I am less depressed.      Objective #A (Client Action)    Status: Continued- Date: 7/15/2019     Client will use identified behavioral and cognitive skills to challenge negative self talk 90% of the time.    Intervention(s)  Therapist will provide psychoeducation, behavioral activation, and cognitive restructuring.      Objective #B  Client will complete at least 10 minutes of self-regulation practice (e.g.: yoga, meditation, relaxation breathing, etc.) per day.    Status: Continued- Date: 7/15/2019     Intervention(s)  Therapist will provide psychoeducation, behavioral activation, and cognitive restructuring.      Objective #C  Client will exercise 30 minutes 36 times in the next 12 weeks.  Status: Continued- Date: 7/15/2019     Intervention(s)  Therapist will provide psychoeducation, behavioral activation, and cognitive restructuring.                 Client has reviewed and agreed to the above plan.      Justo Dawson, [facility]  February 20, 2017

## 2019-12-31 ENCOUNTER — OFFICE VISIT (OUTPATIENT)
Dept: BEHAVIORAL HEALTH | Facility: CLINIC | Age: 34
End: 2019-12-31
Payer: COMMERCIAL

## 2019-12-31 DIAGNOSIS — F31.81 BIPOLAR 2 DISORDER (H): Primary | ICD-10-CM

## 2019-12-31 PROCEDURE — 90834 PSYTX W PT 45 MINUTES: CPT | Performed by: SOCIAL WORKER

## 2019-12-31 NOTE — PROGRESS NOTES
"                                             Progress Note    Client Name: Ramiro Najera  Date: 12/31/2019          Service Type: Individual      Session Start Time: 130 Session End Time: 215      Session Length: 38-52     Session #: 1     Attendees: Client attended alone    Treatment Plan Last Reviewed: 7/15/2019   PHQ-9 / DEANNA-7 :      DATA      Progress Since Last Session (Related to Symptoms / Goals / Homework):   Symptoms: Stable    Homework: Partially completed        Episode of Care Goals: Satisfactory progress - ACTION (Actively working towards change); Intervened by reinforcing change plan / affirming steps taken     Current / Ongoing Stressors and Concerns:     First meeting in two months, new episode of care started today.  Things have been \"overwhelmingly good.\"  Bought a house with his girlfriend and they are living in [place].  Plans to ask her to  him early next year.  Some stress with parents and we discuss this today in session.  Loss also in that his uncle passed away suddenly last week and they had been close for years.  Reviewed stages of grief and loss today in session.  Client reports that his behavioral activation is not whaere it should be, has not been to the gym in months primarily because he has been busy with the new house and caring for his parents.  Set a goal today to have him get back to his working out, he feels better when he is consistent with this.             Treatment Objective(s) Addressed in This Session:   Client will complete at least 10 minutes of self-regulation practice (e.g.: yoga, meditation, relaxation breathing, etc.) per day.    Client to follow BA plan    Client will use identified behavioral and cognitive skills to challenge negative self talk 90% of the time.   Log ANTS, Cog Ds, and Alt Es     Intervention:     CBT  Discussed cognitive restructuring and behavioral activation.  Explored the connection between thoughts, feelings, and actions by using " examples relative to client's presenting concerns.  Explained major domains of symptoms (cognitive, emotional, somatic, behavioral, interpersonal) and importance of targeted and specific interventions to reduce symptoms of anxiety and depression.  Discussed role of symptom monitoring in cognitive behavioral treatment.           ASSESSMENT: Current Emotional / Mental Status (status of significant symptoms):   Risk status (Self / Other harm or suicidal ideation)   Client denies current fears or concerns for personal safety.   Client denies current or recent suicidal ideation or behaviors.   Client denies current or recent homicidal ideation or behaviors.   Client denies current or recent self injurious behavior or ideation.   Client denies other safety concerns.   A safety and risk management plan has not been developed at this time, however client was given the after-hours number / 911 should there be a change in any of these risk factors.     Appearance:   Appropriate    Eye Contact:   Good    Psychomotor Behavior: Retarded (Slowed)    Attitude:   Cooperative    Orientation:   All   Speech    Rate / Production: Monotone     Volume:  Normal    Mood:    Depressed    Affect:    Appropriate    Thought Content:  Clear    Thought Form:  Coherent  Logical    Insight:    Good      Medication Review:   No current psychiatric medications prescribed     Medication Compliance:   NA     Changes in Health Issues:   None reported     Chemical Use Review:   Substance Use: Chemical use reviewed, no active concerns identified      Tobacco Use: No current tobacco use.       Collateral Reports Completed:   Not Applicable    PLAN: (Client Tasks / Therapist Tasks / Other)  1.  Client will log activities of Achievement, Closeness, and Enjoyment (ACEs) using ACE rating scale and bring to next session.         2.  Log ANTS, Cog Ds, and Alt Es            Justo Dawson, LICSW                                                          ________________________________________________________________________    Treatment Plan    Client's Name: Ramiro Najera  YOB: 1985    Date: 2/20/2017     DSM5 Diagnoses: (Sustained by DSM5 Criteria Listed Above)  Diagnoses: 296.89 Bipolar II Disorder With anxious distress        PTSD  Psychosocial & Contextual Factors: parents' health problems  WHODAS 2.0 (12 item)            This questionnaire asks about difficulties due to health conditions. Health conditions  include  disease or illnesses, other health problems that may be short or long lasting,  injuries, mental health or emotional problems, and problems with alcohol or drugs.                     Think back over the past 30 days and answer these questions, thinking about how much  difficulty you had doing the following activities. For each question, please [place] only  one response.    S1 Standing for long periods such as 30 minutes? None =         1   S2 Taking care of household responsibilities? Mild =           2   S3 Learning a new task, for example, learning how to get to a new place? None =         1   S4 How much of a problem do you have joining community activities (for example, festivals, Orthodoxy or other activities) in the same way as anyone else can? Moderate =   3   S5 How much have you been emotionally affected by your health problems? Moderate =   3     In the past 30 days, how much difficulty did you have in:   S6 Concentrating on doing something for ten minutes? Mild =           2   S7 Walking a long distance such as a kilometer (or equivalent)? None =         1   S8 Washing your whole body? None =         1   S9 Getting dressed? None =         1   S10 Dealing with people you do not know? None =         1   S11 Maintaining a friendship? Moderate =   3   S12 Your day to day work? None =         1     H1 Overall, in the past 30 days, how many days were these difficulties present? Record number of days 30   H2 In the past  30 days, for how many days were you totally unable to carry out your usual activities or work because of any health condition? Record number of days  0   H3 In the past 30 days, not counting the days that you were totally unable, for how many days did you cut back or reduce your usual activities or work because of any health condition? Record number of days 3       Referral / Collaboration:  Referral to another professional/service is not indicated at this time..    Anticipated number of session or this episode of care: 18-24      MeasurableTreatment Goal(s) related to diagnosis / functional impairment(s)  Goal-Emotional regulation: Client will effectively manage mood dysregulation    I will know I've met my goal when I am feeling less out of control.      Objective #A (Client Action)    Status: continued- Date: 7/15/2019     Client will learn at least 3 mindfulness skills and practice one daily    Intervention(s)  Therapist will provide mindfulness training, psychoeducation, behavioral activation, and cognitive restructuring.    Objective #B  Client will use at least 3 coping skills for anxiety management in the next 12 weeks.    Status: continued- Date: 7/15/2019     Intervention(s)  Therapist will provide psychoeducation, behavioral activation, and cognitive restructuring.      Objective #C  Client will identify three distraction and diversion activities and use those activities to improve distress tolerance and emotional regulation.  Status: continued- Date: 7/15/2019     Intervention(s)  Therapist will provide psychoeducation, behavioral activation, and cognitive restructuring.    MeasurableTreatment Goal(s) related to diagnosis / functional impairment(s)            Goal-Depression: Client will decrease depressed mood    I will know I've met my goal when I am less depressed.      Objective #A (Client Action)    Status: Continued- Date: 7/15/2019     Client will use identified behavioral and cognitive skills to  challenge negative self talk 90% of the time.    Intervention(s)  Therapist will provide psychoeducation, behavioral activation, and cognitive restructuring.      Objective #B  Client will complete at least 10 minutes of self-regulation practice (e.g.: yoga, meditation, relaxation breathing, etc.) per day.    Status: Continued- Date: 7/15/2019     Intervention(s)  Therapist will provide psychoeducation, behavioral activation, and cognitive restructuring.      Objective #C  Client will exercise 30 minutes 36 times in the next 12 weeks.  Status: Continued- Date: 7/15/2019     Intervention(s)  Therapist will provide psychoeducation, behavioral activation, and cognitive restructuring.                 Client has reviewed and agreed to the above plan.      Justo Dawson, LICSW  February 20, 2017

## 2020-01-20 ENCOUNTER — OFFICE VISIT (OUTPATIENT)
Dept: BEHAVIORAL HEALTH | Facility: CLINIC | Age: 35
End: 2020-01-20
Payer: COMMERCIAL

## 2020-01-20 DIAGNOSIS — F31.81 BIPOLAR 2 DISORDER (H): Primary | ICD-10-CM

## 2020-01-20 PROCEDURE — 90834 PSYTX W PT 45 MINUTES: CPT | Performed by: SOCIAL WORKER

## 2020-01-20 NOTE — PROGRESS NOTES
"                                             Progress Note    Client Name: Ramiro Najera  Date: 1/20/2020            Service Type: Individual      Session Start Time: 830 Session End Time: 915      Session Length: 38-52     Session #: 2     Attendees: Client attended alone    Treatment Plan Last Reviewed: 1/20/2020   PHQ-9 / DEANNA-7 :      DATA      Progress Since Last Session (Related to Symptoms / Goals / Homework):   Symptoms: Stable    Homework: Partially completed        Episode of Care Goals: Satisfactory progress - ACTION (Actively working towards change); Intervened by reinforcing change plan / affirming steps taken     Current / Ongoing Stressors and Concerns:     Things have been \"in a holding pattern.\"  Getting settled into the new house with gf.  Long discussion today about getting back in to the rounties around health and fitness that he had been doing over the summer.             Treatment Objective(s) Addressed in This Session:   Client will complete at least 10 minutes of self-regulation practice (e.g.: yoga, meditation, relaxation breathing, etc.) per day.    Client to follow BA plan    Client will use identified behavioral and cognitive skills to challenge negative self talk 90% of the time.   Log ANTS, Cog Ds, and Alt Es     Intervention:     CBT- reviewed schema journaling today in session    Discussed cognitive therapy today in session.  Reviewed that cognitive therapy addresses cognitions on three levels: automatic thoughts, conditional assumptions, and core beliefs.  Identified that way that these three levels of cognition can influence emotions, behaviors, and body sensations.  Discussed interpersonal strategies informed by these three levels of cognitive dysfunction that the patient has used in the past.         ASSESSMENT: Current Emotional / Mental Status (status of significant symptoms):   Risk status (Self / Other harm or suicidal ideation)   Client denies current fears or concerns for " personal safety.   Client denies current or recent suicidal ideation or behaviors.   Client denies current or recent homicidal ideation or behaviors.   Client denies current or recent self injurious behavior or ideation.   Client denies other safety concerns.   A safety and risk management plan has not been developed at this time, however client was given the after-hours number / 911 should there be a change in any of these risk factors.     Appearance:   Appropriate    Eye Contact:   Good    Psychomotor Behavior: Retarded (Slowed)    Attitude:   Cooperative    Orientation:   All   Speech    Rate / Production: Monotone     Volume:  Normal    Mood:    Depressed    Affect:    Appropriate    Thought Content:  Clear    Thought Form:  Coherent  Logical    Insight:    Good      Medication Review:   No current psychiatric medications prescribed     Medication Compliance:   NA     Changes in Health Issues:   None reported     Chemical Use Review:   Substance Use: Chemical use reviewed, no active concerns identified      Tobacco Use: No current tobacco use.       Collateral Reports Completed:   Not Applicable    PLAN: (Client Tasks / Therapist Tasks / Other)  1.  Client will log activities of Achievement, Closeness, and Enjoyment (ACEs) using ACE rating scale and bring to next session.         2.  Log ANTS, Cog Ds, and Alt Es  3.  Client will read handouts on cognitive therapy by next session.            Justo Dawson, LincolnHealthSW                                                         ________________________________________________________________________    Treatment Plan    Client's Name: Ramiro Najera  YOB: 1985    Date: 2/20/2017     DSM5 Diagnoses: (Sustained by DSM5 Criteria Listed Above)  Diagnoses: 296.89 Bipolar II Disorder With anxious distress        PTSD  Psychosocial & Contextual Factors: parents' health problems  WHODAS 2.0 (12 item)            This questionnaire asks about difficulties due  to health conditions. Health conditions  include  disease or illnesses, other health problems that may be short or long lasting,  injuries, mental health or emotional problems, and problems with alcohol or drugs.                     Think back over the past 30 days and answer these questions, thinking about how much  difficulty you had doing the following activities. For each question, please [place] only  one response.    S1 Standing for long periods such as 30 minutes? None =         1   S2 Taking care of household responsibilities? Mild =           2   S3 Learning a new task, for example, learning how to get to a new place? None =         1   S4 How much of a problem do you have joining community activities (for example, festivals, Voodoo or other activities) in the same way as anyone else can? Moderate =   3   S5 How much have you been emotionally affected by your health problems? Moderate =   3     In the past 30 days, how much difficulty did you have in:   S6 Concentrating on doing something for ten minutes? Mild =           2   S7 Walking a long distance such as a kilometer (or equivalent)? None =         1   S8 Washing your whole body? None =         1   S9 Getting dressed? None =         1   S10 Dealing with people you do not know? None =         1   S11 Maintaining a friendship? Moderate =   3   S12 Your day to day work? None =         1     H1 Overall, in the past 30 days, how many days were these difficulties present? Record number of days 30   H2 In the past 30 days, for how many days were you totally unable to carry out your usual activities or work because of any health condition? Record number of days  0   H3 In the past 30 days, not counting the days that you were totally unable, for how many days did you cut back or reduce your usual activities or work because of any health condition? Record number of days 3       Referral / Collaboration:  Referral to another professional/service is not  indicated at this time..    Anticipated number of session or this episode of care: 18-24      MeasurableTreatment Goal(s) related to diagnosis / functional impairment(s)  Goal-Emotional regulation: Client will effectively manage mood dysregulation    I will know I've met my goal when I am feeling less out of control.      Objective #A (Client Action)    Status: continued- Date: 1/20/2020     Client will learn at least 3 mindfulness skills and practice one daily    Intervention(s)  Therapist will provide mindfulness training, psychoeducation, behavioral activation, and cognitive restructuring.    Objective #B  Client will use at least 3 coping skills for anxiety management in the next 12 weeks.    Status: continued- Date: 1/20/2020     Intervention(s)  Therapist will provide psychoeducation, behavioral activation, and cognitive restructuring.      Objective #C  Client will identify three distraction and diversion activities and use those activities to improve distress tolerance and emotional regulation.  Status: continued- Date: 1/20/2020     Intervention(s)  Therapist will provide psychoeducation, behavioral activation, and cognitive restructuring.    MeasurableTreatment Goal(s) related to diagnosis / functional impairment(s)            Goal-Depression: Client will decrease depressed mood    I will know I've met my goal when I am less depressed.      Objective #A (Client Action)    Status: Continued- Date: 1/20/2020     Client will use identified behavioral and cognitive skills to challenge negative self talk 90% of the time.    Intervention(s)  Therapist will provide psychoeducation, behavioral activation, and cognitive restructuring.      Objective #B  Client will complete at least 10 minutes of self-regulation practice (e.g.: yoga, meditation, relaxation breathing, etc.) per day.    Status: Continued- Date: 1/20/2020     Intervention(s)  Therapist will provide psychoeducation, behavioral activation, and cognitive  restructuring.      Objective #C  Client will exercise 30 minutes 36 times in the next 12 weeks.  Status: Continued- Date: 1/20/2020     Intervention(s)  Therapist will provide psychoeducation, behavioral activation, and cognitive restructuring.                 Client has reviewed and agreed to the above plan.      Justo Dawson, [facility]  February 20, 2017

## 2020-01-24 ENCOUNTER — OFFICE VISIT (OUTPATIENT)
Dept: FAMILY MEDICINE | Facility: CLINIC | Age: 35
End: 2020-01-24
Payer: COMMERCIAL

## 2020-01-24 VITALS
TEMPERATURE: 97.7 F | BODY MASS INDEX: 34.22 KG/M2 | HEART RATE: 88 BPM | DIASTOLIC BLOOD PRESSURE: 78 MMHG | HEIGHT: 71 IN | SYSTOLIC BLOOD PRESSURE: 138 MMHG | WEIGHT: 244.4 LBS

## 2020-01-24 DIAGNOSIS — F51.05 INSOMNIA DUE TO OTHER MENTAL DISORDER: ICD-10-CM

## 2020-01-24 DIAGNOSIS — F99 INSOMNIA DUE TO OTHER MENTAL DISORDER: ICD-10-CM

## 2020-01-24 DIAGNOSIS — F31.81 BIPOLAR 2 DISORDER (H): Primary | ICD-10-CM

## 2020-01-24 PROCEDURE — 90686 IIV4 VACC NO PRSV 0.5 ML IM: CPT | Performed by: FAMILY MEDICINE

## 2020-01-24 PROCEDURE — 99213 OFFICE O/P EST LOW 20 MIN: CPT | Mod: 25 | Performed by: FAMILY MEDICINE

## 2020-01-24 PROCEDURE — 90471 IMMUNIZATION ADMIN: CPT | Performed by: FAMILY MEDICINE

## 2020-01-24 RX ORDER — TRAZODONE HYDROCHLORIDE 50 MG/1
50 TABLET, FILM COATED ORAL AT BEDTIME
Qty: 90 TABLET | Refills: 1 | Status: SHIPPED | OUTPATIENT
Start: 2020-01-24 | End: 2021-02-11

## 2020-01-24 ASSESSMENT — ANXIETY QUESTIONNAIRES
3. WORRYING TOO MUCH ABOUT DIFFERENT THINGS: SEVERAL DAYS
GAD7 TOTAL SCORE: 9
IF YOU CHECKED OFF ANY PROBLEMS ON THIS QUESTIONNAIRE, HOW DIFFICULT HAVE THESE PROBLEMS MADE IT FOR YOU TO DO YOUR WORK, TAKE CARE OF THINGS AT HOME, OR GET ALONG WITH OTHER PEOPLE: SOMEWHAT DIFFICULT
7. FEELING AFRAID AS IF SOMETHING AWFUL MIGHT HAPPEN: SEVERAL DAYS
6. BECOMING EASILY ANNOYED OR IRRITABLE: SEVERAL DAYS
1. FEELING NERVOUS, ANXIOUS, OR ON EDGE: MORE THAN HALF THE DAYS
2. NOT BEING ABLE TO STOP OR CONTROL WORRYING: MORE THAN HALF THE DAYS
5. BEING SO RESTLESS THAT IT IS HARD TO SIT STILL: NOT AT ALL

## 2020-01-24 ASSESSMENT — MIFFLIN-ST. JEOR: SCORE: 2071.75

## 2020-01-24 ASSESSMENT — PATIENT HEALTH QUESTIONNAIRE - PHQ9
SUM OF ALL RESPONSES TO PHQ QUESTIONS 1-9: 13
5. POOR APPETITE OR OVEREATING: MORE THAN HALF THE DAYS

## 2020-01-24 NOTE — PROGRESS NOTES
Subjective     Ramiro Najera is a 34 year old male who presents to clinic today for the following health issues:    HPI   Depression Followup     How are you doing with your depression since your last visit? Worsened due to life situations, patient is aware of struggles and why he has been feeling depressed.     Are you having other symptoms that might be associated with depression? Yes:  sleepless, anxiousness    Have you had a significant life event?  OTHER: recent move and health issues with parents     Are you feeling anxious or having panic attacks?   Yes:  anxiousness    Do you have any concerns with your use of alcohol or other drugs? No      Patient reports that the Trazodone medication has helped significantly with sleeping and mood     Anxious  Irritable   Wake up constantly   Weight gain about 30 lbs       Social History     Tobacco Use     Smoking status: Passive Smoke Exposure - Never Smoker     Smokeless tobacco: Never Used     Tobacco comment: lived with a smoker until he was 17 years old   Substance Use Topics     Alcohol use: Yes     Comment: a couple beers a month     Drug use: No     PHQ 12/14/2018 6/6/2019 1/24/2020   PHQ-9 Total Score 10 10 13   Q9: Thoughts of better off dead/self-harm past 2 weeks Several days Not at all Not at all     DEANNA-7 SCORE 11/12/2018 12/14/2018 1/24/2020   Total Score 13 10 9     Last PHQ-9 1/24/2020   1.  Little interest or pleasure in doing things 1   2.  Feeling down, depressed, or hopeless 2   3.  Trouble falling or staying asleep, or sleeping too much 3   4.  Feeling tired or having little energy 3   5.  Poor appetite or overeating 2   6.  Feeling bad about yourself 1   7.  Trouble concentrating 1   8.  Moving slowly or restless 0   Q9: Thoughts of better off dead/self-harm past 2 weeks 0   PHQ-9 Total Score 13   Difficulty at work, home, or with people Somewhat difficult     DEANNA-7  1/24/2020   1. Feeling nervous, anxious, or on edge 2   2. Not being able to  stop or control worrying 2   3. Worrying too much about different things 1   4. Trouble relaxing 2   5. Being so restless that it is hard to sit still 0   6. Becoming easily annoyed or irritable 1   7. Feeling afraid, as if something awful might happen 1   DEANNA-7 Total Score 9   If you checked any problems, how difficult have they made it for you to do your work, take care of things at home, or get along with other people? Somewhat difficult     Suicide Assessment Five-step Evaluation and Treatment (SAFE-T)      How many servings of fruits and vegetables do you eat daily?  2-3    On average, how many sweetened beverages do you drink each day (Examples: soda, juice, sweet tea, etc.  Do NOT count diet or artificially sweetened beverages)?   3    How many days per week do you exercise enough to make your heart beat faster? 3 or less    How many minutes a day do you exercise enough to make your heart beat faster? 30 - 60  How many days per week do you miss taking your medication? This week because patient is out of medications    What makes it hard for you to take your medications?  out of medications        BP Readings from Last 3 Encounters:   01/24/20 138/78   06/06/19 124/80   12/14/18 124/76    Wt Readings from Last 3 Encounters:   01/24/20 110.9 kg (244 lb 6.4 oz)   06/06/19 97.9 kg (215 lb 12.8 oz)   12/14/18 103 kg (227 lb)         Reviewed and updated as needed this visit by Provider         Review of Systems   10 point ROS of systems including Constitutional, Eyes, Respiratory, Cardiovascular, Gastroenterology, Genitourinary, Integumentary, Muscularskeletal, Psychiatric were all negative except for pertinent positives noted in my HPI.    This document serves as a record of the services and decisions personally performed and made by Samantha Belle DO. It was created on her behalf by Kaci Perry, waqas medical scribe. The creation of this document is based on the provider's statements to the medical  "kalen.        Objective    /78 (BP Location: Right arm, Patient Position: Chair, Cuff Size: Adult Large)   Pulse 88   Temp 97.7  F (36.5  C) (Oral)   Ht 1.797 m (5' 10.75\")   Wt 110.9 kg (244 lb 6.4 oz)   BMI 34.33 kg/m    Body mass index is 34.33 kg/m .  Physical Exam   GENERAL: healthy, alert and no distress  HENT: ear canals and TM's normal, nose and mouth without ulcers or lesions  NECK: no adenopathy, no asymmetry, masses, or scars and thyroid normal to palpation  RESP: lungs clear to auscultation - no rales, rhonchi or wheezes  CV: regular rate and rhythm, normal S1 S2, no S3 or S4, no murmur, click or rub, no peripheral edema and peripheral pulses strong    Diagnostic Test Results:  Labs reviewed in Epic  none         Assessment & Plan     1. Bipolar 2 disorder (H)  Controlled with medication, Continue taking medication. Follow up as needed.   - traZODone (DESYREL) 50 MG tablet; Take 1 tablet (50 mg) by mouth At Bedtime  Dispense: 90 tablet; Refill: 1    2. Insomnia due to other mental disorder  Controlled with medication, Continue current management plan.       BMI:   Estimated body mass index is 34.33 kg/m  as calculated from the following:    Height as of this encounter: 1.797 m (5' 10.75\").    Weight as of this encounter: 110.9 kg (244 lb 6.4 oz).   Weight management plan: Discussed healthy diet and exercise guidelines    Reviewed preventive health counseling, as reflected in patient instructions       Immunizations    Vaccinated for: Influenza (today)        Patient Instructions   Medication refills today.      Return in about 2 weeks (around 2/7/2020) for BP Recheck.    Length of visit was 10 minutes with more than 50 percent of that time used for discussing medical concerns and education    The information in this document, created by the medical scribe, Kaci Perry, for me, accurately reflects the services I personally performed and the decisions made by me. I have reviewed and " approved this document for accuracy prior to leaving the patient care area. 11:06 AM 1/24/2020    Samantha Belle DO  [facility] [place]

## 2020-01-25 ASSESSMENT — ANXIETY QUESTIONNAIRES: GAD7 TOTAL SCORE: 9

## 2020-02-20 ENCOUNTER — OFFICE VISIT (OUTPATIENT)
Dept: BEHAVIORAL HEALTH | Facility: CLINIC | Age: 35
End: 2020-02-20
Payer: COMMERCIAL

## 2020-02-20 DIAGNOSIS — F31.81 BIPOLAR 2 DISORDER (H): Primary | ICD-10-CM

## 2020-02-20 PROCEDURE — 90834 PSYTX W PT 45 MINUTES: CPT | Performed by: SOCIAL WORKER

## 2020-02-20 NOTE — PROGRESS NOTES
"                                              Progress Note    Client Name: Ramiro Najera  Date: 2/20/2020            Service Type: Individual      Session Start Time: 730 Session End Time: 815      Session Length: 38-52     Session #: 3     Attendees: Client attended alone    Treatment Plan Last Reviewed: 1/20/2020   PHQ-9 / DEANNA-7 :      DATA      Progress Since Last Session (Related to Symptoms / Goals / Homework):   Symptoms: Stable    Homework: Partially completed        Episode of Care Goals: Satisfactory progress - ACTION (Actively working towards change); Intervened by reinforcing change plan / affirming steps taken     Current / Ongoing Stressors and Concerns:     Things have been \"overwhelmingly good.\"  Still having some trouble with motivation.  Would like to be making some progress on fitness and would like to get back to the gym.  Feeling \"fat, lazy, and tired\" over the past few months.  Also knows that he is struggling to make time to be there, has found it hard to prioritize this.                  Treatment Objective(s) Addressed in This Session:   Client will complete at least 10 minutes of self-regulation practice (e.g.: yoga, meditation, relaxation breathing, etc.) per day.    Client to follow BA plan    Client will use identified behavioral and cognitive skills to challenge negative self talk 90% of the time.   Log ANTS, Cog Ds, and Alt Es     Intervention:     CBT- reviewed schema journaling today in session and explored themes of self-worth and putting the needs of others before those of self    Reviewed cognitive therapy today in session.  Reviewed that cognitive therapy addresses cognitions on three levels: automatic thoughts, conditional assumptions, and core beliefs.  Identified that way that these three levels of cognition can influence emotions, behaviors, and body sensations.  Discussed interpersonal strategies informed by these three levels of cognitive dysfunction that the patient has " used in the past.         ASSESSMENT: Current Emotional / Mental Status (status of significant symptoms):   Risk status (Self / Other harm or suicidal ideation)   Client denies current fears or concerns for personal safety.   Client denies current or recent suicidal ideation or behaviors.   Client denies current or recent homicidal ideation or behaviors.   Client denies current or recent self injurious behavior or ideation.   Client denies other safety concerns.   A safety and risk management plan has not been developed at this time, however client was given the after-hours number / 911 should there be a change in any of these risk factors.     Appearance:   Appropriate    Eye Contact:   Good    Psychomotor Behavior: Retarded (Slowed)    Attitude:   Cooperative    Orientation:   All   Speech    Rate / Production: Monotone     Volume:  Normal    Mood:    Depressed    Affect:    Appropriate    Thought Content:  Clear    Thought Form:  Coherent  Logical    Insight:    Good      Medication Review:   No current psychiatric medications prescribed     Medication Compliance:   NA     Changes in Health Issues:   None reported     Chemical Use Review:   Substance Use: Chemical use reviewed, no active concerns identified      Tobacco Use: No current tobacco use.       Collateral Reports Completed:   Not Applicable    PLAN: (Client Tasks / Therapist Tasks / Other)  1.  Client will log activities of Achievement, Closeness, and Enjoyment (ACEs) using ACE rating scale and bring to next session.         2.  Log ANTS, Cog Ds, and Alt Es  3.  Client will read handouts on cognitive therapy by next session.            Justo Dawson, [facility]                                                         ________________________________________________________________________    Treatment Plan    Client's Name: Ramiro Najera  YOB: 1985    Date: 2/20/2017     DSM5 Diagnoses: (Sustained by DSM5 Criteria Listed  Above)  Diagnoses: 296.89 Bipolar II Disorder With anxious distress        PTSD  Psychosocial & Contextual Factors: parents' health problems  WHODAS 2.0 (12 item)            This questionnaire asks about difficulties due to health conditions. Health conditions  include  disease or illnesses, other health problems that may be short or long lasting,  injuries, mental health or emotional problems, and problems with alcohol or drugs.                     Think back over the past 30 days and answer these questions, thinking about how much  difficulty you had doing the following activities. For each question, please [place] only  one response.    S1 Standing for long periods such as 30 minutes? None =         1   S2 Taking care of household responsibilities? Mild =           2   S3 Learning a new task, for example, learning how to get to a new place? None =         1   S4 How much of a problem do you have joining community activities (for example, festivals, [employer] or other activities) in the same way as anyone else can? Moderate =   3   S5 How much have you been emotionally affected by your health problems? Moderate =   3     In the past 30 days, how much difficulty did you have in:   S6 Concentrating on doing something for ten minutes? Mild =           2   S7 Walking a long distance such as a kilometer (or equivalent)? None =         1   S8 Washing your whole body? None =         1   S9 Getting dressed? None =         1   S10 Dealing with people you do not know? None =         1   S11 Maintaining a friendship? Moderate =   3   S12 Your day to day work? None =         1     H1 Overall, in the past 30 days, how many days were these difficulties present? Record number of days 30   H2 In the past 30 days, for how many days were you totally unable to carry out your usual activities or work because of any health condition? Record number of days  0   H3 In the past 30 days, not counting the days that you were totally unable,  for how many days did you cut back or reduce your usual activities or work because of any health condition? Record number of days 3       Referral / Collaboration:  Referral to another professional/service is not indicated at this time..    Anticipated number of session or this episode of care: 18-24      MeasurableTreatment Goal(s) related to diagnosis / functional impairment(s)  Goal-Emotional regulation: Client will effectively manage mood dysregulation    I will know I've met my goal when I am feeling less out of control.      Objective #A (Client Action)    Status: continued- Date: 1/20/2020     Client will learn at least 3 mindfulness skills and practice one daily    Intervention(s)  Therapist will provide mindfulness training, psychoeducation, behavioral activation, and cognitive restructuring.    Objective #B  Client will use at least 3 coping skills for anxiety management in the next 12 weeks.    Status: continued- Date: 1/20/2020     Intervention(s)  Therapist will provide psychoeducation, behavioral activation, and cognitive restructuring.      Objective #C  Client will identify three distraction and diversion activities and use those activities to improve distress tolerance and emotional regulation.  Status: continued- Date: 1/20/2020     Intervention(s)  Therapist will provide psychoeducation, behavioral activation, and cognitive restructuring.    MeasurableTreatment Goal(s) related to diagnosis / functional impairment(s)            Goal-Depression: Client will decrease depressed mood    I will know I've met my goal when I am less depressed.      Objective #A (Client Action)    Status: Continued- Date: 1/20/2020     Client will use identified behavioral and cognitive skills to challenge negative self talk 90% of the time.    Intervention(s)  Therapist will provide psychoeducation, behavioral activation, and cognitive restructuring.      Objective #B  Client will complete at least 10 minutes of  self-regulation practice (e.g.: yoga, meditation, relaxation breathing, etc.) per day.    Status: Continued- Date: 1/20/2020     Intervention(s)  Therapist will provide psychoeducation, behavioral activation, and cognitive restructuring.      Objective #C  Client will exercise 30 minutes 36 times in the next 12 weeks.  Status: Continued- Date: 1/20/2020     Intervention(s)  Therapist will provide psychoeducation, behavioral activation, and cognitive restructuring.                 Client has reviewed and agreed to the above plan.      Justo Dawson, LICSW  February 20, 2017

## 2020-10-07 NOTE — PROGRESS NOTES
"                                             Progress Note    Client Name: Ramiro Najera  Date: 4/22/2019          Service Type: Individual      Session Start Time: 730 Session End Time: 815      Session Length: 38-52     Session #: 45     Attendees: Client attended alone    Treatment Plan Last Reviewed: 4/22/2019   PHQ-9 / DEANNA-7 :      DATA      Progress Since Last Session (Related to Symptoms / Goals / Homework):   Symptoms: Stable    Homework: Partially completed  1.  SIFT meditation daily    2.  Client will track and log cognitive distortions and bring to next session     3.   Meet in two weeks     Episode of Care Goals: Satisfactory progress - ACTION (Actively working towards change); Intervened by reinforcing change plan / affirming steps taken     Current / Ongoing Stressors and Concerns:     Things have been \"very similar, lots of nicol in parts of my life and stress in others.\"  Not getting along well with roommate, and not looking forward to living there for the next three months.  Not sure how he is going to do it.           Treatment Objective(s) Addressed in This Session:   Client will complete at least 10 minutes of self-regulation practice (e.g.: yoga, meditation, relaxation breathing, etc.) per day.    -working out at the gym 3-4 times weekly    Client will use identified behavioral and cognitive skills to challenge negative self talk 90% of the time.   -reviewed recognizing cognitive disortions today in session     Intervention:     CBT   Discussed concept of cognitive distortions today in session.  Explored connection between automatic thinking and cognitive distortions.  Discussed common examples (e.g., catastrophizing, mindreading, dichotomous thinking) of cognitive distortions in session.  Handout provided.  Discussed connection between cognitive distortions and depression, anxiety, and stress.  Introduced concept of challenging cognitive distortions by asking \"is this reaction logical?\" , \"is " Surgery Progress Note


Subjective


Additional Comments


plan trach tomorrow AM 


fi02 50%


more comfortable





Objective





Last 24 Hour Vital Signs








  Date Time  Temp Pulse Resp B/P (MAP) Pulse Ox O2 Delivery O2 Flow Rate FiO2


 


10/7/20 14:00  57 32 105/47 (66) 95   


 


10/7/20 13:00  55 25 104/45 (64) 95   


 


10/7/20 12:00        50


 


10/7/20 12:00  68      


 


10/7/20 12:00  68 26 113/59 (77) 93   


 


10/7/20 12:00      Mechanical Ventilator  





      Mechanical Ventilator  





      Mechanical Ventilator  


 


10/7/20 11:00  62 28 115/56 (75) 95   


 


10/7/20 10:00  65 24 123/53 (76) 94   


 


10/7/20 10:00  65 27 123/53 (76) 95   


 


10/7/20 09:00  66 22 110/49 (69) 94   


 


10/7/20 08:20        50


 


10/7/20 08:00 98.3 68 21 99/47 (64) 96   


 


10/7/20 08:00        50


 


10/7/20 08:00  68      


 


10/7/20 08:00      Mechanical Ventilator  





      Mechanical Ventilator  





      Mechanical Ventilator  


 


10/7/20 07:30  68 24     55


 


10/7/20 07:00  66 21 95/54 (68) 92   


 


10/7/20 06:00  82 24 126/53 (77) 94   


 


10/7/20 05:00  59 24 110/54 (72) 95   


 


10/7/20 04:00 98.2 59 23 99/44 (62) 96   


 


10/7/20 04:00        55


 


10/7/20 04:00      Mechanical Ventilator  





      Mechanical Ventilator  





      Mechanical Ventilator  


 


10/7/20 04:00  61      


 


10/7/20 03:20  59 24     55


 


10/7/20 03:00  60 24 104/36 (58) 96   


 


10/7/20 02:00  70 21 127/54 (78) 96   


 


10/7/20 01:00  61 24 119/58 (78) 96   


 


10/7/20 00:00 98.4 62 25 113/52 (72) 95   


 


10/7/20 00:00        55


 


10/7/20 00:00      Mechanical Ventilator  





      Mechanical Ventilator  





      Mechanical Ventilator  


 


10/7/20 00:00  63      


 


10/6/20 23:53  61 24     55


 


10/6/20 23:00  65 23 103/48 (66) 97   


 


10/6/20 22:00  68 24 105/52 (69) 94   


 


10/6/20 21:10  64 24     55


 


10/6/20 21:00  67 25 89/43 (58) 96   


 


10/6/20 20:00  65      


 


10/6/20 20:00        55


 


10/6/20 20:00      Mechanical Ventilator  





      Mechanical Ventilator  





      Mechanical Ventilator  


 


10/6/20 20:00 98.0 75 27 109/79 (89) 93   


 


10/6/20 19:00  61 24 98/57 (71) 95   


 


10/6/20 18:00  58 25 96/45 (62) 96   


 


10/6/20 17:00  59 22 99/44 (62) 94   


 


10/6/20 16:00        55


 


10/6/20 16:00  60      


 


10/6/20 16:00      Mechanical Ventilator  





      Mechanical Ventilator  





      Mechanical Ventilator  


 


10/6/20 16:00 98.2 62 25 101/44 (63) 97   


 


10/6/20 15:25  67 24     55


 


10/6/20 15:00  60 24 101/44 (63) 97   








I&O











Intake and Output  


 


 10/6/20 10/7/20





 19:00 07:00


 


Intake Total 1427.776 ml 1100 ml


 


Output Total 820 ml 500 ml


 


Balance 607.776 ml 600 ml


 


  


 


Free Water 40 ml 50 ml


 


IV Total 787.776 ml 600 ml


 


Tube Feeding 600 ml 450 ml


 


Output Urine Total 720 ml 500 ml


 


Chest Tube Drainage Total 100 ml 


 


# Bowel Movements 2 3








Dressing:  other


Wound:  other


Cardiovascular:  RSR


Respiratory:  decreased breath sounds


Abdomen:  soft, non-tender, present bowel sounds


Extremities:  no tenderness, no cyanosis





Laboratory Tests








Test


 10/7/20


00:28 10/7/20


04:00 10/7/20


04:13 10/7/20


08:06


 


POC Whole Blood Glucose


 121 MG/DL


()  H 


 146 MG/DL


()  H 





 


White Blood Count


 


 7.5 K/UL


(4.8-10.8) 


 





 


Red Blood Count


 


 2.35 M/UL


(4.20-5.40)  L 


 





 


Hemoglobin


 


 7.5 G/DL


(12.0-16.0)  L 


 





 


Hematocrit


 


 22.3 %


(37.0-47.0)  L 


 





 


Mean Corpuscular Volume  95 FL (80-99)    


 


Mean Corpuscular Hemoglobin


 


 32.0 PG


(27.0-31.0)  H 


 





 


Mean Corpuscular Hemoglobin


Concent 


 33.7 G/DL


(32.0-36.0) 


 





 


Red Cell Distribution Width


 


 18.0 %


(11.6-14.8)  H 


 





 


Platelet Count


 


 74 K/UL


(150-450)  L 


 





 


Mean Platelet Volume


 


 7.7 FL


(6.5-10.1) 


 





 


Neutrophils (%) (Auto)


 


 % (45.0-75.0)


 


 





 


Lymphocytes (%) (Auto)


 


 % (20.0-45.0)


 


 





 


Monocytes (%) (Auto)   % (1.0-10.0)    


 


Eosinophils (%) (Auto)   % (0.0-3.0)    


 


Basophils (%) (Auto)   % (0.0-2.0)    


 


Sodium Level


 


 145 MMOL/L


(136-145) 


 





 


Potassium Level


 


 3.7 MMOL/L


(3.5-5.1) 


 





 


Chloride Level


 


 111 MMOL/L


()  H 


 





 


Carbon Dioxide Level


 


 26 MMOL/L


(21-32) 


 





 


Anion Gap


 


 8 mmol/L


(5-15) 


 





 


Blood Urea Nitrogen


 


 16 mg/dL


(7-18) 


 





 


Creatinine


 


 0.6 MG/DL


(0.55-1.30) 


 





 


Estimat Glomerular Filtration


Rate 


 > 60 mL/min


(>60) 


 





 


Glucose Level


 


 156 MG/DL


()  H 


 





 


Calcium Level


 


 6.9 MG/DL


(8.5-10.1)  L 


 





 


Phosphorus Level


 


 2.5 MG/DL


(2.5-4.9) 


 





 


Magnesium Level


 


 1.6 MG/DL


(1.8-2.4)  L 


 





 


Total Bilirubin


 


 0.6 MG/DL


(0.2-1.0) 


 





 


Aspartate Amino Transf


(AST/SGOT) 


 18 U/L (15-37)


 


 





 


Alanine Aminotransferase


(ALT/SGPT) 


 19 U/L (12-78)


 


 





 


Alkaline Phosphatase


 


 51 U/L


() 


 





 


Total Protein


 


 4.4 G/DL


(6.4-8.2)  L 


 





 


Albumin


 


 1.1 G/DL


(3.4-5.0)  L 


 





 


Globulin  3.3 g/dL    


 


Albumin/Globulin Ratio


 


 0.3 (1.0-2.7)


L 


 





 


Arterial Blood pH


 


 


 


 7.524


(7.350-7.450)


 


Arterial Blood Partial


Pressure CO2 


 


 


 29.1 mmHg


(35.0-45.0)  L


 


Arterial Blood Partial


Pressure O2 


 


 


 100.2 mmHg


(75.0-100.0)  H


 


Arterial Blood HCO3


 


 


 


 23.4 mmol/L


(22.0-26.0)


 


Arterial Blood Oxygen


Saturation 


 


 


 97.6 %


()


 


Arterial Blood Base Excess    0.9 (-2-2)  


 


Cole Test    Positive  











Plan


Problems:  


(1) Respiratory distress


Assessment & Plan:  Respiratory insufficiency requiring prolonged ventilator 

support.  Patient on minimal vent settings right now currently in stable but 

unfortunately not safe for extubation.  Tracheostomy is indicated recommended.  

I discussed case with pulmonology team ICU team medical teams.  Patient unable 

to make decisions and her current condition and given her history.  The care 

team has been contacted and will be reviewed for evaluation and consideration of

the tracheostomy.  If consented I think it is reasonable for tracheostomy given 

patient's current CODE STATUS care plan and goals of care.  Extubation his 

current status is potentially high risk for reintubation emergency complication.

 We will plan for tracheostomy if consent is obtained.  Thank you for let me 

participate patient's care will follow with recommendations





will plan for trach when ready


wean fi02 





(2) Encephalopathy chronic


(3) Dysphagia


(4) Down's syndrome


(5) Sepsis


(6) Acute respiratory failure


(7) Pneumonia


(8) Trisomy 21, Down syndrome


(9) DAVID (acute kidney injury)


(10) Bacteremia


(11) Hypokalemia


(12) Anemia


(13) Diarrhea


(14) Hypernatremia


(15) Pneumothorax


(16) Pneumothorax, right


Assessment & Plan:  right ptx.  s/p chest tube


tube removed 10/3





left ptx tube placed 10/2





(17) Cardiac arrest


(18) ARDS (adult respiratory distress syndrome)


(19) Septic shock


(20) HCAP (healthcare-associated pneumonia)


(21) Respiratory failure requiring intubation


(22) Seizure disorder


(23) Hypothyroidism











Jake Aranda                 Oct 7, 2020 14:31 "this reaction helpful?\" , \"am I overreacting?\" , and \"how can I respond to make this situation better?\"               ASSESSMENT: Current Emotional / Mental Status (status of significant symptoms):   Risk status (Self / Other harm or suicidal ideation)   Client denies current fears or concerns for personal safety.   Client denies current or recent suicidal ideation or behaviors.   Client denies current or recent homicidal ideation or behaviors.   Client denies current or recent self injurious behavior or ideation.   Client denies other safety concerns.   A safety and risk management plan has not been developed at this time, however client was given the after-hours number / 911 should there be a change in any of these risk factors.     Appearance:   Appropriate    Eye Contact:   Good    Psychomotor Behavior: Retarded (Slowed)    Attitude:   Cooperative    Orientation:   All   Speech    Rate / Production: Monotone     Volume:  Normal    Mood:    Depressed    Affect:    Appropriate    Thought Content:  Clear    Thought Form:  Coherent  Logical    Insight:    Good      Medication Review:   No current psychiatric medications prescribed     Medication Compliance:   NA     Changes in Health Issues:   None reported     Chemical Use Review:   Substance Use: Chemical use reviewed, no active concerns identified      Tobacco Use: No current tobacco use.       Collateral Reports Completed:   Not Applicable    PLAN: (Client Tasks / Therapist Tasks / Other)  1.  SIFT meditation daily    2.  Client will use one boundary management skill (i.e., direct communication, saying no to a request) by next session.    3.  Client will track and log cognitive distortions and bring to next session      4.  Meet in 4 weeks        YUNIOR Bustos                                                         ________________________________________________________________________    Treatment Plan    Client's Name: Ramiro Najera  Date Of " Birth: 1985    Date: 2/20/2017     DSM5 Diagnoses: (Sustained by DSM5 Criteria Listed Above)  Diagnoses: 296.89 Bipolar II Disorder With anxious distress        PTSD  Psychosocial & Contextual Factors: parents' health problems  WHODAS 2.0 (12 item)            This questionnaire asks about difficulties due to health conditions. Health conditions  include  disease or illnesses, other health problems that may be short or long lasting,  injuries, mental health or emotional problems, and problems with alcohol or drugs.                     Think back over the past 30 days and answer these questions, thinking about how much  difficulty you had doing the following activities. For each question, please [place] only  one response.    S1 Standing for long periods such as 30 minutes? None =         1   S2 Taking care of household responsibilities? Mild =           2   S3 Learning a new task, for example, learning how to get to a new place? None =         1   S4 How much of a problem do you have joining community activities (for example, festivals, [employer] or other activities) in the same way as anyone else can? Moderate =   3   S5 How much have you been emotionally affected by your health problems? Moderate =   3     In the past 30 days, how much difficulty did you have in:   S6 Concentrating on doing something for ten minutes? Mild =           2   S7 Walking a long distance such as a kilometer (or equivalent)? None =         1   S8 Washing your whole body? None =         1   S9 Getting dressed? None =         1   S10 Dealing with people you do not know? None =         1   S11 Maintaining a friendship? Moderate =   3   S12 Your day to day work? None =         1     H1 Overall, in the past 30 days, how many days were these difficulties present? Record number of days 30   H2 In the past 30 days, for how many days were you totally unable to carry out your usual activities or work because of any health condition? Record  number of days  0   H3 In the past 30 days, not counting the days that you were totally unable, for how many days did you cut back or reduce your usual activities or work because of any health condition? Record number of days 3       Referral / Collaboration:  Referral to another professional/service is not indicated at this time..    Anticipated number of session or this episode of care: 18-24      MeasurableTreatment Goal(s) related to diagnosis / functional impairment(s)  Goal-Emotional regulation: Client will effectively manage mood dysregulation    I will know I've met my goal when I am feeling less out of control.      Objective #A (Client Action)    Status: continued- Date: 4/22/2019     Client will learn at least 3 mindfulness skills and practice one daily    Intervention(s)  Therapist will provide mindfulness training, psychoeducation, behavioral activation, and cognitive restructuring.    Objective #B  Client will use at least 3 coping skills for anxiety management in the next 12 weeks.    Status: continued- Date: 4/22/2019      Intervention(s)  Therapist will provide psychoeducation, behavioral activation, and cognitive restructuring.      Objective #C  Client will identify three distraction and diversion activities and use those activities to improve distress tolerance and emotional regulation.  Status: continued- Date: 4/22/2019     Intervention(s)  Therapist will provide psychoeducation, behavioral activation, and cognitive restructuring.    MeasurableTreatment Goal(s) related to diagnosis / functional impairment(s)            Goal-Depression: Client will decrease depressed mood    I will know I've met my goal when I am less depressed.      Objective #A (Client Action)    Status: Continued- Date: 4/22/2019     Client will use identified behavioral and cognitive skills to challenge negative self talk 90% of the time.    Intervention(s)  Therapist will provide psychoeducation, behavioral activation, and  cognitive restructuring.      Objective #B  Client will complete at least 10 minutes of self-regulation practice (e.g.: yoga, meditation, relaxation breathing, etc.) per day.    Status: Continued- Date: 4/22/2019     Intervention(s)  Therapist will provide psychoeducation, behavioral activation, and cognitive restructuring.      Objective #C  Client will exercise 30 minutes 36 times in the next 12 weeks.  Status: Continued- Date: 4/22/2019      Intervention(s)  Therapist will provide psychoeducation, behavioral activation, and cognitive restructuring.                 Client has reviewed and agreed to the above plan.      Justo Dawson, [facility]  February 20, 2017

## 2020-12-14 ENCOUNTER — TELEPHONE (OUTPATIENT)
Dept: PSYCHOLOGY | Facility: CLINIC | Age: 35
End: 2020-12-14

## 2020-12-17 ENCOUNTER — VIRTUAL VISIT (OUTPATIENT)
Dept: PSYCHOLOGY | Facility: CLINIC | Age: 35
End: 2020-12-17
Payer: COMMERCIAL

## 2020-12-17 DIAGNOSIS — F31.81 BIPOLAR 2 DISORDER (H): Primary | ICD-10-CM

## 2020-12-17 PROCEDURE — 90791 PSYCH DIAGNOSTIC EVALUATION: CPT | Mod: 95 | Performed by: SOCIAL WORKER

## 2020-12-17 ASSESSMENT — ANXIETY QUESTIONNAIRES
7. FEELING AFRAID AS IF SOMETHING AWFUL MIGHT HAPPEN: NOT AT ALL
1. FEELING NERVOUS, ANXIOUS, OR ON EDGE: MORE THAN HALF THE DAYS
5. BEING SO RESTLESS THAT IT IS HARD TO SIT STILL: NEARLY EVERY DAY
3. WORRYING TOO MUCH ABOUT DIFFERENT THINGS: SEVERAL DAYS
2. NOT BEING ABLE TO STOP OR CONTROL WORRYING: MORE THAN HALF THE DAYS
GAD7 TOTAL SCORE: 12
6. BECOMING EASILY ANNOYED OR IRRITABLE: SEVERAL DAYS
IF YOU CHECKED OFF ANY PROBLEMS ON THIS QUESTIONNAIRE, HOW DIFFICULT HAVE THESE PROBLEMS MADE IT FOR YOU TO DO YOUR WORK, TAKE CARE OF THINGS AT HOME, OR GET ALONG WITH OTHER PEOPLE: VERY DIFFICULT

## 2020-12-17 ASSESSMENT — COLUMBIA-SUICIDE SEVERITY RATING SCALE - C-SSRS
TOTAL  NUMBER OF INTERRUPTED ATTEMPTS LIFETIME: NO
2. HAVE YOU ACTUALLY HAD ANY THOUGHTS OF KILLING YOURSELF LIFETIME?: NO
TOTAL  NUMBER OF ABORTED OR SELF INTERRUPTED ATTEMPTS LIFETIME: 1
1. IN THE PAST MONTH, HAVE YOU WISHED YOU WERE DEAD OR WISHED YOU COULD GO TO SLEEP AND NOT WAKE UP?: NO
ATTEMPT PAST THREE MONTHS: NO
1. IN THE PAST MONTH, HAVE YOU WISHED YOU WERE DEAD OR WISHED YOU COULD GO TO SLEEP AND NOT WAKE UP?: YES
TOTAL  NUMBER OF ABORTED OR SELF INTERRUPTED ATTEMPTS PAST LIFETIME: YES
REASONS FOR IDEATION LIFETIME: EQUALLY TO GET ATTENTION, REVENGE OR A REACTION FROM OTHERS AND TO END/STOP THE PAIN
ATTEMPT LIFETIME: NO
TOTAL  NUMBER OF INTERRUPTED ATTEMPTS PAST 3 MONTHS: NO

## 2020-12-17 ASSESSMENT — PATIENT HEALTH QUESTIONNAIRE - PHQ9
5. POOR APPETITE OR OVEREATING: NEARLY EVERY DAY
SUM OF ALL RESPONSES TO PHQ QUESTIONS 1-9: 14
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
## 2020-12-18 ENCOUNTER — TRANSFERRED RECORDS (OUTPATIENT)
Dept: HEALTH INFORMATION MANAGEMENT | Facility: CLINIC | Age: 35
End: 2020-12-18

## 2020-12-18 ASSESSMENT — ANXIETY QUESTIONNAIRES: GAD7 TOTAL SCORE: 12

## 2020-12-24 ENCOUNTER — VIRTUAL VISIT (OUTPATIENT)
Dept: PSYCHOLOGY | Facility: CLINIC | Age: 35
End: 2020-12-24
Payer: COMMERCIAL

## 2020-12-24 DIAGNOSIS — F31.81 BIPOLAR 2 DISORDER (H): Primary | ICD-10-CM

## 2020-12-24 PROCEDURE — 90834 PSYTX W PT 45 MINUTES: CPT | Mod: 95 | Performed by: SOCIAL WORKER

## 2020-12-24 NOTE — PROGRESS NOTES
"                                              Progress Note    Client Name: Ramiro Najera  Date: 12/24/2020            Service Type: Individual      Session Start Time: 730 Session End Time: 815      Session Length: 45     Session #: 4     Attendees: Client attended alone    Treatment Plan Last Reviewed: 12/24/2020   PHQ-9 / DEANNA-7 :    Telemedicine Visit: The patient's condition can be safely assessed and treated via synchronous audio and visual telemedicine encounter.      Reason for Telemedicine Visit: Patient has requested telehealth visit    Originating Site (Patient Location): Patient's home    Distant Site (Provider Location): Provider Remote Setting    Consent:  The patient/guardian has verbally consented to: the potential risks and benefits of telemedicine (video visit) versus in person care; bill my insurance or make self-payment for services provided; and responsibility for payment of non-covered services.     Mode of Communication:  Video Conference via [employer]    As the provider I attest to compliance with applicable laws and regulations related to telemedicine.    DATA      Progress Since Last Session (Related to Symptoms / Goals / Homework):   Symptoms: Stable    Homework: Partially completed        Episode of Care Goals: Satisfactory progress - ACTION (Actively working towards change); Intervened by reinforcing change plan / affirming steps taken     Current / Ongoing Stressors and Concerns:     Things have been \"good, quieter this week.\"  Has had a better week at work in terms of stress, thinks that this is due to the holidays and does wonder what things will be like in the new year.  Has been thinking about finding another job, and forming a long-term plan for this.  Has been able to get back into exercise this past week, walking and doing body-weight exercises and feels better.  Sleep has improved a bit as well.  Looking forward to planning his wedding to [place] and their relationship is a " "\"source of comfort and stability.\"                     Treatment Objective(s) Addressed in This Session:   Client will complete at least 10 minutes of self-regulation practice (e.g.: yoga, meditation, relaxation breathing, etc.) per day.    Client to follow BA plan    Client will use identified behavioral and cognitive skills to challenge negative self talk 90% of the time.   Log ANTS, Cog Ds, and Alt Es     Intervention:     Long discussion today about behavioral activation.  Reviewed basic tracking options, identified differences between long-term v short-term planning, and discussed setting and achieving SMART goals as an effective method for dealing with depression, anxiety, and many other mental health concerns.  Set behavioral goals in session today.              ASSESSMENT: Current Emotional / Mental Status (status of significant symptoms):   Risk status (Self / Other harm or suicidal ideation)   Client denies current fears or concerns for personal safety.   Client denies current or recent suicidal ideation or behaviors.   Client denies current or recent homicidal ideation or behaviors.   Client denies current or recent self injurious behavior or ideation.   Client denies other safety concerns.   A safety and risk management plan has not been developed at this time, however client was given the after-hours number / 911 should there be a change in any of these risk factors.     Appearance:   Appropriate    Eye Contact:   Good    Psychomotor Behavior: Retarded (Slowed)    Attitude:   Cooperative    Orientation:   All   Speech    Rate / Production: Monotone     Volume:  Normal    Mood:    Depressed    Affect:    Appropriate    Thought Content:  Clear    Thought Form:  Coherent  Logical    Insight:    Good      Medication Review:   No current psychiatric medications prescribed     Medication Compliance:   NA     Changes in Health Issues:   None reported     Chemical Use Review:   Substance Use: Chemical use " reviewed, no active concerns identified      Tobacco Use: No current tobacco use.       Collateral Reports Completed:   Not Applicable      Diagnosis:   Bipolar 2 Disorder    PLAN: (Client Tasks / Therapist Tasks / Other)  1.  Client will log activities of Achievement, Closeness, and Enjoyment (ACEs) using ACE rating scale and bring to next session.         2.  Meet in a month or sooner if needed            Justo Dawson, LICSW                                                         ________________________________________________________________________    Treatment Plan    Client's Name: Ramiro Najera  YOB: 1985    Date: 2/20/2017     DSM5 Diagnoses: (Sustained by DSM5 Criteria Listed Above)  Diagnoses: 296.89 Bipolar II Disorder With anxious distress        PTSD  Psychosocial & Contextual Factors: parents' health problems  WHODAS 2.0 (12 item)            This questionnaire asks about difficulties due to health conditions. Health conditions  include  disease or illnesses, other health problems that may be short or long lasting,  injuries, mental health or emotional problems, and problems with alcohol or drugs.                     Think back over the past 30 days and answer these questions, thinking about how much  difficulty you had doing the following activities. For each question, please [place] only  one response.    S1 Standing for long periods such as 30 minutes? None =         1   S2 Taking care of household responsibilities? Mild =           2   S3 Learning a new task, for example, learning how to get to a new place? None =         1   S4 How much of a problem do you have joining community activities (for example, festivals, Jain or other activities) in the same way as anyone else can? Moderate =   3   S5 How much have you been emotionally affected by your health problems? Moderate =   3     In the past 30 days, how much difficulty did you have in:   S6 Concentrating on doing  something for ten minutes? Mild =           2   S7 Walking a long distance such as a kilometer (or equivalent)? None =         1   S8 Washing your whole body? None =         1   S9 Getting dressed? None =         1   S10 Dealing with people you do not know? None =         1   S11 Maintaining a friendship? Moderate =   3   S12 Your day to day work? None =         1     H1 Overall, in the past 30 days, how many days were these difficulties present? Record number of days 30   H2 In the past 30 days, for how many days were you totally unable to carry out your usual activities or work because of any health condition? Record number of days  0   H3 In the past 30 days, not counting the days that you were totally unable, for how many days did you cut back or reduce your usual activities or work because of any health condition? Record number of days 3       Referral / Collaboration:  Referral to another professional/service is not indicated at this time..    Anticipated number of session or this episode of care: 18-24      MeasurableTreatment Goal(s) related to diagnosis / functional impairment(s)  Goal-Emotional regulation: Client will effectively manage mood dysregulation    I will know I've met my goal when I am feeling less out of control.      Objective #A (Client Action)    Status: new- Date: 12/24/2020      Client will learn at least 3 mindfulness skills and practice one daily    Intervention(s)  Therapist will provide mindfulness training, psychoeducation, behavioral activation, and cognitive restructuring.    Objective #B  Client will use at least 3 coping skills for anxiety management in the next 12 weeks.    Status: new- Date: 12/24/2020     Intervention(s)  Therapist will provide psychoeducation, behavioral activation, and cognitive restructuring.      Objective #C  Client will identify three distraction and diversion activities and use those activities to improve distress tolerance and emotional  regulation.  Status: new- Date: 12/24/2020     Intervention(s)  Therapist will provide psychoeducation, behavioral activation, and cognitive restructuring.    MeasurableTreatment Goal(s) related to diagnosis / functional impairment(s)            Goal-Depression: Client will decrease depressed mood    I will know I've met my goal when I am less depressed.      Objective #A (Client Action)    Status: new- Date: 12/24/2020     Client will use identified behavioral and cognitive skills to challenge negative self talk 90% of the time.    Intervention(s)  Therapist will provide psychoeducation, behavioral activation, and cognitive restructuring.      Objective #B  Client will complete at least 10 minutes of self-regulation practice (e.g.: yoga, meditation, relaxation breathing, etc.) per day.    Status: new- Date: 12/24/2020     Intervention(s)  Therapist will provide psychoeducation, behavioral activation, and cognitive restructuring.      Objective #C  Client will exercise 30 minutes 36 times in the next 12 weeks.  Status: new- Date: 12/24/2020     Intervention(s)  Therapist will provide psychoeducation, behavioral activation, and cognitive restructuring.                 Client has reviewed and agreed to the above plan.      Justo Dawson, LINETTESW  February 20, 2017

## 2020-12-27 ENCOUNTER — HEALTH MAINTENANCE LETTER (OUTPATIENT)
Age: 35
End: 2020-12-27

## 2021-01-26 ENCOUNTER — VIRTUAL VISIT (OUTPATIENT)
Dept: PSYCHOLOGY | Facility: CLINIC | Age: 36
End: 2021-01-26
Payer: COMMERCIAL

## 2021-01-26 DIAGNOSIS — F31.81 BIPOLAR 2 DISORDER (H): Primary | ICD-10-CM

## 2021-01-26 PROCEDURE — 90834 PSYTX W PT 45 MINUTES: CPT | Mod: 95 | Performed by: SOCIAL WORKER

## 2021-01-26 NOTE — PROGRESS NOTES
"                                              Progress Note    Client Name: Ramiro Najera  Date: 1/26/2021            Service Type: Individual      Session Start Time: 830 Session End Time: 915      Session Length: 45     Session #: 5     Attendees: Client attended alone    Treatment Plan Last Reviewed: 12/24/2020   PHQ-9 / DEANNA-7 :    Telemedicine Visit: The patient's condition can be safely assessed and treated via synchronous audio and visual telemedicine encounter.      Reason for Telemedicine Visit: Patient has requested telehealth visit    Originating Site (Patient Location): Patient's home    Distant Site (Provider Location): Provider Remote Setting    Consent:  The patient/guardian has verbally consented to: the potential risks and benefits of telemedicine (video visit) versus in person care; bill my insurance or make self-payment for services provided; and responsibility for payment of non-covered services.     Mode of Communication:  Video Conference via [employer]    As the provider I attest to compliance with applicable laws and regulations related to telemedicine.    DATA      Progress Since Last Session (Related to Symptoms / Goals / Homework):   Symptoms: Stable    Homework: Partially completed        Episode of Care Goals: Satisfactory progress - ACTION (Actively working towards change); Intervened by reinforcing change plan / affirming steps taken     Current / Ongoing Stressors and Concerns:     Things have been \"been a busy month.\"  Has been trying to do a little bit every day in terms of exercise and eating.  Has found that he is more productive at work and home and is happy with this.  Has not fully \"mobilized to where I where I want to be yet.\"  Is also looking for a new job.           Treatment Objective(s) Addressed in This Session:   Client will complete at least 10 minutes of self-regulation practice (e.g.: yoga, meditation, relaxation breathing, etc.) per day.    Client to follow BA " plan    Client will use identified behavioral and cognitive skills to challenge negative self talk 90% of the time.   Log ANTS, Cog Ds, and Alt Es     Intervention:     Long discussion today about behavioral activation.  Reviewed basic tracking options, identified differences between long-term v short-term planning, and discussed setting and achieving SMART goals as an effective method for dealing with depression, anxiety, and many other mental health concerns.  Set behavioral goals in session today.              ASSESSMENT: Current Emotional / Mental Status (status of significant symptoms):   Risk status (Self / Other harm or suicidal ideation)   Client denies current fears or concerns for personal safety.   Client denies current or recent suicidal ideation or behaviors.   Client denies current or recent homicidal ideation or behaviors.   Client denies current or recent self injurious behavior or ideation.   Client denies other safety concerns.   A safety and risk management plan has not been developed at this time, however client was given the after-hours number / 911 should there be a change in any of these risk factors.     Appearance:   Appropriate    Eye Contact:   Good    Psychomotor Behavior: Retarded (Slowed)    Attitude:   Cooperative    Orientation:   All   Speech    Rate / Production: Monotone     Volume:  Normal    Mood:    Depressed    Affect:    Appropriate    Thought Content:  Clear    Thought Form:  Coherent  Logical    Insight:    Good      Medication Review:   No current psychiatric medications prescribed     Medication Compliance:   NA     Changes in Health Issues:   None reported     Chemical Use Review:   Substance Use: Chemical use reviewed, no active concerns identified      Tobacco Use: No current tobacco use.       Collateral Reports Completed:   Not Applicable      Diagnosis:   Bipolar 2 Disorder    PLAN: (Client Tasks / Therapist Tasks / Other)  1.  Client will log activities of  Achievement, Closeness, and Enjoyment (ACEs) using ACE rating scale and bring to next session.         2.  Meet in a month or sooner if needed            YUNIOR Bustos                                                         ________________________________________________________________________    Treatment Plan    Client's Name: Ramiro Najera  YOB: 1985    Date: 2/20/2017     DSM5 Diagnoses: (Sustained by DSM5 Criteria Listed Above)  Diagnoses: 296.89 Bipolar II Disorder With anxious distress        PTSD  Psychosocial & Contextual Factors: parents' health problems  WHODAS 2.0 (12 item)            This questionnaire asks about difficulties due to health conditions. Health conditions  include  disease or illnesses, other health problems that may be short or long lasting,  injuries, mental health or emotional problems, and problems with alcohol or drugs.                     Think back over the past 30 days and answer these questions, thinking about how much  difficulty you had doing the following activities. For each question, please [place] only  one response.    S1 Standing for long periods such as 30 minutes? None =         1   S2 Taking care of household responsibilities? Mild =           2   S3 Learning a new task, for example, learning how to get to a new place? None =         1   S4 How much of a problem do you have joining community activities (for example, festivals, Congregation or other activities) in the same way as anyone else can? Moderate =   3   S5 How much have you been emotionally affected by your health problems? Moderate =   3     In the past 30 days, how much difficulty did you have in:   S6 Concentrating on doing something for ten minutes? Mild =           2   S7 Walking a long distance such as a kilometer (or equivalent)? None =         1   S8 Washing your whole body? None =         1   S9 Getting dressed? None =         1   S10 Dealing with people you do not know?  None =         1   S11 Maintaining a friendship? Moderate =   3   S12 Your day to day work? None =         1     H1 Overall, in the past 30 days, how many days were these difficulties present? Record number of days 30   H2 In the past 30 days, for how many days were you totally unable to carry out your usual activities or work because of any health condition? Record number of days  0   H3 In the past 30 days, not counting the days that you were totally unable, for how many days did you cut back or reduce your usual activities or work because of any health condition? Record number of days 3       Referral / Collaboration:  Referral to another professional/service is not indicated at this time..    Anticipated number of session or this episode of care: 18-24      MeasurableTreatment Goal(s) related to diagnosis / functional impairment(s)  Goal-Emotional regulation: Client will effectively manage mood dysregulation    I will know I've met my goal when I am feeling less out of control.      Objective #A (Client Action)    Status: new- Date: 12/24/2020      Client will learn at least 3 mindfulness skills and practice one daily    Intervention(s)  Therapist will provide mindfulness training, psychoeducation, behavioral activation, and cognitive restructuring.    Objective #B  Client will use at least 3 coping skills for anxiety management in the next 12 weeks.    Status: new- Date: 12/24/2020     Intervention(s)  Therapist will provide psychoeducation, behavioral activation, and cognitive restructuring.      Objective #C  Client will identify three distraction and diversion activities and use those activities to improve distress tolerance and emotional regulation.  Status: new- Date: 12/24/2020     Intervention(s)  Therapist will provide psychoeducation, behavioral activation, and cognitive restructuring.    MeasurableTreatment Goal(s) related to diagnosis / functional impairment(s)            Goal-Depression: Client will  decrease depressed mood    I will know I've met my goal when I am less depressed.      Objective #A (Client Action)    Status: new- Date: 12/24/2020     Client will use identified behavioral and cognitive skills to challenge negative self talk 90% of the time.    Intervention(s)  Therapist will provide psychoeducation, behavioral activation, and cognitive restructuring.      Objective #B  Client will complete at least 10 minutes of self-regulation practice (e.g.: yoga, meditation, relaxation breathing, etc.) per day.    Status: new- Date: 12/24/2020     Intervention(s)  Therapist will provide psychoeducation, behavioral activation, and cognitive restructuring.      Objective #C  Client will exercise 30 minutes 36 times in the next 12 weeks.  Status: new- Date: 12/24/2020     Intervention(s)  Therapist will provide psychoeducation, behavioral activation, and cognitive restructuring.                 Client has reviewed and agreed to the above plan.      Justo Dawson, [facility]  February 20, 2017

## 2021-02-08 DIAGNOSIS — F31.81 BIPOLAR 2 DISORDER (H): ICD-10-CM

## 2021-02-08 NOTE — LETTER
February 12, 2021      Ramiro Najera  [address]  [place] [id number]        Dear Ramiro,     We recently received a call from your pharmacy requesting a refill of your medication.     A review of your chart indicates that an appointment is required with your provider. Please call the clinic at [phone] to schedule your appointment.     We have authorized one refill of your medication to allow time for you to schedule. If you have a history of diabetes or high cholesterol, please come fasting to the appointment. Fasting entails nothing to eat or drink 8 hours prior to your appointment; with the exception of water. You may take your medication the day of the appointment.        Sincerely,        Samantha Belle, DO

## 2021-02-11 RX ORDER — TRAZODONE HYDROCHLORIDE 50 MG/1
TABLET, FILM COATED ORAL
Qty: 90 TABLET | Refills: 0 | Status: SHIPPED | OUTPATIENT
Start: 2021-02-11 | End: 2021-03-02

## 2021-02-11 NOTE — TELEPHONE ENCOUNTER
Medication is being filled for 1 time refill only due to:  Patient needs to be seen because it has been more than one year since last visit.     Deloris Mazariegos, RN, BSN, PHN  [facility]: [place]

## 2021-02-12 NOTE — TELEPHONE ENCOUNTER
Letter mailed to patient's home address to call back and schedule an appointment.  Vaishnavi Villalta CMA ([facility])

## 2021-02-18 ENCOUNTER — VIRTUAL VISIT (OUTPATIENT)
Dept: PSYCHOLOGY | Facility: CLINIC | Age: 36
End: 2021-02-18
Payer: COMMERCIAL

## 2021-02-18 DIAGNOSIS — F31.81 BIPOLAR 2 DISORDER (H): Primary | ICD-10-CM

## 2021-02-18 PROCEDURE — 90834 PSYTX W PT 45 MINUTES: CPT | Mod: 95 | Performed by: SOCIAL WORKER

## 2021-02-18 NOTE — PROGRESS NOTES
"                                              Progress Note    Client Name: Ramiro Najera  Date: 2/18/2021            Service Type: Individual      Session Start Time: 830 Session End Time: 915      Session Length: 45     Session #: 6     Attendees: Client attended alone    Treatment Plan Last Reviewed: 12/24/2020   PHQ-9 / DEANNA-7 :    Telemedicine Visit: The patient's condition can be safely assessed and treated via synchronous audio and visual telemedicine encounter.      Reason for Telemedicine Visit: Patient has requested telehealth visit    Originating Site (Patient Location): Patient's home    Distant Site (Provider Location): Provider Remote Setting    Consent:  The patient/guardian has verbally consented to: the potential risks and benefits of telemedicine (video visit) versus in person care; bill my insurance or make self-payment for services provided; and responsibility for payment of non-covered services.     Mode of Communication:  Video Conference via [employer]    As the provider I attest to compliance with applicable laws and regulations related to telemedicine.    DATA      Progress Since Last Session (Related to Symptoms / Goals / Homework):   Symptoms: Stable    Homework: Partially completed        Episode of Care Goals: Satisfactory progress - ACTION (Actively working towards change); Intervened by reinforcing change plan / affirming steps taken     Current / Ongoing Stressors and Concerns:     Things have been \"better.\"  Has been ,aking changes in more exercise and diet.  Lent started yesterday which is a good motivation for him, this will help him build good habits.  Also just received word that he was get the first dose of the covid vaccine this coming Saturday.           Treatment Objective(s) Addressed in This Session:   Client will complete at least 10 minutes of self-regulation practice (e.g.: yoga, meditation, relaxation breathing, etc.) per day.    Client to follow BA plan    Client " will use identified behavioral and cognitive skills to challenge negative self talk 90% of the time.   Log ANTS, Cog Ds, and Alt Es     Intervention:     Reviewed thought-feeling-action connection.  Discussed Sohan Monsalve's concept of a personal domain and its connection to our emotions.  Explained personal domain as being comprised of our physical bodies, personal space, friends and loved ones, and significant beliefs.  Discussed four emotions and their relationship to the domain:      Estefany: Results from a perceived gain to the domain    Sadness: Results from a perceived loss to the domain    Fear: Results from a perceived threat to the domain    Anger: Results from a perceived harm to or transgression upon the domain       ASSESSMENT: Current Emotional / Mental Status (status of significant symptoms):   Risk status (Self / Other harm or suicidal ideation)   Client denies current fears or concerns for personal safety.   Client denies current or recent suicidal ideation or behaviors.   Client denies current or recent homicidal ideation or behaviors.   Client denies current or recent self injurious behavior or ideation.   Client denies other safety concerns.   A safety and risk management plan has not been developed at this time, however client was given the after-hours number / 911 should there be a change in any of these risk factors.     Appearance:   Appropriate    Eye Contact:   Good    Psychomotor Behavior: Retarded (Slowed)    Attitude:   Cooperative    Orientation:   All   Speech    Rate / Production: Monotone     Volume:  Normal    Mood:    Depressed    Affect:    Appropriate    Thought Content:  Clear    Thought Form:  Coherent  Logical    Insight:    Good      Medication Review:   No current psychiatric medications prescribed     Medication Compliance:   NA     Changes in Health Issues:   None reported     Chemical Use Review:   Substance Use: Chemical use reviewed, no active concerns identified      Tobacco  Use: No current tobacco use.       Collateral Reports Completed:   Not Applicable      Diagnosis:   Bipolar 2 Disorder    PLAN: (Client Tasks / Therapist Tasks / Other)  1.  Personal domain journaling once per day       2.  Meet in a month or sooner if needed            Justo Dawson, LICSW                                                         ________________________________________________________________________    Treatment Plan    Client's Name: Ramiro Najera  YOB: 1985    Date: 2/20/2017     DSM5 Diagnoses: (Sustained by DSM5 Criteria Listed Above)  Diagnoses: 296.89 Bipolar II Disorder With anxious distress        PTSD  Psychosocial & Contextual Factors: parents' health problems  WHODAS 2.0 (12 item)            This questionnaire asks about difficulties due to health conditions. Health conditions  include  disease or illnesses, other health problems that may be short or long lasting,  injuries, mental health or emotional problems, and problems with alcohol or drugs.                     Think back over the past 30 days and answer these questions, thinking about how much  difficulty you had doing the following activities. For each question, please [place] only  one response.    S1 Standing for long periods such as 30 minutes? None =         1   S2 Taking care of household responsibilities? Mild =           2   S3 Learning a new task, for example, learning how to get to a new place? None =         1   S4 How much of a problem do you have joining community activities (for example, festivals, Evangel[employer] or other activities) in the same way as anyone else can? Moderate =   3   S5 How much have you been emotionally affected by your health problems? Moderate =   3     In the past 30 days, how much difficulty did you have in:   S6 Concentrating on doing something for ten minutes? Mild =           2   S7 Walking a long distance such as a kilometer (or equivalent)? None =         1   S8 Washing  your whole body? None =         1   S9 Getting dressed? None =         1   S10 Dealing with people you do not know? None =         1   S11 Maintaining a friendship? Moderate =   3   S12 Your day to day work? None =         1     H1 Overall, in the past 30 days, how many days were these difficulties present? Record number of days 30   H2 In the past 30 days, for how many days were you totally unable to carry out your usual activities or work because of any health condition? Record number of days  0   H3 In the past 30 days, not counting the days that you were totally unable, for how many days did you cut back or reduce your usual activities or work because of any health condition? Record number of days 3       Referral / Collaboration:  Referral to another professional/service is not indicated at this time..    Anticipated number of session or this episode of care: 18-24      MeasurableTreatment Goal(s) related to diagnosis / functional impairment(s)  Goal-Emotional regulation: Client will effectively manage mood dysregulation    I will know I've met my goal when I am feeling less out of control.      Objective #A (Client Action)    Status: new- Date: 12/24/2020      Client will learn at least 3 mindfulness skills and practice one daily    Intervention(s)  Therapist will provide mindfulness training, psychoeducation, behavioral activation, and cognitive restructuring.    Objective #B  Client will use at least 3 coping skills for anxiety management in the next 12 weeks.    Status: new- Date: 12/24/2020     Intervention(s)  Therapist will provide psychoeducation, behavioral activation, and cognitive restructuring.      Objective #C  Client will identify three distraction and diversion activities and use those activities to improve distress tolerance and emotional regulation.  Status: new- Date: 12/24/2020     Intervention(s)  Therapist will provide psychoeducation, behavioral activation, and cognitive  restructuring.    MeasurableTreatment Goal(s) related to diagnosis / functional impairment(s)            Goal-Depression: Client will decrease depressed mood    I will know I've met my goal when I am less depressed.      Objective #A (Client Action)    Status: new- Date: 12/24/2020     Client will use identified behavioral and cognitive skills to challenge negative self talk 90% of the time.    Intervention(s)  Therapist will provide psychoeducation, behavioral activation, and cognitive restructuring.      Objective #B  Client will complete at least 10 minutes of self-regulation practice (e.g.: yoga, meditation, relaxation breathing, etc.) per day.    Status: new- Date: 12/24/2020     Intervention(s)  Therapist will provide psychoeducation, behavioral activation, and cognitive restructuring.      Objective #C  Client will exercise 30 minutes 36 times in the next 12 weeks.  Status: new- Date: 12/24/2020     Intervention(s)  Therapist will provide psychoeducation, behavioral activation, and cognitive restructuring.                 Client has reviewed and agreed to the above plan.      Justo Dawson, [facility]  February 20, 2017

## 2021-03-02 ENCOUNTER — OFFICE VISIT (OUTPATIENT)
Dept: FAMILY MEDICINE | Facility: CLINIC | Age: 36
End: 2021-03-02
Payer: COMMERCIAL

## 2021-03-02 VITALS
HEART RATE: 101 BPM | HEIGHT: 71 IN | DIASTOLIC BLOOD PRESSURE: 82 MMHG | RESPIRATION RATE: 20 BRPM | WEIGHT: 260 LBS | OXYGEN SATURATION: 98 % | SYSTOLIC BLOOD PRESSURE: 134 MMHG | BODY MASS INDEX: 36.4 KG/M2 | TEMPERATURE: 97.5 F

## 2021-03-02 DIAGNOSIS — R00.2 PALPITATIONS: ICD-10-CM

## 2021-03-02 DIAGNOSIS — Z00.00 ROUTINE GENERAL MEDICAL EXAMINATION AT A HEALTH CARE FACILITY: Primary | ICD-10-CM

## 2021-03-02 DIAGNOSIS — F31.81 BIPOLAR 2 DISORDER (H): ICD-10-CM

## 2021-03-02 DIAGNOSIS — R35.0 URINARY FREQUENCY: ICD-10-CM

## 2021-03-02 DIAGNOSIS — R06.09 EXERTIONAL DYSPNEA: ICD-10-CM

## 2021-03-02 DIAGNOSIS — L73.9 FOLLICULITIS: ICD-10-CM

## 2021-03-02 LAB
ERYTHROCYTE [DISTWIDTH] IN BLOOD BY AUTOMATED COUNT: 13.3 % (ref 10–15)
HBA1C MFR BLD: 5 % (ref 0–5.6)
HCT VFR BLD AUTO: 45.1 % (ref 40–53)
HGB BLD-MCNC: 15.6 G/DL (ref 13.3–17.7)
MCH RBC QN AUTO: 28.2 PG (ref 26.5–33)
MCHC RBC AUTO-ENTMCNC: 34.6 G/DL (ref 31.5–36.5)
MCV RBC AUTO: 82 FL (ref 78–100)
PLATELET # BLD AUTO: 191 10E9/L (ref 150–450)
RBC # BLD AUTO: 5.53 10E12/L (ref 4.4–5.9)
WBC # BLD AUTO: 5.5 10E9/L (ref 4–11)

## 2021-03-02 PROCEDURE — 84443 ASSAY THYROID STIM HORMONE: CPT | Performed by: FAMILY MEDICINE

## 2021-03-02 PROCEDURE — 99395 PREV VISIT EST AGE 18-39: CPT | Performed by: FAMILY MEDICINE

## 2021-03-02 PROCEDURE — 83036 HEMOGLOBIN GLYCOSYLATED A1C: CPT | Performed by: FAMILY MEDICINE

## 2021-03-02 PROCEDURE — 99213 OFFICE O/P EST LOW 20 MIN: CPT | Mod: 25 | Performed by: FAMILY MEDICINE

## 2021-03-02 PROCEDURE — 93000 ELECTROCARDIOGRAM COMPLETE: CPT | Performed by: FAMILY MEDICINE

## 2021-03-02 PROCEDURE — 36415 COLL VENOUS BLD VENIPUNCTURE: CPT | Performed by: FAMILY MEDICINE

## 2021-03-02 PROCEDURE — 80048 BASIC METABOLIC PNL TOTAL CA: CPT | Performed by: FAMILY MEDICINE

## 2021-03-02 PROCEDURE — 85027 COMPLETE CBC AUTOMATED: CPT | Performed by: FAMILY MEDICINE

## 2021-03-02 RX ORDER — TRAZODONE HYDROCHLORIDE 50 MG/1
100-150 TABLET, FILM COATED ORAL AT BEDTIME
Qty: 180 TABLET | Refills: 1 | Status: SHIPPED | OUTPATIENT
Start: 2021-03-02 | End: 2021-09-02

## 2021-03-02 RX ORDER — MUPIROCIN 20 MG/G
OINTMENT TOPICAL 3 TIMES DAILY
Qty: 22 G | Refills: 1 | Status: SHIPPED | OUTPATIENT
Start: 2021-03-02 | End: 2022-01-11

## 2021-03-02 ASSESSMENT — ENCOUNTER SYMPTOMS
COUGH: 0
CHILLS: 0
NERVOUS/ANXIOUS: 1
HEADACHES: 0
DIZZINESS: 0
ABDOMINAL PAIN: 0
CONSTIPATION: 0
ARTHRALGIAS: 0
HEMATOCHEZIA: 0
NAUSEA: 0
SORE THROAT: 0
DIARRHEA: 0
WEAKNESS: 0
MYALGIAS: 1
PARESTHESIAS: 0
PALPITATIONS: 1
SHORTNESS OF BREATH: 1
FEVER: 0
EYE PAIN: 0
DYSURIA: 0
HEARTBURN: 0
HEMATURIA: 0
JOINT SWELLING: 0
FREQUENCY: 1

## 2021-03-02 ASSESSMENT — PATIENT HEALTH QUESTIONNAIRE - PHQ9
10. IF YOU CHECKED OFF ANY PROBLEMS, HOW DIFFICULT HAVE THESE PROBLEMS MADE IT FOR YOU TO DO YOUR WORK, TAKE CARE OF THINGS AT HOME, OR GET ALONG WITH OTHER PEOPLE: SOMEWHAT DIFFICULT
SUM OF ALL RESPONSES TO PHQ QUESTIONS 1-9: 16
SUM OF ALL RESPONSES TO PHQ QUESTIONS 1-9: 16

## 2021-03-02 ASSESSMENT — MIFFLIN-ST. JEOR: SCORE: 2137.51
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
## 2021-03-03 ASSESSMENT — PATIENT HEALTH QUESTIONNAIRE - PHQ9: SUM OF ALL RESPONSES TO PHQ QUESTIONS 1-9: 16

## 2021-03-04 LAB
ANION GAP SERPL CALCULATED.3IONS-SCNC: 11 MMOL/L (ref 3–14)
BUN SERPL-MCNC: 11 MG/DL (ref 7–30)
CALCIUM SERPL-MCNC: 9.3 MG/DL (ref 8.5–10.1)
CHLORIDE SERPL-SCNC: 107 MMOL/L (ref 94–109)
CO2 SERPL-SCNC: 21 MMOL/L (ref 20–32)
CREAT SERPL-MCNC: 0.99 MG/DL (ref 0.66–1.25)
GFR SERPL CREATININE-BSD FRML MDRD: >90 ML/MIN/{1.73_M2}
GLUCOSE SERPL-MCNC: 85 MG/DL (ref 70–99)
POTASSIUM SERPL-SCNC: 3.9 MMOL/L (ref 3.4–5.3)
SODIUM SERPL-SCNC: 139 MMOL/L (ref 133–144)
TSH SERPL DL<=0.005 MIU/L-ACNC: 1.49 MU/L (ref 0.4–4)

## 2021-03-11 ENCOUNTER — VIRTUAL VISIT (OUTPATIENT)
Dept: PSYCHOLOGY | Facility: CLINIC | Age: 36
End: 2021-03-11
Payer: COMMERCIAL

## 2021-03-11 DIAGNOSIS — F31.81 BIPOLAR 2 DISORDER (H): Primary | ICD-10-CM

## 2021-03-11 PROCEDURE — 90834 PSYTX W PT 45 MINUTES: CPT | Mod: 95 | Performed by: SOCIAL WORKER
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
## 2021-03-25 ENCOUNTER — VIRTUAL VISIT (OUTPATIENT)
Dept: PSYCHOLOGY | Facility: CLINIC | Age: 36
End: 2021-03-25
Payer: COMMERCIAL

## 2021-03-25 DIAGNOSIS — F31.81 BIPOLAR 2 DISORDER (H): Primary | ICD-10-CM

## 2021-03-25 PROCEDURE — 90834 PSYTX W PT 45 MINUTES: CPT | Mod: 95 | Performed by: SOCIAL WORKER

## 2021-03-25 NOTE — PROGRESS NOTES
"                                              Progress Note    Client Name: Ramiro Najera  Date: 3/25/2021            Service Type: Individual      Session Start Time: 830 Session End Time: 915      Session Length: 45     Session #: 8     Attendees: Client attended alone    Treatment Plan Last Reviewed: 3/11/2021   PHQ-9 / DEANNA-7 :    Telemedicine Visit: The patient's condition can be safely assessed and treated via synchronous audio and visual telemedicine encounter.      Reason for Telemedicine Visit: Patient has requested telehealth visit    Originating Site (Patient Location): Patient's home    Distant Site (Provider Location): Provider Remote Setting    Consent:  The patient/guardian has verbally consented to: the potential risks and benefits of telemedicine (video visit) versus in person care; bill my insurance or make self-payment for services provided; and responsibility for payment of non-covered services.     Mode of Communication:  Video Conference via [employer]    As the provider I attest to compliance with applicable laws and regulations related to telemedicine.    DATA      Progress Since Last Session (Related to Symptoms / Goals / Homework):   Symptoms: Stable    Homework: Partially completed        Episode of Care Goals: Satisfactory progress - ACTION (Actively working towards change); Intervened by reinforcing change plan / affirming steps taken     Current / Ongoing Stressors and Concerns:     Things have been \"feeling worn down.\"  Was out over the weekend, and has been wondering if he has covid, has a plan to get a covid test.  Had plans to get things done this week at work but has not been able to accomplish all of what he had set out to do, some frustration with this.  Resumed a discussion today about how he can become more activated, discussed strategies that can increase activation.  Also discussed limits of \"self-sacrifice v harm to self.\"                 Treatment Objective(s) Addressed " in This Session:   Client will complete at least 10 minutes of self-regulation practice (e.g.: yoga, meditation, relaxation breathing, etc.) per day.    Client to follow BA plan    Client will use identified behavioral and cognitive skills to challenge negative self talk 90% of the time.   Log ANTS, Cog Ds, and Alt Es     Intervention:     Reviewed Stanley Larson' 3 Core Dysfunctional Beliefs and connection to anxiety and depression.  Discussed acceptance as the key to challenging these beliefs:    Self acceptance  I have flaws like everyone else  There is no reason I have to be perfect, no one else is  I am no more or less worthy than anybody else  Other acceptance  Other people will treat me unfairly sometimes  There is no reason they will always treat me fairly  Others who treat me unfairly are no more or less worthy than anybody else  Life acceptance  Life does not always work the way I want it to  There is no reason why life should work the way I want it to  Life may not always be pleasant, but it is never really that terrible and is almost always bearable           ASSESSMENT: Current Emotional / Mental Status (status of significant symptoms):   Risk status (Self / Other harm or suicidal ideation)   Client denies current fears or concerns for personal safety.   Client denies current or recent suicidal ideation or behaviors.   Client denies current or recent homicidal ideation or behaviors.   Client denies current or recent self injurious behavior or ideation.   Client denies other safety concerns.   A safety and risk management plan has not been developed at this time, however client was given the after-hours number / 911 should there be a change in any of these risk factors.     Appearance:   Appropriate    Eye Contact:   Good    Psychomotor Behavior: Retarded (Slowed)    Attitude:   Cooperative    Orientation:   All   Speech    Rate / Production: Monotone     Volume:  Normal    Mood:    Depressed     Affect:    Appropriate    Thought Content:  Clear    Thought Form:  Coherent  Logical    Insight:    Good      Medication Review:   No current psychiatric medications prescribed     Medication Compliance:   NA     Changes in Health Issues:   None reported     Chemical Use Review:   Substance Use: Chemical use reviewed, no active concerns identified      Tobacco Use: No current tobacco use.       Collateral Reports Completed:   Not Applicable      Diagnosis:   Bipolar 2 Disorder    PLAN: (Client Tasks / Therapist Tasks / Other)  1.  Larson Myth/acceptance journaling once per day       2.  Meet in a month or sooner if needed            Justo Dawson, [facility]                                                         ________________________________________________________________________    Treatment Plan    Client's Name: Ramiro Najera  YOB: 1985    Date: 2/20/2017     DSM5 Diagnoses: (Sustained by DSM5 Criteria Listed Above)  Diagnoses: 296.89 Bipolar II Disorder With anxious distress        PTSD  Psychosocial & Contextual Factors: parents' health problems  WHODAS 2.0 (12 item)            This questionnaire asks about difficulties due to health conditions. Health conditions  include  disease or illnesses, other health problems that may be short or long lasting,  injuries, mental health or emotional problems, and problems with alcohol or drugs.                     Think back over the past 30 days and answer these questions, thinking about how much  difficulty you had doing the following activities. For each question, please [place] only  one response.    S1 Standing for long periods such as 30 minutes? None =         1   S2 Taking care of household responsibilities? Mild =           2   S3 Learning a new task, for example, learning how to get to a new place? None =         1   S4 How much of a problem do you have joining community activities (for example, festivals, [employer] or other activities)  in the same way as anyone else can? Moderate =   3   S5 How much have you been emotionally affected by your health problems? Moderate =   3     In the past 30 days, how much difficulty did you have in:   S6 Concentrating on doing something for ten minutes? Mild =           2   S7 Walking a long distance such as a kilometer (or equivalent)? None =         1   S8 Washing your whole body? None =         1   S9 Getting dressed? None =         1   S10 Dealing with people you do not know? None =         1   S11 Maintaining a friendship? Moderate =   3   S12 Your day to day work? None =         1     H1 Overall, in the past 30 days, how many days were these difficulties present? Record number of days 30   H2 In the past 30 days, for how many days were you totally unable to carry out your usual activities or work because of any health condition? Record number of days  0   H3 In the past 30 days, not counting the days that you were totally unable, for how many days did you cut back or reduce your usual activities or work because of any health condition? Record number of days 3       Referral / Collaboration:  Referral to another professional/service is not indicated at this time..    Anticipated number of session or this episode of care: 18-24      MeasurableTreatment Goal(s) related to diagnosis / functional impairment(s)  Goal-Emotional regulation: Client will effectively manage mood dysregulation    I will know I've met my goal when I am feeling less out of control.      Objective #A (Client Action)    Status: new- Date: 12/24/2020      Client will learn at least 3 mindfulness skills and practice one daily    Intervention(s)  Therapist will provide mindfulness training, psychoeducation, behavioral activation, and cognitive restructuring.    Objective #B  Client will use at least 3 coping skills for anxiety management in the next 12 weeks.    Status: new- Date: 12/24/2020     Intervention(s)  Therapist will provide  psychoeducation, behavioral activation, and cognitive restructuring.      Objective #C  Client will identify three distraction and diversion activities and use those activities to improve distress tolerance and emotional regulation.  Status: new- Date: 12/24/2020     Intervention(s)  Therapist will provide psychoeducation, behavioral activation, and cognitive restructuring.    MeasurableTreatment Goal(s) related to diagnosis / functional impairment(s)            Goal-Depression: Client will decrease depressed mood    I will know I've met my goal when I am less depressed.      Objective #A (Client Action)    Status: new- Date: 12/24/2020     Client will use identified behavioral and cognitive skills to challenge negative self talk 90% of the time.    Intervention(s)  Therapist will provide psychoeducation, behavioral activation, and cognitive restructuring.      Objective #B  Client will complete at least 10 minutes of self-regulation practice (e.g.: yoga, meditation, relaxation breathing, etc.) per day.    Status: new- Date: 12/24/2020     Intervention(s)  Therapist will provide psychoeducation, behavioral activation, and cognitive restructuring.      Objective #C  Client will exercise 30 minutes 36 times in the next 12 weeks.  Status: new- Date: 12/24/2020     Intervention(s)  Therapist will provide psychoeducation, behavioral activation, and cognitive restructuring.                 Client has reviewed and agreed to the above plan.      Justo Dawson, [facility]  February 20, 2017

## 2021-04-15 ENCOUNTER — VIRTUAL VISIT (OUTPATIENT)
Dept: PSYCHOLOGY | Facility: CLINIC | Age: 36
End: 2021-04-15
Payer: COMMERCIAL

## 2021-04-15 DIAGNOSIS — F31.81 BIPOLAR 2 DISORDER (H): Primary | ICD-10-CM

## 2021-04-15 PROCEDURE — 90834 PSYTX W PT 45 MINUTES: CPT | Mod: 95 | Performed by: SOCIAL WORKER

## 2021-04-15 NOTE — PROGRESS NOTES
"                                              Progress Note    Client Name: Ramiro Najera  Date: 4/15/2021            Service Type: Individual      Session Start Time: 830 Session End Time: 915      Session Length: 45     Session #: 9     Attendees: Client attended alone    Treatment Plan Last Reviewed: 3/11/2021   PHQ-9 / DEANNA-7 :    Telemedicine Visit: The patient's condition can be safely assessed and treated via synchronous audio and visual telemedicine encounter.      Reason for Telemedicine Visit: Patient has requested telehealth visit    Originating Site (Patient Location): Patient's home    Distant Site (Provider Location): Provider Remote Setting    Consent:  The patient/guardian has verbally consented to: the potential risks and benefits of telemedicine (video visit) versus in person care; bill my insurance or make self-payment for services provided; and responsibility for payment of non-covered services.     Mode of Communication:  Video Conference via [employer]    As the provider I attest to compliance with applicable laws and regulations related to telemedicine.    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           Treatment Objective(s) Addressed in This Session:   Client will complete at least 10 minutes of self-regulation practice (e.g.: yoga, meditation, relaxation breathing, etc.) per " day.    Client to follow BA plan    Client will use identified behavioral and cognitive skills to challenge negative self talk 90% of the time.   Log ANTS, Cog Ds, and Alt Es     Intervention:     Reviewed Stanley Larosn' 3 Core Dysfunctional Beliefs and connection to anxiety and depression.  Discussed acceptance as the key to challenging these beliefs:    Self acceptance  I have flaws like everyone else  There is no reason I have to be perfect, no one else is  I am no more or less worthy than anybody else  Other acceptance  Other people will treat me unfairly sometimes  There is no reason they will always treat me fairly  Others who treat me unfairly are no more or less worthy than anybody else  Life acceptance  Life does not always work the way I want it to  There is no reason why life should work the way I want it to  Life may not always be pleasant, but it is never really that terrible and is almost always bearable         ASSESSMENT: Current Emotional / Mental Status (status of significant symptoms):   Risk status (Self / Other harm or suicidal ideation)   Client denies current fears or concerns for personal safety.   Client denies current or recent suicidal ideation or behaviors.   Client denies current or recent homicidal ideation or behaviors.   Client denies current or recent self injurious behavior or ideation.   Client denies other safety concerns.   A safety and risk management plan has not been developed at this time, however client was given the after-hours number / 911 should there be a change in any of these risk factors.     Appearance:   Appropriate    Eye Contact:   Good    Psychomotor Behavior: Retarded (Slowed)    Attitude:   Cooperative    Orientation:   All   Speech    Rate / Production: Monotone     Volume:  Normal    Mood:    Depressed    Affect:    Appropriate    Thought Content:  Clear    Thought Form:  Coherent  Logical    Insight:    Good      Medication Review:   No current psychiatric  medications prescribed     Medication Compliance:   NA     Changes in Health Issues:   None reported     Chemical Use Review:   Substance Use: Chemical use reviewed, no active concerns identified      Tobacco Use: No current tobacco use.       Collateral Reports Completed:   Not Applicable      Diagnosis:   Bipolar 2 Disorder    PLAN: (Client Tasks / Therapist Tasks / Other)  1.  Larson Myth/acceptance journaling once per day       2.  Meet in a month or sooner if needed            Justo Dawson, LICSW                                                         ________________________________________________________________________    Treatment Plan    Client's Name: Ramiro Najera  YOB: 1985    Date: 2/20/2017     DSM5 Diagnoses: (Sustained by DSM5 Criteria Listed Above)  Diagnoses: 296.89 Bipolar II Disorder With anxious distress        PTSD  Psychosocial & Contextual Factors: parents' health problems  WHODAS 2.0 (12 item)            This questionnaire asks about difficulties due to health conditions. Health conditions  include  disease or illnesses, other health problems that may be short or long lasting,  injuries, mental health or emotional problems, and problems with alcohol or drugs.                     Think back over the past 30 days and answer these questions, thinking about how much  difficulty you had doing the following activities. For each question, please [place] only  one response.    S1 Standing for long periods such as 30 minutes? None =         1   S2 Taking care of household responsibilities? Mild =           2   S3 Learning a new task, for example, learning how to get to a new place? None =         1   S4 How much of a problem do you have joining community activities (for example, festivals, [employer] or other activities) in the same way as anyone else can? Moderate =   3   S5 How much have you been emotionally affected by your health problems? Moderate =   3     In the past 30  days, how much difficulty did you have in:   S6 Concentrating on doing something for ten minutes? Mild =           2   S7 Walking a long distance such as a kilometer (or equivalent)? None =         1   S8 Washing your whole body? None =         1   S9 Getting dressed? None =         1   S10 Dealing with people you do not know? None =         1   S11 Maintaining a friendship? Moderate =   3   S12 Your day to day work? None =         1     H1 Overall, in the past 30 days, how many days were these difficulties present? Record number of days 30   H2 In the past 30 days, for how many days were you totally unable to carry out your usual activities or work because of any health condition? Record number of days  0   H3 In the past 30 days, not counting the days that you were totally unable, for how many days did you cut back or reduce your usual activities or work because of any health condition? Record number of days 3       Referral / Collaboration:  Referral to another professional/service is not indicated at this time..    Anticipated number of session or this episode of care: 18-24      MeasurableTreatment Goal(s) related to diagnosis / functional impairment(s)  Goal-Emotional regulation: Client will effectively manage mood dysregulation    I will know I've met my goal when I am feeling less out of control.      Objective #A (Client Action)    Status: new- Date: 12/24/2020      Client will learn at least 3 mindfulness skills and practice one daily    Intervention(s)  Therapist will provide mindfulness training, psychoeducation, behavioral activation, and cognitive restructuring.    Objective #B  Client will use at least 3 coping skills for anxiety management in the next 12 weeks.    Status: new- Date: 12/24/2020     Intervention(s)  Therapist will provide psychoeducation, behavioral activation, and cognitive restructuring.      Objective #C  Client will identify three distraction and diversion activities and use those  activities to improve distress tolerance and emotional regulation.  Status: new- Date: 12/24/2020     Intervention(s)  Therapist will provide psychoeducation, behavioral activation, and cognitive restructuring.    MeasurableTreatment Goal(s) related to diagnosis / functional impairment(s)            Goal-Depression: Client will decrease depressed mood    I will know I've met my goal when I am less depressed.      Objective #A (Client Action)    Status: new- Date: 12/24/2020     Client will use identified behavioral and cognitive skills to challenge negative self talk 90% of the time.    Intervention(s)  Therapist will provide psychoeducation, behavioral activation, and cognitive restructuring.      Objective #B  Client will complete at least 10 minutes of self-regulation practice (e.g.: yoga, meditation, relaxation breathing, etc.) per day.    Status: new- Date: 12/24/2020     Intervention(s)  Therapist will provide psychoeducation, behavioral activation, and cognitive restructuring.      Objective #C  Client will exercise 30 minutes 36 times in the next 12 weeks.  Status: new- Date: 12/24/2020     Intervention(s)  Therapist will provide psychoeducation, behavioral activation, and cognitive restructuring.                 Client has reviewed and agreed to the above plan.      Justo Dawson, [facility]  February 20, 2017

## 2021-05-13 ENCOUNTER — VIRTUAL VISIT (OUTPATIENT)
Dept: PSYCHOLOGY | Facility: CLINIC | Age: 36
End: 2021-05-13
Payer: COMMERCIAL

## 2021-05-13 DIAGNOSIS — F31.81 BIPOLAR 2 DISORDER (H): Primary | ICD-10-CM

## 2021-05-13 PROCEDURE — 90834 PSYTX W PT 45 MINUTES: CPT | Mod: 95 | Performed by: SOCIAL WORKER

## 2021-05-13 NOTE — PROGRESS NOTES
"                                              Progress Note    Client Name: Ramiro Najera  Date: 5/13/2021            Service Type: Individual      Session Start Time: 830 Session End Time: 915      Session Length: 45     Session #: 10   Attendees: Client attended alone    Treatment Plan Last Reviewed: 3/11/2021   PHQ-9 / DEANNA-7 :    Telemedicine Visit: The patient's condition can be safely assessed and treated via synchronous audio and visual telemedicine encounter.      Reason for Telemedicine Visit: Patient has requested telehealth visit    Originating Site (Patient Location): Patient's home    Distant Site (Provider Location): Provider Remote Setting    Consent:  The patient/guardian has verbally consented to: the potential risks and benefits of telemedicine (video visit) versus in person care; bill my insurance or make self-payment for services provided; and responsibility for payment of non-covered services.     Mode of Communication:  Video Conference via [employer]    As the provider I attest to compliance with applicable laws and regulations related to telemedicine.    DATA      Progress Since Last Session (Related to Symptoms / Goals / Homework):   Symptoms: Stable    Homework: Partially completed reviewed Larson Myorlin/acceptance journaling today in session            Episode of Care Goals: Satisfactory progress - ACTION (Actively working towards change); Intervened by reinforcing change plan / affirming steps taken     Current / Ongoing Stressors and Concerns:     Things have been \"tired but good.\"  Reports that he has been \"ok\" since our last meeting, some improvements with work, the pace has slowed down to a point where he feels like it is now manageable.  Thinks that his mood and sleep have improved, he has now jhad the time to go for walks on his lunch break.  Knows that exercise is good for his mood, not quite where he needs to be in terms of exercise.  Has a plan for getting back to the gym but is " not at a point that he can set that as a goal.         Treatment Objective(s) Addressed in This Session:   Client will complete at least 10 minutes of self-regulation practice (e.g.: yoga, meditation, relaxation breathing, etc.) per day.    Client to follow BA plan    Client will use identified behavioral and cognitive skills to challenge negative self talk 90% of the time.   Log ANTS, Cog Ds, and Alt Es     Intervention:     Reviewed thought-feeling-action connection.  Discussed Sohan Monsalve's concept of a personal domain and its connection to our emotions.  Explained personal domain as being comprised of our physical bodies, personal space, friends and loved ones, and significant beliefs.  Discussed four emotions and their relationship to the domain:      Estefany: Results from a perceived gain to the domain    Sadness: Results from a perceived loss to the domain    Fear: Results from a perceived threat to the domain    Anger: Results from a perceived harm to or transgression upon the domain           ASSESSMENT: Current Emotional / Mental Status (status of significant symptoms):   Risk status (Self / Other harm or suicidal ideation)   Client denies current fears or concerns for personal safety.   Client denies current or recent suicidal ideation or behaviors.   Client denies current or recent homicidal ideation or behaviors.   Client denies current or recent self injurious behavior or ideation.   Client denies other safety concerns.   A safety and risk management plan has not been developed at this time, however client was given the after-hours number / 911 should there be a change in any of these risk factors.     Appearance:   Appropriate    Eye Contact:   Good    Psychomotor Behavior: Retarded (Slowed)    Attitude:   Cooperative    Orientation:   All   Speech    Rate / Production: Monotone     Volume:  Normal    Mood:    Depressed    Affect:    Appropriate    Thought Content:  Clear    Thought Form:  Coherent   Logical    Insight:    Good      Medication Review:   No current psychiatric medications prescribed     Medication Compliance:   NA     Changes in Health Issues:   None reported     Chemical Use Review:   Substance Use: Chemical use reviewed, no active concerns identified      Tobacco Use: No current tobacco use.       Collateral Reports Completed:   Not Applicable      Diagnosis:   Bipolar 2 Disorder    PLAN: (Client Tasks / Therapist Tasks / Other)  1.  Larson Myth/acceptance or personal domain journaling once per day       2.  Meet in a month or sooner if needed            Justo Dawson, [facility]                                                         ________________________________________________________________________    Treatment Plan    Client's Name: Ramiro Najera  YOB: 1985    Date: 2/20/2017     DSM5 Diagnoses: (Sustained by DSM5 Criteria Listed Above)  Diagnoses: 296.89 Bipolar II Disorder With anxious distress        PTSD  Psychosocial & Contextual Factors: parents' health problems  WHODAS 2.0 (12 item)            This questionnaire asks about difficulties due to health conditions. Health conditions  include  disease or illnesses, other health problems that may be short or long lasting,  injuries, mental health or emotional problems, and problems with alcohol or drugs.                     Think back over the past 30 days and answer these questions, thinking about how much  difficulty you had doing the following activities. For each question, please [place] only  one response.    S1 Standing for long periods such as 30 minutes? None =         1   S2 Taking care of household responsibilities? Mild =           2   S3 Learning a new task, for example, learning how to get to a new place? None =         1   S4 How much of a problem do you have joining community activities (for example, festivals, Protestant or other activities) in the same way as anyone else can? Moderate =   3   S5 How  much have you been emotionally affected by your health problems? Moderate =   3     In the past 30 days, how much difficulty did you have in:   S6 Concentrating on doing something for ten minutes? Mild =           2   S7 Walking a long distance such as a kilometer (or equivalent)? None =         1   S8 Washing your whole body? None =         1   S9 Getting dressed? None =         1   S10 Dealing with people you do not know? None =         1   S11 Maintaining a friendship? Moderate =   3   S12 Your day to day work? None =         1     H1 Overall, in the past 30 days, how many days were these difficulties present? Record number of days 30   H2 In the past 30 days, for how many days were you totally unable to carry out your usual activities or work because of any health condition? Record number of days  0   H3 In the past 30 days, not counting the days that you were totally unable, for how many days did you cut back or reduce your usual activities or work because of any health condition? Record number of days 3       Referral / Collaboration:  Referral to another professional/service is not indicated at this time..    Anticipated number of session or this episode of care: 18-24      MeasurableTreatment Goal(s) related to diagnosis / functional impairment(s)  Goal-Emotional regulation: Client will effectively manage mood dysregulation    I will know I've met my goal when I am feeling less out of control.      Objective #A (Client Action)    Status: cont- Date: 3/11/2021     Client will learn at least 3 mindfulness skills and practice one daily    Intervention(s)  Therapist will provide mindfulness training, psychoeducation, behavioral activation, and cognitive restructuring.    Objective #B  Client will use at least 3 coping skills for anxiety management in the next 12 weeks.    Status: cont- Date: 3/11/2021     Intervention(s)  Therapist will provide psychoeducation, behavioral activation, and cognitive  restructuring.      Objective #C  Client will identify three distraction and diversion activities and use those activities to improve distress tolerance and emotional regulation.  Status: cont- Date: 3/11/2021     Intervention(s)  Therapist will provide psychoeducation, behavioral activation, and cognitive restructuring.    MeasurableTreatment Goal(s) related to diagnosis / functional impairment(s)            Goal-Depression: Client will decrease depressed mood    I will know I've met my goal when I am less depressed.      Objective #A (Client Action)    Status: cont- Date: 3/11/2021     Client will use identified behavioral and cognitive skills to challenge negative self talk 90% of the time.    Intervention(s)  Therapist will provide psychoeducation, behavioral activation, and cognitive restructuring.      Objective #B  Client will complete at least 10 minutes of self-regulation practice (e.g.: yoga, meditation, relaxation breathing, etc.) per day.    Status: cont- Date: 3/11/2021     Intervention(s)  Therapist will provide psychoeducation, behavioral activation, and cognitive restructuring.      Objective #C  Client will exercise 30 minutes 36 times in the next 12 weeks.  Status: cont- Date: 3/11/2021     Intervention(s)  Therapist will provide psychoeducation, behavioral activation, and cognitive restructuring.                 Client has reviewed and agreed to the above plan.      Justo Dawson, [facility]  February 20, 2017

## 2021-05-30 ENCOUNTER — RECORDS - HEALTHEAST (OUTPATIENT)
Dept: ADMINISTRATIVE | Facility: CLINIC | Age: 36
End: 2021-05-30

## 2021-06-10 ENCOUNTER — VIRTUAL VISIT (OUTPATIENT)
Dept: PSYCHOLOGY | Facility: CLINIC | Age: 36
End: 2021-06-10
Payer: COMMERCIAL

## 2021-06-10 DIAGNOSIS — F31.81 BIPOLAR 2 DISORDER (H): Primary | ICD-10-CM

## 2021-06-10 PROCEDURE — 90834 PSYTX W PT 45 MINUTES: CPT | Mod: 95 | Performed by: SOCIAL WORKER

## 2021-06-10 NOTE — PROGRESS NOTES
"                                              Progress Note    Client Name: Ramiro Najera  Date: 6/10/2021            Service Type: Individual      Session Start Time: 830 Session End Time: 915      Session Length: 45     Session #: 11   Attendees: Client attended alone    Treatment Plan Last Reviewed: 6/10/2021   PHQ-9 / DEANNA-7 :    Telemedicine Visit: The patient's condition can be safely assessed and treated via synchronous audio and visual telemedicine encounter.      Reason for Telemedicine Visit: Patient has requested telehealth visit    Originating Site (Patient Location): Patient's home    Distant Site (Provider Location): Provider Remote Setting    Consent:  The patient/guardian has verbally consented to: the potential risks and benefits of telemedicine (video visit) versus in person care; bill my insurance or make self-payment for services provided; and responsibility for payment of non-covered services.     Mode of Communication:  Video Conference via [employer]    As the provider I attest to compliance with applicable laws and regulations related to telemedicine.    DATA      Progress Since Last Session (Related to Symptoms / Goals / Homework):   Symptoms: Stable    Homework: Partially completed reviewed personal domain journaling today in session            Episode of Care Goals: Satisfactory progress - ACTION (Actively working towards change); Intervened by reinforcing change plan / affirming steps taken     Current / Ongoing Stressors and Concerns:     Things have been \"in a weird spot.  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  Remarks \"I don't do well with change.\"  Some anxiety over the prospect of looking for a new job, wonders too if he will be able to find something comparable.           Treatment Objective(s) Addressed in This Session:   Client will complete at least 10 minutes " "of self-regulation practice (e.g.: yoga, meditation, relaxation breathing, etc.) per day.    Client to follow BA plan    Client will use identified behavioral and cognitive skills to challenge negative self talk 90% of the time.   Log ANTS, Cog Ds, and Alt Es     Intervention:     Discussed productive and unproductive worry.  Framed productive worry as prudent, focused on events or problems, might reasonably cause a problem if left unattended, and that lead to a potential solution of a problem.  Explained productive worry as a \"to do\" list and unproductive worry as a \"what if\" list.  Used metaphor of car trip to describe the differences.  Explored how client has distinguish between the two in the past and might do this better in the future.  Discussed logging productive and unproductive worry between sessions.            ASSESSMENT: Current Emotional / Mental Status (status of significant symptoms):   Risk status (Self / Other harm or suicidal ideation)   Client denies current fears or concerns for personal safety.   Client denies current or recent suicidal ideation or behaviors.   Client denies current or recent homicidal ideation or behaviors.   Client denies current or recent self injurious behavior or ideation.   Client denies other safety concerns.   A safety and risk management plan has not been developed at this time, however client was given the after-hours number / 911 should there be a change in any of these risk factors.     Appearance:   Appropriate    Eye Contact:   Good    Psychomotor Behavior: Retarded (Slowed)    Attitude:   Cooperative    Orientation:   All   Speech    Rate / Production: Monotone     Volume:  Normal    Mood:    Depressed    Affect:    Appropriate    Thought Content:  Clear    Thought Form:  Coherent  Logical    Insight:    Good      Medication Review:   No current psychiatric medications prescribed     Medication Compliance:   NA     Changes in Health Issues:   None " reported     Chemical Use Review:   Substance Use: Chemical use reviewed, no active concerns identified      Tobacco Use: No current tobacco use.       Collateral Reports Completed:   Not Applicable      Diagnosis:   Bipolar 2 Disorder    PLAN: (Client Tasks / Therapist Tasks / Other)  1.  Client will log productive v. unproductive worry at least once by next session.  Worksheet given in session to facilitate this.      2.  Meet in a month or sooner if needed            Justo Dawson, LICSW                                                         ________________________________________________________________________    Treatment Plan    Client's Name: Ramiro Najera  YOB: 1985    Date: 2/20/2017     DSM5 Diagnoses: (Sustained by DSM5 Criteria Listed Above)  Diagnoses: 296.89 Bipolar II Disorder With anxious distress        PTSD  Psychosocial & Contextual Factors: parents' health problems  WHODAS 2.0 (12 item)            This questionnaire asks about difficulties due to health conditions. Health conditions  include  disease or illnesses, other health problems that may be short or long lasting,  injuries, mental health or emotional problems, and problems with alcohol or drugs.                     Think back over the past 30 days and answer these questions, thinking about how much  difficulty you had doing the following activities. For each question, please [place] only  one response.    S1 Standing for long periods such as 30 minutes? None =         1   S2 Taking care of household responsibilities? Mild =           2   S3 Learning a new task, for example, learning how to get to a new place? None =         1   S4 How much of a problem do you have joining community activities (for example, festivals, Gnosticist or other activities) in the same way as anyone else can? Moderate =   3   S5 How much have you been emotionally affected by your health problems? Moderate =   3     In the past 30 days, how  much difficulty did you have in:   S6 Concentrating on doing something for ten minutes? Mild =           2   S7 Walking a long distance such as a kilometer (or equivalent)? None =         1   S8 Washing your whole body? None =         1   S9 Getting dressed? None =         1   S10 Dealing with people you do not know? None =         1   S11 Maintaining a friendship? Moderate =   3   S12 Your day to day work? None =         1     H1 Overall, in the past 30 days, how many days were these difficulties present? Record number of days 30   H2 In the past 30 days, for how many days were you totally unable to carry out your usual activities or work because of any health condition? Record number of days  0   H3 In the past 30 days, not counting the days that you were totally unable, for how many days did you cut back or reduce your usual activities or work because of any health condition? Record number of days 3       Referral / Collaboration:  Referral to another professional/service is not indicated at this time..    Anticipated number of session or this episode of care: 18-24      MeasurableTreatment Goal(s) related to diagnosis / functional impairment(s)  Goal-Emotional regulation: Client will effectively manage mood dysregulation    I will know I've met my goal when I am feeling less out of control.      Objective #A (Client Action)    Status: cont- Date: 6/10/2021     Client will learn at least 3 mindfulness skills and practice one daily    Intervention(s)  Therapist will provide mindfulness training, psychoeducation, behavioral activation, and cognitive restructuring.    Objective #B  Client will use at least 3 coping skills for anxiety management in the next 12 weeks.    Status: cont- Date: 6/10/2021     Intervention(s)  Therapist will provide psychoeducation, behavioral activation, and cognitive restructuring.      Objective #C  Client will identify three distraction and diversion activities and use those activities  to improve distress tolerance and emotional regulation.  Status: cont- Date: 6/10/2021     Intervention(s)  Therapist will provide psychoeducation, behavioral activation, and cognitive restructuring.    MeasurableTreatment Goal(s) related to diagnosis / functional impairment(s)            Goal-Depression: Client will decrease depressed mood    I will know I've met my goal when I am less depressed.      Objective #A (Client Action)    Status: cont- Date: 3/11/2021     Client will use identified behavioral and cognitive skills to challenge negative self talk 90% of the time.    Intervention(s)  Therapist will provide psychoeducation, behavioral activation, and cognitive restructuring.      Objective #B  Client will complete at least 10 minutes of self-regulation practice (e.g.: yoga, meditation, relaxation breathing, etc.) per day.    Status: cont- Date: 3/11/2021     Intervention(s)  Therapist will provide psychoeducation, behavioral activation, and cognitive restructuring.      Objective #C  Client will exercise 30 minutes 36 times in the next 12 weeks.  Status: cont- Date: 3/11/2021     Intervention(s)  Therapist will provide psychoeducation, behavioral activation, and cognitive restructuring.                 Client has reviewed and agreed to the above plan.      Justo Dawson, [facility]  February 20, 2017

## 2021-07-08 ENCOUNTER — VIRTUAL VISIT (OUTPATIENT)
Dept: PSYCHOLOGY | Facility: CLINIC | Age: 36
End: 2021-07-08
Payer: COMMERCIAL

## 2021-07-08 DIAGNOSIS — F31.81 BIPOLAR 2 DISORDER (H): Primary | ICD-10-CM

## 2021-07-08 PROCEDURE — 90834 PSYTX W PT 45 MINUTES: CPT | Mod: 95 | Performed by: SOCIAL WORKER

## 2021-07-08 NOTE — PROGRESS NOTES
"                                              Progress Note    Client Name: Ramiro Najera  Date: 7/8/2021            Service Type: Individual      Session Start Time: 830 Session End Time: 915      Session Length: 45     Session #: 12   Attendees: Client attended alone    Treatment Plan Last Reviewed: 6/10/2021   PHQ-9 / DEANNA-7 :    Telemedicine Visit: The patient's condition can be safely assessed and treated via synchronous audio and visual telemedicine encounter.      Reason for Telemedicine Visit: Patient has requested telehealth visit    Originating Site (Patient Location): Patient's home    Distant Site (Provider Location): Provider Remote Setting    Consent:  The patient/guardian has verbally consented to: the potential risks and benefits of telemedicine (video visit) versus in person care; bill my insurance or make self-payment for services provided; and responsibility for payment of non-covered services.     Mode of Communication:  Video Conference via [employer]    As the provider I attest to compliance with applicable laws and regulations related to telemedicine.    DATA      Progress Since Last Session (Related to Symptoms / Goals / Homework):   Symptoms: Stable    Homework: Partially completed again reviewed personal domain journaling today in session            Episode of Care Goals: Satisfactory progress - ACTION (Actively working towards change); Intervened by reinforcing change plan / affirming steps taken     Current / Ongoing Stressors and Concerns:     Things have been \"ok, its been a long couple weeks.\"  \"I have moved from hemming and hawing about finding a new job to actively sending out resumes and applying for jobs.\"  Feels like he has been making some progress in this area, has felt stuck in his career for some time and now is feeling like the time is right for him to make a change.              Treatment Objective(s) Addressed in This Session:   Client will complete at least 10 minutes " of self-regulation practice (e.g.: yoga, meditation, relaxation breathing, etc.) per day.    Client to follow BA plan    Client will use identified behavioral and cognitive skills to challenge negative self talk 90% of the time.   Log ANTS, Cog Ds, and Alt Es     Intervention:     Long discussion today about behavioral activation.  Reviewed basic tracking options, identified differences between long-term v short-term planning, and discussed setting and achieving SMART goals as an effective method for dealing with depression, anxiety, and many other mental health concerns.  Set behavioral goals in session today around exercise.                  ASSESSMENT: Current Emotional / Mental Status (status of significant symptoms):   Risk status (Self / Other harm or suicidal ideation)   Client denies current fears or concerns for personal safety.   Client denies current or recent suicidal ideation or behaviors.   Client denies current or recent homicidal ideation or behaviors.   Client denies current or recent self injurious behavior or ideation.   Client denies other safety concerns.   A safety and risk management plan has not been developed at this time, however client was given the after-hours number / 911 should there be a change in any of these risk factors.     Appearance:   Appropriate    Eye Contact:   Good    Psychomotor Behavior: Retarded (Slowed)    Attitude:   Cooperative    Orientation:   All   Speech    Rate / Production: Monotone     Volume:  Normal    Mood:    Depressed    Affect:    Appropriate    Thought Content:  Clear    Thought Form:  Coherent  Logical    Insight:    Good      Medication Review:   No current psychiatric medications prescribed     Medication Compliance:   NA     Changes in Health Issues:   None reported     Chemical Use Review:   Substance Use: Chemical use reviewed, no active concerns identified      Tobacco Use: No current tobacco use.       Collateral Reports Completed:   Not  Applicable      Diagnosis:   Bipolar 2 Disorder    PLAN: (Client Tasks / Therapist Tasks / Other)  1.  Client will log productive v. unproductive worry at least once by next session.      2.  Exercise 1x/week+ by next session  3.  Meet in a month or sooner if needed            Justo Dawson, LICSW                                                         ________________________________________________________________________    Treatment Plan    Client's Name: Ramiro Najera  YOB: 1985    Date: 2/20/2017     DSM5 Diagnoses: (Sustained by DSM5 Criteria Listed Above)  Diagnoses: 296.89 Bipolar II Disorder With anxious distress        PTSD  Psychosocial & Contextual Factors: parents' health problems  WHODAS 2.0 (12 item)            This questionnaire asks about difficulties due to health conditions. Health conditions  include  disease or illnesses, other health problems that may be short or long lasting,  injuries, mental health or emotional problems, and problems with alcohol or drugs.                     Think back over the past 30 days and answer these questions, thinking about how much  difficulty you had doing the following activities. For each question, please [place] only  one response.    S1 Standing for long periods such as 30 minutes? None =         1   S2 Taking care of household responsibilities? Mild =           2   S3 Learning a new task, for example, learning how to get to a new place? None =         1   S4 How much of a problem do you have joining community activities (for example, festivals, Sabian[employer] or other activities) in the same way as anyone else can? Moderate =   3   S5 How much have you been emotionally affected by your health problems? Moderate =   3     In the past 30 days, how much difficulty did you have in:   S6 Concentrating on doing something for ten minutes? Mild =           2   S7 Walking a long distance such as a kilometer (or equivalent)? None =         1   S8  Washing your whole body? None =         1   S9 Getting dressed? None =         1   S10 Dealing with people you do not know? None =         1   S11 Maintaining a friendship? Moderate =   3   S12 Your day to day work? None =         1     H1 Overall, in the past 30 days, how many days were these difficulties present? Record number of days 30   H2 In the past 30 days, for how many days were you totally unable to carry out your usual activities or work because of any health condition? Record number of days  0   H3 In the past 30 days, not counting the days that you were totally unable, for how many days did you cut back or reduce your usual activities or work because of any health condition? Record number of days 3       Referral / Collaboration:  Referral to another professional/service is not indicated at this time..    Anticipated number of session or this episode of care: 18-24      MeasurableTreatment Goal(s) related to diagnosis / functional impairment(s)  Goal-Emotional regulation: Client will effectively manage mood dysregulation    I will know I've met my goal when I am feeling less out of control.      Objective #A (Client Action)    Status: cont- Date: 6/10/2021     Client will learn at least 3 mindfulness skills and practice one daily    Intervention(s)  Therapist will provide mindfulness training, psychoeducation, behavioral activation, and cognitive restructuring.    Objective #B  Client will use at least 3 coping skills for anxiety management in the next 12 weeks.    Status: cont- Date: 6/10/2021     Intervention(s)  Therapist will provide psychoeducation, behavioral activation, and cognitive restructuring.      Objective #C  Client will identify three distraction and diversion activities and use those activities to improve distress tolerance and emotional regulation.  Status: cont- Date: 6/10/2021     Intervention(s)  Therapist will provide psychoeducation, behavioral activation, and cognitive  restructuring.    MeasurableTreatment Goal(s) related to diagnosis / functional impairment(s)            Goal-Depression: Client will decrease depressed mood    I will know I've met my goal when I am less depressed.      Objective #A (Client Action)    Status: cont- Date: 3/11/2021     Client will use identified behavioral and cognitive skills to challenge negative self talk 90% of the time.    Intervention(s)  Therapist will provide psychoeducation, behavioral activation, and cognitive restructuring.      Objective #B  Client will complete at least 10 minutes of self-regulation practice (e.g.: yoga, meditation, relaxation breathing, etc.) per day.    Status: cont- Date: 3/11/2021     Intervention(s)  Therapist will provide psychoeducation, behavioral activation, and cognitive restructuring.      Objective #C  Client will exercise 30 minutes 36 times in the next 12 weeks.  Status: cont- Date: 3/11/2021     Intervention(s)  Therapist will provide psychoeducation, behavioral activation, and cognitive restructuring.                 Client has reviewed and agreed to the above plan.      Justo Dawson, YUNIOR  February 20, 2017

## 2021-07-22 ENCOUNTER — VIRTUAL VISIT (OUTPATIENT)
Dept: PSYCHOLOGY | Facility: CLINIC | Age: 36
End: 2021-07-22
Payer: COMMERCIAL

## 2021-07-22 DIAGNOSIS — F31.81 BIPOLAR 2 DISORDER (H): Primary | ICD-10-CM

## 2021-07-22 PROCEDURE — 90834 PSYTX W PT 45 MINUTES: CPT | Mod: 95 | Performed by: SOCIAL WORKER

## 2021-07-22 NOTE — PROGRESS NOTES
"                                              Progress Note    Client Name: Ramiro Najera  Date: 7/22/2021            Service Type: Individual      Session Start Time: 830 Session End Time: 915      Session Length: 45     Session #: 13   Attendees: Client attended alone    Treatment Plan Last Reviewed: 6/10/2021   PHQ-9 / DEANNA-7 :    Telemedicine Visit: The patient's condition can be safely assessed and treated via synchronous audio and visual telemedicine encounter.      Reason for Telemedicine Visit: Patient has requested telehealth visit    Originating Site (Patient Location): Patient's home    Distant Site (Provider Location): Provider Remote Setting    Consent:  The patient/guardian has verbally consented to: the potential risks and benefits of telemedicine (video visit) versus in person care; bill my insurance or make self-payment for services provided; and responsibility for payment of non-covered services.     Mode of Communication:  Video Conference via [employer]    As the provider I attest to compliance with applicable laws and regulations related to telemedicine.    DATA      Progress Since Last Session (Related to Symptoms / Goals / Homework):   Symptoms: Stable    Homework: Partially completed again reviewed personal domain journaling today in session            Episode of Care Goals: Satisfactory progress - ACTION (Actively working towards change); Intervened by reinforcing change plan / affirming steps taken     Current / Ongoing Stressors and Concerns:     Things have been \"tired, been a rough couple of weeks.\"  Reports lots of intrusive thoughts around past actions.  Notes that Stacy has been coming up in his thoughts lately.  Thinks that a trigger for this is that one of his groomsmen have started planning his bachelor party and thought of inviting a mutual friend of client and stacy that there has been some history with.  This has resulted in client \"going down a rabbit hole of all the " "things that I did not feel good about in the relationship.\"           Treatment Objective(s) Addressed in This Session:   Client will complete at least 10 minutes of self-regulation practice (e.g.: yoga, meditation, relaxation breathing, etc.) per day.    Client to follow BA plan    Client will use identified behavioral and cognitive skills to challenge negative self talk 90% of the time.   Log ANTS, Cog Ds, and Alt Es     Intervention:     Discussed productive and unproductive worry.  Framed productive worry as prudent, focused on events or problems, might reasonably cause a problem if left unattended, and that lead to a potential solution of a problem.  Explained productive worry as a \"to do\" list and unproductive worry as a \"what if\" list.  Used metaphor of car trip to describe the differences.  Explored how client has distinguish between the two in the past and might do this better in the future.  Discussed logging productive and unproductive worry between sessions.      ASSESSMENT: Current Emotional / Mental Status (status of significant symptoms):   Risk status (Self / Other harm or suicidal ideation)   Client denies current fears or concerns for personal safety.   Client denies current or recent suicidal ideation or behaviors.   Client denies current or recent homicidal ideation or behaviors.   Client denies current or recent self injurious behavior or ideation.   Client denies other safety concerns.   A safety and risk management plan has not been developed at this time, however client was given the after-hours number / 911 should there be a change in any of these risk factors.     Appearance:   Appropriate    Eye Contact:   Good    Psychomotor Behavior: Retarded (Slowed)    Attitude:   Cooperative    Orientation:   All   Speech    Rate / Production: Monotone     Volume:  Normal    Mood:    Depressed    Affect:    Appropriate    Thought Content:  Clear    Thought Form:  Coherent  Logical    Insight:    Good "      Medication Review:   No current psychiatric medications prescribed     Medication Compliance:   NA     Changes in Health Issues:   None reported     Chemical Use Review:   Substance Use: Chemical use reviewed, no active concerns identified      Tobacco Use: No current tobacco use.       Collateral Reports Completed:   Not Applicable      Diagnosis:   Bipolar 2 Disorder    PLAN: (Client Tasks / Therapist Tasks / Other)  1.  Client will log productive v. unproductive worry at least once by next session.      2.  Exercise 1x/week+ by next session  3.  Meet in two weeks            YUNIOR Bustos                                                         ________________________________________________________________________    Treatment Plan    Client's Name: Ramiro Najera  YOB: 1985    Date: 2/20/2017     DSM5 Diagnoses: (Sustained by DSM5 Criteria Listed Above)  Diagnoses: 296.89 Bipolar II Disorder With anxious distress        PTSD  Psychosocial & Contextual Factors: parents' health problems  WHODAS 2.0 (12 item)            This questionnaire asks about difficulties due to health conditions. Health conditions  include  disease or illnesses, other health problems that may be short or long lasting,  injuries, mental health or emotional problems, and problems with alcohol or drugs.                     Think back over the past 30 days and answer these questions, thinking about how much  difficulty you had doing the following activities. For each question, please [place] only  one response.    S1 Standing for long periods such as 30 minutes? None =         1   S2 Taking care of household responsibilities? Mild =           2   S3 Learning a new task, for example, learning how to get to a new place? None =         1   S4 How much of a problem do you have joining community activities (for example, festivals, Jewish or other activities) in the same way as anyone else can? Moderate =   3   S5  How much have you been emotionally affected by your health problems? Moderate =   3     In the past 30 days, how much difficulty did you have in:   S6 Concentrating on doing something for ten minutes? Mild =           2   S7 Walking a long distance such as a kilometer (or equivalent)? None =         1   S8 Washing your whole body? None =         1   S9 Getting dressed? None =         1   S10 Dealing with people you do not know? None =         1   S11 Maintaining a friendship? Moderate =   3   S12 Your day to day work? None =         1     H1 Overall, in the past 30 days, how many days were these difficulties present? Record number of days 30   H2 In the past 30 days, for how many days were you totally unable to carry out your usual activities or work because of any health condition? Record number of days  0   H3 In the past 30 days, not counting the days that you were totally unable, for how many days did you cut back or reduce your usual activities or work because of any health condition? Record number of days 3       Referral / Collaboration:  Referral to another professional/service is not indicated at this time..    Anticipated number of session or this episode of care: 18-24      MeasurableTreatment Goal(s) related to diagnosis / functional impairment(s)  Goal-Emotional regulation: Client will effectively manage mood dysregulation    I will know I've met my goal when I am feeling less out of control.      Objective #A (Client Action)    Status: cont- Date: 6/10/2021     Client will learn at least 3 mindfulness skills and practice one daily    Intervention(s)  Therapist will provide mindfulness training, psychoeducation, behavioral activation, and cognitive restructuring.    Objective #B  Client will use at least 3 coping skills for anxiety management in the next 12 weeks.    Status: cont- Date: 6/10/2021     Intervention(s)  Therapist will provide psychoeducation, behavioral activation, and cognitive  restructuring.      Objective #C  Client will identify three distraction and diversion activities and use those activities to improve distress tolerance and emotional regulation.  Status: cont- Date: 6/10/2021     Intervention(s)  Therapist will provide psychoeducation, behavioral activation, and cognitive restructuring.    MeasurableTreatment Goal(s) related to diagnosis / functional impairment(s)            Goal-Depression: Client will decrease depressed mood    I will know I've met my goal when I am less depressed.      Objective #A (Client Action)    Status: cont- Date: 3/11/2021     Client will use identified behavioral and cognitive skills to challenge negative self talk 90% of the time.    Intervention(s)  Therapist will provide psychoeducation, behavioral activation, and cognitive restructuring.      Objective #B  Client will complete at least 10 minutes of self-regulation practice (e.g.: yoga, meditation, relaxation breathing, etc.) per day.    Status: cont- Date: 3/11/2021     Intervention(s)  Therapist will provide psychoeducation, behavioral activation, and cognitive restructuring.      Objective #C  Client will exercise 30 minutes 36 times in the next 12 weeks.  Status: cont- Date: 3/11/2021     Intervention(s)  Therapist will provide psychoeducation, behavioral activation, and cognitive restructuring.                 Client has reviewed and agreed to the above plan.      Justo Dawson, Northern Light Mayo HospitalSW  February 20, 2017

## 2021-09-16 ENCOUNTER — VIRTUAL VISIT (OUTPATIENT)
Dept: PSYCHOLOGY | Facility: CLINIC | Age: 36
End: 2021-09-16
Payer: COMMERCIAL

## 2021-09-16 DIAGNOSIS — F31.81 BIPOLAR 2 DISORDER (H): Primary | ICD-10-CM

## 2021-09-16 PROCEDURE — 90834 PSYTX W PT 45 MINUTES: CPT | Mod: 95 | Performed by: SOCIAL WORKER

## 2021-09-16 NOTE — PROGRESS NOTES
"                                              Progress Note    Client Name: Ramiro Najera  Date: 9/16/2021            Service Type: Individual      Session Start Time: 830 Session End Time: 915      Session Length: 45     Session #: 14   Attendees: Client attended alone    Treatment Plan Last Reviewed: 6/10/2021   PHQ-9 / DEANNA-7 :    Telemedicine Visit: The patient's condition can be safely assessed and treated via synchronous audio and visual telemedicine encounter.      Reason for Telemedicine Visit: Patient has requested telehealth visit    Originating Site (Patient Location): Patient's home    Distant Site (Provider Location): Provider Remote Setting    Consent:  The patient/guardian has verbally consented to: the potential risks and benefits of telemedicine (video visit) versus in person care; bill my insurance or make self-payment for services provided; and responsibility for payment of non-covered services.     Mode of Communication:  Video Conference via [employer]    As the provider I attest to compliance with applicable laws and regulations related to telemedicine.    DATA      Progress Since Last Session (Related to Symptoms / Goals / Homework):   Symptoms: Stable    Homework: Partially completed again reviewed personal domain journaling today in session            Episode of Care Goals: Satisfactory progress - ACTION (Actively working towards change); Intervened by reinforcing change plan / affirming steps taken     Current / Ongoing Stressors and Concerns:     Things have been \"overall, pretty good.\"  Has had a sense of        Treatment Objective(s) Addressed in This Session:   Client will complete at least 10 minutes of self-regulation practice (e.g.: yoga, meditation, relaxation breathing, etc.) per day.    Client to follow BA plan    Client will use identified behavioral and cognitive skills to challenge negative self talk 90% of the time.   Log ANTS, Cog Ds, and Alt " Es     Intervention:     Motivational Interviewing  Target Behavior: exercise    Stage of Change: PREPARATION (Decided to change - considering how)    MI Intervention: Expressed Empathy/Understanding, Supported Autonomy, Collaboration, Evocation, Permission to raise concern or advise, Open-ended questions, Reflections: simple and complex and Importance Scale (1-10) 5 Confidence Scale (1-10) 4      Change Talk Expressed by the Patient: Desire to change Ability to change Reasons to change Need to change Committment to change Activation Taking steps    Provider Response to Change Talk: E - Evoked more info from patient about behavior change, A - Affirmed patient's thoughts, decisions, or attempts at behavior change, R - Reflected patient's change talk and S - Summarized patient's change talk statements       ASSESSMENT: Current Emotional / Mental Status (status of significant symptoms):   Risk status (Self / Other harm or suicidal ideation)   Client denies current fears or concerns for personal safety.   Client denies current or recent suicidal ideation or behaviors.   Client denies current or recent homicidal ideation or behaviors.   Client denies current or recent self injurious behavior or ideation.   Client denies other safety concerns.   A safety and risk management plan has not been developed at this time, however client was given the after-hours number / 911 should there be a change in any of these risk factors.     Appearance:   Appropriate    Eye Contact:   Good    Psychomotor Behavior: Retarded (Slowed)    Attitude:   Cooperative    Orientation:   All   Speech    Rate / Production: Monotone     Volume:  Normal    Mood:    Depressed    Affect:    Appropriate    Thought Content:  Clear    Thought Form:  Coherent  Logical    Insight:    Good      Medication Review:   No current psychiatric medications prescribed     Medication Compliance:   NA     Changes in Health Issues:   None reported     Chemical Use  Review:   Substance Use: Chemical use reviewed, no active concerns identified      Tobacco Use: No current tobacco use.       Collateral Reports Completed:   Not Applicable      Diagnosis:   Bipolar 2 Disorder    PLAN: (Client Tasks / Therapist Tasks / Other)  1.  Client will log productive v. unproductive worry at least once by next session.      2.  Exercise 3x/week+ by next session  3.  Sent out 5 resumes per week by next session  4  Meet in two weeks            Justo Dawson, [facility]                                                         ________________________________________________________________________    Treatment Plan    Client's Name: Ramiro Najera  YOB: 1985    Date: 2/20/2017     DSM5 Diagnoses: (Sustained by DSM5 Criteria Listed Above)  Diagnoses: 296.89 Bipolar II Disorder With anxious distress        PTSD  Psychosocial & Contextual Factors: parents' health problems  WHODAS 2.0 (12 item)            This questionnaire asks about difficulties due to health conditions. Health conditions  include  disease or illnesses, other health problems that may be short or long lasting,  injuries, mental health or emotional problems, and problems with alcohol or drugs.                     Think back over the past 30 days and answer these questions, thinking about how much  difficulty you had doing the following activities. For each question, please [place] only  one response.    S1 Standing for long periods such as 30 minutes? None =         1   S2 Taking care of household responsibilities? Mild =           2   S3 Learning a new task, for example, learning how to get to a new place? None =         1   S4 How much of a problem do you have joining community activities (for example, festivals, Jehovah's witness or other activities) in the same way as anyone else can? Moderate =   3   S5 How much have you been emotionally affected by your health problems? Moderate =   3     In the past 30 days, how much  difficulty did you have in:   S6 Concentrating on doing something for ten minutes? Mild =           2   S7 Walking a long distance such as a kilometer (or equivalent)? None =         1   S8 Washing your whole body? None =         1   S9 Getting dressed? None =         1   S10 Dealing with people you do not know? None =         1   S11 Maintaining a friendship? Moderate =   3   S12 Your day to day work? None =         1     H1 Overall, in the past 30 days, how many days were these difficulties present? Record number of days 30   H2 In the past 30 days, for how many days were you totally unable to carry out your usual activities or work because of any health condition? Record number of days  0   H3 In the past 30 days, not counting the days that you were totally unable, for how many days did you cut back or reduce your usual activities or work because of any health condition? Record number of days 3       Referral / Collaboration:  Referral to another professional/service is not indicated at this time..    Anticipated number of session or this episode of care: 18-24      MeasurableTreatment Goal(s) related to diagnosis / functional impairment(s)  Goal-Emotional regulation: Client will effectively manage mood dysregulation    I will know I've met my goal when I am feeling less out of control.      Objective #A (Client Action)    Status: cont- Date: 6/10/2021     Client will learn at least 3 mindfulness skills and practice one daily    Intervention(s)  Therapist will provide mindfulness training, psychoeducation, behavioral activation, and cognitive restructuring.    Objective #B  Client will use at least 3 coping skills for anxiety management in the next 12 weeks.    Status: cont- Date: 6/10/2021     Intervention(s)  Therapist will provide psychoeducation, behavioral activation, and cognitive restructuring.      Objective #C  Client will identify three distraction and diversion activities and use those activities to  improve distress tolerance and emotional regulation.  Status: cont- Date: 6/10/2021     Intervention(s)  Therapist will provide psychoeducation, behavioral activation, and cognitive restructuring.    MeasurableTreatment Goal(s) related to diagnosis / functional impairment(s)            Goal-Depression: Client will decrease depressed mood    I will know I've met my goal when I am less depressed.      Objective #A (Client Action)    Status: cont- Date: 3/11/2021     Client will use identified behavioral and cognitive skills to challenge negative self talk 90% of the time.    Intervention(s)  Therapist will provide psychoeducation, behavioral activation, and cognitive restructuring.      Objective #B  Client will complete at least 10 minutes of self-regulation practice (e.g.: yoga, meditation, relaxation breathing, etc.) per day.    Status: cont- Date: 3/11/2021     Intervention(s)  Therapist will provide psychoeducation, behavioral activation, and cognitive restructuring.      Objective #C  Client will exercise 30 minutes 36 times in the next 12 weeks.  Status: cont- Date: 3/11/2021     Intervention(s)  Therapist will provide psychoeducation, behavioral activation, and cognitive restructuring.                 Client has reviewed and agreed to the above plan.      Justo Dawson, [facility]  February 20, 2017

## 2021-10-04 ENCOUNTER — HEALTH MAINTENANCE LETTER (OUTPATIENT)
Age: 36
End: 2021-10-04

## 2021-10-22 ENCOUNTER — VIRTUAL VISIT (OUTPATIENT)
Dept: PSYCHOLOGY | Facility: CLINIC | Age: 36
End: 2021-10-22
Payer: COMMERCIAL

## 2021-10-22 DIAGNOSIS — F31.81 BIPOLAR 2 DISORDER (H): Primary | ICD-10-CM

## 2021-10-22 PROCEDURE — 90834 PSYTX W PT 45 MINUTES: CPT | Mod: 95 | Performed by: SOCIAL WORKER

## 2021-10-22 NOTE — PROGRESS NOTES
"                                              Progress Note    Client Name: Ramiro Najera  Date: 10/22/2021            Service Type: Individual      Session Start Time: 730 Session End Time: 815      Session Length: 45     Session #: 15   Attendees: Client attended alone    Treatment Plan Last Reviewed: 6/10/2021   PHQ-9 / DEANNA-7 :    Telemedicine Visit: The patient's condition can be safely assessed and treated via synchronous audio and visual telemedicine encounter.      Reason for Telemedicine Visit: Patient has requested telehealth visit    Originating Site (Patient Location): Patient's home    Distant Site (Provider Location): Provider Remote Setting    Consent:  The patient/guardian has verbally consented to: the potential risks and benefits of telemedicine (video visit) versus in person care; bill my insurance or make self-payment for services provided; and responsibility for payment of non-covered services.     Mode of Communication:  Video Conference via [employer]    As the provider I attest to compliance with applicable laws and regulations related to telemedicine.    DATA      Progress Since Last Session (Related to Symptoms / Goals / Homework):   Symptoms: Stable    Homework: Partially completed again reviewed personal domain journaling today in session            Episode of Care Goals: Satisfactory progress - ACTION (Actively working towards change); Intervened by reinforcing change plan / affirming steps taken     Current / Ongoing Stressors and Concerns:     Things have been \"up and down.\"  Has had a tough week at work, lots of stress but notes that work seems to be slowing down.  Also has put in some applications and getting some interviews.  This is encouraging for him.  Also will be getting  in a few months and is busy with the planning of that.  Had his bachelor party last weekend and this was good, spent time [place] his friends [place].  Report that mood has been \"better.\"  Some frustration " with his limited ability to achieve his exercise goals and we review his log today in session.               Treatment Objective(s) Addressed in This Session:   Client will complete at least 10 minutes of self-regulation practice (e.g.: yoga, meditation, relaxation breathing, etc.) per day.    Client to follow BA plan    Client will use identified behavioral and cognitive skills to challenge negative self talk 90% of the time.   Log ANTS, Cog Ds, and Alt Es     Intervention:     Motivational Interviewing  Target Behavior: exercise    Stage of Change: PREPARATION (Decided to change - considering how)    MI Intervention: Expressed Empathy/Understanding, Supported Autonomy, Collaboration, Evocation, Open-ended questions, Reflections: simple and complex, Change talk (evoked) and Reframe     Change Talk Expressed by the Patient: Desire to change Reasons to change Committment to change Activation Taking steps    Provider Response to Change Talk: E - Evoked more info from patient about behavior change, A - Affirmed patient's thoughts, decisions, or attempts at behavior change, R - Reflected patient's change talk and S - Summarized patient's change talk statements       ASSESSMENT: Current Emotional / Mental Status (status of significant symptoms):   Risk status (Self / Other harm or suicidal ideation)   Client denies current fears or concerns for personal safety.   Client denies current or recent suicidal ideation or behaviors.   Client denies current or recent homicidal ideation or behaviors.   Client denies current or recent self injurious behavior or ideation.   Client denies other safety concerns.   A safety and risk management plan has not been developed at this time, however client was given the after-hours number / 911 should there be a change in any of these risk factors.     Appearance:   Appropriate    Eye Contact:   Good    Psychomotor Behavior: Retarded (Slowed)    Attitude:   Cooperative     Orientation:   All   Speech    Rate / Production: Monotone     Volume:  Normal    Mood:    Depressed    Affect:    Appropriate    Thought Content:  Clear    Thought Form:  Coherent  Logical    Insight:    Good      Medication Review:   No current psychiatric medications prescribed     Medication Compliance:   NA     Changes in Health Issues:   None reported     Chemical Use Review:   Substance Use: Chemical use reviewed, no active concerns identified      Tobacco Use: No current tobacco use.       Collateral Reports Completed:   Not Applicable      Diagnosis:   Bipolar 2 Disorder    PLAN: (Client Tasks / Therapist Tasks / Other)  1.  Client will log productive v. unproductive worry at least once by next session.      2.  Exercise 3x/week+ by next session  3.  Sent out 4 resumes per week by next session  4  Meet in two weeks            Justo Dawson, [facility]                                                         ________________________________________________________________________    Treatment Plan    Client's Name: Ramiro Najera  YOB: 1985    Date: 2/20/2017     DSM5 Diagnoses: (Sustained by DSM5 Criteria Listed Above)  Diagnoses: 296.89 Bipolar II Disorder With anxious distress        PTSD  Psychosocial & Contextual Factors: parents' health problems  WHODAS 2.0 (12 item)            This questionnaire asks about difficulties due to health conditions. Health conditions  include  disease or illnesses, other health problems that may be short or long lasting,  injuries, mental health or emotional problems, and problems with alcohol or drugs.                     Think back over the past 30 days and answer these questions, thinking about how much  difficulty you had doing the following activities. For each question, please [place] only  one response.    S1 Standing for long periods such as 30 minutes? None =         1   S2 Taking care of household responsibilities? Mild =           2   S3  Learning a new task, for example, learning how to get to a new place? None =         1   S4 How much of a problem do you have joining community activities (for example, festivals, Scientologist or other activities) in the same way as anyone else can? Moderate =   3   S5 How much have you been emotionally affected by your health problems? Moderate =   3     In the past 30 days, how much difficulty did you have in:   S6 Concentrating on doing something for ten minutes? Mild =           2   S7 Walking a long distance such as a kilometer (or equivalent)? None =         1   S8 Washing your whole body? None =         1   S9 Getting dressed? None =         1   S10 Dealing with people you do not know? None =         1   S11 Maintaining a friendship? Moderate =   3   S12 Your day to day work? None =         1     H1 Overall, in the past 30 days, how many days were these difficulties present? Record number of days 30   H2 In the past 30 days, for how many days were you totally unable to carry out your usual activities or work because of any health condition? Record number of days  0   H3 In the past 30 days, not counting the days that you were totally unable, for how many days did you cut back or reduce your usual activities or work because of any health condition? Record number of days 3       Referral / Collaboration:  Referral to another professional/service is not indicated at this time..    Anticipated number of session or this episode of care: 18-24      MeasurableTreatment Goal(s) related to diagnosis / functional impairment(s)  Goal-Emotional regulation: Client will effectively manage mood dysregulation    I will know I've met my goal when I am feeling less out of control.      Objective #A (Client Action)    Status: cont- Date: 6/10/2021     Client will learn at least 3 mindfulness skills and practice one daily    Intervention(s)  Therapist will provide mindfulness training, psychoeducation, behavioral activation, and  cognitive restructuring.    Objective #B  Client will use at least 3 coping skills for anxiety management in the next 12 weeks.    Status: cont- Date: 6/10/2021     Intervention(s)  Therapist will provide psychoeducation, behavioral activation, and cognitive restructuring.      Objective #C  Client will identify three distraction and diversion activities and use those activities to improve distress tolerance and emotional regulation.  Status: cont- Date: 6/10/2021     Intervention(s)  Therapist will provide psychoeducation, behavioral activation, and cognitive restructuring.    MeasurableTreatment Goal(s) related to diagnosis / functional impairment(s)            Goal-Depression: Client will decrease depressed mood    I will know I've met my goal when I am less depressed.      Objective #A (Client Action)    Status: cont- Date: 3/11/2021     Client will use identified behavioral and cognitive skills to challenge negative self talk 90% of the time.    Intervention(s)  Therapist will provide psychoeducation, behavioral activation, and cognitive restructuring.      Objective #B  Client will complete at least 10 minutes of self-regulation practice (e.g.: yoga, meditation, relaxation breathing, etc.) per day.    Status: cont- Date: 3/11/2021     Intervention(s)  Therapist will provide psychoeducation, behavioral activation, and cognitive restructuring.      Objective #C  Client will exercise 30 minutes 36 times in the next 12 weeks.  Status: cont- Date: 3/11/2021     Intervention(s)  Therapist will provide psychoeducation, behavioral activation, and cognitive restructuring.                 Client has reviewed and agreed to the above plan.      Justo Dawson, YUNIOR  February 20, 2017

## 2022-01-11 ENCOUNTER — OFFICE VISIT (OUTPATIENT)
Dept: FAMILY MEDICINE | Facility: CLINIC | Age: 37
End: 2022-01-11
Payer: COMMERCIAL

## 2022-01-11 VITALS
HEIGHT: 71 IN | HEART RATE: 94 BPM | RESPIRATION RATE: 16 BRPM | BODY MASS INDEX: 36.96 KG/M2 | TEMPERATURE: 98 F | OXYGEN SATURATION: 98 % | WEIGHT: 264 LBS | SYSTOLIC BLOOD PRESSURE: 128 MMHG | DIASTOLIC BLOOD PRESSURE: 80 MMHG

## 2022-01-11 DIAGNOSIS — L73.9 FOLLICULITIS: Primary | ICD-10-CM

## 2022-01-11 DIAGNOSIS — F31.81 BIPOLAR 2 DISORDER (H): ICD-10-CM

## 2022-01-11 DIAGNOSIS — Z23 HIGH PRIORITY FOR 2019-NCOV VACCINE: ICD-10-CM

## 2022-01-11 PROBLEM — J45.909 ASTHMA: Status: ACTIVE | Noted: 2022-01-11

## 2022-01-11 PROCEDURE — 90471 IMMUNIZATION ADMIN: CPT | Performed by: FAMILY MEDICINE

## 2022-01-11 PROCEDURE — 91300 COVID-19,PF,PFIZER (12+ YRS): CPT | Performed by: FAMILY MEDICINE

## 2022-01-11 PROCEDURE — 0004A COVID-19,PF,PFIZER (12+ YRS): CPT | Performed by: FAMILY MEDICINE

## 2022-01-11 PROCEDURE — 96127 BRIEF EMOTIONAL/BEHAV ASSMT: CPT | Performed by: FAMILY MEDICINE

## 2022-01-11 PROCEDURE — 90686 IIV4 VACC NO PRSV 0.5 ML IM: CPT | Performed by: FAMILY MEDICINE

## 2022-01-11 PROCEDURE — 99214 OFFICE O/P EST MOD 30 MIN: CPT | Mod: 25 | Performed by: FAMILY MEDICINE

## 2022-01-11 RX ORDER — DOXYCYCLINE 100 MG/1
100 CAPSULE ORAL 2 TIMES DAILY
Qty: 14 CAPSULE | Refills: 0 | Status: SHIPPED | OUTPATIENT
Start: 2022-01-11 | End: 2022-08-25

## 2022-01-11 RX ORDER — MUPIROCIN 20 MG/G
OINTMENT TOPICAL
Qty: 1 G | Refills: 1 | Status: SHIPPED | OUTPATIENT
Start: 2022-01-11 | End: 2022-08-25

## 2022-01-11 ASSESSMENT — ANXIETY QUESTIONNAIRES
1. FEELING NERVOUS, ANXIOUS, OR ON EDGE: NOT AT ALL
IF YOU CHECKED OFF ANY PROBLEMS ON THIS QUESTIONNAIRE, HOW DIFFICULT HAVE THESE PROBLEMS MADE IT FOR YOU TO DO YOUR WORK, TAKE CARE OF THINGS AT HOME, OR GET ALONG WITH OTHER PEOPLE: NOT DIFFICULT AT ALL
6. BECOMING EASILY ANNOYED OR IRRITABLE: NOT AT ALL
2. NOT BEING ABLE TO STOP OR CONTROL WORRYING: SEVERAL DAYS
3. WORRYING TOO MUCH ABOUT DIFFERENT THINGS: SEVERAL DAYS
GAD7 TOTAL SCORE: 4
5. BEING SO RESTLESS THAT IT IS HARD TO SIT STILL: NOT AT ALL
7. FEELING AFRAID AS IF SOMETHING AWFUL MIGHT HAPPEN: SEVERAL DAYS

## 2022-01-11 ASSESSMENT — MIFFLIN-ST. JEOR: SCORE: 2150.66

## 2022-01-11 ASSESSMENT — PAIN SCALES - GENERAL: PAINLEVEL: NO PAIN (0)

## 2022-01-11 ASSESSMENT — PATIENT HEALTH QUESTIONNAIRE - PHQ9
SUM OF ALL RESPONSES TO PHQ QUESTIONS 1-9: 11
5. POOR APPETITE OR OVEREATING: SEVERAL DAYS

## 2022-01-11 NOTE — PROGRESS NOTES
"  Assessment & Plan     Folliculitis - Likely recurrent folliculitis secondary to staph infection. Nothing purulent to culture in clinic today. Advised Hibaclens wash, nasal ointment, and oral Doxycyline 100 mg 2x daily x7 days. Refilled Bactroban ointment.   - doxycycline hyclate (VIBRAMYCIN) 100 MG capsule  Dispense: 14 capsule; Refill: 0  - mupirocin (BACTROBAN) 2 % external ointment  Dispense: 1 g; Refill: 1    High priority for 2019-nCoV vaccine - Received booster today.    Bipolar 2 disorder - Patient reports stable symptoms on Trazodone. Follows with psychologist.       30 minutes spent on the date of the encounter doing chart review, history and exam, documentation and further activities per the note       BMI:   Estimated body mass index is 37.08 kg/m  as calculated from the following:    Height as of this encounter: 1.797 m (5' 10.75\").    Weight as of this encounter: 119.7 kg (264 lb).   Weight management plan: Discussed healthy diet and exercise guidelines    Depression Screening Follow Up    PHQ 1/11/2022   PHQ-9 Total Score 11   Q9: Thoughts of better off dead/self-harm past 2 weeks Several days   Some encounter information is confidential and restricted. Go to [employer] activity to see all data.     Last PHQ-9 1/11/2022   1.  Little interest or pleasure in doing things 1   2.  Feeling down, depressed, or hopeless 1   3.  Trouble falling or staying asleep, or sleeping too much 3   4.  Feeling tired or having little energy 2   5.  Poor appetite or overeating 1   6.  Feeling bad about yourself 1   7.  Trouble concentrating 1   8.  Moving slowly or restless 0   Q9: Thoughts of better off dead/self-harm past 2 weeks 1   PHQ-9 Total Score 11   Difficulty at work, home, or with people Somewhat difficult   Some encounter information is confidential and restricted. Go to [employer] activity to see all data.     Follow Up      No follow-ups on file.    Albertina Galicia, MS3  [facility]" "[employer]    Samantha MoseswendyDO  [facility]    Rancho Rubio is a 36 year old who presents for the following health issues     HPI     Ramiro is here to reevaluate a rash on his upper right thigh. He describes the rash as pimple-like cysts that are painful and occasionally itchy. It has been waning and waxing since March of 2021. No specific provoking factors noted. No recent medication change. Was previously given Bactroban 2% ointment which helped, but ran out of rx. He is wondering if oral antibiotics may be more effective as the rash never cleared completely with the ointment.     Additionally Ramiro reports recent weight gain during the pandemic. Trying to find ways to be more active at home as he has not been going to the gym as much. No additional concerns. Interested in flu vaccine and Covid booster.     The patient reports his mental health has been stable lately. He is taking Trazodone for sleep and mood stability and states it helps. He follows with psychology.     Review of Systems   Constitutional, HEENT, cardiovascular, pulmonary, gi and gu systems are negative, except as otherwise noted.      Objective    /80 (BP Location: Right arm, Patient Position: Sitting, Cuff Size: Adult Large)   Pulse 94   Temp 98  F (36.7  C) (Oral)   Resp 16   Ht 1.797 m (5' 10.75\")   Wt 119.7 kg (264 lb)   SpO2 98%   BMI 37.08 kg/m    Body mass index is 37.08 kg/m .  Physical Exam   GENERAL: healthy, alert and no distress  EYES: Eyes grossly normal to inspection, PERRL and conjunctivae and sclerae normal  RESP: lungs clear to auscultation - no rales, rhonchi or wheezes  CV: regular rate and rhythm, normal S1 S2, no S3 or S4, no murmur, click or rub, no peripheral edema and peripheral pulses strong  MS: no gross musculoskeletal defects noted, no edema  SKIN: Small pustule present on inner right thigh with surrounding erythema. Scarring from several old lesions " bilateral inner thighs.   NEURO: Normal strength and tone, mentation intact and speech normal  PSYCH: mentation appears normal, affect normal/bright    No imaging indicated.

## 2022-01-11 NOTE — PATIENT INSTRUCTIONS
Get some hibaclens at a pharmacy or medical supply store to use daily for 2 weeks then weekly for a few months.     I also want you to use a nasal ointment for 5 days twice a day.      I have called this in as it is a prescription.      These are to get the staph out of your system.

## 2022-01-12 ASSESSMENT — ANXIETY QUESTIONNAIRES: GAD7 TOTAL SCORE: 4

## 2022-01-13 ENCOUNTER — VIRTUAL VISIT (OUTPATIENT)
Dept: PSYCHOLOGY | Facility: CLINIC | Age: 37
End: 2022-01-13
Payer: COMMERCIAL

## 2022-01-13 DIAGNOSIS — F31.81 BIPOLAR 2 DISORDER (H): Primary | ICD-10-CM

## 2022-01-13 PROCEDURE — 90834 PSYTX W PT 45 MINUTES: CPT | Mod: 95 | Performed by: SOCIAL WORKER

## 2022-01-13 ASSESSMENT — PATIENT HEALTH QUESTIONNAIRE - PHQ9
SUM OF ALL RESPONSES TO PHQ QUESTIONS 1-9: 11
SUM OF ALL RESPONSES TO PHQ QUESTIONS 1-9: 11
10. IF YOU CHECKED OFF ANY PROBLEMS, HOW DIFFICULT HAVE THESE PROBLEMS MADE IT FOR YOU TO DO YOUR WORK, TAKE CARE OF THINGS AT HOME, OR GET ALONG WITH OTHER PEOPLE: SOMEWHAT DIFFICULT

## 2022-01-13 NOTE — PROGRESS NOTES
"  Answers for HPI/ROS submitted by the patient on 1/13/2022  If you checked off any problems, how difficult have these problems made it for you to do your work, take care of things at home, or get along with other people?: Somewhat difficult  PHQ9 TOTAL SCORE: 11                                                  Progress Note    Client Name: Ramiro Najera  Date: 1/13/2022            Service Type: Individual      Session Start Time: 730 Session End Time: 815      Session Length: 45     Session #: 16   Attendees: Client attended alone    Treatment Plan Last Reviewed: 1/13/2022   PHQ-9 / DEANNA-7 :    Telemedicine Visit: The patient's condition can be safely assessed and treated via synchronous audio and visual telemedicine encounter.      Reason for Telemedicine Visit: Patient has requested telehealth visit    Originating Site (Patient Location): Patient's home    Distant Site (Provider Location): Provider Remote Setting    Consent:  The patient/guardian has verbally consented to: the potential risks and benefits of telemedicine (video visit) versus in person care; bill my insurance or make self-payment for services provided; and responsibility for payment of non-covered services.     Mode of Communication:  Video Conference via [employer]    As the provider I attest to compliance with applicable laws and regulations related to telemedicine.    DATA      Progress Since Last Session (Related to Symptoms / Goals / Homework):   Symptoms: Stable    Homework: Partially completed again reviewed personal domain journaling today in session            Episode of Care Goals: Satisfactory progress - ACTION (Actively working towards change); Intervened by reinforcing change plan / affirming steps taken     Current / Ongoing Stressors and Concerns:     Things have been \"lots has happened.\"  Got  and went on their honeymoon.  Stress with work continues but states that he has been handling this well.  Long discussion today " about behavioral activation.  Reviewed basic tracking options, identified differences between long-term v short-term planning, and discussed setting and achieving SMART goals as an effective method for dealing with depression, anxiety, and many other mental health concerns.  Set behavioral goals in session today.                  Treatment Objective(s) Addressed in This Session:   Client will complete at least 10 minutes of self-regulation practice (e.g.: yoga, meditation, relaxation breathing, etc.) per day.    Client to follow BA plan    Client will use identified behavioral and cognitive skills to challenge negative self talk 90% of the time.   Log ANTS, Cog Ds, and Alt Es     Intervention:     Motivational Interviewing  Target Behavior: exercise    Stage of Change: PREPARATION (Decided to change - considering how)    MI Intervention: Expressed Empathy/Understanding, Supported Autonomy, Collaboration, Evocation, Open-ended questions, Reflections: simple and complex, Importance Scale (1-10) 8 Confidence Scale (1-10) 8  and Change talk (evoked)     Change Talk Expressed by the Patient: Desire to change Reasons to change Committment to change Activation Taking steps    Provider Response to Change Talk: E - Evoked more info from patient about behavior change, A - Affirmed patient's thoughts, decisions, or attempts at behavior change, R - Reflected patient's change talk and S - Summarized patient's change talk statements       ASSESSMENT: Current Emotional / Mental Status (status of significant symptoms):   Risk status (Self / Other harm or suicidal ideation)   Client denies current fears or concerns for personal safety.   Client denies current or recent suicidal ideation or behaviors.   Client denies current or recent homicidal ideation or behaviors.   Client denies current or recent self injurious behavior or ideation.   Client denies other safety concerns.   A safety and risk management plan has not been developed  at this time, however client was given the after-hours number / 911 should there be a change in any of these risk factors.     Appearance:   Appropriate    Eye Contact:   Good    Psychomotor Behavior: Retarded (Slowed)    Attitude:   Cooperative    Orientation:   All   Speech    Rate / Production: Monotone     Volume:  Normal    Mood:    Depressed    Affect:    Appropriate    Thought Content:  Clear    Thought Form:  Coherent  Logical    Insight:    Good      Medication Review:   No current psychiatric medications prescribed     Medication Compliance:   NA     Changes in Health Issues:   None reported     Chemical Use Review:   Substance Use: Chemical use reviewed, no active concerns identified      Tobacco Use: No current tobacco use.       Collateral Reports Completed:   Not Applicable      Diagnosis:   Bipolar 2 Disorder    PLAN: (Client Tasks / Therapist Tasks / Other)  1.  Client will log productive v. unproductive worry at least once by next session.      2.  Exercise 3x/week+ by next session  3.  Sent out 4 resumes per week by next session  4  Meet in two weeks            YUNIOR Bustos                                                         ________________________________________________________________________    Treatment Plan    Client's Name: Ramiro Najera  YOB: 1985    Date: 2/20/2017     DSM5 Diagnoses: (Sustained by DSM5 Criteria Listed Above)  Diagnoses: 296.89 Bipolar II Disorder With anxious distress        PTSD  Psychosocial & Contextual Factors: parents' health problems  WHODAS 2.0 (12 item)            This questionnaire asks about difficulties due to health conditions. Health conditions  include  disease or illnesses, other health problems that may be short or long lasting,  injuries, mental health or emotional problems, and problems with alcohol or drugs.                     Think back over the past 30 days and answer these questions, thinking about how much   difficulty you had doing the following activities. For each question, please [place] only  one response.    S1 Standing for long periods such as 30 minutes? None =         1   S2 Taking care of household responsibilities? Mild =           2   S3 Learning a new task, for example, learning how to get to a new place? None =         1   S4 How much of a problem do you have joining community activities (for example, festivals, [employer] or other activities) in the same way as anyone else can? Moderate =   3   S5 How much have you been emotionally affected by your health problems? Moderate =   3     In the past 30 days, how much difficulty did you have in:   S6 Concentrating on doing something for ten minutes? Mild =           2   S7 Walking a long distance such as a kilometer (or equivalent)? None =         1   S8 Washing your whole body? None =         1   S9 Getting dressed? None =         1   S10 Dealing with people you do not know? None =         1   S11 Maintaining a friendship? Moderate =   3   S12 Your day to day work? None =         1     H1 Overall, in the past 30 days, how many days were these difficulties present? Record number of days 30   H2 In the past 30 days, for how many days were you totally unable to carry out your usual activities or work because of any health condition? Record number of days  0   H3 In the past 30 days, not counting the days that you were totally unable, for how many days did you cut back or reduce your usual activities or work because of any health condition? Record number of days 3       Referral / Collaboration:  Referral to another professional/service is not indicated at this time..    Anticipated number of session or this episode of care: 18-24      MeasurableTreatment Goal(s) related to diagnosis / functional impairment(s)  Goal-Emotional regulation: Client will effectively manage mood dysregulation    I will know I've met my goal when I am feeling less out of control.       Objective #A (Client Action)    Status: cont- Date: 1/13/2022     Client will learn at least 3 mindfulness skills and practice one daily    Intervention(s)  Therapist will provide mindfulness training, psychoeducation, behavioral activation, and cognitive restructuring.    Objective #B  Client will use at least 3 coping skills for anxiety management in the next 12 weeks.    Status: cont- Date: 1/13/2022     Intervention(s)  Therapist will provide psychoeducation, behavioral activation, and cognitive restructuring.      Objective #C  Client will identify three distraction and diversion activities and use those activities to improve distress tolerance and emotional regulation.  Status: cont- Date: 1/13/2022     Intervention(s)  Therapist will provide psychoeducation, behavioral activation, and cognitive restructuring.    MeasurableTreatment Goal(s) related to diagnosis / functional impairment(s)            Goal-Depression: Client will decrease depressed mood    I will know I've met my goal when I am less depressed.      Objective #A (Client Action)    Status: cont- Date: 3/11/2021     Client will use identified behavioral and cognitive skills to challenge negative self talk 90% of the time.    Intervention(s)  Therapist will provide psychoeducation, behavioral activation, and cognitive restructuring.      Objective #B  Client will complete at least 10 minutes of self-regulation practice (e.g.: yoga, meditation, relaxation breathing, etc.) per day.    Status: cont- Date: 3/11/2021     Intervention(s)  Therapist will provide psychoeducation, behavioral activation, and cognitive restructuring.      Objective #C  Client will exercise 30 minutes 36 times in the next 12 weeks.  Status: cont- Date: 3/11/2021     Intervention(s)  Therapist will provide psychoeducation, behavioral activation, and cognitive restructuring.                 Client has reviewed and agreed to the above plan.      Justo Dawson,  LICSW  February 20, 2017

## 2022-01-14 ASSESSMENT — PATIENT HEALTH QUESTIONNAIRE - PHQ9: SUM OF ALL RESPONSES TO PHQ QUESTIONS 1-9: 11

## 2022-02-17 ENCOUNTER — VIRTUAL VISIT (OUTPATIENT)
Dept: PSYCHOLOGY | Facility: CLINIC | Age: 37
End: 2022-02-17
Payer: COMMERCIAL

## 2022-02-17 DIAGNOSIS — F31.81 BIPOLAR 2 DISORDER (H): Primary | ICD-10-CM

## 2022-02-17 PROCEDURE — 90834 PSYTX W PT 45 MINUTES: CPT | Mod: 95 | Performed by: SOCIAL WORKER

## 2022-02-17 ASSESSMENT — PATIENT HEALTH QUESTIONNAIRE - PHQ9
10. IF YOU CHECKED OFF ANY PROBLEMS, HOW DIFFICULT HAVE THESE PROBLEMS MADE IT FOR YOU TO DO YOUR WORK, TAKE CARE OF THINGS AT HOME, OR GET ALONG WITH OTHER PEOPLE: SOMEWHAT DIFFICULT
SUM OF ALL RESPONSES TO PHQ QUESTIONS 1-9: 15
SUM OF ALL RESPONSES TO PHQ QUESTIONS 1-9: 15

## 2022-02-17 NOTE — PROGRESS NOTES
"  Answers for HPI/ROS submitted by the patient on 1/13/2022  If you checked off any problems, how difficult have these problems made it for you to do your work, take care of things at home, or get along with other people?: Somewhat difficult  PHQ9 TOTAL SCORE: 11                                                  Progress Note    Client Name: Ramiro Najera  Date: 2/17/2022            Service Type: Individual      Session Start Time: 730 Session End Time: 815      Session Length: 45     Session #: 17   Attendees: Client attended alone    Treatment Plan Last Reviewed: 1/13/2022   PHQ-9 / DEANNA-7 :    Telemedicine Visit: The patient's condition can be safely assessed and treated via synchronous audio and visual telemedicine encounter.      Reason for Telemedicine Visit: Patient has requested telehealth visit    Originating Site (Patient Location): Patient's home    Distant Site (Provider Location): Provider Remote Setting    Consent:  The patient/guardian has verbally consented to: the potential risks and benefits of telemedicine (video visit) versus in person care; bill my insurance or make self-payment for services provided; and responsibility for payment of non-covered services.     Mode of Communication:  Video Conference via [employer]    As the provider I attest to compliance with applicable laws and regulations related to telemedicine.    DATA      Progress Since Last Session (Related to Symptoms / Goals / Homework):   Symptoms: Stable    Homework: Partially completed again reviewed personal domain journaling today in session            Episode of Care Goals: Satisfactory progress - ACTION (Actively working towards change); Intervened by reinforcing change plan / affirming steps taken     Current / Ongoing Stressors and Concerns:     Things have been \"I feel stuck.\"  Reports that job search has not been going well and is feeling overwhelmed with work.  Lately has been \"wondering if I have the discipline to make " "the changes I need.\"                Treatment Objective(s) Addressed in This Session:   Client will complete at least 10 minutes of self-regulation practice (e.g.: yoga, meditation, relaxation breathing, etc.) per day.    Client to follow BA plan    Client will use identified behavioral and cognitive skills to challenge negative self talk 90% of the time.   Log ANTS, Cog Ds, and Alt Es     Intervention:     Motivational Interviewing  Target Behavior: exercise    Stage of Change: PREPARATION (Decided to change - considering how)    MI Intervention: Expressed Empathy/Understanding, Supported Autonomy, Collaboration, Evocation, Permission to raise concern or advise, Open-ended questions, Reflections: simple and complex, Change talk (evoked) and Reframe     Change Talk Expressed by the Patient: Desire to change Ability to change Reasons to change Need to change Committment to change Activation Taking steps    Provider Response to Change Talk: E - Evoked more info from patient about behavior change, A - Affirmed patient's thoughts, decisions, or attempts at behavior change, R - Reflected patient's change talk and S - Summarized patient's change talk statements       ASSESSMENT: Current Emotional / Mental Status (status of significant symptoms):   Risk status (Self / Other harm or suicidal ideation)   Client denies current fears or concerns for personal safety.   Client denies current or recent suicidal ideation or behaviors.   Client denies current or recent homicidal ideation or behaviors.   Client denies current or recent self injurious behavior or ideation.   Client denies other safety concerns.   Recommended that patient call 911 or go to the local ED should there be a change in any of these risk factors.     Appearance:   Appropriate    Eye Contact:   Good    Psychomotor Behavior: Retarded (Slowed)    Attitude:   Cooperative  Interested Friendly   Orientation:   All   Speech    Rate / Production: Normal/ " Responsive    Volume:  Normal    Mood:    Depressed    Affect:    Appropriate    Thought Content:  Clear    Thought Form:  Coherent  Logical    Insight:    Good      Medication Review:   No current psychiatric medications prescribed     Medication Compliance:   NA     Changes in Health Issues:   None reported     Chemical Use Review:   Substance Use: Chemical use reviewed, no active concerns identified      Tobacco Use: No current tobacco use.       Collateral Reports Completed:   Not Applicable      Diagnosis:   Bipolar 2 Disorder    PLAN: (Client Tasks / Therapist Tasks / Other)  1.  Client will log productive v. unproductive worry at least once by next session.      2.  Exercise 1x/week+ by next session  3.  Sent out 4 resumes per week by next session  4  Meet in two weeks            Justo Dawson, YUNIOR                                                         ________________________________________________________________________    Treatment Plan    Client's Name: Ramiro Najera  YOB: 1985    Date: 2/20/2017     DSM5 Diagnoses: (Sustained by DSM5 Criteria Listed Above)  Diagnoses: 296.89 Bipolar II Disorder With anxious distress        PTSD  Psychosocial & Contextual Factors: parents' health problems  WHODAS 2.0 (12 item)            This questionnaire asks about difficulties due to health conditions. Health conditions  include  disease or illnesses, other health problems that may be short or long lasting,  injuries, mental health or emotional problems, and problems with alcohol or drugs.                     Think back over the past 30 days and answer these questions, thinking about how much  difficulty you had doing the following activities. For each question, please [place] only  one response.    S1 Standing for long periods such as 30 minutes? None =         1   S2 Taking care of household responsibilities? Mild =           2   S3 Learning a new task, for example, learning how to get to a  new place? None =         1   S4 How much of a problem do you have joining community activities (for example, festivals, [employer] or other activities) in the same way as anyone else can? Moderate =   3   S5 How much have you been emotionally affected by your health problems? Moderate =   3     In the past 30 days, how much difficulty did you have in:   S6 Concentrating on doing something for ten minutes? Mild =           2   S7 Walking a long distance such as a kilometer (or equivalent)? None =         1   S8 Washing your whole body? None =         1   S9 Getting dressed? None =         1   S10 Dealing with people you do not know? None =         1   S11 Maintaining a friendship? Moderate =   3   S12 Your day to day work? None =         1     H1 Overall, in the past 30 days, how many days were these difficulties present? Record number of days 30   H2 In the past 30 days, for how many days were you totally unable to carry out your usual activities or work because of any health condition? Record number of days  0   H3 In the past 30 days, not counting the days that you were totally unable, for how many days did you cut back or reduce your usual activities or work because of any health condition? Record number of days 3       Referral / Collaboration:  Referral to another professional/service is not indicated at this time..    Anticipated number of session or this episode of care: 18-24      MeasurableTreatment Goal(s) related to diagnosis / functional impairment(s)  Goal-Emotional regulation: Client will effectively manage mood dysregulation    I will know I've met my goal when I am feeling less out of control.      Objective #A (Client Action)    Status: cont- Date: 1/13/2022     Client will learn at least 3 mindfulness skills and practice one daily    Intervention(s)  Therapist will provide mindfulness training, psychoeducation, behavioral activation, and cognitive restructuring.    Objective #B  Client will use at  least 3 coping skills for anxiety management in the next 12 weeks.    Status: cont- Date: 1/13/2022     Intervention(s)  Therapist will provide psychoeducation, behavioral activation, and cognitive restructuring.      Objective #C  Client will identify three distraction and diversion activities and use those activities to improve distress tolerance and emotional regulation.  Status: cont- Date: 1/13/2022     Intervention(s)  Therapist will provide psychoeducation, behavioral activation, and cognitive restructuring.    MeasurableTreatment Goal(s) related to diagnosis / functional impairment(s)            Goal-Depression: Client will decrease depressed mood    I will know I've met my goal when I am less depressed.      Objective #A (Client Action)    Status: cont- Date: 3/11/2021     Client will use identified behavioral and cognitive skills to challenge negative self talk 90% of the time.    Intervention(s)  Therapist will provide psychoeducation, behavioral activation, and cognitive restructuring.      Objective #B  Client will complete at least 10 minutes of self-regulation practice (e.g.: yoga, meditation, relaxation breathing, etc.) per day.    Status: cont- Date: 3/11/2021     Intervention(s)  Therapist will provide psychoeducation, behavioral activation, and cognitive restructuring.      Objective #C  Client will exercise 30 minutes 36 times in the next 12 weeks.  Status: cont- Date: 3/11/2021     Intervention(s)  Therapist will provide psychoeducation, behavioral activation, and cognitive restructuring.                 Client has reviewed and agreed to the above plan.      Justo Dawson, [facility]  February 20, 2017  Answers for HPI/ROS submitted by the patient on 2/17/2022  If you checked off any problems, how difficult have these problems made it for you to do your work, take care of things at home, or get along with other people?: Somewhat difficult  PHQ9 TOTAL SCORE: 15

## 2022-02-18 ASSESSMENT — PATIENT HEALTH QUESTIONNAIRE - PHQ9: SUM OF ALL RESPONSES TO PHQ QUESTIONS 1-9: 15

## 2022-03-03 ENCOUNTER — VIRTUAL VISIT (OUTPATIENT)
Dept: PSYCHOLOGY | Facility: CLINIC | Age: 37
End: 2022-03-03
Payer: COMMERCIAL

## 2022-03-03 DIAGNOSIS — F31.81 BIPOLAR 2 DISORDER (H): Primary | ICD-10-CM

## 2022-03-03 PROCEDURE — 90834 PSYTX W PT 45 MINUTES: CPT | Mod: 95 | Performed by: SOCIAL WORKER

## 2022-03-03 NOTE — PROGRESS NOTES
"  Answers for HPI/ROS submitted by the patient on 1/13/2022  If you checked off any problems, how difficult have these problems made it for you to do your work, take care of things at home, or get along with other people?: Somewhat difficult  PHQ9 TOTAL SCORE: 11                                                  Progress Note    Client Name: Ramiro Najera  Date: 3/3/2022            Service Type: Individual      Session Start Time: 730 Session End Time: 815      Session Length: 45     Session #: 18   Attendees: Client attended alone    Treatment Plan Last Reviewed: 1/13/2022   PHQ-9 / DEANNA-7 :    Telemedicine Visit: The patient's condition can be safely assessed and treated via synchronous audio and visual telemedicine encounter.      Reason for Telemedicine Visit: Patient has requested telehealth visit    Originating Site (Patient Location): Patient's home    Distant Site (Provider Location): Provider Remote Setting    Consent:  The patient/guardian has verbally consented to: the potential risks and benefits of telemedicine (video visit) versus in person care; bill my insurance or make self-payment for services provided; and responsibility for payment of non-covered services.     Mode of Communication:  Video Conference via [employer]    As the provider I attest to compliance with applicable laws and regulations related to telemedicine.    DATA      Progress Since Last Session (Related to Symptoms / Goals / Homework):   Symptoms: Stable    Homework: Partially completed again reviewed personal domain journaling today in session            Episode of Care Goals: Satisfactory progress - ACTION (Actively working towards change); Intervened by reinforcing change plan / affirming steps taken     Current / Ongoing Stressors and Concerns:     Things have been \"not great.\"  Discussed recent PHQ-9 score and positive response to question 9.  He reports \"I do not have any plans to kill myself, I always struggle with how to " "answer those questionnaires.\"  Denies current or recent suicidal ideation, intent, or plan.  Reviewed BA plan and he reports that he did not get to the gym as planned, and we discuss this in session.                     Treatment Objective(s) Addressed in This Session:   Client will complete at least 10 minutes of self-regulation practice (e.g.: yoga, meditation, relaxation breathing, etc.) per day.    Client to follow BA plan    Client will use identified behavioral and cognitive skills to challenge negative self talk 90% of the time.   Log ANTS, Cog Ds, and Alt Es     Intervention:     Motivational Interviewing  Target Behavior: exercise    Stage of Change: PREPARATION (Decided to change - considering how)    MI Intervention: Expressed Empathy/Understanding, Supported Autonomy, Collaboration, Evocation, Permission to raise concern or advise and Change talk (evoked)     Change Talk Expressed by the Patient: Committment to change Activation Taking steps    Provider Response to Change Talk: A - Affirmed patient's thoughts, decisions, or attempts at behavior change and R - Reflected patient's change talk         Introduced client to the Flash Technique of desensitization.  Briefly discussed the work of [employer] and their work incorporating Flash into the standard EMDR protocol.  Assisted client in selecting a target memory, assigning a JOSE ANGEL score to the target memory, and choosing a positive engaging focus.  Used bilateral tapping in round 1.  Added single eye blinks in round 2.  Added triple eye blinks in round 3.  Client reported that initial JOSE ANGEL score (prior to round 1) was 7.  Client reported that final JOSE ANGEL score (following round 3) was 0.  Closed with body scan and container metaphor for unprocessed emotion.         ASSESSMENT: Current Emotional / Mental Status (status of significant symptoms):   Risk status (Self / Other harm or suicidal ideation)   Client denies current fears or concerns for personal " safety.   Client denies current or recent suicidal ideation or behaviors.   Client denies current or recent homicidal ideation or behaviors.   Client denies current or recent self injurious behavior or ideation.   Client denies other safety concerns.   Recommended that patient call 911 or go to the local ED should there be a change in any of these risk factors.     Appearance:   Appropriate    Eye Contact:   Good    Psychomotor Behavior: Retarded (Slowed)    Attitude:   Cooperative  Interested Friendly   Orientation:   All   Speech    Rate / Production: Normal/ Responsive    Volume:  Normal    Mood:    Depressed    Affect:    Appropriate    Thought Content:  Clear    Thought Form:  Coherent  Logical    Insight:    Good      Medication Review:   No current psychiatric medications prescribed     Medication Compliance:   NA     Changes in Health Issues:   None reported     Chemical Use Review:   Substance Use: Chemical use reviewed, no active concerns identified      Tobacco Use: No current tobacco use.       Collateral Reports Completed:   Not Applicable      Diagnosis:   Bipolar 2 Disorder    PLAN: (Client Tasks / Therapist Tasks / Other)  1.  Flash at next session     2.  Exercise 1x/week+ by next session  3.  Sent out 4 resumes per week by next session  4  Meet in two weeks            Justo Dawson, [facility]                                                         ________________________________________________________________________    Treatment Plan    Client's Name: Ramiro Najera  YOB: 1985    Date: 2/20/2017     DSM5 Diagnoses: (Sustained by DSM5 Criteria Listed Above)  Diagnoses: 296.89 Bipolar II Disorder With anxious distress        PTSD  Psychosocial & Contextual Factors: parents' health problems  WHODAS 2.0 (12 item)            This questionnaire asks about difficulties due to health conditions. Health conditions  include  disease or illnesses, other health problems that may be short  or long lasting,  injuries, mental health or emotional problems, and problems with alcohol or drugs.                     Think back over the past 30 days and answer these questions, thinking about how much  difficulty you had doing the following activities. For each question, please [place] only  one response.    S1 Standing for long periods such as 30 minutes? None =         1   S2 Taking care of household responsibilities? Mild =           2   S3 Learning a new task, for example, learning how to get to a new place? None =         1   S4 How much of a problem do you have joining community activities (for example, festivals, Mosque or other activities) in the same way as anyone else can? Moderate =   3   S5 How much have you been emotionally affected by your health problems? Moderate =   3     In the past 30 days, how much difficulty did you have in:   S6 Concentrating on doing something for ten minutes? Mild =           2   S7 Walking a long distance such as a kilometer (or equivalent)? None =         1   S8 Washing your whole body? None =         1   S9 Getting dressed? None =         1   S10 Dealing with people you do not know? None =         1   S11 Maintaining a friendship? Moderate =   3   S12 Your day to day work? None =         1     H1 Overall, in the past 30 days, how many days were these difficulties present? Record number of days 30   H2 In the past 30 days, for how many days were you totally unable to carry out your usual activities or work because of any health condition? Record number of days  0   H3 In the past 30 days, not counting the days that you were totally unable, for how many days did you cut back or reduce your usual activities or work because of any health condition? Record number of days 3       Referral / Collaboration:  Referral to another professional/service is not indicated at this time..    Anticipated number of session or this episode of care: 18-24      MeasurableTreatment  Goal(s) related to diagnosis / functional impairment(s)  Goal-Emotional regulation: Client will effectively manage mood dysregulation    I will know I've met my goal when I am feeling less out of control.      Objective #A (Client Action)    Status: cont- Date: 1/13/2022     Client will learn at least 3 mindfulness skills and practice one daily    Intervention(s)  Therapist will provide mindfulness training, psychoeducation, behavioral activation, and cognitive restructuring.    Objective #B  Client will use at least 3 coping skills for anxiety management in the next 12 weeks.    Status: cont- Date: 1/13/2022     Intervention(s)  Therapist will provide psychoeducation, behavioral activation, and cognitive restructuring.      Objective #C  Client will identify three distraction and diversion activities and use those activities to improve distress tolerance and emotional regulation.  Status: cont- Date: 1/13/2022     Intervention(s)  Therapist will provide psychoeducation, behavioral activation, and cognitive restructuring.    MeasurableTreatment Goal(s) related to diagnosis / functional impairment(s)            Goal-Depression: Client will decrease depressed mood    I will know I've met my goal when I am less depressed.      Objective #A (Client Action)    Status: cont- Date: 3/11/2021     Client will use identified behavioral and cognitive skills to challenge negative self talk 90% of the time.    Intervention(s)  Therapist will provide psychoeducation, behavioral activation, and cognitive restructuring.      Objective #B  Client will complete at least 10 minutes of self-regulation practice (e.g.: yoga, meditation, relaxation breathing, etc.) per day.    Status: cont- Date: 3/11/2021     Intervention(s)  Therapist will provide psychoeducation, behavioral activation, and cognitive restructuring.      Objective #C  Client will exercise 30 minutes 36 times in the next 12 weeks.  Status: cont- Date: 3/11/2021      Intervention(s)  Therapist will provide psychoeducation, behavioral activation, and cognitive restructuring.                 Client has reviewed and agreed to the above plan.      Justo Dawson, [facility]  February 20, 2017  Answers for HPI/ROS submitted by the patient on 2/17/2022  If you checked off any problems, how difficult have these problems made it for you to do your work, take care of things at home, or get along with other people?: Somewhat difficult  PHQ9 TOTAL SCORE: 15

## 2022-03-17 ENCOUNTER — VIRTUAL VISIT (OUTPATIENT)
Dept: PSYCHOLOGY | Facility: CLINIC | Age: 37
End: 2022-03-17
Payer: COMMERCIAL

## 2022-03-17 DIAGNOSIS — Z53.9 ERRONEOUS ENCOUNTER--DISREGARD: ICD-10-CM

## 2022-03-17 DIAGNOSIS — F31.81 BIPOLAR 2 DISORDER (H): Primary | ICD-10-CM

## 2022-04-15 ENCOUNTER — VIRTUAL VISIT (OUTPATIENT)
Dept: PSYCHOLOGY | Facility: CLINIC | Age: 37
End: 2022-04-15

## 2022-04-15 DIAGNOSIS — F31.81 BIPOLAR 2 DISORDER (H): Primary | ICD-10-CM

## 2022-04-15 PROCEDURE — 90834 PSYTX W PT 45 MINUTES: CPT | Mod: 95 | Performed by: SOCIAL WORKER

## 2022-04-15 ASSESSMENT — ANXIETY QUESTIONNAIRES
GAD7 TOTAL SCORE: 8
1. FEELING NERVOUS, ANXIOUS, OR ON EDGE: SEVERAL DAYS
7. FEELING AFRAID AS IF SOMETHING AWFUL MIGHT HAPPEN: NOT AT ALL
4. TROUBLE RELAXING: MORE THAN HALF THE DAYS
3. WORRYING TOO MUCH ABOUT DIFFERENT THINGS: MORE THAN HALF THE DAYS
7. FEELING AFRAID AS IF SOMETHING AWFUL MIGHT HAPPEN: NOT AT ALL
GAD7 TOTAL SCORE: 8
2. NOT BEING ABLE TO STOP OR CONTROL WORRYING: SEVERAL DAYS
5. BEING SO RESTLESS THAT IT IS HARD TO SIT STILL: MORE THAN HALF THE DAYS
6. BECOMING EASILY ANNOYED OR IRRITABLE: NOT AT ALL
GAD7 TOTAL SCORE: 8

## 2022-04-15 ASSESSMENT — PATIENT HEALTH QUESTIONNAIRE - PHQ9
SUM OF ALL RESPONSES TO PHQ QUESTIONS 1-9: 10
SUM OF ALL RESPONSES TO PHQ QUESTIONS 1-9: 10
10. IF YOU CHECKED OFF ANY PROBLEMS, HOW DIFFICULT HAVE THESE PROBLEMS MADE IT FOR YOU TO DO YOUR WORK, TAKE CARE OF THINGS AT HOME, OR GET ALONG WITH OTHER PEOPLE: SOMEWHAT DIFFICULT

## 2022-04-15 NOTE — PROGRESS NOTES
[facility]                                     Progress Note    Patient Name: Ramiro Najera  Date: 4/15/2022          Service Type: Individual      Session Start Time: 730  Session End Time: 815     Session Length: 45    Session #: 19    Attendees: Client attended alone    Service Modality:  Video Visit:      Provider verified identity through the following two step process.  Patient provided:  Patient photo and Patient is known previously to provider    Telemedicine Visit: The patient's condition can be safely assessed and treated via synchronous audio and visual telemedicine encounter.      Reason for Telemedicine Visit: Patient has requested telehealth visit    Originating Site (Patient Location): Patient's home    Distant Site (Provider Location): Provider Remote Setting- Home Office    Consent:  The patient/guardian has verbally consented to: the potential risks and benefits of telemedicine (video visit) versus in person care; bill my insurance or make self-payment for services provided; and responsibility for payment of non-covered services.     Patient would like the video invitation sent by:  My Chart    Mode of Communication:  Video Conference via [employer]well    As the provider I attest to compliance with applicable laws and regulations related to telemedicine.    DATA  Interactive Complexity: No  Crisis: No        Progress Since Last Session (Related to Symptoms / Goals / Homework):   Symptoms: No change -    Homework: Partially completed  Reviewed BA goals      Episode of Care Goals: Minimal progress - PREPARATION (Decided to change - considering how); Intervened by negotiating a change plan and determining options / strategies for behavior change, identifying triggers, exploring social supports, and working towards setting a date to begin behavior change     Current / Ongoing Stressors and Concerns:   There has been demonstrated improvement in functioning while patient has been  "engaged in psychotherapy/psychological service- if withdrawn the patient would deteriorate and/or relapse.     \"Things have been ok.\"  Reports that stress with work continues, notes that he was offered a job a few weeks ago and turned this down.  Long discussion today about the \"imposter sydrome.\"  Notes that he knows enough to do ok, but does not feel good enough to \"make the next move or feel 100% secure.\"            Treatment Objective(s) Addressed in This Session:   Client will complete at least 10 minutes of self-regulation practice (e.g.: yoga, meditation, relaxation breathing, etc.) per day.    Client to follow BA plan    Client will use identified behavioral and cognitive skills to challenge negative self talk 90% of the time.   Log ANTS, Cog Ds, and Alt Es     Intervention:     Motivational Interviewing  Target Behavior: exercise    Stage of Change: PREPARATION (Decided to change - considering how)    MI Intervention: Expressed Empathy/Understanding, Supported Autonomy, Collaboration, Evocation, Permission to raise concern or advise and Change talk (evoked)     Change Talk Expressed by the Patient: Committment to change Activation Taking steps    Provider Response to Change Talk: A - Affirmed patient's thoughts, decisions, or attempts at behavior change and R - Reflected patient's change talk  Assessments completed prior to visit:  The following assessments were completed by patient for this visit:  PHQ2:   PHQ-2 ( 1999 [employer]) 3/2/2021 5/30/2019 3/1/2016   Q1: Little interest or pleasure in doing things 2 1 0   Q2: Feeling down, depressed or hopeless 2 1 0   PHQ-2 Score 4 2 0   PHQ-2 Total Score (12-17 Years)- Positive if 3 or more points; Administer PHQ-A if positive 4 2 -   Q1: Little interest or pleasure in doing things More than half the days Several days -   Q2: Feeling down, depressed or hopeless More than half the days Several days -   PHQ-2 Score 4 2 -     PHQ9:   PHQ-9 SCORE 12/14/2020 12/17/2020 " 3/2/2021 1/11/2022 1/13/2022 2/17/2022 4/15/2022   PHQ-9 Total Score MyChart 20 (Severe depression) - 16 (Moderately severe depression) - 11 (Moderate depression) 15 (Moderately severe depression) 10 (Moderate depression)   PHQ-9 Total Score 20 14 16 11 11 15 10     GAD2:   DEANNA-2 1/13/2022 4/15/2022   Feeling nervous, anxious, or on edge 1 1   Not being able to stop or control worrying 1 2   DEANNA-2 Total Score 2 3     GAD7:   DEANNA-7 SCORE 12/14/2018 1/24/2020 12/14/2020 12/14/2020 12/17/2020 1/11/2022 4/15/2022   Total Score - - 13 (moderate anxiety) 13 (moderate anxiety) - - 8 (mild anxiety)   Total Score 10 9 13 13 12 4 8     CAGE-AID: No flowsheet data found.  PROMIS 10-Global Health (all questions and answers displayed):   PROMIS 10 1/13/2022 4/15/2022   In general, would you say your health is: Fair Fair   In general, would you say your quality of life is: Good Good   In general, how would you rate your physical health? Poor Fair   In general, how would you rate your mental health, including your mood and your ability to think? Poor Fair   In general, how would you rate your satisfaction with your social activities and relationships? Good Good   In general, please rate how well you carry out your usual social activities and roles Fair Very good   To what extent are you able to carry out your everyday physical activities such as walking, climbing stairs, carrying groceries, or moving a chair? Completely Completely   How often have you been bothered by emotional problems such as feeling anxious, depressed or irritable? Sometimes Often   How would you rate your fatigue on average? Severe Severe   How would you rate your pain on average?   0 = No Pain  to  10 = Worst Imaginable Pain 1 0   In general, would you say your health is: 2 2   In general, would you say your quality of life is: 3 3   In general, how would you rate your physical health? 1 2   In general, how would you rate your mental health, including your  mood and your ability to think? 1 2   In general, how would you rate your satisfaction with your social activities and relationships? 3 3   In general, please rate how well you carry out your usual social activities and roles. (This includes activities at home, at work and in your community, and responsibilities as a parent, child, spouse, employee, friend, etc.) 2 4   To what extent are you able to carry out your everyday physical activities such as walking, climbing stairs, carrying groceries, or moving a chair? 5 5   In the past 7 days, how often have you been bothered by emotional problems such as feeling anxious, depressed, or irritable? 3 4   In the past 7 days, how would you rate your fatigue on average? 4 4   In the past 7 days, how would you rate your pain on average, where 0 means no pain, and 10 means worst imaginable pain? 1 0   Global Mental Health Score 10 10   Global Physical Health Score 12 14   PROMIS TOTAL - SUBSCORES 22 24   Some recent data might be hidden     PROMIS 10-Global Health (only subscores and total score):   PROMIS-10 Scores Only 1/13/2022 4/15/2022   Global Mental Health Score 10 10   Global Physical Health Score 12 14   PROMIS TOTAL - SUBSCORES 22 24     [place] Suicide Severity Rating Scale (Lifetime/Recent)  [place] Suicide Severity Rating (Lifetime/Recent) 12/17/2020   Wish to be Dead (Lifetime) Yes   Comments aborted attempt at age 17   Non-Specific Active Suicidal Thoughts (Lifetime) No   Most Severe Ideation Rating (Lifetime) 3   Most Severe Ideation Description (Lifetime) at age 17   Frequency (Lifetime) 2   Duration (Lifetime) 2   Controllability (Lifetime) 3   Protective Factors  (Lifetime) 2   Reasons for Ideation (Lifetime) 3   RETIRED: 1. Wish to be Dead (Recent) No   Most Severe Ideation Rating (Past Month) NA   Actual Attempt (Lifetime) No   Actual Attempt (Past 3 Months) No   Has subject engaged in non-suicidal self-injurious behavior? (Lifetime) No   Has subject  engaged in non-suicidal self-injurious behavior? (Past 3 Months) No   Interrupted Attempts (Lifetime) No   Interrupted Attempts (Past 3 Months) No   Aborted or Self-Interrupted Attempt (Lifetime) Yes   Aborted or Self Interrupted Attempt Description (Lifetime) at age 17-pills   Total Number Aborted or Self Interrupted Attempts (Lifetime) 1     [place] Suicide Severity Rating Scale (Short Version)  [place] Suicide Severity Rating (Short Version) 2/18/2021   Over the past 2 weeks have you felt down, depressed, or hopeless? no   Over the past 2 weeks have you had thoughts of killing yourself? no   Have you ever attempted to kill yourself? no         ASSESSMENT: Current Emotional / Mental Status (status of significant symptoms):   Risk status (Self / Other harm or suicidal ideation)   Patient denies current fears or concerns for personal safety.   Patient denies current or recent suicidal ideation or behaviors.   Patient denies current or recent homicidal ideation or behaviors.   Patient denies current or recent self injurious behavior or ideation.   Patient denies other safety concerns.   Patient reports there has been no change in risk factors since their last session.     Patient reports there has been no change in protective factors since their last session.     Recommended that patient call 911 or go to the local ED should there be a change in any of these risk factors.     Appearance:   Appropriate    Eye Contact:   Good    Psychomotor Behavior: Normal    Attitude:   Cooperative  Interested Pleasant   Orientation:   All   Speech    Rate / Production: Normal/ Responsive    Volume:  Normal    Mood:    Depressed    Affect:    Constricted    Thought Content:  Clear    Thought Form:  Coherent  Logical    Insight:    Good      Medication Review:   No changes to current psychiatric medication(s)     Medication Compliance:   Yes     Changes in Health Issues:   None reported     Chemical Use Review:   Substance Use:  Chemical use reviewed, no active concerns identified      Tobacco Use: No current tobacco use.      Diagnosis:  1. Bipolar 2 disorder (H)        Collateral Reports Completed:   Not Applicable    PLAN: (Patient Tasks / Therapist Tasks / Other)  1.  Flash at next session     2.  Exercise 1x/week+ by next session  3.  Sent out 4 resumes per week by next session  4  Meet in two weeks        Justo Dawson, LICSW                                                         ______________________________________________________________________    Individual Treatment Plan    Patient's Name: Ramiro Najera  YOB: 1985    Date of Creation: February 20, 2017  Date Treatment Plan Last Reviewed/Revised: 4/15/2022     DSM5 Diagnoses: (Sustained by DSM5 Criteria Listed Above)  Diagnoses: 296.89 Bipolar II Disorder With anxious distress        PTSD  Psychosocial & Contextual Factors: parents' health problems  WHODAS 2.0 (12 item)    PROMIS (reviewed every 90 days):     Referral / Collaboration:  Referral to another professional/service is not indicated at this time..    Anticipated number of session for this episode of care: 60  Anticipation frequency of session: Every other week  Anticipated Duration of each session: 38-52 minutes  Treatment plan will be reviewed in 90 days or when goals have been changed.       MeasurableTreatment Goal(s) related to diagnosis / functional impairment(s)  Objective #A (Client Action)    Status: cont- Date: 4/15/2022     Client will learn at least 3 mindfulness skills and practice one daily    Intervention(s)  Therapist will provide mindfulness training, psychoeducation, behavioral activation, and cognitive restructuring.    Objective #B  Client will use at least 3 coping skills for anxiety management in the next 12 weeks.    Status: cont- Date: 4/15/2022     Intervention(s)  Therapist will provide psychoeducation, behavioral activation, and cognitive restructuring.      Objective  #C  Client will identify three distraction and diversion activities and use those activities to improve distress tolerance and emotional regulation.  Status: cont- Date: 4/15/2022     Intervention(s)  Therapist will provide psychoeducation, behavioral activation, and cognitive restructuring.    MeasurableTreatment Goal(s) related to diagnosis / functional impairment(s)            Goal-Depression: Client will decrease depressed mood    I will know I've met my goal when I am less depressed.      Objective #A (Client Action)    Status: cont- Date: 4/15/2022     Client will use identified behavioral and cognitive skills to challenge negative self talk 90% of the time.    Intervention(s)  Therapist will provide psychoeducation, behavioral activation, and cognitive restructuring.      Objective #B  Client will complete at least 10 minutes of self-regulation practice (e.g.: yoga, meditation, relaxation breathing, etc.) per day.    Status: cont- Date: 4/15/2022     Intervention(s)  Therapist will provide psychoeducation, behavioral activation, and cognitive restructuring.      Objective #C  Client will exercise 30 minutes 36 times in the next 12 weeks.  Status: cont- Date: 4/15/2022     Intervention(s)  Therapist will provide psychoeducation, behavioral activation, and cognitive restructuring.                       Patient has reviewed and agreed to the above plan.      YUNIOR Bustos  April 15, 2022      Answers for HPI/ROS submitted by the patient on 4/15/2022  If you checked off any problems, how difficult have these problems made it for you to do your work, take care of things at home, or get along with other people?: Somewhat difficult  PHQ9 TOTAL SCORE: 10  DEANNA 7 TOTAL SCORE: 8          Justo Dawson [facility]  February 20, 2017  Answers for HPI/ROS submitted by the patient on 2/17/2022  If you checked off any problems, how difficult have these problems made it for you to do your work, take care of things  at home, or get along with other people?: Somewhat difficult  PHQ9 TOTAL SCORE: 15

## 2022-04-16 ASSESSMENT — PATIENT HEALTH QUESTIONNAIRE - PHQ9: SUM OF ALL RESPONSES TO PHQ QUESTIONS 1-9: 10

## 2022-04-16 ASSESSMENT — ANXIETY QUESTIONNAIRES: GAD7 TOTAL SCORE: 8

## 2022-05-15 ENCOUNTER — HEALTH MAINTENANCE LETTER (OUTPATIENT)
Age: 37
End: 2022-05-15

## 2022-05-28 DIAGNOSIS — F31.81 BIPOLAR 2 DISORDER (H): ICD-10-CM

## 2022-05-30 RX ORDER — TRAZODONE HYDROCHLORIDE 50 MG/1
100-150 TABLET, FILM COATED ORAL AT BEDTIME
Qty: 180 TABLET | Refills: 0 | Status: SHIPPED | OUTPATIENT
Start: 2022-05-30 | End: 2022-12-08

## 2022-06-16 ENCOUNTER — VIRTUAL VISIT (OUTPATIENT)
Dept: PSYCHOLOGY | Facility: CLINIC | Age: 37
End: 2022-06-16
Payer: COMMERCIAL

## 2022-06-16 DIAGNOSIS — F31.81 BIPOLAR 2 DISORDER (H): Primary | ICD-10-CM

## 2022-06-16 PROCEDURE — 90834 PSYTX W PT 45 MINUTES: CPT | Mod: 95 | Performed by: SOCIAL WORKER

## 2022-06-16 ASSESSMENT — PATIENT HEALTH QUESTIONNAIRE - PHQ9
10. IF YOU CHECKED OFF ANY PROBLEMS, HOW DIFFICULT HAVE THESE PROBLEMS MADE IT FOR YOU TO DO YOUR WORK, TAKE CARE OF THINGS AT HOME, OR GET ALONG WITH OTHER PEOPLE: SOMEWHAT DIFFICULT
SUM OF ALL RESPONSES TO PHQ QUESTIONS 1-9: 9
SUM OF ALL RESPONSES TO PHQ QUESTIONS 1-9: 9

## 2022-06-16 NOTE — PROGRESS NOTES
[facility]                                     Progress Note    Patient Name: Ramiro Najera  Date: 6/16/2022          Service Type: Individual      Session Start Time: 730  Session End Time: 815     Session Length: 45    Session #: 20    Attendees: Client attended alone    Service Modality:  Video Visit:      Provider verified identity through the following two step process.  Patient provided:  Patient photo and Patient is known previously to provider    Telemedicine Visit: The patient's condition can be safely assessed and treated via synchronous audio and visual telemedicine encounter.      Reason for Telemedicine Visit: Patient has requested telehealth visit    Originating Site (Patient Location): Patient's home    Distant Site (Provider Location): Provider Remote Setting- Home Office    Consent:  The patient/guardian has verbally consented to: the potential risks and benefits of telemedicine (video visit) versus in person care; bill my insurance or make self-payment for services provided; and responsibility for payment of non-covered services.     Patient would like the video invitation sent by:  My Chart    Mode of Communication:  Video Conference via [employer]well    As the provider I attest to compliance with applicable laws and regulations related to telemedicine.    DATA  Interactive Complexity: No  Crisis: No        Progress Since Last Session (Related to Symptoms / Goals / Homework):   Symptoms: No change -    Homework: Partially completed  Reviewed BA goals      Episode of Care Goals: Minimal progress - PREPARATION (Decided to change - considering how); Intervened by negotiating a change plan and determining options / strategies for behavior change, identifying triggers, exploring social supports, and working towards setting a date to begin behavior change     Current / Ongoing Stressors and Concerns:   There has been demonstrated improvement in functioning while patient has  "been engaged in psychotherapy/psychological service- if withdrawn the patient would deteriorate and/or relapse.     \"It's been a roller coaster, its been weird.\"  Reports that he had been interviewing for a new job, and started a new job on Monday.  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  Some stress with the new job and we discuss how to manage this in session.                Treatment Objective(s) Addressed in This Session:   Client will complete at least 10 minutes of self-regulation practice (e.g.: yoga, meditation, relaxation breathing, etc.) per day.    Client to follow BA plan    Client will use identified behavioral and cognitive skills to challenge negative self talk 90% of the time.   Log ANTS, Cog Ds, and Alt Es     Intervention:     Motivational Interviewing  Target Behavior: getting to the gym    Stage of Change: PREPARATION (Decided to change - considering how)    MI Intervention: Expressed Empathy/Understanding, Supported Autonomy, Collaboration, Evocation and Permission to raise concern or advise     Change Talk Expressed by the Patient: Desire to change Ability to change Reasons to change Committment to change    Provider Response to Change Talk: E - Evoked more info from patient about behavior change and R - Reflected patient's change talk        Assessments completed prior to visit:  The following assessments were completed by patient for this visit:  PHQ2:   PHQ-2 ( 1999 [employer]) 3/2/2021 5/30/2019 3/1/2016   Q1: Little interest or pleasure in doing things 2 1 0   Q2: Feeling down, depressed or hopeless 2 1 0   PHQ-2 Score 4 2 0   PHQ-2 Total Score (12-17 Years)- Positive if 3 or more points; Administer PHQ-A if positive 4 2 -   Q1: Little interest or pleasure in doing things More than half the days Several days -   Q2: " Feeling down, depressed or hopeless More than half the days Several days -   PHQ-2 Score 4 2 -     PHQ9:   PHQ-9 SCORE 12/17/2020 3/2/2021 1/11/2022 1/13/2022 2/17/2022 4/15/2022 6/16/2022   PHQ-9 Total Score MyChart - 16 (Moderately severe depression) - 11 (Moderate depression) 15 (Moderately severe depression) 10 (Moderate depression) 9 (Mild depression)   PHQ-9 Total Score 14 16 11 11 15 10 9     GAD2:   DEANNA-2 1/13/2022 4/15/2022   Feeling nervous, anxious, or on edge 1 1   Not being able to stop or control worrying 1 2   DEANNA-2 Total Score 2 3     GAD7:   DEANNA-7 SCORE 12/14/2018 1/24/2020 12/14/2020 12/14/2020 12/17/2020 1/11/2022 4/15/2022   Total Score - - 13 (moderate anxiety) 13 (moderate anxiety) - - 8 (mild anxiety)   Total Score 10 9 13 13 12 4 8     CAGE-AID: No flowsheet data found.  PROMIS 10-Global Health (all questions and answers displayed):   PROMIS 10 1/13/2022 4/15/2022   In general, would you say your health is: Fair Fair   In general, would you say your quality of life is: Good Good   In general, how would you rate your physical health? Poor Fair   In general, how would you rate your mental health, including your mood and your ability to think? Poor Fair   In general, how would you rate your satisfaction with your social activities and relationships? Good Good   In general, please rate how well you carry out your usual social activities and roles Fair Very good   To what extent are you able to carry out your everyday physical activities such as walking, climbing stairs, carrying groceries, or moving a chair? Completely Completely   How often have you been bothered by emotional problems such as feeling anxious, depressed or irritable? Sometimes Often   How would you rate your fatigue on average? Severe Severe   How would you rate your pain on average?   0 = No Pain  to  10 = Worst Imaginable Pain 1 0   In general, would you say your health is: 2 2   In general, would you say your quality of life  is: 3 3   In general, how would you rate your physical health? 1 2   In general, how would you rate your mental health, including your mood and your ability to think? 1 2   In general, how would you rate your satisfaction with your social activities and relationships? 3 3   In general, please rate how well you carry out your usual social activities and roles. (This includes activities at home, at work and in your community, and responsibilities as a parent, child, spouse, employee, friend, etc.) 2 4   To what extent are you able to carry out your everyday physical activities such as walking, climbing stairs, carrying groceries, or moving a chair? 5 5   In the past 7 days, how often have you been bothered by emotional problems such as feeling anxious, depressed, or irritable? 3 4   In the past 7 days, how would you rate your fatigue on average? 4 4   In the past 7 days, how would you rate your pain on average, where 0 means no pain, and 10 means worst imaginable pain? 1 0   Global Mental Health Score 10 10   Global Physical Health Score 12 14   PROMIS TOTAL - SUBSCORES 22 24   Some recent data might be hidden     PROMIS 10-Global Health (only subscores and total score):   PROMIS-10 Scores Only 1/13/2022 4/15/2022   Global Mental Health Score 10 10   Global Physical Health Score 12 14   PROMIS TOTAL - SUBSCORES 22 24     [place] Suicide Severity Rating Scale (Lifetime/Recent)  [place] Suicide Severity Rating (Lifetime/Recent) 12/17/2020   Wish to be Dead (Lifetime) Yes   Comments aborted attempt at age 17   Non-Specific Active Suicidal Thoughts (Lifetime) No   Most Severe Ideation Rating (Lifetime) 3   Most Severe Ideation Description (Lifetime) at age 17   Frequency (Lifetime) 2   Duration (Lifetime) 2   Controllability (Lifetime) 3   Protective Factors  (Lifetime) 2   Reasons for Ideation (Lifetime) 3   RETIRED: 1. Wish to be Dead (Recent) No   Most Severe Ideation Rating (Past Month) NA   Actual Attempt  (Lifetime) No   Actual Attempt (Past 3 Months) No   Has subject engaged in non-suicidal self-injurious behavior? (Lifetime) No   Has subject engaged in non-suicidal self-injurious behavior? (Past 3 Months) No   Interrupted Attempts (Lifetime) No   Interrupted Attempts (Past 3 Months) No   Aborted or Self-Interrupted Attempt (Lifetime) Yes   Aborted or Self Interrupted Attempt Description (Lifetime) at age 17-pills   Total Number Aborted or Self Interrupted Attempts (Lifetime) 1     [place] Suicide Severity Rating Scale (Short Version)  [place] Suicide Severity Rating (Short Version) 2/18/2021   Over the past 2 weeks have you felt down, depressed, or hopeless? no   Over the past 2 weeks have you had thoughts of killing yourself? no   Have you ever attempted to kill yourself? no         ASSESSMENT: Current Emotional / Mental Status (status of significant symptoms):   Risk status (Self / Other harm or suicidal ideation)   Patient denies current fears or concerns for personal safety.   Patient denies current or recent suicidal ideation or behaviors.   Patient denies current or recent homicidal ideation or behaviors.   Patient denies current or recent self injurious behavior or ideation.   Patient denies other safety concerns.   Patient reports there has been no change in risk factors since their last session.     Patient reports there has been no change in protective factors since their last session.     Recommended that patient call 911 or go to the local ED should there be a change in any of these risk factors.     Appearance:   Appropriate    Eye Contact:   Good    Psychomotor Behavior: Normal    Attitude:   Cooperative  Interested Pleasant   Orientation:   All   Speech    Rate / Production: Normal/ Responsive    Volume:  Normal    Mood:    Depressed    Affect:    Constricted    Thought Content:  Clear    Thought Form:  Coherent  Logical    Insight:    Good      Medication Review:   No changes to current  psychiatric medication(s)     Medication Compliance:   Yes     Changes in Health Issues:   None reported     Chemical Use Review:   Substance Use: Chemical use reviewed, no active concerns identified      Tobacco Use: No current tobacco use.      Diagnosis:  1. Bipolar 2 disorder (H)        Collateral Reports Completed:   Not Applicable    PLAN: (Patient Tasks / Therapist Tasks / Other)  1.  Flash at next session     2.  Exercise 1x/week+ by next session  3.  Sent out 4 resumes per week by next session  4  Meet in two weeks        Justo Dawson, [facility]                                                         ______________________________________________________________________    Individual Treatment Plan    Patient's Name: Ramiro Najera  YOB: 1985    Date of Creation: February 20, 2017  Date Treatment Plan Last Reviewed/Revised: 4/15/2022     DSM5 Diagnoses: (Sustained by DSM5 Criteria Listed Above)  Diagnoses: 296.89 Bipolar II Disorder With anxious distress        PTSD  Psychosocial & Contextual Factors: parents' health problems  WHODAS 2.0 (12 item)    PROMIS (reviewed every 90 days):     Referral / Collaboration:  Referral to another professional/service is not indicated at this time..    Anticipated number of session for this episode of care: 60  Anticipation frequency of session: Every other week  Anticipated Duration of each session: 38-52 minutes  Treatment plan will be reviewed in 90 days or when goals have been changed.       MeasurableTreatment Goal(s) related to diagnosis / functional impairment(s)  Objective #A (Client Action)        Client will learn at least 3 mindfulness skills and practice one daily    Intervention(s)  Therapist will provide mindfulness training, psychoeducation, behavioral activation, and cognitive restructuring.    Objective #B  Client will use at least 3 coping skills for anxiety management in the next 12 weeks.        Intervention(s)  Therapist will  provide psychoeducation, behavioral activation, and cognitive restructuring.      Objective #C  Client will identify three distraction and diversion activities and use those activities to improve distress tolerance and emotional regulation.      Intervention(s)  Therapist will provide psychoeducation, behavioral activation, and cognitive restructuring.    MeasurableTreatment Goal(s) related to diagnosis / functional impairment(s)            Goal-Depression: Client will decrease depressed mood    I will know I've met my goal when I am less depressed.      Objective #A (Client Action)    Status: cont- Date: 4/15/2022     Client will use identified behavioral and cognitive skills to challenge negative self talk 90% of the time.    Intervention(s)  Therapist will provide psychoeducation, behavioral activation, and cognitive restructuring.      Objective #B  Client will complete at least 10 minutes of self-regulation practice (e.g.: yoga, meditation, relaxation breathing, etc.) per day.    Status: cont- Date: 4/15/2022     Intervention(s)  Therapist will provide psychoeducation, behavioral activation, and cognitive restructuring.      Objective #C  Client will exercise 30 minutes 36 times in the next 12 weeks.  Status: cont- Date: 4/15/2022     Intervention(s)  Therapist will provide psychoeducation, behavioral activation, and cognitive restructuring.                       Patient has reviewed and agreed to the above plan.      Justo Dawson [facility]  April 15, 2022      Answers for HPI/ROS submitted by the patient on 4/15/2022  If you checked off any problems, how difficult have these problems made it for you to do your work, take care of things at home, or get along with other people?: Somewhat difficult  PHQ9 TOTAL SCORE: 10  DEANNA 7 TOTAL SCORE: 8          [facility] Dawson [facility]  February 20, 2017  Answers for HPI/ROS submitted by the patient on 2/17/2022  If you checked off any problems, how difficult have these  problems made it for you to do your work, take care of things at home, or get along with other people?: Somewhat difficult  PHQ9 TOTAL SCORE: 15 along with other people?: Somewhat difficult  PHQ9 TOTAL SCORE: 9    Answers for HPI/ROS submitted by the patient on 6/16/2022  If you checked off any problems, how difficult have these problems made it for you to do your work, take care of things at home, or get

## 2022-07-14 ENCOUNTER — VIRTUAL VISIT (OUTPATIENT)
Dept: PSYCHOLOGY | Facility: CLINIC | Age: 37
End: 2022-07-14
Payer: COMMERCIAL

## 2022-07-14 DIAGNOSIS — F31.81 BIPOLAR 2 DISORDER (H): Primary | ICD-10-CM

## 2022-07-14 PROCEDURE — 90834 PSYTX W PT 45 MINUTES: CPT | Mod: 95 | Performed by: SOCIAL WORKER

## 2022-07-14 NOTE — PROGRESS NOTES
[facility]                                     Progress Note    Patient Name: Ramiro Najera  Date: 7/14/2022          Service Type: Individual      Session Start Time: 730  Session End Time: 815     Session Length: 45    Session #: 21    Attendees: Client attended alone    Service Modality:  Video Visit:      Provider verified identity through the following two step process.  Patient provided:  Patient photo and Patient is known previously to provider    Telemedicine Visit: The patient's condition can be safely assessed and treated via synchronous audio and visual telemedicine encounter.      Reason for Telemedicine Visit: Patient has requested telehealth visit    Originating Site (Patient Location): Patient's home    Distant Site (Provider Location): Provider Remote Setting- Home Office    Consent:  The patient/guardian has verbally consented to: the potential risks and benefits of telemedicine (video visit) versus in person care; bill my insurance or make self-payment for services provided; and responsibility for payment of non-covered services.     Patient would like the video invitation sent by:  My Chart    Mode of Communication:  Video Conference via Amwell    As the provider I attest to compliance with applicable laws and regulations related to telemedicine.    DATA  Interactive Complexity: No  Crisis: No        Progress Since Last Session (Related to Symptoms / Goals / Homework):   Symptoms: No change -    Homework: Partially completed  Reviewed BA goals      Episode of Care Goals: Minimal progress - PREPARATION (Decided to change - considering how); Intervened by negotiating a change plan and determining options / strategies for behavior change, identifying triggers, exploring social supports, and working towards setting a date to begin behavior change     Current / Ongoing Stressors and Concerns:   There has been demonstrated improvement in functioning while patient has  "been engaged in psychotherapy/psychological service- if withdrawn the patient would deteriorate and/or relapse.     \"things have been good mostly.\"  Has been at his new job for a month and is starting to feel \"more comfortable in the role.\"  Getting to the gym with his wife 2-3 days per week and is feeling good about that.                Treatment Objective(s) Addressed in This Session:   Client will complete at least 10 minutes of self-regulation practice (e.g.: yoga, meditation, relaxation breathing, etc.) per day.    Client to follow BA plan    Client will use identified behavioral and cognitive skills to challenge negative self talk 90% of the time.   Log ANTS, Cog Ds, and Alt Es     Intervention:     Motivational Interviewing  Target Behavior: exercise    Stage of Change: ACTION (Actively working towards change)    MI Intervention: Supported Autonomy, Collaboration, Evocation, Permission to raise concern or advise, Open-ended questions, Reflections: simple and complex and Change talk (evoked)     Change Talk Expressed by the Patient: Desire to change Committment to change Activation Taking steps    Provider Response to Change Talk: R - Reflected patient's change talk and S - Summarized patient's change talk statements        Assessments completed prior to visit:  The following assessments were completed by patient for this visit:  PHQ2:   PHQ-2 ( 1999 [employer]) 3/2/2021 5/30/2019 3/1/2016   Q1: Little interest or pleasure in doing things 2 1 0   Q2: Feeling down, depressed or hopeless 2 1 0   PHQ-2 Score 4 2 0   PHQ-2 Total Score (12-17 Years)- Positive if 3 or more points; Administer PHQ-A if positive 4 2 -   Q1: Little interest or pleasure in doing things More than half the days Several days -   Q2: Feeling down, depressed or hopeless More than half the days Several days -   PHQ-2 Score 4 2 -     PHQ9:   PHQ-9 SCORE 12/17/2020 3/2/2021 1/11/2022 1/13/2022 2/17/2022 4/15/2022 6/16/2022   PHQ-9 Total Score " MyChart - 16 (Moderately severe depression) - 11 (Moderate depression) 15 (Moderately severe depression) 10 (Moderate depression) 9 (Mild depression)   PHQ-9 Total Score 14 16 11 11 15 10 9     GAD2:   DEANNA-2 1/13/2022 4/15/2022   Feeling nervous, anxious, or on edge 1 1   Not being able to stop or control worrying 1 2   DEANNA-2 Total Score 2 3     GAD7:   DEANNA-7 SCORE 12/14/2018 1/24/2020 12/14/2020 12/14/2020 12/17/2020 1/11/2022 4/15/2022   Total Score - - 13 (moderate anxiety) 13 (moderate anxiety) - - 8 (mild anxiety)   Total Score 10 9 13 13 12 4 8     CAGE-AID: No flowsheet data found.  PROMIS 10-Global Health (all questions and answers displayed):   PROMIS 10 1/13/2022 4/15/2022   In general, would you say your health is: Fair Fair   In general, would you say your quality of life is: Good Good   In general, how would you rate your physical health? Poor Fair   In general, how would you rate your mental health, including your mood and your ability to think? Poor Fair   In general, how would you rate your satisfaction with your social activities and relationships? Good Good   In general, please rate how well you carry out your usual social activities and roles Fair Very good   To what extent are you able to carry out your everyday physical activities such as walking, climbing stairs, carrying groceries, or moving a chair? Completely Completely   How often have you been bothered by emotional problems such as feeling anxious, depressed or irritable? Sometimes Often   How would you rate your fatigue on average? Severe Severe   How would you rate your pain on average?   0 = No Pain  to  10 = Worst Imaginable Pain 1 0   In general, would you say your health is: 2 2   In general, would you say your quality of life is: 3 3   In general, how would you rate your physical health? 1 2   In general, how would you rate your mental health, including your mood and your ability to think? 1 2   In general, how would you rate your  satisfaction with your social activities and relationships? 3 3   In general, please rate how well you carry out your usual social activities and roles. (This includes activities at home, at work and in your community, and responsibilities as a parent, child, spouse, employee, friend, etc.) 2 4   To what extent are you able to carry out your everyday physical activities such as walking, climbing stairs, carrying groceries, or moving a chair? 5 5   In the past 7 days, how often have you been bothered by emotional problems such as feeling anxious, depressed, or irritable? 3 4   In the past 7 days, how would you rate your fatigue on average? 4 4   In the past 7 days, how would you rate your pain on average, where 0 means no pain, and 10 means worst imaginable pain? 1 0   Global Mental Health Score 10 10   Global Physical Health Score 12 14   PROMIS TOTAL - SUBSCORES 22 24   Some recent data might be hidden     PROMIS 10-Global Health (only subscores and total score):   PROMIS-10 Scores Only 1/13/2022 4/15/2022   Global Mental Health Score 10 10   Global Physical Health Score 12 14   PROMIS TOTAL - SUBSCORES 22 24     Anatone Suicide Severity Rating Scale (Lifetime/Recent)  Anatone Suicide Severity Rating (Lifetime/Recent) 12/17/2020   Wish to be Dead (Lifetime) Yes   Comments aborted attempt at age 17   Non-Specific Active Suicidal Thoughts (Lifetime) No   Most Severe Ideation Rating (Lifetime) 3   Most Severe Ideation Description (Lifetime) at age 17   Frequency (Lifetime) 2   Duration (Lifetime) 2   Controllability (Lifetime) 3   Protective Factors  (Lifetime) 2   Reasons for Ideation (Lifetime) 3   RETIRED: 1. Wish to be Dead (Recent) No   Most Severe Ideation Rating (Past Month) NA   Actual Attempt (Lifetime) No   Actual Attempt (Past 3 Months) No   Has subject engaged in non-suicidal self-injurious behavior? (Lifetime) No   Has subject engaged in non-suicidal self-injurious behavior? (Past 3 Months) No    Interrupted Attempts (Lifetime) No   Interrupted Attempts (Past 3 Months) No   Aborted or Self-Interrupted Attempt (Lifetime) Yes   Aborted or Self Interrupted Attempt Description (Lifetime) at age 17-pills   Total Number Aborted or Self Interrupted Attempts (Lifetime) 1     Cable Suicide Severity Rating Scale (Short Version)  Cable Suicide Severity Rating (Short Version) 2/18/2021   Over the past 2 weeks have you felt down, depressed, or hopeless? no   Over the past 2 weeks have you had thoughts of killing yourself? no   Have you ever attempted to kill yourself? no         ASSESSMENT: Current Emotional / Mental Status (status of significant symptoms):   Risk status (Self / Other harm or suicidal ideation)   Patient denies current fears or concerns for personal safety.   Patient denies current or recent suicidal ideation or behaviors.   Patient denies current or recent homicidal ideation or behaviors.   Patient denies current or recent self injurious behavior or ideation.   Patient denies other safety concerns.   Patient reports there has been no change in risk factors since their last session.     Patient reports there has been no change in protective factors since their last session.     Recommended that patient call 911 or go to the local ED should there be a change in any of these risk factors.     Appearance:   Appropriate    Eye Contact:   Good    Psychomotor Behavior: Normal    Attitude:   Cooperative  Interested Pleasant   Orientation:   All   Speech    Rate / Production: Normal/ Responsive    Volume:  Normal    Mood:    Depressed    Affect:    Constricted    Thought Content:  Clear    Thought Form:  Coherent  Logical    Insight:    Good      Medication Review:   No changes to current psychiatric medication(s)     Medication Compliance:   Yes     Changes in Health Issues:   None reported     Chemical Use Review:   Substance Use: Chemical use reviewed, no active concerns identified      Tobacco Use:  No current tobacco use.      Diagnosis:  1. Bipolar 2 disorder (H)        Collateral Reports Completed:   Not Applicable    PLAN: (Patient Tasks / Therapist Tasks / Other)  1.  Flash at next session     2.  Exercise 1x/week+ by next session  3.  Sent out 4 resumes per week by next session  4  Meet in two weeks        Justo Dawson, LICSW                                                         ______________________________________________________________________    Individual Treatment Plan    Patient's Name: Ramiro Najera  YOB: 1985    Date of Creation: February 20, 2017  Date Treatment Plan Last Reviewed/Revised: 4/15/2022     DSM5 Diagnoses: (Sustained by DSM5 Criteria Listed Above)  Diagnoses: 296.89 Bipolar II Disorder With anxious distress        PTSD  Psychosocial & Contextual Factors: parents' health problems  WHODAS 2.0 (12 item)    PROMIS (reviewed every 90 days):     Referral / Collaboration:  Referral to another professional/service is not indicated at this time..    Anticipated number of session for this episode of care: 60  Anticipation frequency of session: Every other week  Anticipated Duration of each session: 38-52 minutes  Treatment plan will be reviewed in 90 days or when goals have been changed.       MeasurableTreatment Goal(s) related to diagnosis / functional impairment(s)  Objective #A (Client Action)        Client will learn at least 3 mindfulness skills and practice one daily    Intervention(s)  Therapist will provide mindfulness training, psychoeducation, behavioral activation, and cognitive restructuring.    Objective #B  Client will use at least 3 coping skills for anxiety management in the next 12 weeks.        Intervention(s)  Therapist will provide psychoeducation, behavioral activation, and cognitive restructuring.      Objective #C  Client will identify three distraction and diversion activities and use those activities to improve distress tolerance and  emotional regulation.      Intervention(s)  Therapist will provide psychoeducation, behavioral activation, and cognitive restructuring.    MeasurableTreatment Goal(s) related to diagnosis / functional impairment(s)            Goal-Depression: Client will decrease depressed mood    I will know I've met my goal when I am less depressed.      Objective #A (Client Action)    Status: cont- Date: 4/15/2022     Client will use identified behavioral and cognitive skills to challenge negative self talk 90% of the time.    Intervention(s)  Therapist will provide psychoeducation, behavioral activation, and cognitive restructuring.      Objective #B  Client will complete at least 10 minutes of self-regulation practice (e.g.: yoga, meditation, relaxation breathing, etc.) per day.    Status: cont- Date: 4/15/2022     Intervention(s)  Therapist will provide psychoeducation, behavioral activation, and cognitive restructuring.      Objective #C  Client will exercise 30 minutes 36 times in the next 12 weeks.  Status: cont- Date: 4/15/2022     Intervention(s)  Therapist will provide psychoeducation, behavioral activation, and cognitive restructuring.                       Patient has reviewed and agreed to the above plan.      Justo Dawson [facility]  April 15, 2022      Answers for HPI/ROS submitted by the patient on 4/15/2022  If you checked off any problems, how difficult have these problems made it for you to do your work, take care of things at home, or get along with other people?: Somewhat difficult  PHQ9 TOTAL SCORE: 10  DEANNA 7 TOTAL SCORE: 8          Justo Dawson [facility]  February 20, 2017  Answers for HPI/ROS submitted by the patient on 2/17/2022  If you checked off any problems, how difficult have these problems made it for you to do your work, take care of things at home, or get along with other people?: Somewhat difficult  PHQ9 TOTAL SCORE: 15 along with other people?: Somewhat difficult  PHQ9 TOTAL SCORE:  9    Answers for HPI/ROS submitted by the patient on 6/16/2022  If you checked off any problems, how difficult have these problems made it for you to do your work, take care of things at home, or get

## 2022-08-25 ENCOUNTER — VIRTUAL VISIT (OUTPATIENT)
Dept: FAMILY MEDICINE | Facility: CLINIC | Age: 37
End: 2022-08-25

## 2022-08-25 DIAGNOSIS — F31.81 BIPOLAR 2 DISORDER (H): Primary | ICD-10-CM

## 2022-08-25 PROBLEM — J45.909 ASTHMA: Status: RESOLVED | Noted: 2022-01-11 | Resolved: 2022-08-25

## 2022-08-25 PROCEDURE — 99213 OFFICE O/P EST LOW 20 MIN: CPT | Mod: 95 | Performed by: FAMILY MEDICINE

## 2022-08-25 RX ORDER — TRAZODONE HYDROCHLORIDE 50 MG/1
100-150 TABLET, FILM COATED ORAL AT BEDTIME
Qty: 180 TABLET | Refills: 0 | Status: CANCELLED | OUTPATIENT
Start: 2022-08-25

## 2022-08-25 RX ORDER — TRAZODONE HYDROCHLORIDE 100 MG/1
100 TABLET ORAL AT BEDTIME
Qty: 90 TABLET | Refills: 3 | Status: SHIPPED | OUTPATIENT
Start: 2022-08-25 | End: 2022-12-08

## 2022-08-25 ASSESSMENT — ANXIETY QUESTIONNAIRES
GAD7 TOTAL SCORE: 2
6. BECOMING EASILY ANNOYED OR IRRITABLE: NOT AT ALL
1. FEELING NERVOUS, ANXIOUS, OR ON EDGE: NOT AT ALL
2. NOT BEING ABLE TO STOP OR CONTROL WORRYING: NOT AT ALL
GAD7 TOTAL SCORE: 2
3. WORRYING TOO MUCH ABOUT DIFFERENT THINGS: SEVERAL DAYS
7. FEELING AFRAID AS IF SOMETHING AWFUL MIGHT HAPPEN: NOT AT ALL
IF YOU CHECKED OFF ANY PROBLEMS ON THIS QUESTIONNAIRE, HOW DIFFICULT HAVE THESE PROBLEMS MADE IT FOR YOU TO DO YOUR WORK, TAKE CARE OF THINGS AT HOME, OR GET ALONG WITH OTHER PEOPLE: NOT DIFFICULT AT ALL
GAD7 TOTAL SCORE: 2
4. TROUBLE RELAXING: SEVERAL DAYS
5. BEING SO RESTLESS THAT IT IS HARD TO SIT STILL: NOT AT ALL
7. FEELING AFRAID AS IF SOMETHING AWFUL MIGHT HAPPEN: NOT AT ALL
8. IF YOU CHECKED OFF ANY PROBLEMS, HOW DIFFICULT HAVE THESE MADE IT FOR YOU TO DO YOUR WORK, TAKE CARE OF THINGS AT HOME, OR GET ALONG WITH OTHER PEOPLE?: NOT DIFFICULT AT ALL

## 2022-08-25 ASSESSMENT — ASTHMA QUESTIONNAIRES
ACT_TOTALSCORE: 25
ACT_TOTALSCORE: 25
QUESTION_2 LAST FOUR WEEKS HOW OFTEN HAVE YOU HAD SHORTNESS OF BREATH: NOT AT ALL
QUESTION_5 LAST FOUR WEEKS HOW WOULD YOU RATE YOUR ASTHMA CONTROL: COMPLETELY CONTROLLED
QUESTION_4 LAST FOUR WEEKS HOW OFTEN HAVE YOU USED YOUR RESCUE INHALER OR NEBULIZER MEDICATION (SUCH AS ALBUTEROL): NOT AT ALL
QUESTION_3 LAST FOUR WEEKS HOW OFTEN DID YOUR ASTHMA SYMPTOMS (WHEEZING, COUGHING, SHORTNESS OF BREATH, CHEST TIGHTNESS OR PAIN) WAKE YOU UP AT NIGHT OR EARLIER THAN USUAL IN THE MORNING: NOT AT ALL
QUESTION_1 LAST FOUR WEEKS HOW MUCH OF THE TIME DID YOUR ASTHMA KEEP YOU FROM GETTING AS MUCH DONE AT WORK, SCHOOL OR AT HOME: NONE OF THE TIME

## 2022-08-25 ASSESSMENT — PATIENT HEALTH QUESTIONNAIRE - PHQ9
SUM OF ALL RESPONSES TO PHQ QUESTIONS 1-9: 10
10. IF YOU CHECKED OFF ANY PROBLEMS, HOW DIFFICULT HAVE THESE PROBLEMS MADE IT FOR YOU TO DO YOUR WORK, TAKE CARE OF THINGS AT HOME, OR GET ALONG WITH OTHER PEOPLE: SOMEWHAT DIFFICULT
SUM OF ALL RESPONSES TO PHQ QUESTIONS 1-9: 10

## 2022-08-25 NOTE — PROGRESS NOTES
"Ramiro is a 36 year old who is being evaluated via a billable video visit.      How would you like to obtain your AVS? MyChart  If the video visit is dropped, the invitation should be resent by: Text to cell phone: [phone]  Will anyone else be joining your video visit? No        Assessment & Plan         Bipolar 2 disorder (H)  The current medical regimen is effective;  continue present plan and medications.    - traZODone (DESYREL) 100 MG tablet; Take 1 tablet (100 mg) by mouth At Bedtime      The patient does not have asthma was taken off of his problem next    20 minutes spent on the date of the encounter doing chart review, history and exam, documentation and further activities per the note       BMI:   Estimated body mass index is 37.08 kg/m  as calculated from the following:    Height as of 1/11/22: 1.797 m (5' 10.75\").    Weight as of 1/11/22: 119.7 kg (264 lb).   Weight management plan: Discussed healthy diet and exercise guidelines      Return in about 1 year (around 8/25/2023) for mood recheck.    Samantha Belle DO  [facility]    Rancho Rubio is a 36 year old, presenting for the following health issues:  Depression and Anxiety    (Asthma is on his problem list and it flagged for ACT- patient is confused by this and stated he has never had Asthma nor been on any inhalers)      History of Present Illness       Mental Health Follow-up:  Patient presents to follow-up on Depression.Patient's depression since last visit has been:  Medium  The patient is not having other symptoms associated with depression.      Any significant life events: grief or loss  Patient is not feeling anxious or having panic attacks.  Patient has no concerns about alcohol or drug use.    He eats 2-3 servings of fruits and vegetables daily.He consumes 2 sweetened beverage(s) daily.He exercises with enough effort to increase his heart rate 30 to 60 minutes per day.  He exercises with enough effort to " increase his heart rate 4 days per week.   He is taking medications regularly.    Today's PHQ-9         PHQ-9 Total Score: 10    PHQ-9 Q9 Thoughts of better off dead/self-harm past 2 weeks :   Not at all    How difficult have these problems made it for you to do your work, take care of things at home, or get along with other people: Somewhat difficult  Today's DEANNA-7 Score: 2       Social History     Tobacco Use     Smoking status: Passive Smoke Exposure - Never Smoker     Smokeless tobacco: Never Used     Tobacco comment: lived with a smoker until he was 17 years old   Vaping Use     Vaping Use: Never used   Substance Use Topics     Alcohol use: Yes     Comment: a couple beers a month     Drug use: No     PHQ 6/16/2022 8/25/2022 8/25/2022   PHQ-9 Total Score 9 10 10   Q9: Thoughts of better off dead/self-harm past 2 weeks Not at all Not at all Not at all   F/U: Thoughts of suicide or self-harm - - -   F/U: Self harm-plan - - -   F/U: Self-harm action - - -   F/U: Safety concerns - - -     DEANNA-7 SCORE 1/11/2022 4/15/2022 8/25/2022   Total Score - 8 (mild anxiety) 2 (minimal anxiety)   Total Score 4 8 2     Last PHQ-9 8/25/2022   1.  Little interest or pleasure in doing things 1   2.  Feeling down, depressed, or hopeless 1   3.  Trouble falling or staying asleep, or sleeping too much 2   4.  Feeling tired or having little energy 2   5.  Poor appetite or overeating 2   6.  Feeling bad about yourself 1   7.  Trouble concentrating 1   8.  Moving slowly or restless 0   Q9: Thoughts of better off dead/self-harm past 2 weeks 0   PHQ-9 Total Score 10   Difficulty at work, home, or with people -   In the past two weeks have you had thoughts of suicide or self harm? -   Do you have concerns about your personal safety or the safety of others? -   In the past 2 weeks have you thought about a plan or had intention to harm yourself? -   In the past 2 weeks have you acted on these thoughts in any way? -     DEANNA-7  8/25/2022   1.  Feeling nervous, anxious, or on edge 0   2. Not being able to stop or control worrying 0   3. Worrying too much about different things 1   4. Trouble relaxing 1   5. Being so restless that it is hard to sit still 0   6. Becoming easily annoyed or irritable 0   7. Feeling afraid, as if something awful might happen 0   DEANNA-7 Total Score 2   If you checked any problems, how difficult have they made it for you to do your work, take care of things at home, or get along with other people? Not difficult at all       Suicide Assessment Five-step Evaluation and Treatment (SAFE-T)      Review of Systems   Constitutional, HEENT, cardiovascular, pulmonary, gi and gu systems are negative, except as otherwise noted.      Objective           Vitals:  No vitals were obtained today due to virtual visit.    Physical Exam   GENERAL: Healthy, alert and no distress  EYES: Eyes grossly normal to inspection.  No discharge or erythema, or obvious scleral/conjunctival abnormalities.  RESP: No audible wheeze, cough, or visible cyanosis.  No visible retractions or increased work of breathing.    SKIN: Visible skin clear. No significant rash, abnormal pigmentation or lesions.  NEURO: Cranial nerves grossly intact.  Mentation and speech appropriate for age.  PSYCH: Mentation appears normal, affect normal/bright, judgement and insight intact, normal speech and appearance well-groomed.            Video-Visit Details    Video Start Time: 2:30 PM    Type of service:  Video Visit    Video End Time:2:50 PM    Originating Location (pt. Location): Home    Distant Location (provider location):  [facility]     Platform used for Video Visit: OctavioWell    Amita Mann

## 2022-09-11 ENCOUNTER — HEALTH MAINTENANCE LETTER (OUTPATIENT)
Age: 37
End: 2022-09-11

## 2022-12-08 ENCOUNTER — OFFICE VISIT (OUTPATIENT)
Dept: FAMILY MEDICINE | Facility: CLINIC | Age: 37
End: 2022-12-08
Payer: COMMERCIAL

## 2022-12-08 VITALS
OXYGEN SATURATION: 98 % | HEIGHT: 71 IN | BODY MASS INDEX: 37.24 KG/M2 | HEART RATE: 102 BPM | RESPIRATION RATE: 20 BRPM | TEMPERATURE: 99 F | SYSTOLIC BLOOD PRESSURE: 126 MMHG | WEIGHT: 266 LBS | DIASTOLIC BLOOD PRESSURE: 82 MMHG

## 2022-12-08 DIAGNOSIS — Z87.2 HISTORY OF FOLLICULITIS: ICD-10-CM

## 2022-12-08 DIAGNOSIS — Z13.220 LIPID SCREENING: ICD-10-CM

## 2022-12-08 DIAGNOSIS — F31.81 BIPOLAR 2 DISORDER (H): ICD-10-CM

## 2022-12-08 DIAGNOSIS — Z31.41 FERTILITY TESTING: ICD-10-CM

## 2022-12-08 DIAGNOSIS — Z00.00 ROUTINE GENERAL MEDICAL EXAMINATION AT A HEALTH CARE FACILITY: Primary | ICD-10-CM

## 2022-12-08 DIAGNOSIS — R73.9 ELEVATED BLOOD SUGAR: ICD-10-CM

## 2022-12-08 DIAGNOSIS — R40.0 DAYTIME SOMNOLENCE: ICD-10-CM

## 2022-12-08 DIAGNOSIS — Z11.59 NEED FOR HEPATITIS C SCREENING TEST: ICD-10-CM

## 2022-12-08 LAB
ANION GAP SERPL CALCULATED.3IONS-SCNC: 15 MMOL/L (ref 7–15)
BUN SERPL-MCNC: 14.8 MG/DL (ref 6–20)
CALCIUM SERPL-MCNC: 9 MG/DL (ref 8.6–10)
CHLORIDE SERPL-SCNC: 106 MMOL/L (ref 98–107)
CHOLEST SERPL-MCNC: 152 MG/DL
CREAT SERPL-MCNC: 1.1 MG/DL (ref 0.67–1.17)
DEPRECATED HCO3 PLAS-SCNC: 22 MMOL/L (ref 22–29)
GFR SERPL CREATININE-BSD FRML MDRD: 89 ML/MIN/1.73M2
GLUCOSE SERPL-MCNC: 102 MG/DL (ref 70–99)
HBA1C MFR BLD: 5.3 % (ref 0–5.6)
HBV SURFACE AB SERPL IA-ACNC: 56.36 M[IU]/ML
HBV SURFACE AB SERPL IA-ACNC: REACTIVE M[IU]/ML
HCV AB SERPL QL IA: NONREACTIVE
HDLC SERPL-MCNC: 32 MG/DL
LDLC SERPL CALC-MCNC: 75 MG/DL
NONHDLC SERPL-MCNC: 120 MG/DL
POTASSIUM SERPL-SCNC: 4.2 MMOL/L (ref 3.4–5.3)
SODIUM SERPL-SCNC: 143 MMOL/L (ref 136–145)
TRIGL SERPL-MCNC: 227 MG/DL

## 2022-12-08 PROCEDURE — 99395 PREV VISIT EST AGE 18-39: CPT | Performed by: FAMILY MEDICINE

## 2022-12-08 PROCEDURE — 99214 OFFICE O/P EST MOD 30 MIN: CPT | Mod: 25 | Performed by: FAMILY MEDICINE

## 2022-12-08 PROCEDURE — 90471 IMMUNIZATION ADMIN: CPT | Performed by: FAMILY MEDICINE

## 2022-12-08 PROCEDURE — 86803 HEPATITIS C AB TEST: CPT | Performed by: FAMILY MEDICINE

## 2022-12-08 PROCEDURE — 80061 LIPID PANEL: CPT | Performed by: FAMILY MEDICINE

## 2022-12-08 PROCEDURE — 36415 COLL VENOUS BLD VENIPUNCTURE: CPT | Performed by: FAMILY MEDICINE

## 2022-12-08 PROCEDURE — 80048 BASIC METABOLIC PNL TOTAL CA: CPT | Performed by: FAMILY MEDICINE

## 2022-12-08 PROCEDURE — 0134A COVID-19 VACCINE BIVALENT BOOSTER 18+ (MODERNA): CPT | Performed by: FAMILY MEDICINE

## 2022-12-08 PROCEDURE — 83036 HEMOGLOBIN GLYCOSYLATED A1C: CPT | Performed by: FAMILY MEDICINE

## 2022-12-08 PROCEDURE — 86706 HEP B SURFACE ANTIBODY: CPT | Performed by: FAMILY MEDICINE

## 2022-12-08 PROCEDURE — 91313 COVID-19 VACCINE BIVALENT BOOSTER 18+ (MODERNA): CPT | Performed by: FAMILY MEDICINE

## 2022-12-08 PROCEDURE — 90686 IIV4 VACC NO PRSV 0.5 ML IM: CPT | Performed by: FAMILY MEDICINE

## 2022-12-08 RX ORDER — TRAZODONE HYDROCHLORIDE 100 MG/1
100-150 TABLET ORAL AT BEDTIME
Qty: 180 TABLET | Refills: 3 | Status: SHIPPED | OUTPATIENT
Start: 2022-12-08 | End: 2023-12-15

## 2022-12-08 RX ORDER — MUPIROCIN 20 MG/G
OINTMENT TOPICAL
Qty: 1 G | Refills: 1 | Status: SHIPPED | OUTPATIENT
Start: 2022-12-08 | End: 2023-10-03

## 2022-12-08 ASSESSMENT — ANXIETY QUESTIONNAIRES
GAD7 TOTAL SCORE: 5
3. WORRYING TOO MUCH ABOUT DIFFERENT THINGS: SEVERAL DAYS
5. BEING SO RESTLESS THAT IT IS HARD TO SIT STILL: SEVERAL DAYS
6. BECOMING EASILY ANNOYED OR IRRITABLE: NOT AT ALL
GAD7 TOTAL SCORE: 5
7. FEELING AFRAID AS IF SOMETHING AWFUL MIGHT HAPPEN: NOT AT ALL
7. FEELING AFRAID AS IF SOMETHING AWFUL MIGHT HAPPEN: NOT AT ALL
4. TROUBLE RELAXING: SEVERAL DAYS
2. NOT BEING ABLE TO STOP OR CONTROL WORRYING: SEVERAL DAYS
8. IF YOU CHECKED OFF ANY PROBLEMS, HOW DIFFICULT HAVE THESE MADE IT FOR YOU TO DO YOUR WORK, TAKE CARE OF THINGS AT HOME, OR GET ALONG WITH OTHER PEOPLE?: SOMEWHAT DIFFICULT
GAD7 TOTAL SCORE: 5
IF YOU CHECKED OFF ANY PROBLEMS ON THIS QUESTIONNAIRE, HOW DIFFICULT HAVE THESE PROBLEMS MADE IT FOR YOU TO DO YOUR WORK, TAKE CARE OF THINGS AT HOME, OR GET ALONG WITH OTHER PEOPLE: SOMEWHAT DIFFICULT
1. FEELING NERVOUS, ANXIOUS, OR ON EDGE: SEVERAL DAYS

## 2022-12-08 ASSESSMENT — PAIN SCALES - GENERAL: PAINLEVEL: NO PAIN (0)

## 2022-12-08 NOTE — PATIENT INSTRUCTIONS
Get some hibaclens at a pharmacy or medical supply store to use daily for 2 weeks then weekly for a few months.     I also want you to use a nasal ointment for 5 days twice a day.      I have called this in as it is a prescription.      These are to get the staph out of your system.      Preventive Health Recommendations  Male Ages 26 - 39    Yearly exam:             See your health care provider every year in order to  o   Review health changes.   o   Discuss preventive care.    o   Review your medicines if your doctor has prescribed any.  You should be tested each year for STDs (sexually transmitted diseases), if you re at risk.   After age 35, talk to your provider about cholesterol testing. If you are at risk for heart disease, have your cholesterol tested at least every 5 years.   If you are at risk for diabetes, you should have a diabetes test (fasting glucose).  Shots: Get a flu shot each year. Get a tetanus shot every 10 years.     Nutrition:  Eat at least 5 servings of fruits and vegetables daily.   Eat whole-grain bread, whole-wheat pasta and brown rice instead of white grains and rice.   Get adequate Calcium and Vitamin D.     Lifestyle  Exercise for at least 150 minutes a week (30 minutes a day, 5 days a week). This will help you control your weight and prevent disease.   Limit alcohol to one drink per day.   No smoking.   Wear sunscreen to prevent skin cancer.   See your dentist every six months for an exam and cleaning.

## 2022-12-08 NOTE — PROGRESS NOTES
SUBJECTIVE:   CC: Ramiro is an 37 year old who presents for preventative health visit.     Patient has been advised of split billing requirements and indicates understanding: Yes     Healthy Habits:     Getting at least 3 servings of Calcium per day:  Yes    Bi-annual eye exam:  Yes    Dental care twice a year:  NO    Sleep apnea or symptoms of sleep apnea:  Daytime drowsiness    Diet:  Regular (no restrictions)    Frequency of exercise:  1 day/week    Duration of exercise:  Less than 15 minutes    Taking medications regularly:  0    Medication side effects:  None    PHQ-2 Total Score: 2    Additional concerns today:  Yes  History of Present Illness       Reason for visit:  Sores on legs and general checkup    He eats 2-3 servings of fruits and vegetables daily.He consumes 0 sweetened beverage(s) daily.He exercises with enough effort to increase his heart rate 10 to 19 minutes per day.  He exercises with enough effort to increase his heart rate 3 or less days per week.   He is taking medications regularly.    Today's PHQ-9         PHQ-9 Total Score: 9    PHQ-9 Q9 Thoughts of better off dead/self-harm past 2 weeks :   Not at all    How difficult have these problems made it for you to do your work, take care of things at home, or get along with other people: Somewhat difficult  Today's DEANNA-7 Score: 5    -- Follow up on ongoing folliculitis-flared up again in October but it is better now  --Talk about family planning       Depression and Anxiety Follow-Up    How are you doing with your depression since your last visit? No change    How are you doing with your anxiety since your last visit?  No change    Are you having other symptoms that might be associated with depression or anxiety? No    Have you had a significant life event? No     Do you have any concerns with your use of alcohol or other drugs? No     The patient is on trazodone 100 mg  for sleep which also acts as a mood stabilizer for him.    He wakes up often  at night about 3-4 times at night.  He can fall back asleep if it is earlier than 3 am.  He is often up for the day at 3-4 am.  He goes to bed 8-9.      His mother has JAY.  He says he rarely snores.  He is tired often and self medicates with caffeine     Social History     Tobacco Use     Smoking status: Former     Packs/day: 0.00     Years: 0.00     Pack years: 0.00     Types: Pipe, Cigarettes     Passive exposure: Yes     Smokeless tobacco: Never     Tobacco comments:     lived with a smoker until he was 17 years old   Vaping Use     Vaping Use: Never used   Substance Use Topics     Alcohol use: Yes     Comment: a couple beers a month     Drug use: No     PHQ 8/25/2022 8/25/2022 12/8/2022   PHQ-9 Total Score 10 10 9   Q9: Thoughts of better off dead/self-harm past 2 weeks Not at all Not at all Not at all   F/U: Thoughts of suicide or self-harm - - -   F/U: Self harm-plan - - -   F/U: Self-harm action - - -   F/U: Safety concerns - - -   Some encounter information is confidential and restricted. Go to Review FlowsVersionOne activity to see all data.     DEANNA-7 SCORE 4/15/2022 8/25/2022 12/8/2022   Total Score 8 (mild anxiety) 2 (minimal anxiety) 5 (mild anxiety)   Total Score 8 2 5   Some encounter information is confidential and restricted. Go to Review [email or web address] activity to see all data.     Last PHQ-9 12/8/2022   1.  Little interest or pleasure in doing things 1   2.  Feeling down, depressed, or hopeless 1   3.  Trouble falling or staying asleep, or sleeping too much 2   4.  Feeling tired or having little energy 2   5.  Poor appetite or overeating 1   6.  Feeling bad about yourself 1   7.  Trouble concentrating 1   8.  Moving slowly or restless 0   Q9: Thoughts of better off dead/self-harm past 2 weeks 0   PHQ-9 Total Score 9   Difficulty at work, home, or with people -   In the past two weeks have you had thoughts of suicide or self harm? -   Do you have concerns about your personal safety or the safety of others?  -   In the past 2 weeks have you thought about a plan or had intention to harm yourself? -   In the past 2 weeks have you acted on these thoughts in any way? -   Some encounter information is confidential and restricted. Go to Review Flowsheets activity to see all data.     DEANNA-7  12/8/2022   1. Feeling nervous, anxious, or on edge 1   2. Not being able to stop or control worrying 1   3. Worrying too much about different things 1   4. Trouble relaxing 1   5. Being so restless that it is hard to sit still 1   6. Becoming easily annoyed or irritable 0   7. Feeling afraid, as if something awful might happen 0   DEANNA-7 Total Score 5   If you checked any problems, how difficult have they made it for you to do your work, take care of things at home, or get along with other people? Somewhat difficult   Some encounter information is confidential and restricted. Go to Review Flowsheets activity to see all data.       Suicide Assessment Five-step Evaluation and Treatment (SAFE-T)      He had a sore on his leg which flared up in October for 1-2 weeks.  He washed with soap and bandages no antibiotic ointment.        Today's PHQ-2 Score:   PHQ-2 ( 1999 [employer]) 8/25/2022   Q1: Little interest or pleasure in doing things -   Q2: Feeling down, depressed or hopeless -   PHQ-2 Score -   PHQ-2 Total Score (12-17 Years)- Positive if 3 or more points; Administer PHQ-A if positive -   Q1: Little interest or pleasure in doing things -   Q2: Feeling down, depressed or hopeless -   PHQ-2 Score Incomplete       Social History     Tobacco Use     Smoking status: Former     Packs/day: 0.00     Years: 0.00     Pack years: 0.00     Types: Pipe, Cigarettes     Passive exposure: Yes     Smokeless tobacco: Never     Tobacco comments:     lived with a smoker until he was 17 years old   Substance Use Topics     Alcohol use: Yes     Comment: a couple beers a month     If you drink alcohol do you typically have >3 drinks per day or >7 drinks per week?  "No    No flowsheet data found.    Last PSA: No results found for: PSA    Reviewed orders with patient. Reviewed health maintenance and updated orders accordingly - Yes  BP Readings from Last 3 Encounters:   12/08/22 126/82   01/11/22 128/80   03/02/21 134/82    Wt Readings from Last 3 Encounters:   12/08/22 120.7 kg (266 lb)   01/11/22 119.7 kg (264 lb)   03/02/21 117.9 kg (260 lb)                 Reviewed and updated as needed this visit by clinical staff   Tobacco  Allergies  Meds              Reviewed and updated as needed this visit by Provider                     Review of Systems  CONSTITUTIONAL: NEGATIVE for fever, chills, change in weight  INTEGUMENTARY/SKIN: NEGATIVE for worrisome rashes, moles or lesions  EYES: NEGATIVE for vision changes or irritation  ENT: NEGATIVE for ear, mouth and throat problems  RESP: NEGATIVE for significant cough or SOB  CV: NEGATIVE for chest pain, palpitations or peripheral edema  GI: NEGATIVE for nausea, abdominal pain, heartburn, or change in bowel habits   male: negative for dysuria, hematuria, decreased urinary stream, erectile dysfunction, urethral discharge  MUSCULOSKELETAL: NEGATIVE for significant arthralgias or myalgia  NEURO: NEGATIVE for weakness, dizziness or paresthesias  PSYCHIATRIC: NEGATIVE for changes in mood or affect    OBJECTIVE:   /82 (BP Location: Right arm, Patient Position: Sitting, Cuff Size: Adult Large)   Pulse 102   Temp 99  F (37.2  C) (Oral)   Resp 20   Ht 1.803 m (5' 11\")   Wt 120.7 kg (266 lb)   SpO2 98%   BMI 37.10 kg/m      Physical Exam  GENERAL: healthy, alert and no distress  EYES: Eyes grossly normal to inspection, PERRL and conjunctivae and sclerae normal  HENT: normal cephalic/atraumatic, ear canals and TM's normal, nose and mouth without ulcers or lesions, oropharynx clear, oral mucous membranes moist and oropharxnx crowded  NECK: no adenopathy, no asymmetry, masses, or scars and thyroid normal to palpation  RESP: lungs " clear to auscultation - no rales, rhonchi or wheezes  CV: regular rate and rhythm, normal S1 S2, no S3 or S4, no murmur, click or rub, no peripheral edema and peripheral pulses strong  ABDOMEN: soft, nontender, no hepatosplenomegaly, no masses and bowel sounds normal  MS: no gross musculoskeletal defects noted, no edema  SKIN: no suspicious lesions or rashes  NEURO: Normal strength and tone, mentation intact and speech normal  PSYCH: mentation appears normal, affect normal/bright    Diagnostic Test Results:  Labs reviewed in Epic  No results found for this or any previous visit (from the past 24 hour(s)).    ASSESSMENT/PLAN:   (Z00.00) Routine general medical examination at a health care facility  (primary encounter diagnosis)  Comment:   Plan: Hepatitis B Surface Antibody            (F31.81) Bipolar 2 disorder (H)  Comment: We will have the patient increase his trazodone to 150 mg.  His worsening mood is likely related to his poor sleep.  We will also do sleep study  Plan: traZODone (DESYREL) 100 MG tablet, OFFICE/OUTPT        VISIT,EST,LEVL IV            (R73.9) Elevated blood sugar  Comment: Monitor with labs today  Plan: Basic metabolic panel  (Ca, Cl, CO2, Creat,         Gluc, K, Na, BUN), Hemoglobin A1c, OFFICE/OUTPT        VISIT,EST,LEVL IV            (R40.0) Daytime somnolence  Comment: We will have the patient talk to sleep medicine  Plan: Adult Sleep Eval & Management          Referral, OFFICE/OUTPT VISIT,EST,LEVL IV            (Z31.41) Fertility testing  Comment:   Plan: Semen Analysis, Strict Morphology (YAYA),         OFFICE/OUTPT VISIT,EST,LEVL IV            (Z87.2) History of folliculitis  Comment: I am having the patient's use the Bactroban nasal ointment and Hibiclens again.  Plan: OFFICE/OUTPT VISIT,EST,LEVL IV            (Z11.59) Need for hepatitis C screening test  Comment:   Plan: Hepatitis C Screen Reflex to HCV RNA Quant and         Genotype            (Z13.220) Lipid  screening  Comment:   Plan: Lipid panel reflex to direct LDL Fasting                    COUNSELING:   Reviewed preventive health counseling, as reflected in patient instructions       Regular exercise       Healthy diet/nutrition       Family planning        He reports that he has quit smoking. His smoking use included pipe and cigarettes. He has been exposed to tobacco smoke. He has never used smokeless tobacco.        DO IDALIA [facility]

## 2023-01-16 ENCOUNTER — LAB (OUTPATIENT)
Dept: LAB | Facility: CLINIC | Age: 38
End: 2023-01-16
Payer: COMMERCIAL

## 2023-01-16 DIAGNOSIS — Z31.41 FERTILITY TESTING: ICD-10-CM

## 2023-01-16 PROCEDURE — 89322 SEMEN ANAL STRICT CRITERIA: CPT

## 2023-01-17 LAB
ABNORMAL SPERM MORPHOLOGY: 98
ABSTINENCE DAYS: 4 DAYS (ref 2–7)
AGGLUTINATION: NO
ANALYSIS TEMP - CENTIGRADE: 23 CENTIGRADE
COLLECTION METHOD: ABNORMAL
COLLECTION SITE: ABNORMAL
CONSENT TO RELEASE TO PARTNER: YES
DAL- RECEIVED TIME: ABNORMAL
HEAD DEFECT: 98 %
IMMOTILE: 39 %
LIQUEFIED: YES
MIDPIECE DEFECT: 42 %
NON-PROGRESSIVE MOTILITY: 5 %
NORMAL SPERM MORPHOLOGY: 2 % NORMAL FORMS
PROGRESSIVE MOTILITY: 56 %
ROUND CELLS: 0.2 MILLION/ML
SPECIMEN PH: 7.2 PH
SPECIMEN VOLUME: 1.5 ML
SPERM CONCENTRATION: 82 MILLION/ML
TAIL DEFECT: 2 %
TIME OF ANALYSIS: ABNORMAL
TOTAL PROGRESSIVE MOTILE NUMBER: 69 MILLION
TOTAL SPERM NUMBER: 123 MILLION
VISCOUS: NO
VITALITY: ABNORMAL

## 2023-03-27 ASSESSMENT — SLEEP AND FATIGUE QUESTIONNAIRES
HOW LIKELY ARE YOU TO NOD OFF OR FALL ASLEEP WHILE WATCHING TV: SLIGHT CHANCE OF DOZING
HOW LIKELY ARE YOU TO NOD OFF OR FALL ASLEEP WHEN YOU ARE A PASSENGER IN A CAR FOR AN HOUR WITHOUT A BREAK: SLIGHT CHANCE OF DOZING
HOW LIKELY ARE YOU TO NOD OFF OR FALL ASLEEP IN A CAR, WHILE STOPPED FOR A FEW MINUTES IN TRAFFIC: WOULD NEVER DOZE
HOW LIKELY ARE YOU TO NOD OFF OR FALL ASLEEP WHILE SITTING QUIETLY AFTER LUNCH WITHOUT ALCOHOL: WOULD NEVER DOZE
HOW LIKELY ARE YOU TO NOD OFF OR FALL ASLEEP WHILE SITTING AND TALKING TO SOMEONE: WOULD NEVER DOZE
HOW LIKELY ARE YOU TO NOD OFF OR FALL ASLEEP WHILE SITTING AND READING: SLIGHT CHANCE OF DOZING
HOW LIKELY ARE YOU TO NOD OFF OR FALL ASLEEP WHILE SITTING INACTIVE IN A PUBLIC PLACE: WOULD NEVER DOZE
HOW LIKELY ARE YOU TO NOD OFF OR FALL ASLEEP WHILE LYING DOWN TO REST IN THE AFTERNOON WHEN CIRCUMSTANCES PERMIT: WOULD NEVER DOZE

## 2023-03-28 ENCOUNTER — OFFICE VISIT (OUTPATIENT)
Dept: SLEEP MEDICINE | Facility: CLINIC | Age: 38
End: 2023-03-28
Attending: FAMILY MEDICINE
Payer: COMMERCIAL

## 2023-03-28 VITALS
WEIGHT: 260 LBS | DIASTOLIC BLOOD PRESSURE: 84 MMHG | OXYGEN SATURATION: 96 % | HEIGHT: 71 IN | SYSTOLIC BLOOD PRESSURE: 132 MMHG | BODY MASS INDEX: 36.4 KG/M2 | HEART RATE: 92 BPM

## 2023-03-28 DIAGNOSIS — G47.8 NON-RESTORATIVE SLEEP: Primary | ICD-10-CM

## 2023-03-28 DIAGNOSIS — R53.82 CHRONIC FATIGUE: ICD-10-CM

## 2023-03-28 DIAGNOSIS — F51.04 CHRONIC INSOMNIA: ICD-10-CM

## 2023-03-28 PROCEDURE — 99203 OFFICE O/P NEW LOW 30 MIN: CPT | Performed by: INTERNAL MEDICINE

## 2023-03-28 NOTE — NURSING NOTE
"Chief Complaint   Patient presents with     Sleep Problem     Sleep difficulties, On Trazodone but still waking early (3:30-5:30am) and falling back to sleep is difficult after that wake up time       Initial /84   Pulse 92   Ht 1.803 m (5' 11\")   Wt 117.9 kg (260 lb)   SpO2 96%   BMI 36.26 kg/m   Estimated body mass index is 36.26 kg/m  as calculated from the following:    Height as of this encounter: 1.803 m (5' 11\").    Weight as of this encounter: 117.9 kg (260 lb).    Medication Reconciliation: complete  ESS 3  Neck circumference: 47 centimeters.  Jillian Best MA  "

## 2023-03-28 NOTE — PROGRESS NOTES
Sleep Consultation:    Date on this visit: 3/28/2023    Ramiro Najera  is referred by Samantha Belle for a sleep consultation.     Primary Physician: Samantha Belle     Ramiro Najera reports nightly poor quality of sleep and excessive daytime fatigue for many years.     His medical history is significant for mood disorder.     Patient reports having chronically non restorative sleep. He has  difficulty with sleep maintenance.  He is prescribed trazodone 150 mg at bedtime.  He has multiple arousals during the night, and frequently experiences long wake periods after sleep onset.  He reports tendency to ruminate and having an active mind that perpetuates insomnia.    Ramiro does snore some nights. Patient denies gasping or choking episodes in sleep. He does not have witnessed apneas.They never sleep separately.  Patient sleeps on his side. He has occasional morning dry mouth and morning headaches, denies no restless legs. Ramiro has occasional bruxism and denies any sleep walking, dream enactment, cataplexy and hypnogogic/hypnopompic hallucinations. He has rare episodes of sleep paralysis.     Ramiro goes to sleep at 9:30 PM during the week. He wakes up at 6:30 AM. He falls asleep in 20 minutes.  Ramiro denies difficulty falling asleep.  He wakes up 2-4 times a night for 10 minutes before falling back to sleep.  Ramiro wakes up to go to the bathroom and anxiety.  On weekends, Ramiro goes to sleep at 9:00 PM.  He wakes up at 6:30 AM. He falls asleep in 20 minutes.  Patient gets an average of 6 hours of sleep per night.     Patient's [place] Sleepiness score 3/24 consistent with no daytime sleepiness.      Ramiro naps 0 times per week . He takes no inadvertant naps.  He denies closing eyes, dozing and falling asleep while driving.   Patient was counseled on the importance of driving while alert, to pull over if drowsy, or nap before getting into the vehicle if sleepy.      He uses 1/2- 3 cups/day of coffee. Last  "caffeine intake is usually before noon.    Past Medical/Surgical History:  Past Medical History:   Diagnosis Date     Asthma 1/11/2022     Depressive disorder      Lyme disease     diagnosed 19, then flared at 22.     Major depression      Social History     Tobacco Use     Smoking status: Former     Packs/day: 0.00     Years: 0.00     Pack years: 0.00     Types: Pipe, Cigarettes     Passive exposure: Yes     Smokeless tobacco: Never     Tobacco comments:     lived with a smoker until he was 17 years old   Vaping Use     Vaping Use: Never used   Substance Use Topics     Alcohol use: Yes     Comment: a couple beers a month     Drug use: No       Physical Examination:  Vitals: /84   Pulse 92   Ht 1.803 m (5' 11\")   Wt 117.9 kg (260 lb)   SpO2 96%   BMI 36.26 kg/m    BMI= Body mass index is 36.26 kg/m .  GENERAL APPEARANCE: healthy, alert and no distress  HENT: oropharynx crowded  NEURO: mentation intact and speech normal  Mallampati Class: III.  Tonsillar Stage: 1  hidden by pillars.    Impression/Plan:    1. Non-restorative sleep   2. Chronic fatigue   3. Chronic Insomnia     Patient is a 37 years old male, with a BMI of 36, with medical history significant for mood disorder, who presents with history of non restorative sleep and excessive daytime fatigue.  He averages 6 to 7 hours of nocturnal sleep with the use of trazodone 150 mg at bedtime.  Sleep maintenance insomnia is reported that is perpetuated by an active mind and psychophysiologic hyperarousal.  Significant daytime impairment is reported, but without irrepressible need to sleep or daytime lapses into sleep.  Today's evaluation do not indicate a clear etiology for nonrestorative sleep and excessive fatigue.  He has demographic risk factors for sleep apnea, but denies characteristic symptoms.  I recommend a polysomnogram for assessment of any sleep fragmenting disorders that can explain his daytime dysfunction.    Plan:     1. PSG for assessment " of non restorative sleep     He will follow up with me in approximately two weeks after his sleep study has been competed to review the results and discuss plan of care.       Polysomnography reviewed.  Obstructive sleep apnea reviewed.  Complications of untreated sleep apnea were reviewed.    I spent a total of 40 minutes for this appointment on this date of service which include time spent before, during and after the visit for chart review, patient care, counseling and coordination of care.    Dr. Nestor Wells       CC: Samantha Belle

## 2023-07-14 ENCOUNTER — TELEPHONE (OUTPATIENT)
Dept: SLEEP MEDICINE | Facility: CLINIC | Age: 38
End: 2023-07-14
Payer: COMMERCIAL

## 2023-07-14 DIAGNOSIS — R29.818 SUSPECTED SLEEP APNEA: Primary | ICD-10-CM

## 2023-07-14 NOTE — TELEPHONE ENCOUNTER
Received following email please review    The below patient is scheduled for an upcoming PSG- patient is not meeting the medical policy for [employer]- patient would meet the medical policy for a HST     [email or web address]          Thank you!     Priya Richter  Financial   [facility]  [facility]  [address] [place] [id number]  [email or web address]  [email or web address]  Office: [phone]  Fax: [phone] Connect with [employer] on social media.

## 2023-08-20 ASSESSMENT — SLEEP AND FATIGUE QUESTIONNAIRES
HOW LIKELY ARE YOU TO NOD OFF OR FALL ASLEEP WHILE SITTING QUIETLY AFTER LUNCH WITHOUT ALCOHOL: WOULD NEVER DOZE
HOW LIKELY ARE YOU TO NOD OFF OR FALL ASLEEP WHILE SITTING INACTIVE IN A PUBLIC PLACE: WOULD NEVER DOZE
HOW LIKELY ARE YOU TO NOD OFF OR FALL ASLEEP WHILE WATCHING TV: WOULD NEVER DOZE
HOW LIKELY ARE YOU TO NOD OFF OR FALL ASLEEP WHILE SITTING AND TALKING TO SOMEONE: WOULD NEVER DOZE
HOW LIKELY ARE YOU TO NOD OFF OR FALL ASLEEP IN A CAR, WHILE STOPPED FOR A FEW MINUTES IN TRAFFIC: WOULD NEVER DOZE
HOW LIKELY ARE YOU TO NOD OFF OR FALL ASLEEP WHILE LYING DOWN TO REST IN THE AFTERNOON WHEN CIRCUMSTANCES PERMIT: WOULD NEVER DOZE
HOW LIKELY ARE YOU TO NOD OFF OR FALL ASLEEP WHEN YOU ARE A PASSENGER IN A CAR FOR AN HOUR WITHOUT A BREAK: SLIGHT CHANCE OF DOZING
HOW LIKELY ARE YOU TO NOD OFF OR FALL ASLEEP WHILE SITTING AND READING: SLIGHT CHANCE OF DOZING

## 2023-08-23 ENCOUNTER — OFFICE VISIT (OUTPATIENT)
Dept: SLEEP MEDICINE | Facility: CLINIC | Age: 38
End: 2023-08-23
Payer: COMMERCIAL

## 2023-08-23 DIAGNOSIS — R29.818 SUSPECTED SLEEP APNEA: ICD-10-CM

## 2023-08-23 PROCEDURE — G0399 HOME SLEEP TEST/TYPE 3 PORTA: HCPCS | Performed by: INTERNAL MEDICINE

## 2023-08-24 ENCOUNTER — DOCUMENTATION ONLY (OUTPATIENT)
Dept: SLEEP MEDICINE | Facility: CLINIC | Age: 38
End: 2023-08-24
Payer: COMMERCIAL

## 2023-08-24 NOTE — PROGRESS NOTES
HST POST-STUDY QUESTIONNAIRE    What time did you go to bed?  8:20pm  How long do you think it took to fall asleep?  < 20 mins  What time did you wake up to start the day?  5:30am  Did you get up during the night at all?  Yes  If you woke up, do you remember approximately what time(s)? 10:45pm to 1:30am.  Did you have any difficulty with the equipment?  No  Did you us any type of treatment with this study?  None  Was the head of the bed elevated? No  Did you sleep in a recliner?  No  Did you stop using CPAP at least 3 days before this test?  NA  Any other information you'd like us to know?

## 2023-08-24 NOTE — NURSING NOTE
Pt returned HST device. It was downloaded and forwarded data to the clinical specialist for scoring.  Elis Walter CMA on 8/24/2023 at 9:00 AM

## 2023-08-25 NOTE — PROGRESS NOTES
This HSAT was performed using a Noxturnal T3 device which recorded snore, sound, movement activity, body position, nasal pressure, oronasal thermal airflow, pulse, oximetry and both chest and abdominal respiratory effort. HSAT data was restricted to the time patient states they were in bed.     HSAT was scored using 1B 4% hypopnea rule.     HST AHI (Non-PAT): 3.4  Snoring was reported as mild.  Time with SpO2 below 89% was 0.8 minutes.   Overall signal quality was good.     Pt will follow up with sleep provider to determine appropriate therapy.

## 2023-08-25 NOTE — PROCEDURES
"HOME SLEEP STUDY INTERPRETATION        Patient: Ramiro Najera  MRN: [id number]  YOB: 1985  Study Date: 2023  PCP/Referring Provider: Samantha Belle;   Ordering Provider: Nestor Wells MD         Indications for Home Study: Ramiro Najera is a 37 year old male who presents with symptoms suggestive of obstructive sleep apnea.    Estimated body mass index is 36.26 kg/m  as calculated from the following:    Height as of 3/28/23: 1.803 m (5' 11\").    Weight as of 3/28/23: 117.9 kg (260 lb).  Total score - Donahue: 2 (2023 10:08 AM)  STOP-BAN/8        Data: A full night home sleep study was performed recording the standard physiologic parameters including body position, movement, sound, nasal pressure, thermal oral airflow, chest and abdominal movements with respiratory inductance plethysmography, and oxygen saturation by pulse oximetry. Pulse rate was estimated by oximetry recording. This study was considered adequate based on > 4 hours of quality oximetry and respiratory recording. As specified by the AASM Manual for the Scoring of Sleep and Associated events, version 2.3, Rule VIII.D 1B, 4% oxygen desaturation scoring for hypopneas is used as a standard of care on all home sleep apnea testing.        Analysis Time:  530 minutes        Respiration:   Sleep Associated Hypoxemia: sustained hypoxemia was not present. Baseline oxygen saturation was 97%.  Time with saturation less than or equal to 88% was 0.8 minutes. The lowest oxygen saturation was 83%.   Snoring: Snoring was present.  Respiratory events: The home study revealed a presence of 0 obstructive apneas and 7 mixed and central apneas. There were 22 hypopneas resulting in a combined apnea/hypopnea index [AHI] of 3.4 events per hour.  AHI was 4.3 per hour supine, N/A per hour prone, 1.2 per hour on left side, and 3.7 per hour on right side.   Pattern: Excluding events noted above, respiratory rate and pattern was " Normal.      Position: Percent of time spent: supine - 28.9%, prone - 0%, on left - 18.8%, on right - 48.9%.      Heart Rate: By pulse oximetry normal rate was noted.       Assessment:   Non- diagnostic for clinically significant sleep apnea.  Sleep associated hypoxemia was not present.    Recommendations:  If clinical concern for sleep apnea or a sleep fragmenting disorder remains, consider in lab PSG for assessment.  Suggest optimizing sleep hygiene and avoiding sleep deprivation.         Diagnosis Code(s): Snoring R06.83    Nestor Wells MD, August 25, 2023   Diplomate, American Board of Psychiatry and Neurology, Sleep Medicine

## 2023-10-03 ENCOUNTER — OFFICE VISIT (OUTPATIENT)
Dept: SLEEP MEDICINE | Facility: CLINIC | Age: 38
End: 2023-10-03
Payer: COMMERCIAL

## 2023-10-03 VITALS
OXYGEN SATURATION: 95 % | HEIGHT: 71 IN | SYSTOLIC BLOOD PRESSURE: 127 MMHG | WEIGHT: 266.6 LBS | DIASTOLIC BLOOD PRESSURE: 87 MMHG | BODY MASS INDEX: 37.32 KG/M2 | HEART RATE: 77 BPM

## 2023-10-03 DIAGNOSIS — F51.04 PSYCHOPHYSIOLOGICAL INSOMNIA: ICD-10-CM

## 2023-10-03 DIAGNOSIS — F51.03 PARADOXICAL INSOMNIA: Primary | ICD-10-CM

## 2023-10-03 PROCEDURE — 99204 OFFICE O/P NEW MOD 45 MIN: CPT | Performed by: INTERNAL MEDICINE

## 2023-10-03 NOTE — NURSING NOTE
"Chief Complaint   Patient presents with    Study Results     HST results       Initial /87   Pulse 77   Ht 1.803 m (5' 10.98\")   Wt 120.9 kg (266 lb 9.6 oz)   SpO2 95%   BMI 37.20 kg/m   Estimated body mass index is 37.2 kg/m  as calculated from the following:    Height as of this encounter: 1.803 m (5' 10.98\").    Weight as of this encounter: 120.9 kg (266 lb 9.6 oz).    Medication Reconciliation: complete  Kip Rhodes MA     "

## 2023-10-03 NOTE — PROGRESS NOTES
Ramiro Najera was seen in our clinic for a follow up for insomnia  He was last seen in my clinic with Dr. Russell Baez  on 3/28/2023.   He then had a sleep study on 8/23/2023, for evaluation of sleep disordered breathing  The study revealed the presence of  Respiration:   Sleep Associated Hypoxemia: sustained hypoxemia was not present. Baseline oxygen saturation was 97%.  Time with saturation less than or equal to 88% was 0.8 minutes. The lowest oxygen saturation was 83%.   Snoring: Snoring was present.  Respiratory events: The home study revealed a presence of 0 obstructive apneas and 7 mixed and central apneas. There were 22 hypopneas resulting in a combined apnea/hypopnea index [AHI] of 3.4 events per hour.  AHI was 4.3 per hour supine, N/A per hour prone, 1.2 per hour on left side, and 3.7 per hour on right side.   Pattern: Excluding events noted above, respiratory rate and pattern was Normal.  Position: Percent of time spent: supine - 28.9%, prone - 0%, on left - 18.8%, on right - 48.9%.  Heart Rate: By pulse oximetry normal rate was noted.   REVIEW OF SYSTEMS: Today, detailed review of systems was not done, except as described above.   Since his last visit there has been no significant change in his overall health.   Past Medical History:   Diagnosis Date    Asthma 1/11/2022    Depressive disorder     Lyme disease     diagnosed 19, then flared at 22.    Major depression      Patient Active Problem List   Diagnosis    BMI 32.0-32.9,adult    History of depression    History of Lyme disease    Major depressive disorder, single episode, moderate (H)    Bipolar 2 disorder (H)     Past Surgical History:   Procedure Laterality Date    TONSILLECTOMY  1991       SOCIAL HX (Reviwed from my previous notes):  PHYSICAL EXAMINATION: Today, a physical examination was not done.  No diagnosis found.  ASSESSMENT:   Assessment:   Non- diagnostic for clinically significant sleep apnea.  Sleep associated hypoxemia was not  present.  Psychophysiologic insomnia    Plan  - He likely has primary insomnia and will benefit from sleep psychology for CBT-I. We discussed sleep hygiene tips and a referral was placed to sleep psychology.  - He will continue Trazodone for now and will explore discontinuation after starting CBT-I      Your BMI is Body mass index is 37.2 kg/m .    Body mass index (BMI) is one way to tell whether you are at a healthy weight, overweight, or obese. It measures your weight in relation to your height.  A BMI of 18.5 to 24.9 is in the healthy range. A person with a BMI of 25 to 29.9 is considered overweight, and someone with a BMI of 30 or greater is considered obese.  Another way to find out if you are at risk for health problems caused by overweight and obesity is to measure your waist. If you are a woman and your waist is more than 35 inches, or if you are a man and your waist is more than 40 inches, your risk of disease may be higher.  More than two-thirds of American adults are considered overweight or obese. Being overweight or obese increases the risk for further weight gain.  Excess weight may lead to heart disease and diabetes. Creating and following plans for healthy eating and physical activity may help you improve your health.    Methods for maintaining or losing weight.    Weight control is part of healthy lifestyle and includes exercise, emotional health, and healthy eating habits.  Careful eating habits lifelong is the mainstay of weight control.  Though there are significant health benefits from weight loss, long-term weight loss with diet alone may be very difficult to achieve- studies show long-term success with dietary management in less than 10% of people. Attaining a healthy weight may be especially difficult to achieve in those with severe obesity. In some cases, medications, devices and surgical management might be considered.    What can you do?    If you are overweight or obese and are interested  in methods for weight loss, you should discuss this with your provider. In addition, we recommend that you review healthy life styles and methods for weight loss available through the National [employer] of [employer] patient information sites:     http://win.niddk.nih.gov/publications/index.htm          PLAN: After detailed discussion with Ramiro Najera, the following plan was agreed upon:   There are no Patient Instructions on file for this visit.  I discussed all of the above with Ramiro Najera, who was agreeable with the plan.  This was a sleep study result review follow-up visit. During this visit, his medication list was not updated, vital signs were not checked, and he was not examined.   I spent 40 minutes with the patient, all of which was spent in discussing the above, and with coordination of care.  Iris Lantigua MD, MD  RTC as needed

## 2023-10-03 NOTE — PROGRESS NOTES
[facility]   Outpatient Sleep Medicine Follow-up Visit  October 3, 2023    Name: Ramiro Najera MRN# [id number]   Age: 37 year old YOB: 1985     Date of Consultation: October 3, 2023  Consultation is requested by: No referring provider defined for this encounter.  Primary care provider: Samantha Belle           Assessment and Plan:     Sleep Diagnoses:   ***    Comorbid conditions:  ***    Summary Recommendations:  ***  No orders of the defined types were placed in this encounter.      Summary Counseling:  New sleep schedule recommendation: ***         History of Present Illness:     Ramiro Najera is a 37 year old male ***  <Nature, severity, frequency, duration of symptom/compliant, asstd impairments, exacerbating factors, alleviating factors. >      SLEEP-WAKE SCHEDULE: ***  Workday bedtime ***  Awakening ***  Using alarm ***  Nonworkday bedtime *** Awakening ***   Awakenings for *** minutes ***/week  Naps ***    PREVIOUS SLEEP STUDIES:  Date:  AHI:  Intervention:  Sleep Architecture:  MSLT/MWT:          Most Recent SCALES:    EPWORTH SLEEPINESS SCALE WITHIN 1 YEAR WITHIN 10 DAYS   Sitting and reading 1 ***   Watching TV 0 ***   Sitting, inactive in a public place (theatre or mtg.) 0  ***    As a passenger in a car 1 ***   Lying down to rest in the afternoon when circumstance permit 0 ***   Sitting and talking to someone 0 ***   Sitting quietly after lunch without alcohol 0 ***   In a car, while stopped for a few minutes in traffic 0 ***   TOTAL SCORE 2 ***   Normal < 11         0--none    1--mild    2--moderate  3--severe      INSOMNIA SEVERITY INDEX WITHIN 1 YEAR   Difficulty falling asleep 0   Difficult staying asleep 3   Problems waking up to early 4   How SATISFIED/DISSATISFIED are you with your CURRENT sleep pattern? 3   How NOTICEABLE to others do you think your sleep pattern is in terms of your quality of life? 3   How WORRIED/DISTRESSED are you about your  current sleep pattern? 3   To what extent do you consider your sleep problem to INTERFERE with your daily fuctioning(e.g. daytime fatigue, mood, ability to function at work/daily chores, concentration, mood,etc.) CURRENTLY? 3   INSOMNIA SEVERITY INDEX TOTAL SCORE 19    --absence of insomnia (0-7); sub-threshold insomnia (8-14); moderate insomnia (15-21); and severe insomnia (22-28)--                    LAST [facility] VISIT WITHIN 60 DAYS:  Do you feel unchanged or worse on therapy? ***   Are you using tht PAP less than 6 nights? ***   Is the pressure to high or too low? ***   Are you experiencing any dryness or soreness with you nose or mouth?  ***   Is the mask leaking, often displaced or uncomfortable? ***   Do you have any other questions or concerns for me at this time? ***   TOTAL ***           No data recorded    Objective:  CPAP Compliance Targets:   >70% days > 4 hours AHI < 5   30 days ending October 3, 2023  Mask type:  ***     ResMed   CPAP *** cmH2O 30 day usage data:  ***% of days with > 4 hours of use. ***/30 days with no use.   Average use *** minutes per day.   95%ile Leak *** L/min.   AHI *** events per hour.     Auto-PAP *** - *** cmH2O 30 day usage data:    ***% of days with > 4 hours of use. ***/30 days with no use.   Average use *** minutes per day.   95%ile Leak *** L/min.   CPAP 95% pressure *** cm.   AHI *** events per hour.       Respironics  CPAP *** cmH2O 30 day usage data:  ***% of days with > 4 hours of use. ***/30 days with no use.   Average use *** minutes per day.   Average leak *** LPM.  Average % of night in large leak ***%.    AHI *** events per hour.     Auto-PAP *** - *** cmH2O 30 day usage data:    ***% of days with > 4 hours of use. ***/30 days with no use.   Average use *** minutes per day.   Average leak *** LPM.  Average % of night in large leak ***%.    CPAP 90% pressure ***cm.   AHI *** events per hour.                      Medications:     Current Outpatient Medications  "  Medication Sig    traZODone (DESYREL) 100 MG tablet Take 1-1.5 tablets (100-150 mg) by mouth At Bedtime     No current facility-administered medications for this visit.        No Known Allergies         Problem List:     Patient Active Problem List   Diagnosis    BMI 32.0-32.9,adult    History of depression    History of Lyme disease    Major depressive disorder, single episode, moderate (H)    Bipolar 2 disorder (H)            Past Medical History:     Does not need 02 supplement at night   Past Medical History:   Diagnosis Date    Asthma 1/11/2022    Depressive disorder     Lyme disease     diagnosed 19, then flared at 22.    Major depression              Past Surgical History:    No*** h/o  upper airway surgery  Past Surgical History:   Procedure Laterality Date    TONSILLECTOMY  1991              Physical Examination:   Objective:  Vitals: /87   Pulse 77   Ht 1.803 m (5' 10.98\")   Wt 120.9 kg (266 lb 9.6 oz)   SpO2 95%   BMI 37.20 kg/m    BMI= Body mass index is 37.2 kg/m .  Neck circumference ***  Mallampatti  ***  Mandibular profile  Reported vitals:  There were no vitals taken for this visit.   {video visit exam brief selected:[id number]}        Copy to: Samantha Belle MD 10/3/2023         Total time spent reviewing medical records including previous testing and interpretation as well as direct patient contact and documentation on this date: ***   "

## 2023-10-13 NOTE — Clinical Note
296.89 Bipolar II Disorder With anxious distress [place] Counseling:  I discussed with the patient the risks of golimumab including but not limited to myelosuppression, immunosuppression, autoimmune hepatitis, demyelinating diseases, lymphoma, and serious infections.  The patient understands that monitoring is required including a PPD at baseline and must alert us or the primary physician if symptoms of infection or other concerning signs are noted.

## 2023-11-28 ENCOUNTER — VIRTUAL VISIT (OUTPATIENT)
Dept: SLEEP MEDICINE | Facility: CLINIC | Age: 38
End: 2023-11-28
Payer: COMMERCIAL

## 2023-11-28 VITALS — HEIGHT: 71 IN | BODY MASS INDEX: 37.24 KG/M2 | WEIGHT: 266 LBS

## 2023-11-28 DIAGNOSIS — F51.04 CHRONIC INSOMNIA: Primary | ICD-10-CM

## 2023-11-28 DIAGNOSIS — F51.04 PSYCHOPHYSIOLOGICAL INSOMNIA: ICD-10-CM

## 2023-11-28 DIAGNOSIS — F51.03 PARADOXICAL INSOMNIA: ICD-10-CM

## 2023-11-28 PROCEDURE — 90791 PSYCH DIAGNOSTIC EVALUATION: CPT | Mod: 95 | Performed by: PSYCHOLOGIST

## 2023-11-28 ASSESSMENT — SLEEP AND FATIGUE QUESTIONNAIRES
HOW LIKELY ARE YOU TO NOD OFF OR FALL ASLEEP IN A CAR, WHILE STOPPED FOR A FEW MINUTES IN TRAFFIC: WOULD NEVER DOZE
HOW LIKELY ARE YOU TO NOD OFF OR FALL ASLEEP WHILE WATCHING TV: WOULD NEVER DOZE
HOW LIKELY ARE YOU TO NOD OFF OR FALL ASLEEP WHILE SITTING AND READING: WOULD NEVER DOZE
HOW LIKELY ARE YOU TO NOD OFF OR FALL ASLEEP WHILE LYING DOWN TO REST IN THE AFTERNOON WHEN CIRCUMSTANCES PERMIT: WOULD NEVER DOZE
HOW LIKELY ARE YOU TO NOD OFF OR FALL ASLEEP WHEN YOU ARE A PASSENGER IN A CAR FOR AN HOUR WITHOUT A BREAK: MODERATE CHANCE OF DOZING
HOW LIKELY ARE YOU TO NOD OFF OR FALL ASLEEP WHILE SITTING AND TALKING TO SOMEONE: WOULD NEVER DOZE
HOW LIKELY ARE YOU TO NOD OFF OR FALL ASLEEP WHILE SITTING QUIETLY AFTER LUNCH WITHOUT ALCOHOL: WOULD NEVER DOZE
HOW LIKELY ARE YOU TO NOD OFF OR FALL ASLEEP WHILE SITTING INACTIVE IN A PUBLIC PLACE: WOULD NEVER DOZE

## 2023-11-28 ASSESSMENT — PAIN SCALES - GENERAL: PAINLEVEL: NO PAIN (0)

## 2023-11-28 ASSESSMENT — PATIENT HEALTH QUESTIONNAIRE - PHQ9: SUM OF ALL RESPONSES TO PHQ QUESTIONS 1-9: 12

## 2023-11-28 NOTE — NURSING NOTE
PHQ9 = 12    Has patient had flu shot for current/most recent flu season? If so, when? Yes: 12/8/2022      Is the patient currently in the state of [place]? YES    Visit mode:VIDEO    If the visit is dropped, the patient can be reconnected by: VIDEO VISIT: Send to e-mail at: [email or web address]    Will anyone else be joining the visit? NO  (If patient encounters technical issues they should call [phone]    How would you like to obtain your AVS? MyChart    Are changes needed to the allergy or medication list? No    Reason for visit: Consult    Sandee JONES

## 2023-11-28 NOTE — PROGRESS NOTES
Virtual Visit Details    Type of service:  Video Visit     Originating Location (pt. Location): Home    Distant Location (provider location):  Off-site  Platform used for Video Visit: [facility]    SLEEP MEDICINE CONSULTATION  Sleep Psychology  2023    Name: Ramiro Najera MRN# [id number]   Age: 38 year old YOB: 1985     Date of Consultation: 2023  Consultation is requested by: Iris Lantigua MD  [address] [place] [id number]  Primary care provider: Samantha Belle    Reason for Sleep Consultation     Ramiro Najera is a 38 year old male seen today for a behavioral sleep medicine consultation because of insomnia    Assessment and Plan     Sleep Diagnoses:            Chronic insomnia  Paradoxical insomnia    Co-occurring Conditions:         Bipolar 2 Disorder       History of Lyme's Disease          Clinical Impressions:    Ramiro Najera was seen for a sleep psychology consultation and possible behavioral sleep intervention and treatment. History and clinical presentation suggests chronic psychophysiologic insomnia associated with mood disorder, specifically bipolar 2 disorder.  As is common with insomnia, patient also experienced considerable sleep state misperception of paradoxical insomnia.  He was surprised to learn that by tracking his sleep using a wearable device, that he was getting significantly more sleep than he perceived.  While he estimated he was only getting about 5 hours sleep, sleep tracker suggested his average sleep time was approximately 7.5-8 hours.  He was heartened by this.  A recent home sleep test was negative for obstructive sleep apnea or other intrinsic sleep disorder.  Ramiro is a very suitable candidate for brief cognitive behavioral therapy for insomnia.    Recommendations and Counselin.  Continue to follow with primary care physician for management of bipolar 2 disorder and use of trazodone.    2.  While patient is not a candidate for  sleep restriction therapy, he is spending extended time in bed and would likely benefit from compressing sleep schedule to an 8.5 hours sleep schedule to help improve and consolidate sleep.  He agreed to a sleep schedule prescription delaying his bedtime to 9:30 PM and getting up every morning by 6 AM using an alarm.  He was also introduced to stimulus control measures to use the bed only for sleep and intimacy.    3.  Today we introduced to relaxation strategies to help improve sleep, most importantly the constructive worry strategy to help compartmentalize and manage increasingly anxiety provoking thoughts and worry about his work.    Ramiro was provided information about the pathophysiology of insomnia, psychophysiological factors contributing to the onset and maintenance of insomnia and how co-occurring medical conditions and intrinsic sleep disorders can affect sleep.  Treatment options were discussed  as applicable to patient specific sleep concerns and symptoms. The benefits and potential early side effects of treatment including increased daytime sleepiness were discussed.     Patient was advised to consult with their prescribing provider around use of or changes to prescription sleep medication.  Patient was counseled on the importance of avoiding driving if drowsy.    Services provided are compliant with the requirements of [place] Statute SS 256B.0625 Subd. 3b and paragraph (b)     Follow-up:  By Haylie as needed, virtual visit scheduled for 8 weeks,.     History of Present Sleep Complaint     Ramiro Najera is a 38 year old year old male who presents with onset of insomnia since a teenager.  He first pursued treatment in the past year.    Was diagnosed with MDD at age 14.  He had a manic episode during teenage years.  He was then diagnosed with Bipolar 2  He has been on Trazodone for mood and insomnia. He has been on the medication for about 5 years.    Currently per PHQ9 is reporting elevated  symptoms of depression over the past six months due to situational stressors. 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  He does not report any suicidal ideation intent or plan.  He has a very good support from his spouse and friends.  He is aware of emergency and urgent care resources and would avail himself to these resources if he felt any impulse or thoughts of self-harm.  He does not report any prior suicide attempts.  He does not own a firearm.  He does not use alcohol or other substances.  Suicide risk level assessed as long.    He reports he has used a wearable device to track his sleep.  He notes being surprised; Average sleep sleep estimates are for about 8 hours of sleep. He perceives he is getting about 5-6 hours of sleep.  Alarm is set for 6:30 AM.    He gets into bed between 8-9 PM.  His device indicates sustained sleep for about 3-4 hours followed by episodes of more fragmented sleep.    .  He reports good sleep hygiene.  No use of alcohol use, Drinks two energy drinks or 3-4 cups of cofee in the morning.  Cut off is noon.    His wife does snow but not routinely impact his sleep.      SCALES      EPWORTH SLEEPINESS SCALE    Likeliness of dozing or falling  asleep:    Sitting and reading: Would never doze    Watching TV: Would never doze    Sitting, inactive in a public place (e.g. a theatre or a meeting): Would never doze    As a passenger in a car for an hour without a break: Moderate chance of dozing    Lying down to rest in the afternoon when circumstances permit: Would never doze    Sitting and talking to someone: Would never doze    Sitting quietly after a lunch without alcohol: Would never doze    In a car, while stopped for a few minutes in traffic: Would never doze    Total score - [place]: 2      INSOMNIA SEVERITY INDEX (KJ)      The Insomnia Severity Index measures the severity of insomnia symptoms over the past two weeks.    1. Difficulty falling  asleep: None    2. Difficulty staying asleep: Moderate    3. Problems waking up too early: Severe    4. How SATISFIED/DISSATISFIED are youwith your CURRENT sleep pattern? Dissatisfied    5. How NOTICEABLE to others do you think your sleep problem is in terms of impairing the quality of your life? Somewhat      6. How WORRIED/DISTRESSED are you about your sleep problem? Somewhat    7. To what extent do you consider your sleep problem to INTERFERE with your daily functioning (e.g. daytime fatigue, mood, ability to fun[place]on at work/daily chores, concentration, memory, mood, etc.) CURRENTLY?   Somewhat     KJ Total Score: 14    Guidelines for Scoring/Interpretation:   0-7 = No clinically significant insomnia   8-14 = Subthreshold insomnia   15-21 = Clinical insomnia (moderate severity)  22-28 = Clinical insomnia (severe)      STOP BANG     1. Snoring: Do you snore loudly (louder than talking or loud enough to be heard through closed doors)?       2. Tired: Do you often feel tired, fatigued, or sleepy during daytime?       3. Observed: Has anyone observed you stop breathing during your sleep?       4. Blood pressure: Do you have or are you being treated for high blood pressure?       5. BMI: BMI more than 35 kg/m2?       6. Age: Age over 50 yr old?       7. Neck circumference: Neck circumference greater than 40 cm?       8. Gender: Gender male?       STOP-BANG Total Score:      JAY Risk  0-2 Low risk for JAY  3-4  Intermediate risk for JAY  5-8 High risk    PHQ-9     Over the last 2 weeks, how often have you been bothered by any of the following problems?    1. Little interest or pleasure in doing things -  Several days   2. Feeling down, depressed, or hopeless -  More than half the days   3. Trouble falling or staying asleep, or sleeping too much - Nearly every day   4. Feeling tired or having little energy -  Nearly every day   5. Poor appetite or overeating -  Several days   6. Feeling bad about yourself - or that you  "are a failure or have let yourself or your family down -  Several days   7. Trouble concentrating on things, such as reading the newspaper or watching television - Several days   8. Moving or speaking so slowly that other people could have noticed? Or the opposite - being so fidgety or restless that you have been moving around a lot more than usual Not at all   9. Thoughts that you would be better off dead or of hurting  yourself in some way Not at all   Total Score: 12     If you checked off any problems, how difficult have these problems made it for you to do your work, take care of things at home, or get along with other people? Somewhat difficult    Developed by Radha Uribe, Cyndi Rhodes, Al Davidson and colleagues, with an educational andres from [employer] No permission required to reproduce, translate, display or distribute. permission required to reproduce, translate, display or distribute.     DEANNA-7        4/15/2022     6:37 AM 2022     2:01 PM 2022     7:33 AM   DEANNA-7 SCORE   Total Score 8 (mild anxiety) 2 (minimal anxiety) 5 (mild anxiety)   Total Score 8 2 5         Previous Sleep Studies   Patient: Ramiro Najera  MRN: [id number]  YOB: 1985  Study Date: 2023  PCP/Referring Provider: Samantha Belle;   Ordering Provider: Nestor Wells MD           Indications for Home Study: Ramiro Najera is a 37 year old male who presents with symptoms suggestive of obstructive sleep apnea.     Estimated body mass index is 36.26 kg/m  as calculated from the following:    Height as of 3/28/23: 1.803 m (5' 11\").    Weight as of 3/28/23: 117.9 kg (260 lb).  Total score - [place]: 2 (2023 10:08 AM)  STOP-BAN/8           Data: A full night home sleep study was performed recording the standard physiologic parameters including body position, movement, sound, nasal pressure, thermal oral airflow, chest and abdominal movements with respiratory inductance " plethysmography, and oxygen saturation by pulse oximetry. Pulse rate was estimated by oximetry recording. This study was considered adequate based on > 4 hours of quality oximetry and respiratory recording. As specified by the AASM Manual for the Scoring of Sleep and Associated events, version 2.3, Rule VIII.D 1B, 4% oxygen desaturation scoring for hypopneas is used as a standard of care on all home sleep apnea testing.           Analysis Time:  530 minutes           Respiration:   Sleep Associated Hypoxemia: sustained hypoxemia was not present. Baseline oxygen saturation was 97%.  Time with saturation less than or equal to 88% was 0.8 minutes. The lowest oxygen saturation was 83%.   Snoring: Snoring was present.  Respiratory events: The home study revealed a presence of 0 obstructive apneas and 7 mixed and central apneas. There were 22 hypopneas resulting in a combined apnea/hypopnea index [AHI] of 3.4 events per hour.  AHI was 4.3 per hour supine, N/A per hour prone, 1.2 per hour on left side, and 3.7 per hour on right side.   Pattern: Excluding events noted above, respiratory rate and pattern was Normal.        Position: Percent of time spent: supine - 28.9%, prone - 0%, on left - 18.8%, on right - 48.9%.        Heart Rate: By pulse oximetry normal rate was noted.         Assessment:   Non- diagnostic for clinically significant sleep apnea.  Sleep associated hypoxemia was not present.     Recommendations:  If clinical concern for sleep apnea or a sleep fragmenting disorder remains, consider in lab PSG for assessment.  Suggest optimizing sleep hygiene and avoiding sleep deprivation.            Diagnosis Code(s): Snoring R06.83     Nestor Wells MD, August 25, 2023   Diplomate, American Board of Psychiatry and Neurology, Sleep Medicine         Vitals     There were no vitals taken for this visit.     Medical History     Allergies:    No Known Allergies    Medications:    Current Outpatient Medications   Medication  Sig Dispense Refill    traZODone (DESYREL) 100 MG tablet Take 1-1.5 tablets (100-150 mg) by mouth At Bedtime 180 tablet 3       Problem List:  Patient Active Problem List    Diagnosis Date Noted    Bipolar 2 disorder (H) 02/06/2017     Priority: Medium    Major depressive disorder, single episode, moderate (H) 01/31/2017     Priority: Medium    BMI 32.0-32.9,adult 03/01/2016     Priority: Medium    History of depression 03/01/2016     Priority: Medium    History of Lyme disease 03/01/2016     Priority: Medium        Past Medical/Surgical History:  Past Medical History:   Diagnosis Date    Asthma 1/11/2022    Depressive disorder     Lyme disease     diagnosed 19, then flared at 22.    Major depression      Patient Active Problem List    Diagnosis Date Noted    Bipolar 2 disorder (H) 02/06/2017     Priority: Medium    Major depressive disorder, single episode, moderate (H) 01/31/2017     Priority: Medium    BMI 32.0-32.9,adult 03/01/2016     Priority: Medium    History of depression 03/01/2016     Priority: Medium    History of Lyme disease 03/01/2016     Priority: Medium     Patient Active Problem List   Diagnosis    BMI 32.0-32.9,adult    History of depression    History of Lyme disease    Major depressive disorder, single episode, moderate (H)    Bipolar 2 disorder (H)        Most Recent Labs:  Lab on 01/16/2023   Component Date Value Ref Range Status    Collection Method 01/16/2023 Masturbation   Final    Collection Site 01/16/2023 YAYA   Final    Time of Receipt 01/16/2023 11:26 AM   Final    Time of Analysis 01/16/2023 11:40 AM   Final    Analysis Temp - Centigrade 01/16/2023 23.0  centigrade Final    Abstinence days 01/16/2023 4  2 - 7 days Final    Liquefied 01/16/2023 Yes  Yes Final    Viscous 01/16/2023 No  No Final    Agglutination 01/16/2023 No  No Final    pH 01/16/2023 7.2  >=7.2 pH Final    Volume 01/16/2023 1.5  >=1.4 mL Final    Sperm Concentration 01/16/2023 82.0  >=16.0 million/ml Final    Total  Sperm Number 01/16/2023 123.0  >=39.0 million Final    Progressive motility 01/16/2023 56  >=30 % Final    Non-progressive motility 01/16/2023 5  % Final    Immotile 01/16/2023 39  % Final    Total Progressive Motile Number 01/16/2023 69.00  million Final    Vitality 01/16/2023    Final    Vitality determined if <32% progressive motility.    Normal Sperm Morphology 01/16/2023 2 (L)  >=4 % Normal forms Final    Abnormal Sperm Morphology 01/16/2023 98   Final          Head Defect 01/16/2023 98  % Final          Midpiece Defect 01/16/2023 42  % Final          Tail Defect 01/16/2023 2  % Final    Round Cells 01/16/2023 0.2  <=2.0 million/ml Final    Consent to Release to Partner 01/16/2023 Yes   Final     Mental Health History     Prior Mental Health Diagnosis:   Initially diagnosed with major depressive disorder as a teenager.  Reported episode hypomania late teenage years resulted in diagnoses of bipolar 2 disorder.    Mental Health Treatment:   ; Medication management by primary care physician with trazodone, 150 mg for insomnia and mood maintenance.  Prior episodes of psychotherapy.  Patient currently not in psychotherapy.  Current elevated PHQ-9 without suicidal ideation intention or plan.  Patient declined mental health referral    Chemical Abuse/Treatment:    None reported        Family History     Family History   Problem Relation Age of Onset    Diabetes Mother     Hypertension Mother     Depression Mother     Other - See Comments Mother 63        abcess that was removed, caused by strep, located on spine    Obesity Mother     Diabetes Father     Hypertension Father     Depression Father     Aneurysm Father 61    Obesity Father     Diabetes Maternal Grandmother     Hypertension Maternal Grandmother     Diabetes Paternal Grandmother     Hypertension Paternal Grandmother     Heart Disease Maternal Grandfather 60        Taking Nitro since he was 30    Coronary Artery Disease Maternal Grandfather     Hypertension  Maternal Grandfather     Breast Cancer No family hx of     Colon Cancer No family hx of     Prostate Cancer No family hx of        Social History     Social History     Socioeconomic History    Marital status:     Number of children: 0   Tobacco Use    Smoking status: Former     Packs/day: 0.00     Years: 0.00     Additional pack years: 0.00     Total pack years: 0.00     Types: Pipe, Cigarettes     Passive exposure: Yes    Smokeless tobacco: Never    Tobacco comments:     lived with a smoker until he was 17 years old   Vaping Use    Vaping Use: Never used   Substance and Sexual Activity    Alcohol use: Yes     Comment: a couple beers a month    Drug use: No    Sexual activity: Yes     Partners: Female     Birth control/protection: Pill     Comment: New sexual partner   Other Topics Concern    Parent/sibling w/ CABG, MI or angioplasty before 65F 55M? 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     Mental Status Examination     Ramiro presented as oriented X3 with speech and language intact.  The patient was cooperative throughout the evaluation with no signs of hallucinations, delusions, loosening of associations or other thought disturbance.  Mood was depressed Affect was congruent with mood. Insight and judgement were intact.  Memory appeared intact for recent and remote elements.  There was no report of suicidal ideation, intention or plan. Attention and concentration were within normal.        Celestine Oseguera, Cindy, LP, DBSM  Diplomate, Behavioral Sleep Medicine  [facility]      Copy:   Samantha Belle MD  [address] [place] [id number]    Note: This dictation was created using voice recognition software. This document may contain an error not identified before finalizing the document. If the error changes the accuracy of the document, I would appreciate it being brought to my attention.

## 2023-11-28 NOTE — PATIENT INSTRUCTIONS
[facility]  Cognitive Behavioral Therapy for Insomnia       Core Sleep Training Module    Now that you understand a bit more about how sleep works and what causes insomnia, you are ready to begin CBT-I Core Sleep Training.  This process involves five key strategies that will:    Strengthen your sleep drive and circadian sleep rhythm  Strengthen the link between your bed and sleep    It will be important that you continue to keep track of your sleep using your Insomnia  Aren or your paper sleep diary.      Core Sleep Strategies    Much like physical activity and healthy eating strengthens our body, studies show that the following Core strategies are key to achieving stronger, healthier sleep. The focus is on quality of sleep (not quantity) and improving how you feel during the day.     Reduce Your Time in Bed    Spending extra time in bed trying to get more sleep can cause more sleep disruption and weaken sleep efficiency. Sleep efficiency is simply the percentage of time a person spends asleep while in bed. Normal sleep efficiency is thought to be 85% or greater.  By reducing your time in bed, you will be awake longer during the day leading to quicker and deeper sleep at night. This strategy does not reduce the amount of sleep you get, just the time you are awake in bed.                                             Your provider has recommended the following initial sleep schedule determined by your  estimate of average sleep time over the past two weeks plus 30 minutes.  It also consider the best time for you to sleep.     Your Sleep Schedule Prescription                       Total Time in Bed:  8.5 hours                     Bedtime:  No earlier than 9:30 PM, take trazodone per prescription instructions                      Wake-up Time:  Out of bed every day by 6 AM with alarm      Don't go to bed until you feel sleepy (not tired or fatigued)     This helps increase your sleep drive by keeping you  awake longer.  If you go to bed when you're not sleepy, it gives your brain the wrong message and can lead to frustration.     If you feel sleepy before your prescribed bedtime, find activities that can help you stay awake until it is time for you to go to bed. Even brief naps in the evening can be very disruptive of sleep at night.      Don't stay in bed unless you are sleeping      If you are unable to fall asleep or return to sleep after about 30 minutes, get out of bed. This helps train your brain that the bed is for sleep. It is harmful to your sleep when you are worried or frustrated in bed. Do not read, eat, worry, think about sleep, use mobile phones or tablets, or watch TV in bed. Do not watch the clock during the night.     Go to another room and do something relaxing. Plan things you can do when you get out of bed. Avoid use of mobile devices or computers when out of bed.    Return to bed only when you feel sleepy again.  Repeat as often as needed throughout the night.      Get out of bed at the same time every day    Getting up at the same time helps set your biological clock. It is important to stick to your wake time no matter how much sleep you got the night before or how you feel in the morning.    Varying your wake can have the same effect as jet lag.  It disrupts your biological clock and makes you feel more tired and exhausted.  Trying to  catch up  on sleep on the weekends only makes things worse and sets you up for a cycle of poor sleep the following weekdays.      Make sure you set an alarm and keep it away from the bed to prevent looking at the clock during the night.       Avoid daytime napping    Avoid daytime napping if possible. Napping partially fulfills your need for sleep and weakens your sleep drive at night.    However, if you find yourself sleepy (not just tired) you can take a brief 15-30-minute nap during the day if needed.  Naps within 7-8 hours of your prescribed wake time are  less likely to affect your sleep the coming night.  Sleepy people make more mistakes and may hurt themselves or others. Therefore, safety trumps all other sleep prescriptions.  Never drive or operate equipment if drowsy or sleepy.           CBT-I Module - Relaxation Training    Relaxation and Sleep    Winding Down Time    A wind-down time before you go to bed can help you relax your mind and body. Ways to help transition from a busy day to bedtime include things like:    Listening to calm music  Reading  Watching a pleasant or relaxing TV show  Taking a bath    We recommend you set a reminder to begin your wind down time one hour before bedtime.  Many mobile sleep apps such as Insomnia  have wind-down time reminders.    Relaxation and Sleep    Research shows relaxing before bed can reduce the time it takes to fall asleep and improve sleep quality.  Relaxation training works by reducing physical, emotional and mental arousal that can interfere with your sleep.     Relaxation skills are easy to learn on your own using widely available free mobile apps or online resources.  We recommend doing some form of relaxation exercise daily for at least 10 minutes.  This can include short breathing exercises used throughout the day to help manage stress.    If you are using the Insomnia  mobile gurdeep you will find  relaxation exercises by pressing the Tools icon  going to the Relax Your Body or Quiet Your Mind section.     Insight Timer, Calm, and Headspace are examples of well-reviewed mobile apps that offer free, for-purchase, and subscription guided relaxation exercises and meditations.    Do not use guided relaxation tapes while driving or operating equipment.      Managing Worry before Bed and During the Night    All human beings worry.  Uncontrolled worry can affect your sleep and health by activating your arousal system.  Worry makes your brain, body and heart behave like there is a danger or threat.  This stress  response can make it more difficult to fall asleep and stay asleep. The most common types of worry include:    Concerned about unfinished tasks  Concern over family, finances or work  Worry about things beyond your control    Scheduling Constructive Worry Time    The part of our brain that plans, sorts, and helps manage our emotions is less effective in doing its job as nighttime comes.  Without the usual distractions of the day, we tend to worry more and have trouble putting aside our worries.    A simple technique called Constructive Worry Time can help to reduce and manage worry as you approach bedtime or in the middle of the night. It is most effective when practiced daily.

## 2023-12-14 DIAGNOSIS — F31.81 BIPOLAR 2 DISORDER (H): ICD-10-CM

## 2023-12-15 RX ORDER — TRAZODONE HYDROCHLORIDE 100 MG/1
100-150 TABLET ORAL AT BEDTIME
Qty: 90 TABLET | Refills: 0 | Status: SHIPPED | OUTPATIENT
Start: 2023-12-15 | End: 2024-01-15

## 2024-01-12 ENCOUNTER — MYC REFILL (OUTPATIENT)
Dept: FAMILY MEDICINE | Facility: CLINIC | Age: 39
End: 2024-01-12
Payer: COMMERCIAL

## 2024-01-12 DIAGNOSIS — F31.81 BIPOLAR 2 DISORDER (H): ICD-10-CM

## 2024-01-15 RX ORDER — TRAZODONE HYDROCHLORIDE 100 MG/1
100-150 TABLET ORAL AT BEDTIME
Qty: 90 TABLET | Refills: 0 | OUTPATIENT
Start: 2024-01-15

## 2024-01-15 RX ORDER — TRAZODONE HYDROCHLORIDE 100 MG/1
100-150 TABLET ORAL AT BEDTIME
Qty: 90 TABLET | Refills: 0 | Status: SHIPPED | OUTPATIENT
Start: 2024-01-15 | End: 2024-02-13

## 2024-01-15 NOTE — TELEPHONE ENCOUNTER
Patient has scheduled an appointment with Dr Belle on 02/13/24 (soonest he could get). Patient is wanting to know if he can get enough medication to get him through to his appointment.    KAIT Gould  [facility]

## 2024-02-01 ASSESSMENT — SLEEP AND FATIGUE QUESTIONNAIRES
HOW LIKELY ARE YOU TO NOD OFF OR FALL ASLEEP WHILE SITTING AND READING: SLIGHT CHANCE OF DOZING
HOW LIKELY ARE YOU TO NOD OFF OR FALL ASLEEP WHILE SITTING QUIETLY AFTER LUNCH WITHOUT ALCOHOL: WOULD NEVER DOZE
HOW LIKELY ARE YOU TO NOD OFF OR FALL ASLEEP WHILE LYING DOWN TO REST IN THE AFTERNOON WHEN CIRCUMSTANCES PERMIT: WOULD NEVER DOZE
HOW LIKELY ARE YOU TO NOD OFF OR FALL ASLEEP IN A CAR, WHILE STOPPED FOR A FEW MINUTES IN TRAFFIC: WOULD NEVER DOZE
HOW LIKELY ARE YOU TO NOD OFF OR FALL ASLEEP WHEN YOU ARE A PASSENGER IN A CAR FOR AN HOUR WITHOUT A BREAK: MODERATE CHANCE OF DOZING
HOW LIKELY ARE YOU TO NOD OFF OR FALL ASLEEP WHILE SITTING AND TALKING TO SOMEONE: WOULD NEVER DOZE
HOW LIKELY ARE YOU TO NOD OFF OR FALL ASLEEP WHILE SITTING INACTIVE IN A PUBLIC PLACE: WOULD NEVER DOZE
HOW LIKELY ARE YOU TO NOD OFF OR FALL ASLEEP WHILE WATCHING TV: SLIGHT CHANCE OF DOZING

## 2024-02-02 ENCOUNTER — VIRTUAL VISIT (OUTPATIENT)
Dept: SLEEP MEDICINE | Facility: CLINIC | Age: 39
End: 2024-02-02
Payer: COMMERCIAL

## 2024-02-02 VITALS — BODY MASS INDEX: 37.1 KG/M2 | WEIGHT: 265 LBS | HEIGHT: 71 IN

## 2024-02-02 DIAGNOSIS — F51.04 CHRONIC INSOMNIA: Primary | ICD-10-CM

## 2024-02-02 PROCEDURE — 90832 PSYTX W PT 30 MINUTES: CPT | Mod: 95 | Performed by: PSYCHOLOGIST

## 2024-02-02 ASSESSMENT — PAIN SCALES - GENERAL: PAINLEVEL: NO PAIN (0)

## 2024-02-02 NOTE — PATIENT INSTRUCTIONS
"Recommendation  Adjust your sleep schedule to 9 PM and a wake time of 5:30 AM.  Talk with your spouse about the possible impact of your cat and snoring as disrupters to sleep during the last third of the night.  Keep in mind that the experience of sleep changes throughout the night with respect to depth and type of sleep.  Our deepest sleep is during the first third to half of the night and our most dynamic and varying REM or dream sleep is mostly the last third interspersed with lighter stages of sleep for the most part.  Here is a review article that outlines a somewhat different approach to managing insomnia that focuses less on \"stimulus control\" which involves getting up and out of bed when awake.        ACT for INSOMNIA (ACT-I) by Dr Anthony Mayfield (PhD)    Abstract  Acceptance and Commitment Therapy (ACT) offers a unique and gentle non-drug based approach to overcoming chronic insomnia. It seeks to increase people's willingness to experience the conditioned physiological and psychological discomfort commonly associated with not sleeping.  Such acceptance paradoxically acts to lessen the brains level of nocturnal arousal, thus encouraging a state of rest and sleepiness, rather than struggle and wakefulness. Additional focus on valued driven behaviour also acts to avert unhelpful patterns of experiential avoidance and promote the ideal safe environment from which good quality sleep can emerge. The application and merits for using ACT approaches such as acceptance and willingness, mindfulness and defusion and values and committed action for the treatment of chronic insomnia are discussed and compared to the traditional cognitive behavioural strategies.    Introduction  Sleep involves a slowing of psychological (e.g. decision making, problem solving and emotional readiness) and physiological (e.g. heart rate, breathing rate, blood pressure, bowel movements and muscle tone) processes at the end of the day. It is a " natural act that can't be controlled and is characterized by an acceptance of the natural rise in sleep drive associated with nighttime and a willingness to let go of wakefulness.  By contrast, chronic insomnia is a difficulty sleeping that is characterized by a state of hyper arousal, which interferes with the natural ability to initiate or maintain sleep or achieve restorative sleep. Where insomnia is the primary disorder, the initial trigger is often resolved. But sleeplessness is maintained by a vicious cycle whereby poor sleep initiates worry about not sleeping, which in turn elevates arousal levels leading to further poor sleep. The adoption of unhelpful coping strategies and the movement away from valued living act to create an inflexible relationship with sleep.  Cognitive behaviour therapy for insomnia (CBT-I) is the most widely used treatment method. Its primary focus is based around symptom reduction via changing the cognitive and behavioral conditions that perpetuate the insomnia. It most commonly achieves this through a series of 1-hour interventions delivered over a 6 - 8 week period.  Whilst these interventions have been proven to be effective at reducing insomnia rates, some patients still struggle. Complaints typically focus around the non-workable nature of the process used and the continued need for patients to diarize their sleep and thoughts throughout the intervention. Sourav et al. (2010) investigated how incorporating principles from Acceptance and Commitment Therapy (ACT; Juarez et al. 1999) into the treatment of insomnia could potentially enhance the adherence to and acceptability of such traditional CBT-I approaches. This opens up the debate as to whether ACT approaches alone could be an effective insomnia treatment. The rationale for using ACT approaches within the treatment of insomnia and the practical ways in which it could be implemented into therapy sessions are discussed in this  poster.  Acceptance  The vicious cycle of insomnia demonstrates that it is the unwillingness of patients to experience the unwanted thoughts, emotions and physical sensations associated with not sleeping and the ensuing struggle with them that heightens arousal levels and perpetuates sleeplessness. Most insomniacs can provide a long list of control-based strategies, including traditional CBT-I techniques, they have implemented and yet which have failed to improve their sleep. This focus on symptom reduction, which if ineffective simply perpetuates the vicious cycle. It could be more effective to teach patients to adopt a more accepting attitude towards what spontaneously arises when experiencing difficulty sleeping. The greater willingness to experience poor sleep results in fewer struggles, less arousal and paradoxically greater levels of sleepiness.  Practically, any initial insomnia assessment should include a detailed review of the failed techniques previously used to improve sleep. Trevor (2005) suggests that such an awareness of the futility of control-based strategies could then be used as a starting point from which to guide a patient towards alternative acceptance based approaches.  Mindfulness:  Mindfulness is the ability to objectively and non judgmentally take notice of your internal and external experiences as they unfold. It can help insomniacs to stand back and observe their level of wakefulness or unwanted thoughts and emotional reactions without becoming overly entangled or judging them, a quality that is inherent in the normal act of falling to sleep. Shifting patients towards an attitude of acceptance rather than the CBT-I focus on symptom reduction seen with many of the traditional relaxation strategies could be a way of short-circuiting the vicious cycle of insomnia.  Practically, insomniacs can be taught to mindfully notice their senses and the movement of their breath, which they can then  practice during the day. At night such exercises can be used to help patients to notice and let go of their struggles and return back to the present moment (e.g. noticing the touch of the duvet on their toes or the gentle movement of their breathe). Care must be taken to highlight the potential risk and futility of using mindfulness as a tool to control the level of relaxation or sleep and to confirm the benefits of objective noticing (e.g. the use of the quicksand metaphor can be helpful at this point).    Defusion  Defusion describes the ability to see thoughts and emotions for what they are (e.g. naturally occurring cognitive and emotional responses) and create space for them to exist, rather than fusing with them or seeing them as something that needs to be removed, before sleep can be restored.  Such acceptance once again bypasses the need to struggle with such internal content by changing the patient's relationship with the content. It could be argued that such an energy efficient and low arousal approach is more nocturnally intuitive than the traditional CBT-I based cognitive restructuring, which requires patients to challenge their thoughts and then create new alternative and balanced ones in their place.  Practically, patients spend time identifying their unwanted thoughts, emotions, physical sensations and urges that show up when they don't sleep. They are then taught basic defusion exercises such as objectively describing, naming and welcoming unhelpful thoughts when they arrive in the night such as  I am having the thought that   or  Hello, 'coping', 'medication' and 'ill health' thoughts . For emotions and physical sensations, patients can practice 'Physicalizing' exercises such as objectively locating and describing them and imagining them as objects and giving them a physical shape and colour.  Valued Sleep Actions  A normal sleeper has a strong relationship with their bed and bedroom, meaning they  can fall to sleep or return back to sleep very quickly and with little effort. In contrast, an insomniac has a negative sleep association meaning they become cognitively and emotionally alert at the point of trying to sleep (e.g. conditioned bedtime arousal). The adoption of unhelpful coping strategies in order to control sleep (e.g. going to bed early, sleeping in the spare room or sleeping during the day), not only perpetuates poor sleep, but also results in a reduction in valued living (e.g. not being able to go out at night, share a bed with a partner or schedule early appointments), something that patients cite as one of the main reasons for struggling with sleep in the first place.  CBT-I approaches the problem by asking patients to follow a series of strict rules known as Stimulus Control Therapy (SCT), see table 1. The aim is to limit the time patients spend in the negative state or engaging in non-sleep activities, in the hope of developing a positive association, whereby sleep onset occurs rapidly on getting into bed. Whilst SCT is widely used, many patients complain that its strict nature is counter intuitive and goes against normal valued sleep actions, see table 1. The result is that anxiety and arousal levels associated with the bedroom and sleep are unhelpfully heightened.  In contrast, teaching patients to commit to making behavioural changes in line with their sleep and life values, whilst being willing to experience the unwanted elements of the negative association could be an effective way of re-establishing a positive association between sleep and bed. The aim is that by promoting a healthy and flexible approach to sleep, any negative associations and therefore arousal levels would be reduced, creating a platform from which natural sleep could emerge effortlessly.    Table 1. Valued Sleep Actions    Allow normal bedroom activities  CBT-I: NO - Use the bed for sleep and sex only  ACT-I: YES -  Allow calm non sleep activities such as reading in bed  Go to bed when either tired or sleepy  CBT-I: NO - Only when sleepy  ACT-I: YES - Both states allowed  Stay in bed, if awake at night  CBT-I: NO - If not asleep within 15mins, go to a spare room/read  ACT-I: YES - Focus on resting and welcoming discomfort  Allow daytime naps  CBT-I: NO - Avoid all daytime napping  ACT-I: YES - Allow a short (<20mins) daytime naps  Restricting Sleep  Sleep is regulated by an internal body clock and a sleep homeostat, to keep it on time and create its cyclical drive. A normal sleeper keeps a fairly regular pattern by going to bed and getting up at roughly the same time four days per week and a little later on the weekend. A common coping strategy of many insomniacs is to change their sleep wake cycle in the hope of getting a little more sleep (e.g. going to bed earlier, lying in later or catching up during the day). Whilst such action may make cognitive sense, they do in fact worsen nocturnal sleep by lowering the sleep efficiency (SE), the percentage of time spent in bed asleep.  CBT-I approaches this problem using Sleep Restriction Therapy (SRT), which restricts the time in bed to actual sleeping time only so as sleep drive increases, so will sleep consolidation, which can then be steadily titrated back to a normal level. Whilst such an approach can be affective, some patients struggle with the concept of spending less time in bed. Many insomniacs also present with sleep anxiety and so any suggestion of restricting the amount of time in bed is often met with increased levels of anxiety and alertness, thus counteracting any of the positive benefits of an increase in sleep drive, possibly explaining the low adherence rates.  Practically, it is therefore advised that patient anxiety levels be considered before the implementation of sleep restriction. If anxiety levels are found to be high, then treatment should be focused on  increasing patient willingness to experience the anxiety, before then considering sleep restriction at a later date (e.g. assuming reduced anxiety levels did not lead to improved sleep).  Conclusions  This poster puts forward the rationale for using Acceptance and Commitment Therapy for Insomnia (ACT-I) and its potential benefits over traditional cognitive behavioural treatments. It suggests that acceptance and mindfulness based approaches are intuitively, psychologically and physiologically more in tune with the natural process of deactivation associated with falling to sleep. It places importance on the act of adopting valued sleep actions, whilst being willing to experience the heightened cognitive and emotional processes that present in response to not sleeping. Ultimately it seeks to promote a high level of sleep flexibility, thus leading to less struggle and arousal, improved sleep quality and energy conservation and enhanced quality of life.    References  CHAYITO Benjamin., DEUCE Hatch, CHRIS Early, & STANISLAV Matos, (2010). Incorporating Principles from Aceptance and Commitment Therapy for Insomnia: A Case Example. J Contemp Psychother, 40: 209-217.  SAHARA Pizarro., JU Ludwig, & HENRI Ivy (1999). Acceptance and commitment t: An experiential approach to behaviour change. [place]: [employer].  CECILE Murray., (2005). The Role of Acceptance and Mindfulness in the Treatment of Insomnia. J of Cog Psychother, 19: 29-39.    Additional Information  Dr Oriana Mayfield (PhD) is a sleep physiologist who runs [facility] a private sleep clinic in [place] focusing on the use of Acceptance and Commitment Therapy in the treatment of Insomnia. For further information please visit: [email or web address] or email: [email or web address].  Alternatively please follow FB: [facility] or Twitter: Tex  Or see his ACT-I book: The Sleep Book    Published  byAssociation for Contextual Behavioral Science  (https://contextualscience.org)  Source URL: https://contextualscience.org/print/book/export/html/8429  A

## 2024-02-02 NOTE — NURSING NOTE
Has patient had flu shot for current/most recent flu season? If so, when? No      Is the patient currently in the state of [place]? YES    Visit mode:VIDEO    If the visit is dropped, the patient can be reconnected by: VIDEO VISIT: Text to cell phone:   Telephone Information:   Mobile [phone]       Will anyone else be joining the visit? NO  (If patient encounters technical issues they should call [phone]    How would you like to obtain your AVS? MyChart    Are changes needed to the allergy or medication list? No    Reason for visit: RECHECK    Sandee BOOGIEF

## 2024-02-02 NOTE — PROGRESS NOTES
Virtual Visit Details    Type of service:  Video Visit     Originating Location (pt. Location): Home    Distant Location (provider location):  On-site  Platform used for Video Visit: AmWell    Visit Start Time:  8:27 AM  Visit End Time: 8:57 AM      SLEEP MEDICINE VIRTUAL FOLLOW-UP VISIT  Sleep Psychology    Patient Name: Ramiro Najera  MRN:  [id number]  Date of Service: Feb 2, 2024       Subjective Report     Ramiro Najera  returns for a telehealth video visit to discuss progress in implementing behavioral strategies for the management of insomnia.  Patient consent for initiation of video visit was obtained and documented prior to initiation of visit.     Ramiro reports He is sleeping somewhat better but continues to have some EMA and frequently wakes about 3:30-4 AM.  EMA has been reduced to 4 AM oar so.  Sleep overall is better during the first third of the night.  .   He is following stimulus control recommendations but he has maintain an extended time in bed 9-9.5 hours.  We discussed the rationale and benefits of reducing time in bed to comport better with his estimated sleep need of 7.5 hours.  Discussed revising sleep schedule to 9 PM-5:30 AM with alarm.  We introduced acceptance and commitment therapy strategies for managing insomnia.  .     Sleep Data:     Source of Sleep Estimates:  Verbal Self-report    Average Time in Bed: 8.5 hrs.  Average Total Sleep Time:  6-6.5 hrs  Sleep Efficiency estimate: less than 75%%    EPWORTH SLEEPINESS SCALE    Sitting and reading 1   Watching TV 1   Sitting, inactive in a public place (theatre or mtg.) 0    As a passenger in a car 2   Lying down to rest in the afternoon when circumstance permit 0   Sitting and talking to someone 0   Sitting quietly after lunch without alcohol 0   In a car, while stopped for a few minutes in traffic 0   TOTAL SCORE (nl <11) 4     INSOMNIA SEVERITY INDEX     Difficulty falling asleep 1   Difficult staying asleep 3   Problems waking up  "to early 4   How SATISFIED/DISSATISFIED are you with your CURRENT sleep pattern? 2   How NOTICEABLE to others do you think your sleep pattern is in terms of your quality of life? 3   How WORRIED/DISTRESSED are you about your current sleep pattern? 2   To what extent do you consider your sleep problem to INTERFERE with your daily fuctioning(e.g. daytime fatigue, mood, ability to function at work/daily chores, concentration, mood,etc.) CURRENTLY? 2   INSOMNIA SEVERITY INDEX TOTAL SCORE 17    Absence of insomnia (0-7); sub-threshold insomnia (8-14); moderate insomnia (15-21); and severe insomnia (22-28)       Interventions     Strategies and recommendations including Stimulus control therapy, Sleep compression therapy, and ACT strategies for insomnia were reviewed and discussed today.     Services provided are compliant with the requirements of [place] Statute SS 256B.0625 Subd. 3b and paragraph (b)       Vital Signs     Ht 1.803 m (5' 11\")   Wt 120.2 kg (265 lb)   BMI 36.96 kg/m       Mental Status     Orientation:  X3  Mood:  normal  Affect:  Congruent with mood  Speech/Language:  Normal  Thought Process: Intact  Associations:  Normal  Thought Content: Normal  Patient does not report any suicidal ideation, intention or plan.    Diagnostic Impressions and Plan     Chronic insomnia    Patient has experienced improvement in overall sleep quality.  Major concern is persistent early morning awakening.  Discussed possible contributors to this including extended time in bed and emergence of sleep specific worry.    Plan:   Adjust sleep schedule to reduce time in bed by an hour.  Sleep schedule of 9 PM-5:30 AM with alarm recommended.  Review ACT module and strategies to reduce overall sleep effort.    Follow-up: 6 weeks      Celestine Oseguera PsyD, PAL, [facility]  Diplomate, Behavioral Sleep Medicine  [facility]      Note: This dictation was created using voice recognition software. This document " may contain an error not identified before finalizing the document. If the error changes the accuracy of the document, I would appreciate it being brought to my attention.

## 2024-02-13 ENCOUNTER — OFFICE VISIT (OUTPATIENT)
Dept: FAMILY MEDICINE | Facility: CLINIC | Age: 39
End: 2024-02-13
Payer: COMMERCIAL

## 2024-02-13 VITALS
HEART RATE: 82 BPM | OXYGEN SATURATION: 98 % | TEMPERATURE: 98.3 F | HEIGHT: 71 IN | WEIGHT: 270.4 LBS | SYSTOLIC BLOOD PRESSURE: 126 MMHG | BODY MASS INDEX: 37.85 KG/M2 | DIASTOLIC BLOOD PRESSURE: 76 MMHG | RESPIRATION RATE: 20 BRPM

## 2024-02-13 DIAGNOSIS — F31.81 BIPOLAR 2 DISORDER (H): ICD-10-CM

## 2024-02-13 DIAGNOSIS — E66.812 CLASS 2 OBESITY WITHOUT SERIOUS COMORBIDITY WITH BODY MASS INDEX (BMI) OF 37.0 TO 37.9 IN ADULT, UNSPECIFIED OBESITY TYPE: ICD-10-CM

## 2024-02-13 DIAGNOSIS — R73.9 ELEVATED BLOOD SUGAR: ICD-10-CM

## 2024-02-13 DIAGNOSIS — Z13.220 LIPID SCREENING: ICD-10-CM

## 2024-02-13 DIAGNOSIS — E55.9 VITAMIN D DEFICIENCY: ICD-10-CM

## 2024-02-13 DIAGNOSIS — Z00.00 ROUTINE GENERAL MEDICAL EXAMINATION AT A HEALTH CARE FACILITY: Primary | ICD-10-CM

## 2024-02-13 PROBLEM — F32.1 MAJOR DEPRESSIVE DISORDER, SINGLE EPISODE, MODERATE (H): Status: RESOLVED | Noted: 2017-01-31 | Resolved: 2024-02-13

## 2024-02-13 LAB
ANION GAP SERPL CALCULATED.3IONS-SCNC: 11 MMOL/L (ref 7–15)
BUN SERPL-MCNC: 11 MG/DL (ref 6–20)
CALCIUM SERPL-MCNC: 9.3 MG/DL (ref 8.6–10)
CHLORIDE SERPL-SCNC: 104 MMOL/L (ref 98–107)
CHOLEST SERPL-MCNC: 214 MG/DL
CREAT SERPL-MCNC: 1.04 MG/DL (ref 0.67–1.17)
DEPRECATED HCO3 PLAS-SCNC: 24 MMOL/L (ref 22–29)
EGFRCR SERPLBLD CKD-EPI 2021: >90 ML/MIN/1.73M2
FASTING STATUS PATIENT QL REPORTED: YES
GLUCOSE SERPL-MCNC: 104 MG/DL (ref 70–99)
HBA1C MFR BLD: 5.1 % (ref 0–5.6)
HDLC SERPL-MCNC: 39 MG/DL
LDLC SERPL CALC-MCNC: 126 MG/DL
NONHDLC SERPL-MCNC: 175 MG/DL
POTASSIUM SERPL-SCNC: 4.1 MMOL/L (ref 3.4–5.3)
SODIUM SERPL-SCNC: 139 MMOL/L (ref 135–145)
TRIGL SERPL-MCNC: 244 MG/DL
VIT D+METAB SERPL-MCNC: 16 NG/ML (ref 20–50)

## 2024-02-13 PROCEDURE — 90471 IMMUNIZATION ADMIN: CPT | Mod: 59 | Performed by: FAMILY MEDICINE

## 2024-02-13 PROCEDURE — 99395 PREV VISIT EST AGE 18-39: CPT | Performed by: FAMILY MEDICINE

## 2024-02-13 PROCEDURE — 83036 HEMOGLOBIN GLYCOSYLATED A1C: CPT | Performed by: FAMILY MEDICINE

## 2024-02-13 PROCEDURE — 91320 SARSCV2 VAC 30MCG TRS-SUC IM: CPT | Performed by: FAMILY MEDICINE

## 2024-02-13 PROCEDURE — 36415 COLL VENOUS BLD VENIPUNCTURE: CPT | Performed by: FAMILY MEDICINE

## 2024-02-13 PROCEDURE — 90686 IIV4 VACC NO PRSV 0.5 ML IM: CPT | Performed by: FAMILY MEDICINE

## 2024-02-13 PROCEDURE — 90480 ADMN SARSCOV2 VAC 1/ONLY CMP: CPT | Performed by: FAMILY MEDICINE

## 2024-02-13 PROCEDURE — 82306 VITAMIN D 25 HYDROXY: CPT | Performed by: FAMILY MEDICINE

## 2024-02-13 PROCEDURE — 96127 BRIEF EMOTIONAL/BEHAV ASSMT: CPT | Performed by: FAMILY MEDICINE

## 2024-02-13 PROCEDURE — 99213 OFFICE O/P EST LOW 20 MIN: CPT | Mod: 25 | Performed by: FAMILY MEDICINE

## 2024-02-13 PROCEDURE — 80048 BASIC METABOLIC PNL TOTAL CA: CPT | Performed by: FAMILY MEDICINE

## 2024-02-13 PROCEDURE — 80061 LIPID PANEL: CPT | Performed by: FAMILY MEDICINE

## 2024-02-13 RX ORDER — TRAZODONE HYDROCHLORIDE 150 MG/1
150 TABLET ORAL AT BEDTIME
Qty: 90 TABLET | Refills: 3 | Status: SHIPPED | OUTPATIENT
Start: 2024-02-13

## 2024-02-13 SDOH — HEALTH STABILITY: PHYSICAL HEALTH: ON AVERAGE, HOW MANY DAYS PER WEEK DO YOU ENGAGE IN MODERATE TO STRENUOUS EXERCISE (LIKE A BRISK WALK)?: 3 DAYS

## 2024-02-13 SDOH — HEALTH STABILITY: PHYSICAL HEALTH: ON AVERAGE, HOW MANY MINUTES DO YOU ENGAGE IN EXERCISE AT THIS LEVEL?: 30 MIN

## 2024-02-13 ASSESSMENT — PATIENT HEALTH QUESTIONNAIRE - PHQ9
10. IF YOU CHECKED OFF ANY PROBLEMS, HOW DIFFICULT HAVE THESE PROBLEMS MADE IT FOR YOU TO DO YOUR WORK, TAKE CARE OF THINGS AT HOME, OR GET ALONG WITH OTHER PEOPLE: SOMEWHAT DIFFICULT
SUM OF ALL RESPONSES TO PHQ QUESTIONS 1-9: 8
SUM OF ALL RESPONSES TO PHQ QUESTIONS 1-9: 8

## 2024-02-13 ASSESSMENT — PAIN SCALES - GENERAL: PAINLEVEL: NO PAIN (0)

## 2024-02-13 ASSESSMENT — SOCIAL DETERMINANTS OF HEALTH (SDOH): HOW OFTEN DO YOU GET TOGETHER WITH FRIENDS OR RELATIVES?: TWICE A WEEK

## 2024-02-13 NOTE — PROGRESS NOTES
"Preventive Care Visit  [facility]  Samantha Belle DO, Family Medicine  Feb 13, 2024    Assessment & Plan     Routine general medical examination at a health care facility      Bipolar 2 disorder (H)    The patient has been stable using trazodone as a mood stabilizer.    - traZODone (DESYREL) 150 MG tablet; Take 1 tablet (150 mg) by mouth at bedtime    Class II obesity without serious comorbidity BMI 72.0-37.9,adult  We discussed to help with weight loss.  The patient will wait to see what his blood sugar is today.    Vitamin D deficiency  Will check vitamin D level I have recommended supplements  - Vitamin D Deficiency; Future    Elevated blood sugar    - Basic metabolic panel  (Ca, Cl, CO2, Creat, Gluc, K, Na, BUN); Future  - Hemoglobin A1c; Future    Lipid screening    - Lipid panel reflex to direct LDL Fasting; Future      30 minutes spent by me on the date of the encounter doing chart review, history and exam, documentation and further activities per the note      BMI  Estimated body mass index is 37.71 kg/m  as calculated from the following:    Height as of this encounter: 1.803 m (5' 11\").    Weight as of this encounter: 122.7 kg (270 lb 6.4 oz).   Weight management plan: Discussed healthy diet and exercise guidelines    Counseling  Appropriate preventive services were discussed with this patient, including applicable screening as appropriate for fall prevention, nutrition, physical activity, Tobacco-use cessation, weight loss and cognition.  Checklist reviewing preventive services available has been given to the patient.  Reviewed patient's diet, addressing concerns and/or questions.   He is at risk for lack of exercise and has been provided with information to increase physical activity for the benefit of his well-being.   The patient was instructed to see the dentist every 6 months.   The patient's PHQ-9 score is consistent with mild depression. He was provided with information " regarding depression.       Follow-up in 1 year for complete physical    Subjective   Ramiro is a 38 year old, presenting for the following:  Physical        2/13/2024     6:54 AM   Additional Questions   Roomed by Yuliya BELTRÁN   Accompanied by self         2/13/2024     6:54 AM   Patient Reported Additional Medications   Patient reports taking the following new medications none        Health Care Directive  Patient does not have a Health Care Directive or Living Will: Discussed advance care planning with patient; information given to patient to review.    HPI      Depression and Anxiety Follow-Up  How are you doing with your depression since your last visit? No change  How are you doing with your anxiety since your last visit?  No change  Are you having other symptoms that might be associated with depression or anxiety? Yes:  ups and downs  Have you had a significant life event? OTHER: work related    Do you have any concerns with your use of alcohol or other drugs? No  Trazodone 100 to 150 mg at at bedtime which has been working as a mood stabilizer for him.    Social History     Tobacco Use    Smoking status: Former     Packs/day: 0.00     Years: 0.00     Additional pack years: 0.00     Total pack years: 0.00     Types: Cigarettes, Pipe     Passive exposure: Yes    Smokeless tobacco: Never    Tobacco comments:     lived with a smoker until he was 17 years old   Vaping Use    Vaping Use: Never used   Substance Use Topics    Alcohol use: Not Currently     Comment: a couple beers a month    Drug use: No         12/8/2022     7:33 AM 11/28/2023     2:08 PM 2/13/2024     6:41 AM   PHQ   PHQ-9 Total Score 9 12 8   Q9: Thoughts of better off dead/self-harm past 2 weeks Not at all Not at all Not at all         4/15/2022     6:37 AM 8/25/2022     2:01 PM 12/8/2022     7:33 AM   DEANNA-7 SCORE   Total Score 8 (mild anxiety) 2 (minimal anxiety) 5 (mild anxiety)   Total Score 8 2 5         2/13/2024     6:41 AM   Last PHQ-9   1.   Little interest or pleasure in doing things 1   2.  Feeling down, depressed, or hopeless 1   3.  Trouble falling or staying asleep, or sleeping too much 2   4.  Feeling tired or having little energy 1   5.  Poor appetite or overeating 1   6.  Feeling bad about yourself 1   7.  Trouble concentrating 1   8.  Moving slowly or restless 0   Q9: Thoughts of better off dead/self-harm past 2 weeks 0   PHQ-9 Total Score 8         12/8/2022     7:33 AM   DEANNA-7    1. Feeling nervous, anxious, or on edge 1   2. Not being able to stop or control worrying 1   3. Worrying too much about different things 1   4. Trouble relaxing 1   5. Being so restless that it is hard to sit still 1   6. Becoming easily annoyed or irritable 0   7. Feeling afraid, as if something awful might happen 0   DEANNA-7 Total Score 5   If you checked any problems, how difficult have they made it for you to do your work, take care of things at home, or get along with other people? Somewhat difficult       Suicide Assessment Five-step Evaluation and Treatment (SAFE-T)            2/13/2024   General Health   How would you rate your overall physical health? (!) FAIR   Feel stress (tense, anxious, or unable to sleep) To some extent   (!) STRESS CONCERN      2/13/2024   Nutrition   Three or more servings of calcium each day? Yes   Diet: Regular (no restrictions)   How many servings of fruit and vegetables per day? (!) 2-3   How many sweetened beverages each day? (!) 2         2/13/2024   Exercise   Days per week of moderate/strenous exercise 3 days   Average minutes spent exercising at this level 30 min         2/13/2024   Social Factors   Frequency of gathering with friends or relatives Twice a week   Worry food won't last until get money to buy more No   Food not last or not have enough money for food? No   Do you have housing?  Yes   Are you worried about losing your housing? No   Lack of transportation? No   Unable to get utilities (heat,electricity)? No          "2/13/2024   Dental   Dentist two times every year? (!) NO         2/13/2024   TB Screening   Were you born outside of [place]?  No       Today's PHQ-9 Score:       2/13/2024     6:41 AM   PHQ-9 SCORE   PHQ-9 Total Score MyChart 8 (Mild depression)   PHQ-9 Total Score 8         2/13/2024   Substance Use   Alcohol more than 3/day or more than 7/wk No   Do you use any other substances recreationally? No     Social History     Tobacco Use    Smoking status: Former     Packs/day: 0.00     Years: 0.00     Additional pack years: 0.00     Total pack years: 0.00     Types: Cigarettes, Pipe     Passive exposure: Yes    Smokeless tobacco: Never    Tobacco comments:     lived with a smoker until he was 17 years old   Vaping Use    Vaping Use: Never used   Substance Use Topics    Alcohol use: Not Currently     Comment: a couple beers a month    Drug use: No           2/13/2024   STI Screening   New sexual partner(s) since last STI/HIV test? No         2/13/2024   Contraception/Family Planning   Questions about contraception or family planning No        Reviewed and updated as needed this visit by Provider                    BP Readings from Last 3 Encounters:   02/13/24 126/76   10/03/23 127/87   03/28/23 132/84    Wt Readings from Last 3 Encounters:   02/13/24 122.7 kg (270 lb 6.4 oz)   02/02/24 120.2 kg (265 lb)   11/28/23 120.7 kg (266 lb)                      Review of Systems  Constitutional, HEENT, cardiovascular, pulmonary, gi and gu systems are negative, except as otherwise noted.     Objective    Exam  /76 (BP Location: Right arm, Patient Position: Sitting, Cuff Size: Adult Large)   Pulse 82   Temp 98.3  F (36.8  C) (Oral)   Resp 20   Ht 1.803 m (5' 11\")   Wt 122.7 kg (270 lb 6.4 oz)   SpO2 98%   BMI 37.71 kg/m     Estimated body mass index is 37.71 kg/m  as calculated from the following:    Height as of this encounter: 1.803 m (5' 11\").    Weight as of this encounter: 122.7 kg (270 lb 6.4 oz).    Physical " Exam  GENERAL: alert, no distress, and obese  EYES: Eyes grossly normal to inspection, PERRL and conjunctivae and sclerae normal  HENT: ear canals and TM's normal, nose and mouth without ulcers or lesions  NECK: no adenopathy, no asymmetry, masses, or scars  RESP: lungs clear to auscultation - no rales, rhonchi or wheezes  CV: regular rate and rhythm, normal S1 S2, no S3 or S4, no murmur, click or rub, no peripheral edema  ABDOMEN: soft, nontender, no hepatosplenomegaly, no masses and bowel sounds normal  MS: no gross musculoskeletal defects noted, no edema  SKIN: no suspicious lesions or rashes  NEURO: Normal strength and tone, mentation intact and speech normal  PSYCH: mentation appears normal, affect normal/bright      Signed Electronically by: Samantha Belle DO

## 2024-02-13 NOTE — PATIENT INSTRUCTIONS
Your Health Risk Assessment indicates you feel you are not in good health    A healthy lifestyle helps keep the body fit and the mind alert. It helps protect you from disease, helps you fight disease, and helps prevent chronic disease (disease that doesn't go away) from getting worse. This is important as you get older and begin to notice twinges in muscles and joints and a decline in the strength and stamina you once took for granted. A healthy lifestyle includes good healthcare, good nutrition, weight control, recreation, and regular exercise. Avoid harmful substances and do what you can to keep safe. Another part of a healthy lifestyle is stay mentally active and socially involved.    Good healthcare   Have a wellness visit every year.   If you have new symptoms, let us know right away. Don't wait until the next checkup.   Take medicines exactly as prescribed and keep your medicines in a safe place. Tell us if your medicine causes problems.   Healthy diet and weight control   Eat 3 or 4 small, nutritious, low-fat, high-fiber meals a day. Include a variety of fruits, vegetables, and whole-grain foods.   Make sure you get enough calcium in your diet. Calcium, vitamin D, and exercise help prevent osteoporosis (bone thinning).   If you live alone, try eating with others when you can. That way you get a good meal and have company while you eat it.   Try to keep a healthy weight. If you eat more calories than your body uses for energy, it will be stored as fat and you will gain weight.     Recreation   Recreation is not limited to sports and team events. It includes any activity that provides relaxation, interest, enjoyment, and exercise. Recreation provides an outlet for physical, mental, and social energy. It can give a sense of worth and achievement. It can help you stay healthy.    Mental Exercise and Social Involvement  Mental and emotional health is as important as physical health. Keep in touch with friends and  family. Stay as active as possible. Continue to learn and challenge yourself.   Things you can do to stay mentally active are:  Learn something new, like a foreign language or musical instrument.   Play SCRABBLE or do crossword puzzles. If you cannot find people to play these games with you at home, you can play them with others on your computer through the Internet.   Join a games club--anything from card games to chess or checkers or lawn bowling.   Start a new hobby.   Go back to school.   Volunteer.   Read.   Keep up with world events.  Eating Healthy Foods: Care Instructions  With every meal, you can make healthy food choices. Try to eat a variety of fruits, vegetables, whole grains, lean proteins, and low-fat dairy products. This can help you get the right balance of nutrients, including vitamins and minerals. Small changes add up over time. You can start by adding one healthy food to your meals each day.    Try to make half your plate fruits and vegetables, one-fourth whole grains, and one-fourth lean proteins. Try including dairy with your meals.   Eat more fruits and vegetables. Try to have them with most meals and snacks.   Foods for healthy eating    Fruits    These can be fresh, frozen, canned, or dried.  Try to choose whole fruit rather than fruit juice.  Eat a variety of colors.    Vegetables    These can be fresh, frozen, canned, or dried.  Beans, peas, and lentils count too.    Whole grains    Choose whole-grain breads, cereals, and noodles.  Try brown rice.    Lean proteins    These can include lean meat, poultry, fish, and eggs.  You can also have tofu, beans, peas, lentils, nuts, and seeds.    Dairy    Try milk, yogurt, and cheese.  Choose low-fat or fat-free when you can.  If you need to, use lactose-free milk or fortified plant-based milk products, such as soy milk.    Water    Drink water when you're thirsty.  Limit sugar-sweetened drinks, including soda, fruit drinks, and sports drinks.  Where  "can you learn more?  Go to https://www.[employer].net/patiented  Enter T756 in the search box to learn more about \"Eating Healthy Foods: Care Instructions.\"  Current as of: February 28, 2023               Content Version: 13.8    6725-3700 [employer].   Care instructions adapted under license by your healthcare professional. If you have questions about a medical condition or this instruction, always ask your healthcare professional. [employer] disclaims any warranty or liability for your use of this information.      Learning About Stress  What is stress?     Stress is your body's response to a hard situation. Your body can have a physical, emotional, or mental response. Stress is a fact of life for most people, and it affects everyone differently. What causes stress for you may not be stressful for someone else.  A lot of things can cause stress. You may feel stress when you go on a job interview, take a test, or run a race. This kind of short-term stress is normal and even useful. It can help you if you need to work hard or react quickly. For example, stress can help you finish an important job on time.  Long-term stress is caused by ongoing stressful situations or events. Examples of long-term stress include long-term health problems, ongoing problems at work, or conflicts in your family. Long-term stress can harm your health.  How does stress affect your health?  When you are stressed, your body responds as though you are in danger. It makes hormones that speed up your heart, make you breathe faster, and give you a burst of energy. This is called the fight-or-flight stress response. If the stress is over quickly, your body goes back to normal and no harm is done.  But if stress happens too often or lasts too long, it can have bad effects. Long-term stress can make you more likely to get sick, and it can make symptoms of some diseases worse. If you tense up when you are stressed, you " may develop neck, shoulder, or low back pain. Stress is linked to high blood pressure and heart disease.  Stress also harms your emotional health. It can make you samaniego, tense, or depressed. Your relationships may suffer, and you may not do well at work or school.  What can you do to manage stress?  You can try these things to help manage stress:   Do something active. Exercise or activity can help reduce stress. Walking is a great way to get started. Even everyday activities such as housecleaning or yard work can help.  Try yoga or jay chi. These techniques combine exercise and meditation. You may need some training at first to learn them.  Do something you enjoy. For example, listen to music or go to a movie. Practice your hobby or do volunteer work.  Meditate. This can help you relax, because you are not worrying about what happened before or what may happen in the future.  Do guided imagery. Imagine yourself in any setting that helps you feel calm. You can use online videos, books, or a teacher to guide you.  Do breathing exercises. For example:  From a standing position, bend forward from the waist with your knees slightly bent. Let your arms dangle close to the floor.  Breathe in slowly and deeply as you return to a standing position. Roll up slowly and lift your head last.  Hold your breath for just a few seconds in the standing position.  Breathe out slowly and bend forward from the waist.  Let your feelings out. Talk, laugh, cry, and express anger when you need to. Talking with supportive friends or family, a counselor, or a obey leader about your feelings is a healthy way to relieve stress. Avoid discussing your feelings with people who make you feel worse.  Write. It may help to write about things that are bothering you. This helps you find out how much stress you feel and what is causing it. When you know this, you can find better ways to cope.  What can you do to prevent stress?  You might try some of  "these things to help prevent stress:  Manage your time. This helps you find time to do the things you want and need to do.  Get enough sleep. Your body recovers from the stresses of the day while you are sleeping.  Get support. Your family, friends, and community can make a difference in how you experience stress.  Limit your news feed. Avoid or limit time on social media or news that may make you feel stressed.  Do something active. Exercise or activity can help reduce stress. Walking is a great way to get started.  Where can you learn more?  Go to https://www.[employer].net/patiented  Enter N032 in the search box to learn more about \"Learning About Stress.\"  Current as of: February 26, 2023               Content Version: 13.8    6272-6664 [employer].   Care instructions adapted under license by your healthcare professional. If you have questions about a medical condition or this instruction, always ask your healthcare professional. [employer] disclaims any warranty or liability for your use of this information.      Learning About Depression Screening  What is depression screening?  Depression screening is a way to see if you have depression symptoms. It may be done by a doctor or counselor. It's often part of a routine checkup. That's because your mental health is just as important as your physical health.  Depression is a mental health condition that affects how you feel, think, and act. You may:  Have less energy.  Lose interest in your daily activities.  Feel sad and grouchy for a long time.  Depression is very common. It affects people of all ages.  Many things can lead to depression. Some people become depressed after they have a stroke or find out they have a major illness like cancer or heart disease. The death of a loved one or a breakup may lead to depression. It can run in families. Most experts believe that a combination of inherited genes and stressful life events can cause " "it.  What happens during screening?  You may be asked to fill out a form about your depression symptoms. You and the doctor will discuss your answers. The doctor may ask you more questions to learn more about how you think, act, and feel.  What happens after screening?  If you have symptoms of depression, your doctor will talk to you about your options.  Doctors usually treat depression with medicines or counseling. Often, combining the two works best. Many people don't get help because they think that they'll get over the depression on their own. But people with depression may not get better unless they get treatment.  The cause of depression is not well understood. There may be many factors involved. But if you have depression, it's not your fault.  A serious symptom of depression is thinking about death or suicide. If you or someone you care about talks about this or about feeling hopeless, get help right away.  It's important to know that depression can be treated. Medicine, counseling, and self-care may help.  Where can you learn more?  Go to https://www.[employer].net/patiented  Enter T185 in the search box to learn more about \"Learning About Depression Screening.\"  Current as of: June 25, 2023               Content Version: 13.8    3135-2955 [employer].   Care instructions adapted under license by your healthcare professional. If you have questions about a medical condition or this instruction, always ask your healthcare professional. [employer] disclaims any warranty or liability for your use of this information.      Preventive Care Advice   This is general advice given by our system to help you stay healthy. However, your care team may have specific advice just for you. Please talk to your care team about your preventive care needs.  Nutrition  Eat 5 or more servings of fruits and vegetables each day.  Try wheat bread, brown rice and whole grain pasta (instead of white bread, " rice, and pasta).  Get enough calcium and vitamin D. Check the label on foods and aim for 100% of the RDA (recommended daily allowance).  Lifestyle  Exercise at least 150 minutes each week  (30 minutes a day, 5 days a week).  Do muscle strengthening activities 2 days a week. These help control your weight and prevent disease.  No smoking.  Wear sunscreen to prevent skin cancer.  Have a dental exam and cleaning every 6 months.  Yearly exams  See your health care team every year to talk about:  Any changes in your health.  Any medicines your care team has prescribed.  Preventive care, family planning, and ways to prevent chronic diseases.  Shots (vaccines)   HPV shots (up to age 26), if you've never had them before.  Hepatitis B shots (up to age 59), if you've never had them before.  COVID-19 shot: Get this shot when it's due.  Flu shot: Get a flu shot every year.  Tetanus shot: Get a tetanus shot every 10 years.  Pneumococcal, hepatitis A, and RSV shots: Ask your care team if you need these based on your risk.  Shingles shot (for age 50 and up)  General health tests  Diabetes screening:  Starting at age 35, Get screened for diabetes at least every 3 years.  If you are younger than age 35, ask your care team if you should be screened for diabetes.  Cholesterol test: At age 39, start having a cholesterol test every 5 years, or more often if advised.  Bone density scan (DEXA): At age 50, ask your care team if you should have this scan for osteoporosis (brittle bones).  Hepatitis C: Get tested at least once in your life.  STIs (sexually transmitted infections)  Before age 24: Ask your care team if you should be screened for STIs.  After age 24: Get screened for STIs if you're at risk. You are at risk for STIs (including HIV) if:  You are sexually active with more than one person.  You don't use condoms every time.  You or a partner was diagnosed with a sexually transmitted infection.  If you are at risk for HIV, ask  about PrEP medicine to prevent HIV.  Get tested for HIV at least once in your life, whether you are at risk for HIV or not.  Cancer screening tests  Cervical cancer screening: If you have a cervix, begin getting regular cervical cancer screening tests starting at age 21.  Breast cancer scan (mammogram): If you've ever had breasts, begin having regular mammograms starting at age 40. This is a scan to check for breast cancer.  Colon cancer screening: It is important to start screening for colon cancer at age 45.  Have a colonoscopy test every 10 years (or more often if you're at risk) Or, ask your provider about stool tests like a FIT test every year or Cologuard test every 3 years.  To learn more about your testing options, visit:   https://www.[employer]/600168.pdf.  For help making a decision, visit:   https://bit.[employer]/qj89178.  Prostate cancer screening test: If you have a prostate, ask your care team if a prostate cancer screening test (PSA) at age 55 is right for you.  Lung cancer screening: If you are a current or former smoker ages 50 to 80, ask your care team if ongoing lung cancer screenings are right for you.  For informational purposes only. Not to replace the advice of your health care provider. Copyright   2023 [employer]. All rights reserved. Clinically reviewed by the [facility] Transitions Program. [employer] [id number] - REV 01/24.

## 2024-03-27 ASSESSMENT — SLEEP AND FATIGUE QUESTIONNAIRES
HOW LIKELY ARE YOU TO NOD OFF OR FALL ASLEEP WHILE WATCHING TV: SLIGHT CHANCE OF DOZING
HOW LIKELY ARE YOU TO NOD OFF OR FALL ASLEEP WHILE SITTING AND READING: SLIGHT CHANCE OF DOZING
HOW LIKELY ARE YOU TO NOD OFF OR FALL ASLEEP IN A CAR, WHILE STOPPED FOR A FEW MINUTES IN TRAFFIC: WOULD NEVER DOZE
HOW LIKELY ARE YOU TO NOD OFF OR FALL ASLEEP WHILE SITTING INACTIVE IN A PUBLIC PLACE: WOULD NEVER DOZE
HOW LIKELY ARE YOU TO NOD OFF OR FALL ASLEEP WHILE SITTING QUIETLY AFTER LUNCH WITHOUT ALCOHOL: WOULD NEVER DOZE
HOW LIKELY ARE YOU TO NOD OFF OR FALL ASLEEP WHILE SITTING AND TALKING TO SOMEONE: WOULD NEVER DOZE
HOW LIKELY ARE YOU TO NOD OFF OR FALL ASLEEP WHILE LYING DOWN TO REST IN THE AFTERNOON WHEN CIRCUMSTANCES PERMIT: WOULD NEVER DOZE
HOW LIKELY ARE YOU TO NOD OFF OR FALL ASLEEP WHEN YOU ARE A PASSENGER IN A CAR FOR AN HOUR WITHOUT A BREAK: SLIGHT CHANCE OF DOZING

## 2024-03-28 ENCOUNTER — VIRTUAL VISIT (OUTPATIENT)
Dept: SLEEP MEDICINE | Facility: CLINIC | Age: 39
End: 2024-03-28
Payer: COMMERCIAL

## 2024-03-28 VITALS — BODY MASS INDEX: 37.24 KG/M2 | WEIGHT: 266 LBS | HEIGHT: 71 IN

## 2024-03-28 DIAGNOSIS — F51.03 PARADOXICAL INSOMNIA: ICD-10-CM

## 2024-03-28 DIAGNOSIS — F51.04 CHRONIC INSOMNIA: Primary | ICD-10-CM

## 2024-03-28 PROCEDURE — 90832 PSYTX W PT 30 MINUTES: CPT | Mod: 95 | Performed by: PSYCHOLOGIST

## 2024-03-28 ASSESSMENT — PAIN SCALES - GENERAL: PAINLEVEL: NO PAIN (0)

## 2024-03-28 NOTE — NURSING NOTE
Is the patient currently in the state of [place]? YES    Visit mode:VIDEO    If the visit is dropped, the patient can be reconnected by: VIDEO VISIT: Send to e-mail at: [email or web address]    Will anyone else be joining the visit? NO  (If patient encounters technical issues they should call [phone]    How would you like to obtain your AVS? MyChart    Are changes needed to the allergy or medication list? No    Reason for visit: RECHZUNILDA JONES

## 2024-03-28 NOTE — PROGRESS NOTES
"Virtual Visit Details    Type of service:  Video Visit     Originating Location (pt. Location): Home    Distant Location (provider location):  Off-site  Platform used for Video Visit: AmWell    Visit Start Time: 10:03 AM  Visit End Time: 10:30 AM        SLEEP MEDICINE VIRTUAL FOLLOW-UP VISIT  Sleep Psychology    Patient Name: Ramiro Najera  MRN:  [id number]  Date of Service: Mar 28, 2024       Subjective Report     Ramiro Najera  returns for a telehealth video visit to discuss progress in implementing behavioral strategies for the management of insomnia.  Patient consent for initiation of video visit was obtained and documented prior to initiation of visit.     Ramiro reports he continues to sleep better..  He is sleeping longer without awakening and feels more rested.  He feels less preoccupied or concerned about sleep.  He is noticing more consistency.     .  Recent Recommendation  Adjust your sleep schedule to 9 PM and a wake time of 5:30 AM.  Talk with your spouse about the possible impact of your cat and snoring as disrupters to sleep during the last third of the night.  Keep in mind that the experience of sleep changes throughout the night with respect to depth and type of sleep.  Our deepest sleep is during the first third to half of the night and our most dynamic and varying REM or dream sleep is mostly the last third interspersed with lighter stages of sleep for the most part.  Here is a review article that outlines a somewhat different approach to managing insomnia that focuses less on \"stimulus control\" which involves getting up and out of bed when awake.           Sleep Data:     Source of Sleep Estimates:  Verbal Self-report    Average Time in Bed: 7 hrs.  Average Total Sleep Time: 5.5-6 hrs  Sleep Efficiency estimate: Greater than 85%    EPWORTH SLEEPINESS SCALE    Sitting and reading 1   Watching TV 1   Sitting, inactive in a public place (theatre or mtg.) 0    As a passenger in a car 1   Lying " "down to rest in the afternoon when circumstance permit 0   Sitting and talking to someone 0   Sitting quietly after lunch without alcohol 0   In a car, while stopped for a few minutes in traffic 0   TOTAL SCORE (nl <11) 3     INSOMNIA SEVERITY INDEX     Difficulty falling asleep 1   Difficult staying asleep 2   Problems waking up to early 2   How SATISFIED/DISSATISFIED are you with your CURRENT sleep pattern? 2   How NOTICEABLE to others do you think your sleep pattern is in terms of your quality of life? 2   How WORRIED/DISTRESSED are you about your current sleep pattern? 1   To what extent do you consider your sleep problem to INTERFERE with your daily fuctioning(e.g. daytime fatigue, mood, ability to function at work/daily chores, concentration, mood,etc.) CURRENTLY? 1   INSOMNIA SEVERITY INDEX TOTAL SCORE 11    Absence of insomnia (0-7); sub-threshold insomnia (8-14); moderate insomnia (15-21); and severe insomnia (22-28)       Interventions     Strategies and recommendations including     were reviewed and discussed today.     Services provided are compliant with the requirements of [place] Statute SS 256B.0625 Subd. 3b and paragraph (b)       Vital Signs     Ht 1.803 m (5' 11\")   Wt 120.7 kg (266 lb)   BMI 37.10 kg/m       Mental Status     Orientation:  X3  Mood:  normal  Affect:  Congruent with mood  Speech/Language:  Normal  Thought Process: Intact  Associations:  Normal  Thought Content: Normal  Patient does not report any suicidal ideation, intention or plan.    Diagnostic Impressions and Plan        Chronic insomnia      Patient reports continued improvement and maintenance of behavioral sleep plan.  Sleep state misperception significantly reduced    Plan:  Continue current sleep schedule and plan, continue with psychotherapy for management of bipolar disorder.  Continue with trazodone for treatment of bipolar disorder/insomnia    Follow-up: as needed if symptoms recur      Celestine Oseguera" PAL White, TAHIR  Diplomate, Behavioral Sleep Medicine  [facility]      Note: This dictation was created using voice recognition software. This document may contain an error not identified before finalizing the document. If the error changes the accuracy of the document, I would appreciate it being brought to my attention.

## 2024-07-14 ASSESSMENT — PATIENT HEALTH QUESTIONNAIRE - PHQ9
SUM OF ALL RESPONSES TO PHQ QUESTIONS 1-9: 13
SUM OF ALL RESPONSES TO PHQ QUESTIONS 1-9: 13
10. IF YOU CHECKED OFF ANY PROBLEMS, HOW DIFFICULT HAVE THESE PROBLEMS MADE IT FOR YOU TO DO YOUR WORK, TAKE CARE OF THINGS AT HOME, OR GET ALONG WITH OTHER PEOPLE: SOMEWHAT DIFFICULT

## 2024-07-15 ENCOUNTER — OFFICE VISIT (OUTPATIENT)
Dept: FAMILY MEDICINE | Facility: CLINIC | Age: 39
End: 2024-07-15
Payer: COMMERCIAL

## 2024-07-15 ENCOUNTER — ORDERS ONLY (AUTO-RELEASED) (OUTPATIENT)
Dept: FAMILY MEDICINE | Facility: CLINIC | Age: 39
End: 2024-07-15

## 2024-07-15 VITALS
BODY MASS INDEX: 37.8 KG/M2 | HEIGHT: 71 IN | OXYGEN SATURATION: 97 % | RESPIRATION RATE: 18 BRPM | TEMPERATURE: 97.9 F | SYSTOLIC BLOOD PRESSURE: 138 MMHG | HEART RATE: 106 BPM | WEIGHT: 270 LBS | DIASTOLIC BLOOD PRESSURE: 88 MMHG

## 2024-07-15 DIAGNOSIS — E66.812 CLASS 2 OBESITY WITHOUT SERIOUS COMORBIDITY WITH BODY MASS INDEX (BMI) OF 37.0 TO 37.9 IN ADULT, UNSPECIFIED OBESITY TYPE: ICD-10-CM

## 2024-07-15 DIAGNOSIS — R00.0 TACHYCARDIA: Primary | ICD-10-CM

## 2024-07-15 DIAGNOSIS — R00.0 TACHYCARDIA: ICD-10-CM

## 2024-07-15 PROCEDURE — 99214 OFFICE O/P EST MOD 30 MIN: CPT | Performed by: PHYSICIAN ASSISTANT

## 2024-07-15 PROCEDURE — G2211 COMPLEX E/M VISIT ADD ON: HCPCS | Performed by: PHYSICIAN ASSISTANT

## 2024-07-15 ASSESSMENT — PAIN SCALES - GENERAL: PAINLEVEL: NO PAIN (0)

## 2024-07-15 NOTE — PROGRESS NOTES
Assessment & Plan   Problem List Items Addressed This Visit    None  Visit Diagnoses       Tachycardia    -  Primary    Relevant Orders    ZIO PATCH MAIL OUT (Completed)    Adult Cardiology Eval  Referral    Class 2 obesity without serious comorbidity with body mass index (BMI) of 37.0 to 37.9 in adult, unspecified obesity type        Relevant Orders    Adult Nutrition  Referral    Adult Comprehensive Weight Management  Referral           Impression is tachycardia, mild cardiomegaly and pericardial effusion of unknown etiology. Tachycardia is persistent today, but he is asymptomatic declines repeat evaluation for PE. Will obtain Zio and cardiology eval for cardiac findings on CT. Also referred to nutrition and weight loss clinic as he would appreciate their input. Follow up with primary or myself prn in meantime.    Complete history and physical exam as below. Afebrile with normal vital signs aside from asymptomatic tachycardia.  .    DDx and Dx discussed with and explained to the pt to their satisfaction.  All questions were answered at this time. Pt expressed understanding of and agreement with this dx, tx, and plan. No further workup warranted and standard medication warnings given. I have given the patient a list of pertinent indications for re-evaluation. Will go to the Emergency Department if symptoms worsen or new concerning symptoms arise. Patient left in no apparent distress.   Review of prior external note(s) from - [facility] information from [facility] reviewed  Ordering of each unique test  36 minutes spent by me on the date of the encounter doing chart review, history and exam, documentation and further activities per the note   MED REC REQUIRED  Post Medication Reconciliation Status: discharge medications reconciled, continue medications without change  See Patient Instructions      Rancho Rubio is a 38 year old, presenting for the following health issues:  ER  "F/U        7/15/2024     9:40 AM   Additional Questions   Roomed by Jerome Tian CMA   Accompanied by N/A         7/15/2024     9:40 AM   Patient Reported Additional Medications   Patient reports taking the following new medications Nexium     HPI   ED/UC Followup:    Facility:  [facility]  Date of visit: 07/14/2024  Reason for visit: Fast heart rate (145-160)  Current Status: Feeling better    Patient had been feeling sweaty and had vomited late Saturday. Has a single energy drink in the am.  Hospital did a CT and EKG.  EKG came back as normal, while CTA showed some mild heart enlargement and pericardial effusion and distal esophageal thickening. CTA was non-diagnostic for ruling out PE. Has felt improved since then. Interested in nutrition referral as he has struggled with losing weight for years.      Review of Systems  Constitutional, neuro, ENT, endocrine, pulmonary, cardiac, gastrointestinal, genitourinary, musculoskeletal, integument and psychiatric systems are negative, except as otherwise noted.      Objective    BP (!) 140/88   Pulse 106   Temp 97.9  F (36.6  C) (Temporal)   Resp 18   Ht 1.798 m (5' 10.79\")   Wt 122.5 kg (270 lb)   SpO2 97%   BMI 37.88 kg/m    Body mass index is 37.88 kg/m .  Physical Exam  Vitals and nursing note reviewed.   Constitutional:       General: He is not in acute distress.     Appearance: He is not ill-appearing or diaphoretic.   HENT:      Head: Normocephalic and atraumatic.      Mouth/Throat:      Mouth: Mucous membranes are moist.   Eyes:      Conjunctiva/sclera: Conjunctivae normal.   Cardiovascular:      Rate and Rhythm: Normal rate and regular rhythm.      Heart sounds: Normal heart sounds. No murmur heard.     No friction rub. No gallop.      Comments: 2+ symmetric radial/PT pulses. No LE edema or tenderness.  Pulmonary:      Effort: Pulmonary effort is normal. No respiratory distress.      Breath sounds: Normal breath sounds. No stridor. No wheezing, rhonchi " or rales.   Abdominal:      General: Bowel sounds are normal. There is no distension.      Palpations: Abdomen is soft. There is no mass.      Tenderness: There is no abdominal tenderness. There is no guarding or rebound.      Hernia: No hernia is present.   Skin:     General: Skin is warm and dry.   Neurological:      General: No focal deficit present.      Mental Status: He is alert. Mental status is at baseline.   Psychiatric:         Mood and Affect: Mood normal.         Behavior: Behavior normal.          Signed Electronically by: HEMAL Honeycutt

## 2024-07-15 NOTE — PATIENT INSTRUCTIONS
Vidal Rubio,    Thank you for allowing [facility] to manage your care.    I am unsure of the cause of your symptoms, but your exam is reassuring. We will see what our workup shows.     If you develop worsening/changing/recurrent symptoms at any time, please be seen in clinic/urgent care or call 911/go to the emergency department for evaluation as we discussed.    I made a referral to cardiology, weight management and the nutrition services. They will be calling in approximately 1 week to set up your appointment.  If you do not hear from them, please call the specialty number on your after visit summary.     If you have any questions or concerns, please feel free to call us at [phone]    Sincerely,    Rishabh Schroeder PA-C    Did you know?      You can schedule a video visit for follow-up appointments as well as future appointments for certain conditions.  Please see the below link.     [email or web address]    If you have not already done so,  I encourage you to sign up for [email or web address] ([email or web address]).  This will allow you to review your results, securely communicate with a provider, and schedule virtual visits as well.

## 2024-07-17 ASSESSMENT — SLEEP AND FATIGUE QUESTIONNAIRES
HOW LIKELY ARE YOU TO NOD OFF OR FALL ASLEEP IN A CAR, WHILE STOPPED FOR A FEW MINUTES IN TRAFFIC: WOULD NEVER DOZE
HOW LIKELY ARE YOU TO NOD OFF OR FALL ASLEEP WHILE SITTING QUIETLY AFTER LUNCH WITHOUT ALCOHOL: WOULD NEVER DOZE
HOW LIKELY ARE YOU TO NOD OFF OR FALL ASLEEP WHEN YOU ARE A PASSENGER IN A CAR FOR AN HOUR WITHOUT A BREAK: SLIGHT CHANCE OF DOZING
HOW LIKELY ARE YOU TO NOD OFF OR FALL ASLEEP WHILE WATCHING TV: SLIGHT CHANCE OF DOZING
HOW LIKELY ARE YOU TO NOD OFF OR FALL ASLEEP WHILE LYING DOWN TO REST IN THE AFTERNOON WHEN CIRCUMSTANCES PERMIT: MODERATE CHANCE OF DOZING
HOW LIKELY ARE YOU TO NOD OFF OR FALL ASLEEP WHILE SITTING AND READING: SLIGHT CHANCE OF DOZING
HOW LIKELY ARE YOU TO NOD OFF OR FALL ASLEEP WHILE SITTING AND TALKING TO SOMEONE: WOULD NEVER DOZE
HOW LIKELY ARE YOU TO NOD OFF OR FALL ASLEEP WHILE SITTING INACTIVE IN A PUBLIC PLACE: WOULD NEVER DOZE

## 2024-07-18 ENCOUNTER — VIRTUAL VISIT (OUTPATIENT)
Dept: SURGERY | Facility: CLINIC | Age: 39
End: 2024-07-18
Payer: COMMERCIAL

## 2024-07-18 ENCOUNTER — TELEPHONE (OUTPATIENT)
Dept: SURGERY | Facility: CLINIC | Age: 39
End: 2024-07-18

## 2024-07-18 VITALS — BODY MASS INDEX: 37.88 KG/M2 | WEIGHT: 270 LBS

## 2024-07-18 DIAGNOSIS — E66.812 CLASS 2 SEVERE OBESITY DUE TO EXCESS CALORIES WITH SERIOUS COMORBIDITY AND BODY MASS INDEX (BMI) OF 37.0 TO 37.9 IN ADULT (H): ICD-10-CM

## 2024-07-18 DIAGNOSIS — E88.810 METABOLIC SYNDROME: ICD-10-CM

## 2024-07-18 DIAGNOSIS — E66.01 CLASS 2 SEVERE OBESITY DUE TO EXCESS CALORIES WITH SERIOUS COMORBIDITY AND BODY MASS INDEX (BMI) OF 37.0 TO 37.9 IN ADULT (H): ICD-10-CM

## 2024-07-18 DIAGNOSIS — E78.5 DYSLIPIDEMIA: ICD-10-CM

## 2024-07-18 DIAGNOSIS — E66.812 CLASS 2 OBESITY WITHOUT SERIOUS COMORBIDITY WITH BODY MASS INDEX (BMI) OF 37.0 TO 37.9 IN ADULT, UNSPECIFIED OBESITY TYPE: Primary | ICD-10-CM

## 2024-07-18 DIAGNOSIS — K21.9 GASTROESOPHAGEAL REFLUX DISEASE WITHOUT ESOPHAGITIS: ICD-10-CM

## 2024-07-18 PROCEDURE — 99205 OFFICE O/P NEW HI 60 MIN: CPT | Mod: 95 | Performed by: FAMILY MEDICINE
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
## 2024-07-18 NOTE — PATIENT INSTRUCTIONS
[facility]    Remember to:    -Eat 3 meals a day (not 2, not 5) Chew your food well/SLOW down  -Eat your protein first  -Be a water drinker/Minize liquid calories (no regular pop, no juice) skim or 1% milk OK  -Sleep 7-8 hours each night. Address sleep if problematic  -Stress management is important. Address if problematic  -Move-8000 steps daily Muscle: maintain your muscle mass (strength training 2X/wk)  -Wheat, not white (bread, pasta, crackers, jonas, bagels, tortillas, rice)  -Limit restaurant, cafeteria, take out, drive through to 2 times per week or less  -Minimize caffeine, alcohol, and night-time snacking  -Consider keeping a food diary (i.e. My Fitness Pal, Lose It, or other food tracker)  -Follow up with the dietitian      **Some lean proteins: chicken, turkey, tuna, salmon, crab, fish, shrimp, scallops, lobster, lean cuts of beef and pork, luncheon meats, veggie burgers, beans (black, lima, garbanzo, conrad, kidney, refried), chile, cottage cheese, string cheese, other cheese, eggs, tofu, peanut butter, nuts, vegan crumbles, greek yogurt      MEDICATIONS FOR WEIGHT LOSS    PHENTERMINE (Adipex): approved in 1959 for appetite suppression.  It has stimulant effects and cannot be used with Ritalin, Concerta, or other stimulants.  It is not addictive although it's chemically related to amphetamines.  Amphetamines are addictive. The most common side effects are dry mouth, increased energy and concentration, increased pulse, and constipation.  You should not take phentermine if you have glaucoma, hyperthyroidism, or uncontrolled/untreated hypertension.  $24-$30 for 90 tablets    PHENDIMETRAZINE (Bontril): Appetite suppressant/sympathomimetic.  Controlled substance.  Side effects and contraindications similar to phentermine.  $45-$60 for 3 month supply    TOPIRAMATE (Topamax): Anti-seizure medication, also used to prevent migraines.  Side effects include paresthesia, glaucoma, altered  concentration, attention difficulties, memory and speech problems, metabolic acidosis, depression, increase in body temperature and decrease sweating, kidney stones, and weight loss.  Do not take Topamax while taking Depakote as this can cause high ammonia levels.  You must have reliable birth control as Topamax can cause birth defects.  Discontinue slowly to avoid seizure.  Insurance usually covers Topiramate.    QSYMIA (Phentermine + Topamax):  See above information about phentermine and Topamax.  Most common side effects are paresthesia, dizziness, distortion of taste, insomnia, constipation, and dry mouth.  $150-$220 per month    DIETHYLPROPION (Tenuate): Sympathomimetic amine.  Appetite suppressant.  Doses 25 mg before meals or 75 mg per day.  Most common side effects are hypertension, palpitations, EKG changes, and increased seizures in epileptics.  There can be a possible adverse reaction with alcohol.  $70-$90 per 3 months    XENICAL(Orlistat) (Ryan OTC): Approved in 1999.  A fat-blocker.  It reduces absorption of fat by approximately 30%.  It has beneficial effects on lipid levels.  Side effects include diarrhea, abdominal cramping, fecal incontinence, oily spotting, and flatus with discharge.  Side effects are minimized if the patient limits their dietary fat to no more than 30% of their diet.  Patients must take a multivitamin daily to avoid vitamin D, E, A, and K deficiency.  $120 per month    CONTRAVE (Naltrexone/Bupropion): Approved in 2014.  It is a combination pill including an opioid receptor blocker and a long-standing antidepressant.  Most common side effects include nausea, constipation, headache, vomiting, dizziness, trouble sleeping, dry mouth, and diarrhea.  With all antidepressants watch for mood changes and suicide ideation.  Bupropion has been known to lower the seizure threshold in those prone to seizures.  It should not be used in a patient with a recent history of bulimia. It has been  associated with liver damage from taking higher than recommended doses.  Do not use countrave if you have taken opioid medications or opioid street drugs in the past 7-10 days, if you are currently on opioids, methadone, or if you are pregnant.  Do not use contrave if you have recently stopped using alcohol or benzodiazepines.  Taper off contrave slowly.  Dosing: titrate up to 2 tablets twice daily of the Naltrexone 8 mg/ Bupropion 90 mg tablets.  $200 for 90 tablets    SAXENDA (Liraglutide (called Victoza for Type 2 Diabetes): A daily injectable (3mg daily) medication used for type 2 diabetes (Victoza). Glucagon-like peptide-1 (GLP-1) agonist. Contraindications include personal or family history of medullary thyroid cancer or MEN type 2. Acute pancreatitis has been observed in patients taking liraglutide. Liraglutide causes C-cell tumors in rats and mice. It is unknown whether liraglutide causes tumors in humans. Start at 0.6mg, increasing the dose weekly up to 3mg.     VYVANSE (Lisdexamfetamine dimesylate): a CNS stimulant used to treat ADHD. Indicated for the treatment of moderate to severe Binge Eating Disorder in Adults. Contraindicated in patients with known heart disease, structural abnormalities of the heart, serious heart arrhythmias or unexplained syncope. CNS stimulants such as vyvanse may cause manic or psychotic symptoms in patients with BPAD or pre-existing psychosis. Use with caution in patients with Raynaud's phenomenon. Most common side effects include dry mouth, insomnia, decreased appetite, increased heart rate, jittery feeling, constipation and anxiety.     WEGOVY (Semaglutide (called Ozempic for Type 2 Diabetes): A weekly injectable (2.4mg weekly) medication used for type 2 diabetes (Ozempic). Glucagon-like peptide-1 (GLP-1) agonist. Contraindications include personal or family history of medullary thyroid cancer or MEN type 2. Acute pancreatitis has been observed in patients taking Semaglutide.  Semaglutide causes C-cell tumors in rats and mice. It is unknown whether Semaglutide causes tumors in humans. Start at 0.25mg, increasing to 0.5, 1.0, 1.7 then 2.4 monthly. $1200/mo    ZEPBOUND (Tirzepatide (called Mounjaro for Type 2 Diabetes): A weekly injectable medication indicated for type 2 diabetes in 2022 and for weight loss in 2023.. Glucagon-like-peptide-1 (GLP-1) agonist and Glucose-Dependent Insulinotropic Polypeptide (GIP) agonist. Contraindications include personal or family history of medullary thyroid cancer or MEN type 2. Acute pancreatitis has been observed in patients taking Tirzepatide. Tirzepatide causes C-cell tumors in rats and mice. It is unknown whether Tirzepatide causes tumors in humans. Watch for neck mass, difficulty swallowing, persistent hoarseness, and epigastric abdominal pain. Most common side effects include nausea, diarrhea, vomiting, constipation, dyspepsia, and abdominal pain. Start at 2.5mg, increasing to 5.0, 7.5, 10, 12.5 then 15mg monthly. $1,000/month

## 2024-07-18 NOTE — TELEPHONE ENCOUNTER
Attempted to reach pt to make follow up visit with . LM that appt details will be through [facility] and to call [phone] if changes are needed.
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
## 2024-07-18 NOTE — NURSING NOTE
Is the patient currently in the state of [place]? YES    Visit mode:VIDEO    If the visit is dropped, the patient can be reconnected by: VIDEO VISIT: Text to cell phone:   Telephone Information:   Mobile [phone]    and VIDEO VISIT: Send to e-mail at: [email or web address]    Will anyone else be joining the visit? NO  (If patient encounters technical issues they should call [phone]    How would you like to obtain your AVS? MyChart    Are changes needed to the allergy or medication list? No    Are refills needed on medications prescribed by this physician? NO    Reason for visit: Consult    Deyanira JONES

## 2024-07-20 ENCOUNTER — LAB (OUTPATIENT)
Dept: LAB | Facility: CLINIC | Age: 39
End: 2024-07-20
Payer: COMMERCIAL

## 2024-07-20 DIAGNOSIS — E66.812 CLASS 2 OBESITY WITHOUT SERIOUS COMORBIDITY WITH BODY MASS INDEX (BMI) OF 37.0 TO 37.9 IN ADULT, UNSPECIFIED OBESITY TYPE: ICD-10-CM

## 2024-07-20 LAB
ALBUMIN SERPL BCG-MCNC: 4.5 G/DL (ref 3.5–5.2)
ALP SERPL-CCNC: 80 U/L (ref 40–150)
ALT SERPL W P-5'-P-CCNC: 44 U/L (ref 0–70)
AST SERPL W P-5'-P-CCNC: 25 U/L (ref 0–45)
BILIRUB DIRECT SERPL-MCNC: <0.2 MG/DL (ref 0–0.3)
BILIRUB SERPL-MCNC: 0.2 MG/DL
FERRITIN SERPL-MCNC: 348 NG/ML (ref 31–409)
PROT SERPL-MCNC: 7.3 G/DL (ref 6.4–8.3)
PTH-INTACT SERPL-MCNC: 22 PG/ML (ref 15–65)
SHBG SERPL-SCNC: 9 NMOL/L (ref 11–80)
VIT B12 SERPL-MCNC: 505 PG/ML (ref 232–1245)

## 2024-07-20 PROCEDURE — 83970 ASSAY OF PARATHORMONE: CPT

## 2024-07-20 PROCEDURE — 84270 ASSAY OF SEX HORMONE GLOBUL: CPT

## 2024-07-20 PROCEDURE — 82728 ASSAY OF FERRITIN: CPT

## 2024-07-20 PROCEDURE — 80076 HEPATIC FUNCTION PANEL: CPT

## 2024-07-20 PROCEDURE — 36415 COLL VENOUS BLD VENIPUNCTURE: CPT

## 2024-07-20 PROCEDURE — 82607 VITAMIN B-12: CPT

## 2024-07-20 PROCEDURE — 84403 ASSAY OF TOTAL TESTOSTERONE: CPT

## 2024-07-23 ENCOUNTER — VIRTUAL VISIT (OUTPATIENT)
Dept: SURGERY | Facility: CLINIC | Age: 39
End: 2024-07-23
Payer: COMMERCIAL

## 2024-07-23 DIAGNOSIS — Z71.3 NUTRITIONAL COUNSELING: Primary | ICD-10-CM

## 2024-07-23 DIAGNOSIS — E66.812 CLASS 2 OBESITY WITHOUT SERIOUS COMORBIDITY WITH BODY MASS INDEX (BMI) OF 37.0 TO 37.9 IN ADULT, UNSPECIFIED OBESITY TYPE: ICD-10-CM

## 2024-07-23 PROCEDURE — 97802 MEDICAL NUTRITION INDIV IN: CPT | Mod: 95 | Performed by: DIETITIAN, REGISTERED

## 2024-07-23 NOTE — PROGRESS NOTES
"Ramiro Najera is a 38 year old who is being evaluated via a billable video visit.      How would you like to obtain your AVS? MyChart  If the video visit is dropped, the invitation should be resent by: Text to cell phone: [phone]  Will anyone else be joining your video visit? No          Medical Weight Loss Initial Diet Evaluation  Assessment:  This patient was referred by Dr. Meadows for MNT as treatment for Obesity   Ramiro is presenting today for a new weight management nutrition consultation. Pt has had an initial appointment with Dr. Meadows.    Weight loss medication:  zepbound . Started on Saturday  Reduction in bowel movements    Personal Goals: recent tachycardiac event- this was eye opening  Weight loss and prevention     Anthropometrics:    Pt's weight is 0 lbs 0 oz  Initial weight: 270lb  Weight change: none  BMI: There is no height or weight on file to calculate BMI.   Ideal body weight: 74.8 kg (164 lb 14.8 oz)  Adjusted ideal body weight: 93.9 kg (206 lb 15.3 oz)  Estimated RMR ([place]-St Jeor equation):  2156 kcals x 1.2 (sedentary) = 2964 kcals (for weight maintenance)    Recommended Protein Intake:  grams of protein/day    Medical History:  Patient Active Problem List   Diagnosis    BMI 37.0-37.9, adult    History of depression    History of Lyme disease    Bipolar 2 disorder (H)    Dyslipidemia    Metabolic syndrome    Gastroesophageal reflux disease without esophagitis    Class 2 severe obesity due to excess calories with serious comorbidity in adult (H)      Diabetes: no  HbA1c:  No results found for: \"HGBA1C\"    Nutrition History:   Food allergies/intolerances/cultural or religous food customs: No   Weight loss history: lost 70lb about 7 years and was able to keep it off for a while  Struggles with stress eating  Walking also helps with stress management    Dietary Recall:  Breakfast: ham and banana, green tea, electrolyte water  Lunch: home- leftovers, office- salad with chicken "   -used to snack more frequently before medication  Dinner: greek yogurt bowl with chicken and veggies,  salad  Eating out: Saturday night or Sunday morning    Beverages: green tea, electrolyte beverage, water  Has been reducing caffeine    Exercise: walking to and from office a couple of days per week  -would like like add more after meeting with cardiologist    Nutrition Diagnosis (PES statement):     Obesity related to excessive energy intake as evidence by estimated intake greater than RMR, reports of stress eating and BMI of 37.88     Nutrition Intervention  Food and/or Nutrient Delivery   Placed emphasis on importance of developing a healthy meal routine, aiming for 3 meals a day and no snacks.  Nutrition Education   Discussed with patient how to build a meal: the importance of including a lean/low fat protein at each meal, include a source of vegetables at a minimum of lunch and dinner and limiting carbohydrate intake to 30-45g per meal  Educated on sources of lean protein, portion sizes, the amount of grams found in each source. Recommend patient to aim for 20-30g protein at each meal.    Nutrition Counseling   Encouraged importance of developing routine exercise for health benefits and weight loss.    Goals established by patient:   20-30g protein per tate  Add a bit of slow digesting carb to meals  Handouts provided:  Keys to success for weight management    Assessment/Plan:    Pt will follow up in 5 month(s) with bariatrician and 1 month(s) with dietitian.     Video-Visit Details    Type of service:  Video Visit    Video Start Time (time video started): 4:00p    Video End Time (time video stopped): 4:30p    Originating Location (pt. Location): Home      Distant Location (provider location):  Off-site    Mode of Communication:  Video Conference via [facility]    Physician has received verbal consent for a Video Visit from the patient? Yes      Radha Chilel RD

## 2024-07-23 NOTE — LETTER
"7/23/2024      Ramiro Najera  [address]  [facility] [address]      Dear Colleague,    Thank you for referring your patient, Ramiro Najera, to the [facility] [place]. Please see a copy of my visit note below.    Ramiro Najera is a 38 year old who is being evaluated via a billable video visit.      How would you like to obtain your AVS? MyChart  If the video visit is dropped, the invitation should be resent by: Text to cell phone: [phone]  Will anyone else be joining your video visit? No          Medical Weight Loss Initial Diet Evaluation  Assessment:  This patient was referred by Dr. Meadows for MNT as treatment for Obesity   Ramiro is presenting today for a new weight management nutrition consultation. Pt has had an initial appointment with Dr. Meadows.    Weight loss medication:  zepbound . Started on Saturday  Reduction in bowel movements    Personal Goals: recent tachycardiac event- this was eye opening  Weight loss and prevention     Anthropometrics:    Pt's weight is 0 lbs 0 oz  Initial weight: 270lb  Weight change: none  BMI: There is no height or weight on file to calculate BMI.   Ideal body weight: 74.8 kg (164 lb 14.8 oz)  Adjusted ideal body weight: 93.9 kg (206 lb 15.3 oz)  Estimated RMR ([place]-St Jeor equation):  2156 kcals x 1.2 (sedentary) = 2964 kcals (for weight maintenance)    Recommended Protein Intake:  grams of protein/day    Medical History:  Patient Active Problem List   Diagnosis     BMI 37.0-37.9, adult     History of depression     History of Lyme disease     Bipolar 2 disorder (H)     Dyslipidemia     Metabolic syndrome     Gastroesophageal reflux disease without esophagitis     Class 2 severe obesity due to excess calories with serious comorbidity in adult (H)      Diabetes: no  HbA1c:  No results found for: \"HGBA1C\"    Nutrition History:   Food allergies/intolerances/cultural or religous food customs: No "   Weight loss history: lost 70lb about 7 years and was able to keep it off for a while  Struggles with stress eating  Walking also helps with stress management    Dietary Recall:  Breakfast: ham and banana, green tea, electrolyte water  Lunch: home- leftovers, office- salad with chicken   -used to snack more frequently before medication  Dinner: greek yogurt bowl with chicken and veggies,  salad  Eating out: Saturday night or Sunday morning    Beverages: green tea, electrolyte beverage, water  Has been reducing caffeine    Exercise: walking to and from office a couple of days per week  -would like like add more after meeting with cardiologist    Nutrition Diagnosis (PES statement):     Obesity related to excessive energy intake as evidence by estimated intake greater than RMR, reports of stress eating and BMI of 37.88     Nutrition Intervention  Food and/or Nutrient Delivery   Placed emphasis on importance of developing a healthy meal routine, aiming for 3 meals a day and no snacks.  Nutrition Education   Discussed with patient how to build a meal: the importance of including a lean/low fat protein at each meal, include a source of vegetables at a minimum of lunch and dinner and limiting carbohydrate intake to 30-45g per meal  Educated on sources of lean protein, portion sizes, the amount of grams found in each source. Recommend patient to aim for 20-30g protein at each meal.    Nutrition Counseling   Encouraged importance of developing routine exercise for health benefits and weight loss.    Goals established by patient:   20-30g protein per tate  Add a bit of slow digesting carb to meals  Handouts provided:  Keys to success for weight management    Assessment/Plan:    Pt will follow up in 5 month(s) with bariatrician and 1 month(s) with dietitian.     Video-Visit Details    Type of service:  Video Visit    Video Start Time (time video started): 4:00p    Video End Time (time video stopped):  4:30p    Originating Location (pt. Location): Home      Distant Location (provider location):  Off-site    Mode of Communication:  Video Conference via [facility]    Physician has received verbal consent for a Video Visit from the patient? Yes      Radha Chilel RD        Again, thank you for allowing me to participate in the care of your patient.        Sincerely,        Radha Chilel RD

## 2024-07-24 LAB
TESTOST FREE SERPL-MCNC: 4.35 NG/DL
TESTOST SERPL-MCNC: 136 NG/DL (ref 240–950)

## 2024-09-03 ENCOUNTER — VIRTUAL VISIT (OUTPATIENT)
Dept: SURGERY | Facility: CLINIC | Age: 39
End: 2024-09-03
Payer: COMMERCIAL

## 2024-09-03 VITALS — HEIGHT: 70 IN | BODY MASS INDEX: 35.5 KG/M2 | WEIGHT: 248 LBS

## 2024-09-03 DIAGNOSIS — E66.812 OBESITY, CLASS II, BMI 35-39.9, ISOLATED (SEE ACTUAL BMI): Primary | ICD-10-CM

## 2024-09-03 DIAGNOSIS — Z71.3 NUTRITIONAL COUNSELING: ICD-10-CM

## 2024-09-03 PROCEDURE — 97803 MED NUTRITION INDIV SUBSEQ: CPT | Mod: 95 | Performed by: DIETITIAN, REGISTERED

## 2024-09-03 NOTE — PROGRESS NOTES
Ramiro Najera is a 38 year old who is being evaluated via a billable video visit.      How would you like to obtain your AVS? MyChart  If the video visit is dropped, the invitation should be resent by: Send to e-mail at: [email or web address]  Will anyone else be joining your video visit? No        Medical  Weight Loss Follow-Up Diet Evaluation  Assessment:  Ramiro is presenting today for a follow up weight management nutrition consultation.  This patient has had an initial appointment and was referred by Dr. Meadows for MNT as treatment for Obesity   Weight loss medication:  zepbound - 5mg, would like to stay here for a few more weeks  Pt's weight is 248 lbs 0 oz  Initial weight: 270lb  Weight change: down 22lb         No data to display              BMI: There is no height or weight on file to calculate BMI.  Ideal body weight: 74.8 kg (164 lb 14.8 oz)  Adjusted ideal body weight: 93.9 kg (206 lb 15.3 oz)    Estimated RMR ([place]-St Jeor equation):   2059 kcals x 1.2 (sedentary) = 2470 kcals (for weight maintenance)    Recommended Protein Intake:  grams of protein/day  Patient Active Problem List:  Patient Active Problem List   Diagnosis    BMI 37.0-37.9, adult    History of depression    History of Lyme disease    Bipolar 2 disorder (H)    Dyslipidemia    Metabolic syndrome    Gastroesophageal reflux disease without esophagitis    Class 2 severe obesity due to excess calories with serious comorbidity in adult (H)       Progress on goals from last visit: protein is going really well  -fiber intake is really good, not really craving carbs a lot, drawn more to fruit  -energy is better, bowels are normal, water is great    Exercise: gym 2-4 times per week, walking more- building muscle    Nutrition Diagnosis:    Obesity related to excessive energy intake as evidence by estimated intake greater than RMR, reports of stress eating and BMI of 35.58      Intervention:  Food and/or nutrient delivery: consistency with  diet- protein first, higher fiber  Nutrition counseling: support for continued success  Coordination of nutrition care: no change to medication at this time    Monitoring/Evaluation:    Goals:  Continue with current plan    Patient to follow up in 2 month(s) with bariatrician and 4 month(s) with RD        Video-Visit Details    Type of service:  Video Visit    Video Start Time (time video started): 3:30p    Video End Time (time video stopped): 3:45p    Originating Location (pt. Location): Home      Distant Location (provider location):  Off-site    Mode of Communication:  Video Conference via [facility]    Physician has received verbal consent for a Video Visit from the patient? Yes      Radha Chilel RD

## 2024-09-03 NOTE — LETTER
9/3/2024      Ramiro Najera  [address]  [facility] [address]      Dear Colleague,    Thank you for referring your patient, Ramiro Najera, to the [facility] BARIATRICS [facility]ARE [place]. Please see a copy of my visit note below.    Ramiro Najera is a 38 year old who is being evaluated via a billable video visit.      How would you like to obtain your AVS? MyChart  If the video visit is dropped, the invitation should be resent by: Send to e-mail at: [email or web address]  Will anyone else be joining your video visit? No        Medical  Weight Loss Follow-Up Diet Evaluation  Assessment:  Ramiro is presenting today for a follow up weight management nutrition consultation.  This patient has had an initial appointment and was referred by Dr. Meadows for MNT as treatment for Obesity   Weight loss medication:  zepbound - 5mg, would like to stay here for a few more weeks  Pt's weight is 248 lbs 0 oz  Initial weight: 270lb  Weight change: down 22lb         No data to display              BMI: There is no height or weight on file to calculate BMI.  Ideal body weight: 74.8 kg (164 lb 14.8 oz)  Adjusted ideal body weight: 93.9 kg (206 lb 15.3 oz)    Estimated RMR ([place]-St Jeor equation):   2059 kcals x 1.2 (sedentary) = 2470 kcals (for weight maintenance)    Recommended Protein Intake:  grams of protein/day  Patient Active Problem List:  Patient Active Problem List   Diagnosis     BMI 37.0-37.9, adult     History of depression     History of Lyme disease     Bipolar 2 disorder (H)     Dyslipidemia     Metabolic syndrome     Gastroesophageal reflux disease without esophagitis     Class 2 severe obesity due to excess calories with serious comorbidity in adult (H)       Progress on goals from last visit: protein is going really well  -fiber intake is really good, not really craving carbs a lot, drawn more to fruit  -energy is better, bowels are normal, water is  great    Exercise: gym 2-4 times per week, walking more- building muscle    Nutrition Diagnosis:    Obesity related to excessive energy intake as evidence by estimated intake greater than RMR, reports of stress eating and BMI of 35.58      Intervention:  Food and/or nutrient delivery: consistency with diet- protein first, higher fiber  Nutrition counseling: support for continued success  Coordination of nutrition care: no change to medication at this time    Monitoring/Evaluation:    Goals:  Continue with current plan    Patient to follow up in 2 month(s) with bariatrician and 4 month(s) with RD        Video-Visit Details    Type of service:  Video Visit    Video Start Time (time video started): 3:30p    Video End Time (time video stopped): 3:45p    Originating Location (pt. Location): Home      Distant Location (provider location):  Off-site    Mode of Communication:  Video Conference via [facility]    Physician has received verbal consent for a Video Visit from the patient? Yes      Radha Chilel RD          Again, thank you for allowing me to participate in the care of your patient.        Sincerely,        Radha Chilel RD

## 2024-09-08 DIAGNOSIS — E66.812 CLASS 2 OBESITY WITHOUT SERIOUS COMORBIDITY WITH BODY MASS INDEX (BMI) OF 37.0 TO 37.9 IN ADULT, UNSPECIFIED OBESITY TYPE: ICD-10-CM

## 2024-09-09 RX ORDER — TIRZEPATIDE 5 MG/.5ML
INJECTION, SOLUTION SUBCUTANEOUS
Qty: 2 ML | Refills: 0 | Status: SHIPPED | OUTPATIENT
Start: 2024-09-09

## 2024-11-06 ENCOUNTER — TELEPHONE (OUTPATIENT)
Dept: SURGERY | Facility: CLINIC | Age: 39
End: 2024-11-06
Payer: COMMERCIAL

## 2024-11-06 NOTE — TELEPHONE ENCOUNTER
Retail Pharmacy Prior Authorization Team   Phone: [phone]        PA Initiation    Medication: ZEPBOUND 10 MG/0.5ML SC SOAJ  Insurance Company: [employer] - Phone [phone] Fax [phone]  Pharmacy Filling the Rx:    Filling Pharmacy Phone:    Filling Pharmacy Fax:    Start Date: 11/6/2024

## 2024-11-06 NOTE — TELEPHONE ENCOUNTER
"Prior Authorization Retail Medication Request    Medication/Dose: Zepbound 10mg/0.5ml INJ--INSURANCE CHANGE  New/renewal/insurance change PA/secondary ins. PA: Insurance Change  Previously Tried and Failed:  Pt has been using this medication with \"life changing\" results.   Rationale:  Pt has new insurance.     Insurance   Primary:   Insurance ID:  [id number]    Pharmacy Information (if different than what is on RX)  Name:  [facility]  Phone:  [phone]  Fax:  [phone]    Clinic Information  Preferred routing pool for dept communication: Bariatric [facility]     "

## 2024-11-19 ENCOUNTER — VIRTUAL VISIT (OUTPATIENT)
Dept: SURGERY | Facility: CLINIC | Age: 39
End: 2024-11-19
Payer: COMMERCIAL

## 2024-11-19 VITALS — WEIGHT: 228 LBS | HEIGHT: 70 IN | BODY MASS INDEX: 32.64 KG/M2

## 2024-11-19 DIAGNOSIS — Z71.3 NUTRITIONAL COUNSELING: ICD-10-CM

## 2024-11-19 DIAGNOSIS — E66.811 OBESITY, CLASS I, BMI 30.0-34.9 (SEE ACTUAL BMI): Primary | ICD-10-CM

## 2024-11-19 PROCEDURE — 97803 MED NUTRITION INDIV SUBSEQ: CPT | Mod: 95 | Performed by: DIETITIAN, REGISTERED

## 2024-11-19 NOTE — LETTER
11/19/2024      Rmairo Najera  [address]  [facility] [address]      Dear Colleague,    Thank you for referring your patient, Ramiro Najera, to the [facility] SUR[facility]ERY [facility] BARIATRICS [facility] [place]. Please see a copy of my visit note below.    Ramiro Najera is a 38 year old who is being evaluated via a billable video visit.        How would you like to obtain your AVS? MyChart  If the video visit is dropped, the invitation should be resent by: Send to e-mail at: [email or web address]  Will anyone else be joining your video visit? No        Medical  Weight Loss Follow-Up Diet Evaluation  Assessment:  Ramiro is presenting today for a follow up weight management nutrition consultation.  This patient has had an initial appointment and was referred by Dr. Meadows for MNT as treatment for Obesity     Weight loss medication:  zepbound . Appetite is really low, feeling a bit weak and forcing self to eat. Considering weaning off due to cost of medication.  -Would like to decrease a dose with compounded medication  Pt's weight is 228 lbs 0 oz  Initial weight: 270lb  Weight change: down 42lb         No data to display              BMI: Body mass index is 32.71 kg/m .  Ideal body weight: 73 kg (160 lb 15 oz)  Adjusted ideal body weight: 88.8 kg (195 lb 12.2 oz)    Estimated RMR ([place]-St Jeor equation):   1968 kcals x 1.2 (sedentary) = 2361 kcals (for weight maintenance)       Recommended Protein Intake:  grams of protein/day  Patient Active Problem List:  Patient Active Problem List   Diagnosis     BMI 37.0-37.9, adult     History of depression     History of Lyme disease     Bipolar 2 disorder (H)     Dyslipidemia     Metabolic syndrome     Gastroesophageal reflux disease without esophagitis     Class 2 severe obesity due to excess calories with serious comorbidity in adult (H)     Progress on goals from last visit: focusing on protein, diet is consistent  -when hungry, is craving  more nutrient dense foods    Dietary Recall:  Breakfast: apple and beef jerkey, water  Lunch:stuffed pepper  Dinner: apple  Beverages: water intake is good  Exercise: running, this past week has not been great due to reduced calories  -Cardio and resistance training 4-5 days per week    Nutrition Diagnosis:    Obesity related to excessive energy intake as evidence by estimated intake greater than RMR, reports of stress eating and BMI of 32.7      Intervention:  Food and/or nutrient delivery: discussed ways to add protein right now when appetite is down with protein shakes, collagen protein, bone broth, etc.     Coordination of nutrition care: discussed plan for medication, will try compounded medication at a lower dose to determine if he is able to wean off as he gets closer to goal weight    Monitoring/Evaluation:    Goals:  Continue with current plan      Patient to follow up in 1 month(s) with bariatrician and PRN with JORDYN      Video-Visit Details    Type of service:  Video Visit    Video Start Time (time video started): 8:30a    Video End Time (time video stopped): 8:50a    Originating Location (pt. Location): Home      Distant Location (provider location):  Off-site    Mode of Communication:  Video Conference via [facility]    Physician has received verbal consent for a Video Visit from the patient? Yes      Radha Chilel RD          Again, thank you for allowing me to participate in the care of your patient.        Sincerely,        Radha Chilel RD

## 2024-11-19 NOTE — PROGRESS NOTES
Ramiro Najera is a 38 year old who is being evaluated via a billable video visit.        How would you like to obtain your AVS? MyChart  If the video visit is dropped, the invitation should be resent by: Send to e-mail at: [email or web address]  Will anyone else be joining your video visit? No        Medical  Weight Loss Follow-Up Diet Evaluation  Assessment:  Ramiro is presenting today for a follow up weight management nutrition consultation.  This patient has had an initial appointment and was referred by Dr. Meadows for MNT as treatment for Obesity     Weight loss medication:  zepbound . Appetite is really low, feeling a bit weak and forcing self to eat. Considering weaning off due to cost of medication.  -Would like to decrease a dose with compounded medication  Pt's weight is 228 lbs 0 oz  Initial weight: 270lb  Weight change: down 42lb         No data to display              BMI: Body mass index is 32.71 kg/m .  Ideal body weight: 73 kg (160 lb 15 oz)  Adjusted ideal body weight: 88.8 kg (195 lb 12.2 oz)    Estimated RMR ([place]-St Jeor equation):   1968 kcals x 1.2 (sedentary) = 2361 kcals (for weight maintenance)       Recommended Protein Intake:  grams of protein/day  Patient Active Problem List:  Patient Active Problem List   Diagnosis    BMI 37.0-37.9, adult    History of depression    History of Lyme disease    Bipolar 2 disorder (H)    Dyslipidemia    Metabolic syndrome    Gastroesophageal reflux disease without esophagitis    Class 2 severe obesity due to excess calories with serious comorbidity in adult (H)     Progress on goals from last visit: focusing on protein, diet is consistent  -when hungry, is craving more nutrient dense foods    Dietary Recall:  Breakfast: apple and beef jerkey, water  Lunch:stuffed pepper  Dinner: apple  Beverages: water intake is good  Exercise: running, this past week has not been great due to reduced calories  -Cardio and resistance training 4-5 days per  week    Nutrition Diagnosis:    Obesity related to excessive energy intake as evidence by estimated intake greater than RMR, reports of stress eating and BMI of 32.7      Intervention:  Food and/or nutrient delivery: discussed ways to add protein right now when appetite is down with protein shakes, collagen protein, bone broth, etc.     Coordination of nutrition care: discussed plan for medication, will try compounded medication at a lower dose to determine if he is able to wean off as he gets closer to goal weight    Monitoring/Evaluation:    Goals:  Continue with current plan      Patient to follow up in 1 month(s) with bariatrician and PRN with JORDYN      Video-Visit Details    Type of service:  Video Visit    Video Start Time (time video started): 8:30a    Video End Time (time video stopped): 8:50a    Originating Location (pt. Location): Home      Distant Location (provider location):  Off-site    Mode of Communication:  Video Conference via [facility]    Physician has received verbal consent for a Video Visit from the patient? Yes      Radha Chilel RD

## 2024-11-21 DIAGNOSIS — E66.811 OBESITY, CLASS I, BMI 30.0-34.9 (SEE ACTUAL BMI): Primary | ICD-10-CM

## 2024-11-21 RX ORDER — SYRINGE-NEEDLE,INSULIN,0.5 ML 27GX1/2"
SYRINGE, EMPTY DISPOSABLE MISCELLANEOUS
Qty: 100 EACH | Refills: 1 | Status: SHIPPED | OUTPATIENT
Start: 2024-11-21

## 2024-12-10 ENCOUNTER — MYC MEDICAL ADVICE (OUTPATIENT)
Dept: SURGERY | Facility: CLINIC | Age: 39
End: 2024-12-10
Payer: COMMERCIAL

## 2024-12-10 DIAGNOSIS — E66.811 OBESITY, CLASS I, BMI 30.0-34.9 (SEE ACTUAL BMI): ICD-10-CM

## 2025-02-09 SDOH — HEALTH STABILITY: PHYSICAL HEALTH: ON AVERAGE, HOW MANY MINUTES DO YOU ENGAGE IN EXERCISE AT THIS LEVEL?: 60 MIN

## 2025-02-09 SDOH — HEALTH STABILITY: PHYSICAL HEALTH: ON AVERAGE, HOW MANY DAYS PER WEEK DO YOU ENGAGE IN MODERATE TO STRENUOUS EXERCISE (LIKE A BRISK WALK)?: 3 DAYS

## 2025-02-09 ASSESSMENT — ANXIETY QUESTIONNAIRES
4. TROUBLE RELAXING: SEVERAL DAYS
5. BEING SO RESTLESS THAT IT IS HARD TO SIT STILL: SEVERAL DAYS
1. FEELING NERVOUS, ANXIOUS, OR ON EDGE: SEVERAL DAYS
6. BECOMING EASILY ANNOYED OR IRRITABLE: NOT AT ALL
7. FEELING AFRAID AS IF SOMETHING AWFUL MIGHT HAPPEN: SEVERAL DAYS
GAD7 TOTAL SCORE: 5
2. NOT BEING ABLE TO STOP OR CONTROL WORRYING: NOT AT ALL
GAD7 TOTAL SCORE: 5
IF YOU CHECKED OFF ANY PROBLEMS ON THIS QUESTIONNAIRE, HOW DIFFICULT HAVE THESE PROBLEMS MADE IT FOR YOU TO DO YOUR WORK, TAKE CARE OF THINGS AT HOME, OR GET ALONG WITH OTHER PEOPLE: NOT DIFFICULT AT ALL
8. IF YOU CHECKED OFF ANY PROBLEMS, HOW DIFFICULT HAVE THESE MADE IT FOR YOU TO DO YOUR WORK, TAKE CARE OF THINGS AT HOME, OR GET ALONG WITH OTHER PEOPLE?: NOT DIFFICULT AT ALL
7. FEELING AFRAID AS IF SOMETHING AWFUL MIGHT HAPPEN: SEVERAL DAYS
GAD7 TOTAL SCORE: 5
3. WORRYING TOO MUCH ABOUT DIFFERENT THINGS: SEVERAL DAYS

## 2025-02-09 ASSESSMENT — SOCIAL DETERMINANTS OF HEALTH (SDOH): HOW OFTEN DO YOU GET TOGETHER WITH FRIENDS OR RELATIVES?: TWICE A WEEK

## 2025-02-09 ASSESSMENT — PATIENT HEALTH QUESTIONNAIRE - PHQ9
SUM OF ALL RESPONSES TO PHQ QUESTIONS 1-9: 5
10. IF YOU CHECKED OFF ANY PROBLEMS, HOW DIFFICULT HAVE THESE PROBLEMS MADE IT FOR YOU TO DO YOUR WORK, TAKE CARE OF THINGS AT HOME, OR GET ALONG WITH OTHER PEOPLE: SOMEWHAT DIFFICULT
SUM OF ALL RESPONSES TO PHQ QUESTIONS 1-9: 5

## 2025-02-13 ENCOUNTER — OFFICE VISIT (OUTPATIENT)
Dept: FAMILY MEDICINE | Facility: CLINIC | Age: 40
End: 2025-02-13
Attending: FAMILY MEDICINE
Payer: COMMERCIAL

## 2025-02-13 VITALS
WEIGHT: 234 LBS | HEART RATE: 78 BPM | RESPIRATION RATE: 20 BRPM | OXYGEN SATURATION: 97 % | HEIGHT: 71 IN | BODY MASS INDEX: 32.76 KG/M2 | SYSTOLIC BLOOD PRESSURE: 124 MMHG | DIASTOLIC BLOOD PRESSURE: 80 MMHG | TEMPERATURE: 98.4 F

## 2025-02-13 DIAGNOSIS — Z00.00 ROUTINE GENERAL MEDICAL EXAMINATION AT A HEALTH CARE FACILITY: Primary | ICD-10-CM

## 2025-02-13 DIAGNOSIS — H81.10 BENIGN PAROXYSMAL POSITIONAL VERTIGO, UNSPECIFIED LATERALITY: ICD-10-CM

## 2025-02-13 DIAGNOSIS — E78.5 DYSLIPIDEMIA: ICD-10-CM

## 2025-02-13 DIAGNOSIS — F31.81 BIPOLAR 2 DISORDER (H): ICD-10-CM

## 2025-02-13 DIAGNOSIS — Z13.1 SCREENING FOR DIABETES MELLITUS: ICD-10-CM

## 2025-02-13 RX ORDER — TRAZODONE HYDROCHLORIDE 150 MG/1
150 TABLET ORAL AT BEDTIME
Qty: 90 TABLET | Refills: 3 | Status: SHIPPED | OUTPATIENT
Start: 2025-02-13

## 2025-02-13 ASSESSMENT — PAIN SCALES - GENERAL: PAINLEVEL_OUTOF10: NO PAIN (0)

## 2025-02-13 NOTE — NURSING NOTE
Prior to immunization administration, verified patients identity using patient s name and date of birth. Please see Immunization Activity for additional information.     Screening Questionnaire for Adult Immunization    Are you sick today?   No   Do you have allergies to medications, food, a vaccine component or latex?   No   Have you ever had a serious reaction after receiving a vaccination?   No   Do you have a long-term health problem with heart, lung, kidney, or metabolic disease (e.g., diabetes), asthma, a blood disorder, no spleen, complement component deficiency, a cochlear implant, or a spinal fluid leak?  Are you on long-term aspirin therapy?   No   Do you have cancer, leukemia, HIV/AIDS, or any other immune system problem?   No   Do you have a parent, brother, or sister with an immune system problem?   No   In the past 3 months, have you taken medications that affect  your immune system, such as prednisone, other steroids, or anticancer drugs; drugs for the treatment of rheumatoid arthritis, Crohn s disease, or psoriasis; or have you had radiation treatments?   No   Have you had a seizure, or a brain or other nervous system problem?   No   During the past year, have you received a transfusion of blood or blood    products, or been given immune (gamma) globulin or antiviral drug?   No   For women: Are you pregnant or is there a chance you could become       pregnant during the next month?   No   Have you received any vaccinations in the past 4 weeks?   No     Immunization questionnaire answers were all negative.      Patient instructed to remain in clinic for 15 minutes afterwards, and to report any adverse reactions.     Screening performed by Cici Ray MA on 2/13/2025 at 8:03 AM.

## 2025-02-13 NOTE — PATIENT INSTRUCTIONS
Patient Education   Preventive Care Advice   This is general advice given by our system to help you stay healthy. However, your care team may have specific advice just for you. Please talk to your care team about your preventive care needs.  Nutrition  Eat 5 or more servings of fruits and vegetables each day.  Try wheat bread, brown rice and whole grain pasta (instead of white bread, rice, and pasta).  Get enough calcium and vitamin D. Check the label on foods and aim for 100% of the RDA (recommended daily allowance).  Lifestyle  Exercise at least 150 minutes each week  (30 minutes a day, 5 days a week).  Do muscle strengthening activities 2 days a week. These help control your weight and prevent disease.  No smoking.  Wear sunscreen to prevent skin cancer.  Have a dental exam and cleaning every 6 months.  Yearly exams  See your health care team every year to talk about:  Any changes in your health.  Any medicines your care team has prescribed.  Preventive care, family planning, and ways to prevent chronic diseases.  Shots (vaccines)   HPV shots (up to age 26), if you've never had them before.  Hepatitis B shots (up to age 59), if you've never had them before.  COVID-19 shot: Get this shot when it's due.  Flu shot: Get a flu shot every year.  Tetanus shot: Get a tetanus shot every 10 years.  Pneumococcal, hepatitis A, and RSV shots: Ask your care team if you need these based on your risk.  Shingles shot (for age 50 and up)  General health tests  Diabetes screening:  Starting at age 35, Get screened for diabetes at least every 3 years.  If you are younger than age 35, ask your care team if you should be screened for diabetes.  Cholesterol test: At age 39, start having a cholesterol test every 5 years, or more often if advised.  Bone density scan (DEXA): At age 50, ask your care team if you should have this scan for osteoporosis (brittle bones).  Hepatitis C: Get tested at least once in your life.  STIs (sexually  transmitted infections)  Before age 24: Ask your care team if you should be screened for STIs.  After age 24: Get screened for STIs if you're at risk. You are at risk for STIs (including HIV) if:  You are sexually active with more than one person.  You don't use condoms every time.  You or a partner was diagnosed with a sexually transmitted infection.  If you are at risk for HIV, ask about PrEP medicine to prevent HIV.  Get tested for HIV at least once in your life, whether you are at risk for HIV or not.  Cancer screening tests  Cervical cancer screening: If you have a cervix, begin getting regular cervical cancer screening tests starting at age 21.  Breast cancer scan (mammogram): If you've ever had breasts, begin having regular mammograms starting at age 40. This is a scan to check for breast cancer.  Colon cancer screening: It is important to start screening for colon cancer at age 45.  Have a colonoscopy test every 10 years (or more often if you're at risk) Or, ask your provider about stool tests like a FIT test every year or Cologuard test every 3 years.  To learn more about your testing options, visit:   .  For help making a decision, visit:   https://bit.ly/fv46073.  Prostate cancer screening test: If you have a prostate, ask your care team if a prostate cancer screening test (PSA) at age 55 is right for you.  Lung cancer screening: If you are a current or former smoker ages 50 to 80, ask your care team if ongoing lung cancer screenings are right for you.  For informational purposes only. Not to replace the advice of your health care provider. Copyright   2023 [facility]. All rights reserved. Clinically reviewed by the [facility] Transitions Program. AGELON ? 734946 - REV 01/24.   Learning About Risk for Heart Attack and Stroke (01:56)  Your health professional recommends that you watch this short online health video.  Learn what raises your risk for having a heart attack or stroke and  how you can lower your risk.   Purpose: Explains risk factors for heart attack and stroke and ways to lower the risk.  Goal: Users will understand what it means to be at risk for having a heart attack or stroke and what they can do to reduce their risk.    Watch: Scan the QR code or visit the link to view video       https://hwi.se/[email or web address]  Current as of: July 31, 2024  Content Version: 14.3    2024 [employer].   Care instructions adapted under license by your healthcare professional. If you have questions about a medical condition or this instruction, always ask your healthcare professional. [employer] disclaims any warranty or liability for your use of this information.

## 2025-02-13 NOTE — PROGRESS NOTES
"Preventive Care Visit  [facility]  Samantha DO Yoshi, Family Medicine  Feb 13, 2025      Assessment & Plan     Routine general medical examination at a health care facility      Bipolar 2 disorder (H)  The current medical regimen is effective;  continue present plan and medications.    - traZODone (DESYREL) 150 MG tablet; Take 1 tablet (150 mg) by mouth at bedtime.    Benign paroxysmal positional vertigo, unspecified laterality  Pathophysiology explained.  Home exercises discussed    Dyslipidemia    - Lipid panel reflex to direct LDL Fasting; Future    Screening for diabetes mellitus    - Basic metabolic panel  (Ca, Cl, CO2, Creat, Gluc, K, Na, BUN); Future            BMI  Estimated body mass index is 32.64 kg/m  as calculated from the following:    Height as of this encounter: 1.803 m (5' 11\").    Weight as of this encounter: 106.1 kg (234 lb).   Weight management plan: Discussed healthy diet and exercise guidelines    Depression Screening Follow Up        2/9/2025     7:52 PM   PHQ   PHQ-9 Total Score 5    Q9: Thoughts of better off dead/self-harm past 2 weeks Several days   F/U: Thoughts of suicide or self-harm No   F/U: Safety concerns No       Patient-reported         Follow Up Actions Taken  Crisis resource information provided in the After Visit Summary    Discussed the following ways the patient can remain in a safe environment:  be around others  Counseling  Appropriate preventive services were addressed with this patient via screening, questionnaire, or discussion as appropriate for fall prevention, nutrition, physical activity, Tobacco-use cessation, social engagement, weight loss and cognition.  Checklist reviewing preventive services available has been given to the patient.  Reviewed patient's diet, addressing concerns and/or questions.   He is at risk for lack of exercise and has been provided with information to increase physical activity for the benefit of his well-being. "   The patient's PHQ-9 score is consistent with mild depression. He was provided with information regarding depression.       Follow-up yearly for complete physical    Subjective   Ramiro is a 39 year old, presenting for the following:  Physical        2/13/2025     7:31 AM   Additional Questions   Roomed by Yuliya BELTRÁN   Accompanied by self         2/13/2025     7:31 AM   Patient Reported Additional Medications   Patient reports taking the following new medications none        HPI    -- Off and on dizziness x 1 month, it is getting better but still comes and goes.  It is worse in the am and at night.  It is a spinning sensation.   Worse with laying flat.  It lasts a few seconds.   It is with changing positions.         Depression and Anxiety   How are you doing with your depression since your last visit? No change  How are you doing with your anxiety since your last visit?  No change  Are you having other symptoms that might be associated with depression or anxiety? No  Have you had a significant life event? Patient and wife got laid off    Do you have any concerns with your use of alcohol or other drugs? No  Trazodone 150 mg daily    Social History     Tobacco Use    Smoking status: Former     Types: Cigarettes, Pipe     Passive exposure: Yes    Smokeless tobacco: Never    Tobacco comments:     lived with a smoker until he was 17 years old   Vaping Use    Vaping status: Never Used   Substance Use Topics    Alcohol use: Not Currently     Comment: a couple beers a month    Drug use: No         2/13/2024     6:41 AM 7/14/2024     3:06 PM 2/9/2025     7:52 PM   PHQ   PHQ-9 Total Score 8 13 5    Q9: Thoughts of better off dead/self-harm past 2 weeks Not at all Not at all Several days   F/U: Thoughts of suicide or self-harm   No   F/U: Safety concerns   No       Patient-reported         8/25/2022     2:01 PM 12/8/2022     7:33 AM 2/9/2025     7:53 PM   DEANNA-7 SCORE   Total Score 2 (minimal anxiety) 5 (mild anxiety) 5 (mild  anxiety)   Total Score 2 5 5        Patient-reported         2/9/2025     7:52 PM   Last PHQ-9   1.  Little interest or pleasure in doing things 1   2.  Feeling down, depressed, or hopeless 1   3.  Trouble falling or staying asleep, or sleeping too much 1   4.  Feeling tired or having little energy 0   5.  Poor appetite or overeating 0   6.  Feeling bad about yourself 1   7.  Trouble concentrating 0   8.  Moving slowly or restless 0   Q9: Thoughts of better off dead/self-harm past 2 weeks 1   PHQ-9 Total Score 5    In the past two weeks have you had thoughts of suicide or self harm? No   Do you have concerns about your personal safety or the safety of others? No       Patient-reported         2/9/2025     7:53 PM   DEANNA-7    1. Feeling nervous, anxious, or on edge 1   2. Not being able to stop or control worrying 0   3. Worrying too much about different things 1   4. Trouble relaxing 1   5. Being so restless that it is hard to sit still 1   6. Becoming easily annoyed or irritable 0   7. Feeling afraid, as if something awful might happen 1   DEANNA-7 Total Score 5    If you checked any problems, how difficult have they made it for you to do your work, take care of things at home, or get along with other people? Not difficult at all       Patient-reported     Follow Up Actions Taken  Crisis resource information provided in the After Visit Summary     Discussed the following ways the patient can remain in a safe environment:  be around others  Suicide Assessment Five-step Evaluation and Treatment (SAFE-T)      Health Care Directive  Patient does not have a Health Care Directive: Discussed advance care planning with patient; information given to patient to review.      2/9/2025   General Health   How would you rate your overall physical health? (!) FAIR   Feel stress (tense, anxious, or unable to sleep) Only a little   (!) STRESS CONCERN      2/9/2025   Nutrition   Three or more servings of calcium each day? Yes   Diet:  Regular (no restrictions)   How many servings of fruit and vegetables per day? (!) 2-3   How many sweetened beverages each day? 0-1         2/9/2025   Exercise   Days per week of moderate/strenous exercise 3 days   Average minutes spent exercising at this level 60 min         2/9/2025   Social Factors   Frequency of gathering with friends or relatives Twice a week   Worry food won't last until get money to buy more No   Food not last or not have enough money for food? No   Do you have housing? (Housing is defined as stable permanent housing and does not include staying ouside in a car, in a tent, in an abandoned building, in an overnight shelter, or couch-surfing.) Yes   Are you worried about losing your housing? No   Lack of transportation? No   Unable to get utilities (heat,electricity)? No         2/9/2025   Dental   Dentist two times every year? Yes         2/13/2024   TB Screening   Were you born outside of the [place]? No       Today's PHQ-9 Score:       2/9/2025     7:52 PM   PHQ-9 SCORE   PHQ-9 Total Score MyChart 5 (Mild depression)   PHQ-9 Total Score 5        Patient-reported         2/9/2025   Substance Use   Alcohol more than 3/day or more than 7/wk Not Applicable   Do you use any other substances recreationally? No     Social History     Tobacco Use    Smoking status: Former     Types: Cigarettes, Pipe     Passive exposure: Yes    Smokeless tobacco: Never    Tobacco comments:     lived with a smoker until he was 17 years old   Vaping Use    Vaping status: Never Used   Substance Use Topics    Alcohol use: Not Currently     Comment: a couple beers a month    Drug use: No           2/9/2025   STI Screening   New sexual partner(s) since last STI/HIV test? No         2/9/2025   Contraception/Family Planning   Questions about contraception or family planning No        Reviewed and updated as needed this visit by Provider                          Review of Systems  Constitutional, HEENT, cardiovascular, pulmonary,  "gi and gu systems are negative, except as otherwise noted.     Objective    Exam  /80   Pulse 78   Temp 98.4  F (36.9  C) (Oral)   Resp 20   Ht 1.803 m (5' 11\")   Wt 106.1 kg (234 lb)   SpO2 97%   BMI 32.64 kg/m     Estimated body mass index is 32.64 kg/m  as calculated from the following:    Height as of this encounter: 1.803 m (5' 11\").    Weight as of this encounter: 106.1 kg (234 lb).    Physical Exam  GENERAL: alert and no distress  EYES: Eyes grossly normal to inspection, PERRL and conjunctivae and sclerae normal  HENT: ear canals and TM's normal, nose and mouth without ulcers or lesions  NECK: no adenopathy, no asymmetry, masses, or scars  RESP: lungs clear to auscultation - no rales, rhonchi or wheezes  CV: regular rate and rhythm, normal S1 S2, no S3 or S4, no murmur, click or rub, no peripheral edema  ABDOMEN: soft, nontender, no hepatosplenomegaly, no masses and bowel sounds normal  MS: no gross musculoskeletal defects noted, no edema  SKIN: no suspicious lesions or rashes  NEURO: Normal strength and tone, mentation intact and speech normal  PSYCH: mentation appears normal, affect normal/bright        Signed Electronically by: Samantha Belle DO    "

## 2025-07-24 ENCOUNTER — OFFICE VISIT (OUTPATIENT)
Dept: SURGERY | Facility: CLINIC | Age: 40
End: 2025-07-24
Attending: FAMILY MEDICINE
Payer: COMMERCIAL

## 2025-07-24 VITALS
SYSTOLIC BLOOD PRESSURE: 136 MMHG | WEIGHT: 263.2 LBS | HEIGHT: 71 IN | DIASTOLIC BLOOD PRESSURE: 80 MMHG | BODY MASS INDEX: 36.85 KG/M2

## 2025-07-24 DIAGNOSIS — E66.9 OBESITY (BMI 30-39.9): Primary | ICD-10-CM

## 2025-07-24 DIAGNOSIS — R63.8 ABNORMAL CRAVING: ICD-10-CM

## 2025-07-24 DIAGNOSIS — E66.811 OBESITY, CLASS I, BMI 30.0-34.9 (SEE ACTUAL BMI): ICD-10-CM

## 2025-07-24 DIAGNOSIS — F41.9 ANXIETY: ICD-10-CM

## 2025-07-24 RX ORDER — BUPROPION HYDROCHLORIDE 150 MG/1
150 TABLET ORAL EVERY MORNING
Qty: 90 TABLET | Refills: 3 | Status: SHIPPED | OUTPATIENT
Start: 2025-07-24 | End: 2026-07-24

## 2025-07-24 RX ORDER — NALTREXONE HYDROCHLORIDE 50 MG/1
50 TABLET, FILM COATED ORAL DAILY
Qty: 90 TABLET | Refills: 3 | Status: SHIPPED | OUTPATIENT
Start: 2025-07-24 | End: 2026-07-24

## 2025-07-24 NOTE — LETTER
"7/24/2025      Ramiro Najera  [address]  [facility] [id number]      Dear Colleague,    Thank you for referring your patient, Ramiro Najera, to the [facility] [place]. Please see a copy of my visit note below.    Bariatric Follow-up    39 year old  male BMI:Body mass index is 36.71 kg/m .    Weight:   Wt Readings from Last 1 Encounters:   07/24/25 119.4 kg (263 lb 3.2 oz)    pounds      Comorbidities:  Patient Active Problem List   Diagnosis     History of Lyme disease     Bipolar 2 disorder (H)     Dyslipidemia     Metabolic syndrome     Gastroesophageal reflux disease without esophagitis     Class 2 severe obesity due to excess calories with serious comorbidity in adult (H)         Interim: Had done remarkably well on Zepbound, having no side effects and losing 50#. Lost coverage for Zepbound and maintains a 7# weight loss. New job with [employer] and now on his wife's insurance. Anxiety and depression make phentermine not an option for him. Had significant trouble with selective serotonin reuptake inhibitor medications as a younger age. Would like to get back on Zepbound. He is not sure whether it is a covered benefit. Cash pay is not an option at this point    Plan:  DIET  3 meals, meet protein goal with minimum 80gm protein, hydrate through the day, eliminate soda again as before   EXERCISE continue lifting, increase steps as able. Continue tracking   PHARMACOTHERAPY Zepbound 2.5mg and ramp up. First insurance then savings card. If not reasonable then Wellbutrin for anxiety and Naltrexone for cravings for \"contrave-like\" approach. Topiramate may be worth trying given history of racing thoughts    We discussed the importance of hydration, staying ahead of potential constipation, and consuming 3 protein containing, nutrient dense meals while taking GLP-1 RA medications.       -We reviewed his medications and whether associated with " weight gain.  Current Outpatient Medications   Medication Sig Dispense Refill     buPROPion (WELLBUTRIN XL) 150 MG 24 hr tablet Take 1 tablet (150 mg) by mouth every morning. 90 tablet 3     naltrexone (DEPADE/REVIA) 50 MG tablet Take 1 tablet (50 mg) by mouth daily. 90 tablet 3     tirzepatide-Weight Management (ZEPBOUND) 2.5 MG/0.5ML prefilled pen Inject 0.5 mLs (2.5 mg) subcutaneously every 7 days for 28 days. 2 mL 0     traZODone (DESYREL) 150 MG tablet Take 1 tablet (150 mg) by mouth at bedtime. 90 tablet 3     No current facility-administered medications for this visit.        We discussed [facility] Bariatric Basics including:  -eating 3 meals daily  -eating protein first  -eating slowly, chewing food well  -avoiding/limiting calorie containing beverages  -choosing wheat, not white with breads, crackers, pastas, jonas, bagels, tortillas, rice  -limiting restaurant or cafeteria eating to twice a week or less  -We discussed the importance of restorative sleep and stress management in maintaining a healthy weight.  -We discussed insulin resistance and glycemic index as it relates to appetite and weight control  -We discussed the National Weight Control Registry healthy weight maintenance strategies and ways to optimize metabolism.  -We discussed the importance of physical activity including cardiovascular and strength training in maintaining a healthier weight and explored viable options.    Most recent labs:  Recent Labs   Lab Test 02/21/25  0901 02/13/24  0741   CHOL 207* 214*   HDL 41 39*   * 126*   TRIG 176* 244*      Hemoglobin A1C   Date Value Ref Range Status   02/13/2024 5.1 0.0 - 5.6 % Final     Comment:     Normal <5.7%   Prediabetes 5.7-6.4%    Diabetes 6.5% or higher     Note: Adopted from ADA consensus guidelines.   03/02/2021 5.0 0 - 5.6 % Final     Comment:     Normal <5.7% Prediabetes 5.7-6.4%  Diabetes 6.5% or higher - adopted from ADA   consensus guidelines.        TSH   Date Value Ref  "Range Status   03/02/2021 1.49 0.40 - 4.00 mU/L Final      BP Readings from Last 3 Encounters:   07/24/25 136/80   02/13/25 124/80   07/15/24 138/88          DIETARY HISTORY  Meals Per Day: 3  Eating Protein First?: yes  Food Diary: B:ham and banana, kind bar, jerkey and mini-donuts with energy drink L:Factor Meals D:Factor meals and gringo tacos  Snacks Per Day: occ  Typical Snack: almonds, chips  Fluid Intake:  intentional 1-2/wk regular soda  Portion Control:   Calorie Containing Beverages: yes, soda 1-2/wk  Alcohol per week: rare  Typical Protein Food Choices: rare pork mainly chicken fish beef  Choosing Whole Grains: bread WW  Grocery Shopping is done by: shared  Food Preparation is done by: shared  Meals at Restaurant/Cafeteria/Take Out Per Week: 2-3  Eating at the Table:   TV is Off During Meals:     Positive Changes Since Last Visit:   Struggling With:     Knowledgeable in Reading Food Labels:   Getting Adequate Protein: yes  Sleeping 7-8 hours/day 7 hours-improved  Stress management mod to high, new job, anxiety and depression    PHYSICAL ACTIVITY PATTERNS:  Cardiovascular: 3 days weekly gym and 1 mile walk daily  Strength Training: gym 3 days for 45 min strength training    REVIEW OF SYSTEMS    ROS  As above      PHYSICAL EXAM:  Vitals: /80   Ht 1.803 m (5' 11\")   Wt 119.4 kg (263 lb 3.2 oz)   BMI 36.71 kg/m      GEN: Pleasant, well groomed, in no acute distress  EYES: EOMI,  ENT: airway patent  NECK: supple  HEART: Rhythm regular, rate regular, no murmur   LUNGS: Clear  ABDOMEN: BS+  VASCULAR: no  lower extremity edema  MUSCULOSKELETAL:  muscle mass good  SKIN:  no color changes of venous stasis, no ulcerations    Total time spent on the date of this encounter doing: chart review, review of test results, patient visit, physical exam, education, counseling, developing plan of care, and documenting = 30 minutes.            Again, thank you for allowing me to participate in the care of your patient. "        Sincerely,        Sonya Meadows MD    Electronically signed

## 2025-07-24 NOTE — PATIENT INSTRUCTIONS
MEDICATIONS FOR WEIGHT LOSS    PHENTERMINE (Adipex): approved in 1959 for appetite suppression.  It has stimulant effects and cannot be used with Ritalin, Concerta, or other stimulants.  It is not addictive although it's chemically related to amphetamines.  Amphetamines are addictive. The most common side effects are dry mouth, increased energy and concentration, increased pulse, and constipation.  You should not take phentermine if you have glaucoma, hyperthyroidism, or uncontrolled/untreated hypertension.  $24-$30 for 90 tablets    PHENDIMETRAZINE (Bontril): Appetite suppressant/sympathomimetic.  Controlled substance.  Side effects and contraindications similar to phentermine.  $45-$60 for 3 month supply    TOPIRAMATE (Topamax): Anti-seizure medication, also used to prevent migraines.  Side effects include paresthesia, glaucoma, altered concentration, attention difficulties, memory and speech problems, metabolic acidosis, depression, increase in body temperature and decrease sweating, kidney stones, and weight loss.  Do not take Topamax while taking Depakote as this can cause high ammonia levels.  You must have reliable birth control as Topamax can cause birth defects.  Discontinue slowly to avoid seizure.  Insurance usually covers Topiramate.    QSYMIA (Phentermine + Topamax):  See above information about phentermine and Topamax.  Most common side effects are paresthesia, dizziness, distortion of taste, insomnia, constipation, and dry mouth.  $150-$220 per month    DIETHYLPROPION (Tenuate): Sympathomimetic amine.  Appetite suppressant.  Doses 25 mg before meals or 75 mg per day.  Most common side effects are hypertension, palpitations, EKG changes, and increased seizures in epileptics.  There can be a possible adverse reaction with alcohol.  $70-$90 per 3 months    XENICAL(Orlistat) (Ryan OTC): Approved in 1999.  A fat-blocker.  It reduces absorption of fat by approximately 30%.  It has beneficial effects on  lipid levels.  Side effects include diarrhea, abdominal cramping, fecal incontinence, oily spotting, and flatus with discharge.  Side effects are minimized if the patient limits their dietary fat to no more than 30% of their diet.  Patients must take a multivitamin daily to avoid vitamin D, E, A, and K deficiency.  $120 per month    CONTRAVE (Naltrexone/Bupropion): Approved in 2014.  It is a combination pill including an opioid receptor blocker and a long-standing antidepressant.  Most common side effects include nausea, constipation, headache, vomiting, dizziness, trouble sleeping, dry mouth, and diarrhea.  With all antidepressants watch for mood changes and suicide ideation.  Bupropion has been known to lower the seizure threshold in those prone to seizures.  It should not be used in a patient with a recent history of bulimia. It has been associated with liver damage from taking higher than recommended doses.  Do not use countrave if you have taken opioid medications or opioid street drugs in the past 7-10 days, if you are currently on opioids, methadone, or if you are pregnant.  Do not use contrave if you have recently stopped using alcohol or benzodiazepines.  Taper off contrave slowly.  Dosing: titrate up to 2 tablets twice daily of the Naltrexone 8 mg/ Bupropion 90 mg tablets.  $200 for 90 tablets    SAXENDA (Liraglutide (called Victoza for Type 2 Diabetes): A daily injectable (3mg daily) medication used for type 2 diabetes (Victoza). Glucagon-like peptide-1 (GLP-1) agonist. Contraindications include personal or family history of medullary thyroid cancer or MEN type 2. Acute pancreatitis has been observed in patients taking liraglutide. Liraglutide causes C-cell tumors in rats and mice. It is unknown whether liraglutide causes tumors in humans. Start at 0.6mg, increasing the dose weekly up to 3mg.     VYVANSE (Lisdexamfetamine dimesylate): a CNS stimulant used to treat ADHD. Indicated for the treatment of  moderate to severe Binge Eating Disorder in Adults. Contraindicated in patients with known heart disease, structural abnormalities of the heart, serious heart arrhythmias or unexplained syncope. CNS stimulants such as vyvanse may cause manic or psychotic symptoms in patients with BPAD or pre-existing psychosis. Use with caution in patients with Raynaud's phenomenon. Most common side effects include dry mouth, insomnia, decreased appetite, increased heart rate, jittery feeling, constipation and anxiety.     WEGOVY (Semaglutide (called Ozempic for Type 2 Diabetes): A weekly injectable (2.4mg weekly) medication used for type 2 diabetes (Ozempic). Glucagon-like peptide-1 (GLP-1) agonist. Contraindications include personal or family history of medullary thyroid cancer or MEN type 2. Acute pancreatitis has been observed in patients taking Semaglutide. Semaglutide causes C-cell tumors in rats and mice. It is unknown whether Semaglutide causes tumors in humans. Start at 0.25mg, increasing to 0.5, 1.0, 1.7 then 2.4 monthly. $1200/mo    ZEPBOUND (Tirzepatide (called Mounjaro for Type 2 Diabetes): A weekly injectable medication indicated for type 2 diabetes in 2022 and for weight loss in 2023.. Glucagon-like-peptide-1 (GLP-1) agonist and Glucose-Dependent Insulinotropic Polypeptide (GIP) agonist. Contraindications include personal or family history of medullary thyroid cancer or MEN type 2. Acute pancreatitis has been observed in patients taking Tirzepatide. Tirzepatide causes C-cell tumors in rats and mice. It is unknown whether Tirzepatide causes tumors in humans. Watch for neck mass, difficulty swallowing, persistent hoarseness, and epigastric abdominal pain. Most common side effects include nausea, diarrhea, vomiting, constipation, dyspepsia, and abdominal pain. Start at 2.5mg, increasing to 5.0, 7.5, 10, 12.5 then 15mg monthly. $1,000/month     Nausea and constipation are the most common side effects with the GLP-1/GIP  medications.     Drinking water throughout the day, preventing and or treating constipation, and eating 3 nutrient dense meals containing protein is important while on GLP-1 RA/GIP medications. It can mitigate these side effects.     For Prevention and Treatment of Constipation    From least aggressive to most aggressive:    Move: wallking-the more we move, the more our bowels move  Water: Drink water-64oz+ day  Go when you need to go. Don't wait. The longer you wait, the harder it gets.  Fiber: Fruit, raw veggies, nuts, whole grains,   Stool Softeners: if constipation is mild and for maintenance  Gentle laxatives: Miralax, senokot, dulcolax , Smooth move tea as needed    More aggressive (and typically won't get to this point)  Milk of Magnesia  Mag Citrate (what you drink before a colonoscopy)  Suppositories  Enema      After 3 injections: Send a Meniga message or call [phone] to let Isidra or Jillian know that you are ready to go to the next dose or if you would like to stay at the current dose another month.

## 2025-07-24 NOTE — PROGRESS NOTES
"Bariatric Follow-up    39 year old  male BMI:Body mass index is 36.71 kg/m .    Weight:   Wt Readings from Last 1 Encounters:   07/24/25 119.4 kg (263 lb 3.2 oz)    pounds      Comorbidities:  Patient Active Problem List   Diagnosis    History of Lyme disease    Bipolar 2 disorder (H)    Dyslipidemia    Metabolic syndrome    Gastroesophageal reflux disease without esophagitis    Class 2 severe obesity due to excess calories with serious comorbidity in adult (H)         Interim: Had done remarkably well on Zepbound, having no side effects and losing 50#. Lost coverage for Zepbound and maintains a 7# weight loss. New job with [employer] and now on his wife's insurance. Anxiety and depression make phentermine not an option for him. Had significant trouble with selective serotonin reuptake inhibitor medications as a younger age. Would like to get back on Zepbound. He is not sure whether it is a covered benefit. Cash pay is not an option at this point    Plan:  DIET  3 meals, meet protein goal with minimum 80gm protein, hydrate through the day, eliminate soda again as before   EXERCISE continue lifting, increase steps as able. Continue tracking   PHARMACOTHERAPY Zepbound 2.5mg and ramp up. First insurance then savings card. If not reasonable then Wellbutrin for anxiety and Naltrexone for cravings for \"contrave-like\" approach. Topiramate may be worth trying given history of racing thoughts    We discussed the importance of hydration, staying ahead of potential constipation, and consuming 3 protein containing, nutrient dense meals while taking GLP-1 RA medications.       -We reviewed his medications and whether associated with weight gain.  Current Outpatient Medications   Medication Sig Dispense Refill    buPROPion (WELLBUTRIN XL) 150 MG 24 hr tablet Take 1 tablet (150 mg) by mouth every morning. 90 tablet 3    naltrexone (DEPADE/REVIA) 50 MG tablet Take 1 tablet (50 mg) by mouth daily. 90 tablet 3    " tirzepatide-Weight Management (ZEPBOUND) 2.5 MG/0.5ML prefilled pen Inject 0.5 mLs (2.5 mg) subcutaneously every 7 days for 28 days. 2 mL 0    traZODone (DESYREL) 150 MG tablet Take 1 tablet (150 mg) by mouth at bedtime. 90 tablet 3     No current facility-administered medications for this visit.        We discussed [facility] Bariatric Basics including:  -eating 3 meals daily  -eating protein first  -eating slowly, chewing food well  -avoiding/limiting calorie containing beverages  -choosing wheat, not white with breads, crackers, pastas, jonas, bagels, tortillas, rice  -limiting restaurant or cafeteria eating to twice a week or less  -We discussed the importance of restorative sleep and stress management in maintaining a healthy weight.  -We discussed insulin resistance and glycemic index as it relates to appetite and weight control  -We discussed the National Weight Control Registry healthy weight maintenance strategies and ways to optimize metabolism.  -We discussed the importance of physical activity including cardiovascular and strength training in maintaining a healthier weight and explored viable options.    Most recent labs:  Recent Labs   Lab Test 02/21/25  0901 02/13/24  0741   CHOL 207* 214*   HDL 41 39*   * 126*   TRIG 176* 244*      Hemoglobin A1C   Date Value Ref Range Status   02/13/2024 5.1 0.0 - 5.6 % Final     Comment:     Normal <5.7%   Prediabetes 5.7-6.4%    Diabetes 6.5% or higher     Note: Adopted from ADA consensus guidelines.   03/02/2021 5.0 0 - 5.6 % Final     Comment:     Normal <5.7% Prediabetes 5.7-6.4%  Diabetes 6.5% or higher - adopted from ADA   consensus guidelines.        TSH   Date Value Ref Range Status   03/02/2021 1.49 0.40 - 4.00 mU/L Final      BP Readings from Last 3 Encounters:   07/24/25 136/80   02/13/25 124/80   07/15/24 138/88          DIETARY HISTORY  Meals Per Day: 3  Eating Protein First?: yes  Food Diary: B:ham and banana, kind bar, jerkey and mini-donuts  "with energy drink L:Factor Meals D:Factor meals and gringo tacos  Snacks Per Day: occ  Typical Snack: almonds, chips  Fluid Intake:  intentional 1-2/wk regular soda  Portion Control:   Calorie Containing Beverages: yes, soda 1-2/wk  Alcohol per week: rare  Typical Protein Food Choices: rare pork mainly chicken fish beef  Choosing Whole Grains: bread WW  Grocery Shopping is done by: shared  Food Preparation is done by: shared  Meals at Restaurant/Cafeteria/Take Out Per Week: 2-3  Eating at the Table:   TV is Off During Meals:     Positive Changes Since Last Visit:   Struggling With:     Knowledgeable in Reading Food Labels:   Getting Adequate Protein: yes  Sleeping 7-8 hours/day 7 hours-improved  Stress management mod to high, new job, anxiety and depression    PHYSICAL ACTIVITY PATTERNS:  Cardiovascular: 3 days weekly gym and 1 mile walk daily  Strength Training: gym 3 days for 45 min strength training    REVIEW OF SYSTEMS    ROS  As above      PHYSICAL EXAM:  Vitals: /80   Ht 1.803 m (5' 11\")   Wt 119.4 kg (263 lb 3.2 oz)   BMI 36.71 kg/m      GEN: Pleasant, well groomed, in no acute distress  EYES: EOMI,  ENT: airway patent  NECK: supple  HEART: Rhythm regular, rate regular, no murmur   LUNGS: Clear  ABDOMEN: BS+  VASCULAR: no  lower extremity edema  MUSCULOSKELETAL:  muscle mass good  SKIN:  no color changes of venous stasis, no ulcerations    Total time spent on the date of this encounter doing: chart review, review of test results, patient visit, physical exam, education, counseling, developing plan of care, and documenting = 30 minutes.          "